# Patient Record
Sex: MALE | Race: WHITE | NOT HISPANIC OR LATINO | Employment: OTHER | URBAN - METROPOLITAN AREA
[De-identification: names, ages, dates, MRNs, and addresses within clinical notes are randomized per-mention and may not be internally consistent; named-entity substitution may affect disease eponyms.]

---

## 2017-01-16 ENCOUNTER — GENERIC CONVERSION - ENCOUNTER (OUTPATIENT)
Dept: OTHER | Facility: OTHER | Age: 82
End: 2017-01-16

## 2017-01-16 ENCOUNTER — APPOINTMENT (OUTPATIENT)
Dept: LAB | Facility: MEDICAL CENTER | Age: 82
End: 2017-01-16
Payer: COMMERCIAL

## 2017-01-16 ENCOUNTER — TRANSCRIBE ORDERS (OUTPATIENT)
Dept: LAB | Facility: MEDICAL CENTER | Age: 82
End: 2017-01-16

## 2017-01-16 DIAGNOSIS — E03.9 HYPOTHYROIDISM: ICD-10-CM

## 2017-01-16 DIAGNOSIS — I25.10 ATHEROSCLEROTIC HEART DISEASE OF NATIVE CORONARY ARTERY WITHOUT ANGINA PECTORIS: ICD-10-CM

## 2017-01-16 DIAGNOSIS — E78.00 PURE HYPERCHOLESTEROLEMIA: ICD-10-CM

## 2017-01-16 DIAGNOSIS — R35.1 NOCTURIA: ICD-10-CM

## 2017-01-16 DIAGNOSIS — R63.4 ABNORMAL WEIGHT LOSS: ICD-10-CM

## 2017-01-16 DIAGNOSIS — I65.29 OCCLUSION AND STENOSIS OF UNSPECIFIED CAROTID ARTERY: ICD-10-CM

## 2017-01-16 DIAGNOSIS — D64.9 ANEMIA: ICD-10-CM

## 2017-01-16 DIAGNOSIS — I10 ESSENTIAL (PRIMARY) HYPERTENSION: ICD-10-CM

## 2017-01-16 LAB
ALBUMIN SERPL BCP-MCNC: 3.6 G/DL (ref 3.5–5)
ALP SERPL-CCNC: 57 U/L (ref 46–116)
ALT SERPL W P-5'-P-CCNC: 31 U/L (ref 12–78)
ANION GAP SERPL CALCULATED.3IONS-SCNC: 4 MMOL/L (ref 4–13)
AST SERPL W P-5'-P-CCNC: 31 U/L (ref 5–45)
BASOPHILS # BLD AUTO: 0.04 THOUSANDS/ΜL (ref 0–0.1)
BASOPHILS NFR BLD AUTO: 1 % (ref 0–1)
BILIRUB SERPL-MCNC: 0.56 MG/DL (ref 0.2–1)
BUN SERPL-MCNC: 27 MG/DL (ref 5–25)
CALCIUM SERPL-MCNC: 8.7 MG/DL (ref 8.3–10.1)
CHLORIDE SERPL-SCNC: 103 MMOL/L (ref 100–108)
CHOLEST SERPL-MCNC: 178 MG/DL (ref 50–200)
CO2 SERPL-SCNC: 32 MMOL/L (ref 21–32)
CREAT SERPL-MCNC: 1.3 MG/DL (ref 0.6–1.3)
EOSINOPHIL # BLD AUTO: 0.29 THOUSAND/ΜL (ref 0–0.61)
EOSINOPHIL NFR BLD AUTO: 4 % (ref 0–6)
ERYTHROCYTE [DISTWIDTH] IN BLOOD BY AUTOMATED COUNT: 13.2 % (ref 11.6–15.1)
GFR SERPL CREATININE-BSD FRML MDRD: 53.1 ML/MIN/1.73SQ M
GLUCOSE SERPL-MCNC: 87 MG/DL (ref 65–140)
HCT VFR BLD AUTO: 36.1 % (ref 36.5–49.3)
HDLC SERPL-MCNC: 53 MG/DL (ref 40–60)
HGB BLD-MCNC: 12.1 G/DL (ref 12–17)
LDLC SERPL CALC-MCNC: 109 MG/DL (ref 0–100)
LYMPHOCYTES # BLD AUTO: 1.86 THOUSANDS/ΜL (ref 0.6–4.47)
LYMPHOCYTES NFR BLD AUTO: 27 % (ref 14–44)
MCH RBC QN AUTO: 30.7 PG (ref 26.8–34.3)
MCHC RBC AUTO-ENTMCNC: 33.5 G/DL (ref 31.4–37.4)
MCV RBC AUTO: 92 FL (ref 82–98)
MONOCYTES # BLD AUTO: 0.57 THOUSAND/ΜL (ref 0.17–1.22)
MONOCYTES NFR BLD AUTO: 8 % (ref 4–12)
NEUTROPHILS # BLD AUTO: 4.2 THOUSANDS/ΜL (ref 1.85–7.62)
NEUTS SEG NFR BLD AUTO: 60 % (ref 43–75)
NRBC BLD AUTO-RTO: 0 /100 WBCS
PLATELET # BLD AUTO: 217 THOUSANDS/UL (ref 149–390)
PMV BLD AUTO: 10.8 FL (ref 8.9–12.7)
POTASSIUM SERPL-SCNC: 4.5 MMOL/L (ref 3.5–5.3)
PROT SERPL-MCNC: 6.9 G/DL (ref 6.4–8.2)
RBC # BLD AUTO: 3.94 MILLION/UL (ref 3.88–5.62)
SODIUM SERPL-SCNC: 139 MMOL/L (ref 136–145)
TRIGL SERPL-MCNC: 78 MG/DL
TSH SERPL DL<=0.05 MIU/L-ACNC: 3.7 UIU/ML (ref 0.36–3.74)
WBC # BLD AUTO: 6.97 THOUSAND/UL (ref 4.31–10.16)

## 2017-01-16 PROCEDURE — 36415 COLL VENOUS BLD VENIPUNCTURE: CPT

## 2017-01-16 PROCEDURE — 80053 COMPREHEN METABOLIC PANEL: CPT

## 2017-01-16 PROCEDURE — 84443 ASSAY THYROID STIM HORMONE: CPT

## 2017-01-16 PROCEDURE — 85025 COMPLETE CBC W/AUTO DIFF WBC: CPT

## 2017-01-16 PROCEDURE — 80061 LIPID PANEL: CPT

## 2017-01-23 ENCOUNTER — ALLSCRIPTS OFFICE VISIT (OUTPATIENT)
Dept: OTHER | Facility: OTHER | Age: 82
End: 2017-01-23

## 2017-01-23 DIAGNOSIS — E03.9 HYPOTHYROIDISM: ICD-10-CM

## 2017-01-23 DIAGNOSIS — N18.9 CHRONIC KIDNEY DISEASE: ICD-10-CM

## 2017-01-23 DIAGNOSIS — I25.10 ATHEROSCLEROTIC HEART DISEASE OF NATIVE CORONARY ARTERY WITHOUT ANGINA PECTORIS: ICD-10-CM

## 2017-01-23 DIAGNOSIS — Z00.00 ENCOUNTER FOR GENERAL ADULT MEDICAL EXAMINATION WITHOUT ABNORMAL FINDINGS: ICD-10-CM

## 2017-01-23 DIAGNOSIS — I65.29 OCCLUSION AND STENOSIS OF UNSPECIFIED CAROTID ARTERY: ICD-10-CM

## 2017-01-23 DIAGNOSIS — E78.00 PURE HYPERCHOLESTEROLEMIA: ICD-10-CM

## 2017-01-23 DIAGNOSIS — F32.9 MAJOR DEPRESSIVE DISORDER, SINGLE EPISODE: ICD-10-CM

## 2017-01-23 DIAGNOSIS — R41.3 OTHER AMNESIA: ICD-10-CM

## 2017-02-22 ENCOUNTER — GENERIC CONVERSION - ENCOUNTER (OUTPATIENT)
Dept: OTHER | Facility: OTHER | Age: 82
End: 2017-02-22

## 2017-05-15 ENCOUNTER — GENERIC CONVERSION - ENCOUNTER (OUTPATIENT)
Dept: OTHER | Facility: OTHER | Age: 82
End: 2017-05-15

## 2017-05-15 ENCOUNTER — APPOINTMENT (OUTPATIENT)
Dept: LAB | Facility: MEDICAL CENTER | Age: 82
End: 2017-05-15
Payer: COMMERCIAL

## 2017-05-15 ENCOUNTER — TRANSCRIBE ORDERS (OUTPATIENT)
Dept: LAB | Facility: MEDICAL CENTER | Age: 82
End: 2017-05-15

## 2017-05-15 DIAGNOSIS — Z00.00 ENCOUNTER FOR GENERAL ADULT MEDICAL EXAMINATION WITHOUT ABNORMAL FINDINGS: ICD-10-CM

## 2017-05-15 DIAGNOSIS — F32.9 MAJOR DEPRESSIVE DISORDER, SINGLE EPISODE: ICD-10-CM

## 2017-05-15 DIAGNOSIS — R41.3 OTHER AMNESIA: ICD-10-CM

## 2017-05-15 DIAGNOSIS — E03.9 HYPOTHYROIDISM: ICD-10-CM

## 2017-05-15 DIAGNOSIS — E78.00 PURE HYPERCHOLESTEROLEMIA: ICD-10-CM

## 2017-05-15 DIAGNOSIS — N18.9 CHRONIC KIDNEY DISEASE: ICD-10-CM

## 2017-05-15 DIAGNOSIS — I65.29 OCCLUSION AND STENOSIS OF UNSPECIFIED CAROTID ARTERY: ICD-10-CM

## 2017-05-15 DIAGNOSIS — I25.10 ATHEROSCLEROTIC HEART DISEASE OF NATIVE CORONARY ARTERY WITHOUT ANGINA PECTORIS: ICD-10-CM

## 2017-05-15 LAB
ALBUMIN SERPL BCP-MCNC: 3.8 G/DL (ref 3.5–5)
ALP SERPL-CCNC: 53 U/L (ref 46–116)
ALT SERPL W P-5'-P-CCNC: 34 U/L (ref 12–78)
ANION GAP SERPL CALCULATED.3IONS-SCNC: 6 MMOL/L (ref 4–13)
AST SERPL W P-5'-P-CCNC: 35 U/L (ref 5–45)
BASOPHILS # BLD AUTO: 0.04 THOUSANDS/ΜL (ref 0–0.1)
BASOPHILS NFR BLD AUTO: 1 % (ref 0–1)
BILIRUB SERPL-MCNC: 0.48 MG/DL (ref 0.2–1)
BUN SERPL-MCNC: 23 MG/DL (ref 5–25)
CALCIUM SERPL-MCNC: 9.5 MG/DL (ref 8.3–10.1)
CHLORIDE SERPL-SCNC: 104 MMOL/L (ref 100–108)
CHOLEST SERPL-MCNC: 203 MG/DL (ref 50–200)
CO2 SERPL-SCNC: 30 MMOL/L (ref 21–32)
CREAT SERPL-MCNC: 1.42 MG/DL (ref 0.6–1.3)
EOSINOPHIL # BLD AUTO: 0.27 THOUSAND/ΜL (ref 0–0.61)
EOSINOPHIL NFR BLD AUTO: 4 % (ref 0–6)
ERYTHROCYTE [DISTWIDTH] IN BLOOD BY AUTOMATED COUNT: 13.8 % (ref 11.6–15.1)
GFR SERPL CREATININE-BSD FRML MDRD: 48 ML/MIN/1.73SQ M
GLUCOSE P FAST SERPL-MCNC: 91 MG/DL (ref 65–99)
HCT VFR BLD AUTO: 38 % (ref 36.5–49.3)
HDLC SERPL-MCNC: 51 MG/DL (ref 40–60)
HGB BLD-MCNC: 12.5 G/DL (ref 12–17)
LDLC SERPL CALC-MCNC: 133 MG/DL (ref 0–100)
LYMPHOCYTES # BLD AUTO: 1.94 THOUSANDS/ΜL (ref 0.6–4.47)
LYMPHOCYTES NFR BLD AUTO: 30 % (ref 14–44)
MCH RBC QN AUTO: 30.6 PG (ref 26.8–34.3)
MCHC RBC AUTO-ENTMCNC: 32.9 G/DL (ref 31.4–37.4)
MCV RBC AUTO: 93 FL (ref 82–98)
MONOCYTES # BLD AUTO: 0.65 THOUSAND/ΜL (ref 0.17–1.22)
MONOCYTES NFR BLD AUTO: 10 % (ref 4–12)
NEUTROPHILS # BLD AUTO: 3.53 THOUSANDS/ΜL (ref 1.85–7.62)
NEUTS SEG NFR BLD AUTO: 55 % (ref 43–75)
NRBC BLD AUTO-RTO: 0 /100 WBCS
PLATELET # BLD AUTO: 196 THOUSANDS/UL (ref 149–390)
PMV BLD AUTO: 11.4 FL (ref 8.9–12.7)
POTASSIUM SERPL-SCNC: 4.8 MMOL/L (ref 3.5–5.3)
PROT SERPL-MCNC: 7.2 G/DL (ref 6.4–8.2)
RBC # BLD AUTO: 4.08 MILLION/UL (ref 3.88–5.62)
SODIUM SERPL-SCNC: 140 MMOL/L (ref 136–145)
TRIGL SERPL-MCNC: 94 MG/DL
TSH SERPL DL<=0.05 MIU/L-ACNC: 5.76 UIU/ML (ref 0.36–3.74)
WBC # BLD AUTO: 6.44 THOUSAND/UL (ref 4.31–10.16)

## 2017-05-15 PROCEDURE — 80061 LIPID PANEL: CPT

## 2017-05-15 PROCEDURE — 36415 COLL VENOUS BLD VENIPUNCTURE: CPT

## 2017-05-15 PROCEDURE — 80053 COMPREHEN METABOLIC PANEL: CPT

## 2017-05-15 PROCEDURE — 84443 ASSAY THYROID STIM HORMONE: CPT

## 2017-05-15 PROCEDURE — 85025 COMPLETE CBC W/AUTO DIFF WBC: CPT

## 2017-05-17 ENCOUNTER — GENERIC CONVERSION - ENCOUNTER (OUTPATIENT)
Dept: OTHER | Facility: OTHER | Age: 82
End: 2017-05-17

## 2017-05-23 ENCOUNTER — ALLSCRIPTS OFFICE VISIT (OUTPATIENT)
Dept: OTHER | Facility: OTHER | Age: 82
End: 2017-05-23

## 2017-05-23 DIAGNOSIS — E03.9 HYPOTHYROIDISM: ICD-10-CM

## 2017-05-23 DIAGNOSIS — N18.9 CHRONIC KIDNEY DISEASE: ICD-10-CM

## 2017-05-23 DIAGNOSIS — R41.3 OTHER AMNESIA: ICD-10-CM

## 2017-05-23 DIAGNOSIS — I25.10 ATHEROSCLEROTIC HEART DISEASE OF NATIVE CORONARY ARTERY WITHOUT ANGINA PECTORIS: ICD-10-CM

## 2017-05-23 DIAGNOSIS — E78.00 PURE HYPERCHOLESTEROLEMIA: ICD-10-CM

## 2017-05-23 DIAGNOSIS — F32.9 MAJOR DEPRESSIVE DISORDER, SINGLE EPISODE: ICD-10-CM

## 2017-07-04 DIAGNOSIS — E03.9 HYPOTHYROIDISM: ICD-10-CM

## 2017-07-10 ENCOUNTER — APPOINTMENT (OUTPATIENT)
Dept: LAB | Facility: MEDICAL CENTER | Age: 82
End: 2017-07-10
Payer: COMMERCIAL

## 2017-07-10 ENCOUNTER — TRANSCRIBE ORDERS (OUTPATIENT)
Dept: LAB | Facility: MEDICAL CENTER | Age: 82
End: 2017-07-10

## 2017-07-10 DIAGNOSIS — E03.9 HYPOTHYROIDISM: ICD-10-CM

## 2017-07-10 LAB — TSH SERPL DL<=0.05 MIU/L-ACNC: 2.61 UIU/ML (ref 0.36–3.74)

## 2017-07-10 PROCEDURE — 36415 COLL VENOUS BLD VENIPUNCTURE: CPT

## 2017-07-10 PROCEDURE — 84443 ASSAY THYROID STIM HORMONE: CPT

## 2017-07-12 ENCOUNTER — GENERIC CONVERSION - ENCOUNTER (OUTPATIENT)
Dept: OTHER | Facility: OTHER | Age: 82
End: 2017-07-12

## 2017-09-14 ENCOUNTER — ALLSCRIPTS OFFICE VISIT (OUTPATIENT)
Dept: OTHER | Facility: OTHER | Age: 82
End: 2017-09-14

## 2017-09-15 ENCOUNTER — GENERIC CONVERSION - ENCOUNTER (OUTPATIENT)
Dept: OTHER | Facility: OTHER | Age: 82
End: 2017-09-15

## 2017-10-03 ENCOUNTER — TRANSCRIBE ORDERS (OUTPATIENT)
Dept: LAB | Facility: MEDICAL CENTER | Age: 82
End: 2017-10-03

## 2017-10-03 ENCOUNTER — APPOINTMENT (OUTPATIENT)
Dept: LAB | Facility: MEDICAL CENTER | Age: 82
End: 2017-10-03
Payer: COMMERCIAL

## 2017-10-03 DIAGNOSIS — F32.9 MAJOR DEPRESSIVE DISORDER, SINGLE EPISODE: ICD-10-CM

## 2017-10-03 DIAGNOSIS — I25.10 ATHEROSCLEROTIC HEART DISEASE OF NATIVE CORONARY ARTERY WITHOUT ANGINA PECTORIS: ICD-10-CM

## 2017-10-03 DIAGNOSIS — E78.00 PURE HYPERCHOLESTEROLEMIA: ICD-10-CM

## 2017-10-03 DIAGNOSIS — E03.9 HYPOTHYROIDISM: ICD-10-CM

## 2017-10-03 DIAGNOSIS — R41.3 OTHER AMNESIA: ICD-10-CM

## 2017-10-03 DIAGNOSIS — N18.9 CHRONIC KIDNEY DISEASE: ICD-10-CM

## 2017-10-03 LAB
ALBUMIN SERPL BCP-MCNC: 3.6 G/DL (ref 3.5–5)
ALP SERPL-CCNC: 54 U/L (ref 46–116)
ALT SERPL W P-5'-P-CCNC: 26 U/L (ref 12–78)
ANION GAP SERPL CALCULATED.3IONS-SCNC: 3 MMOL/L (ref 4–13)
AST SERPL W P-5'-P-CCNC: 31 U/L (ref 5–45)
BASOPHILS # BLD AUTO: 0.06 THOUSANDS/ΜL (ref 0–0.1)
BASOPHILS NFR BLD AUTO: 1 % (ref 0–1)
BILIRUB SERPL-MCNC: 0.5 MG/DL (ref 0.2–1)
BUN SERPL-MCNC: 29 MG/DL (ref 5–25)
CALCIUM SERPL-MCNC: 9 MG/DL (ref 8.3–10.1)
CHLORIDE SERPL-SCNC: 104 MMOL/L (ref 100–108)
CHOLEST SERPL-MCNC: 200 MG/DL (ref 50–200)
CO2 SERPL-SCNC: 31 MMOL/L (ref 21–32)
CREAT SERPL-MCNC: 1.17 MG/DL (ref 0.6–1.3)
EOSINOPHIL # BLD AUTO: 0.25 THOUSAND/ΜL (ref 0–0.61)
EOSINOPHIL NFR BLD AUTO: 4 % (ref 0–6)
ERYTHROCYTE [DISTWIDTH] IN BLOOD BY AUTOMATED COUNT: 13.7 % (ref 11.6–15.1)
GFR SERPL CREATININE-BSD FRML MDRD: 58 ML/MIN/1.73SQ M
GLUCOSE P FAST SERPL-MCNC: 93 MG/DL (ref 65–99)
HCT VFR BLD AUTO: 36.6 % (ref 36.5–49.3)
HDLC SERPL-MCNC: 53 MG/DL (ref 40–60)
HGB BLD-MCNC: 12.3 G/DL (ref 12–17)
LDLC SERPL CALC-MCNC: 132 MG/DL (ref 0–100)
LYMPHOCYTES # BLD AUTO: 1.95 THOUSANDS/ΜL (ref 0.6–4.47)
LYMPHOCYTES NFR BLD AUTO: 29 % (ref 14–44)
MCH RBC QN AUTO: 31 PG (ref 26.8–34.3)
MCHC RBC AUTO-ENTMCNC: 33.6 G/DL (ref 31.4–37.4)
MCV RBC AUTO: 92 FL (ref 82–98)
MONOCYTES # BLD AUTO: 0.59 THOUSAND/ΜL (ref 0.17–1.22)
MONOCYTES NFR BLD AUTO: 9 % (ref 4–12)
NEUTROPHILS # BLD AUTO: 3.9 THOUSANDS/ΜL (ref 1.85–7.62)
NEUTS SEG NFR BLD AUTO: 57 % (ref 43–75)
NRBC BLD AUTO-RTO: 0 /100 WBCS
PLATELET # BLD AUTO: 227 THOUSANDS/UL (ref 149–390)
PMV BLD AUTO: 11.4 FL (ref 8.9–12.7)
POTASSIUM SERPL-SCNC: 4.6 MMOL/L (ref 3.5–5.3)
PROT SERPL-MCNC: 7.3 G/DL (ref 6.4–8.2)
RBC # BLD AUTO: 3.97 MILLION/UL (ref 3.88–5.62)
SODIUM SERPL-SCNC: 138 MMOL/L (ref 136–145)
TRIGL SERPL-MCNC: 76 MG/DL
TSH SERPL DL<=0.05 MIU/L-ACNC: 3.91 UIU/ML (ref 0.36–3.74)
WBC # BLD AUTO: 6.76 THOUSAND/UL (ref 4.31–10.16)

## 2017-10-03 PROCEDURE — 85025 COMPLETE CBC W/AUTO DIFF WBC: CPT

## 2017-10-03 PROCEDURE — 80053 COMPREHEN METABOLIC PANEL: CPT

## 2017-10-03 PROCEDURE — 84443 ASSAY THYROID STIM HORMONE: CPT

## 2017-10-03 PROCEDURE — 36415 COLL VENOUS BLD VENIPUNCTURE: CPT

## 2017-10-03 PROCEDURE — 80061 LIPID PANEL: CPT

## 2017-10-09 ENCOUNTER — GENERIC CONVERSION - ENCOUNTER (OUTPATIENT)
Dept: OTHER | Facility: OTHER | Age: 82
End: 2017-10-09

## 2017-11-17 ENCOUNTER — ALLSCRIPTS OFFICE VISIT (OUTPATIENT)
Dept: OTHER | Facility: OTHER | Age: 82
End: 2017-11-17

## 2017-11-19 NOTE — PROGRESS NOTES
Assessment    1  Hypercholesterolemia (272 0) (E78 00)   2  Hypertension (401 9) (I10)   3  Hypothyroidism (244 9) (E03 9)   4  Bradycardia (427 89) (R00 1)   5  Arteriosclerotic heart disease (414 00) (I25 10)   6  Chronic kidney disease (585 9) (N18 9)    Plan  Anemia, Bradycardia, Depression, Hypercholesterolemia, Hypertension, Hypothyroidism    · (1) TSH; Status:Active; Requested for:17Mar2018; Anemia, Carotid artery stenosis, Chronic kidney disease    · (1) COMPREHENSIVE METABOLIC PANEL; Status:Active; Requested for:17Mar2018; Arteriosclerotic heart disease, Carotid artery stenosis, Chronic kidney disease,Hypercholesterolemia, Hypertension, Hypothyroidism    · (1) LIPID PANEL, FASTING; Status:Active; Requested for:17Mar2018;   Chronic kidney disease, Depression    · (1) CBC/PLT/DIFF; Status:Active; Requested for:17Mar2018;   Hypercholesterolemia    · Atorvastatin Calcium 80 MG Oral Tablet; Take 1 tablet daily    Discussion/Summary    Hypothyroidism- patients last TSH was 3 9 slightly up  Will not adjust dose at this time due to him missing some doses  His wife states when they come home she will call to have his TSH checked in 6 to 8 weeks  Continue dose as previous  LDL was 132 and is on Atorvastatin 80 mg daily  Patient was instructed to increase diet in his diet and is following up with Cardiology  Patient is taking Cymbalta and his wife does not want to change any medications at this time due to them going away  Some of his issues seem to be due to his underlying dementia  Dementia with worsening memory issues- Wife states he is continuing to have memory issues and feels they are getting worse  She was suggesting possible Neurology but does not want a referral or any medication changes at this time  heart rate still at 51  She states Dr Carol Ruiz is aware and is not very concerned at this time   He is going to continue to monitor his heart rate and if patient become symptomatic will order further testing  is up to date on his immunizations and did have the flu vaccine in the fall  Patient was given a script for fasting blood work to have done in 3 months  If any issues or concerns please call the office  Possible side effects of new medications were reviewed with the patient/guardian today  The treatment plan was reviewed with the patient/guardian  The patient/guardian understands and agrees with the treatment plan      Chief Complaint  Follow Up      History of Present Illness  Milan Daly is here today for a follow up visit  He is doing well today but his wife states he is still having issues with his memory  He is forgetting every day things more easily and she states it is getting worse  She does not want to address this now until she is home from being away  He did have blood work done in October and his TSH was up at 3 9  He is taking 75 mcg of Synthroid  His wife states he is missing some doses and feels this is why it is off  She does put his pills out for him but he still forgets to take certain ones  His heart rate is still low at 51 and he does follow up with Dr Mariella Reynolds and his wife states he is aware of his heart rate being low and states this is normal for him  He is eating and drinking without any issues  He is moving his bowels and passing his urine without any issues  His is taking his Cymbalta but she does not want to switch his medication to anything else at this time  He denies any chest pain or SOB  He states he does not want to travel to [place] and would just like to stay home  He offers no other complaints  Review of Systems   Constitutional: No fever or chills, feels well, no tiredness, no recent weight gain or weight loss  Eyes: No complaints of eye pain, no red eyes, no discharge from eyes, no itchy eyes  ENT: no complaints of earache, no hearing loss, no nosebleeds, no nasal discharge, no sore throat, no hoarseness  Cardiovascular: as noted in HPI    Respiratory: No complaints of shortness of breath, no wheezing, no cough, no SOB on exertion, no orthopnea or PND  Gastrointestinal: No complaints of abdominal pain, no constipation, no nausea or vomiting, no diarrhea or bloody stools  Genitourinary: No complaints of dysuria, no incontinence, no hesitancy, no nocturia, no genital lesion, no testicular pain  Musculoskeletal: No complaints of arthralgia, no myalgias, no joint swelling or stiffness, no limb pain or swelling  Integumentary: No complaints of skin rash or skin lesions, no itching, no skin wound, no dry skin  Neurological: as noted in HPI  Psychiatric: Is not suicidal, no sleep disturbances, no anxiety or depression, no change in personality, no emotional problems  Endocrine: as noted in HPI  Hematologic/Lymphatic: No complaints of swollen glands, no swollen glands in the neck, does not bleed easily, no easy bruising  ROS reviewed  Active Problems  1  Anemia (285 9) (D64 9)   2  Arteriosclerotic heart disease (414 00) (I25 10)   3  Arthralgia (719 40) (M25 50)   4  Blocked ear, bilateral (388 8) (H93 8X3)   5  Bradycardia (427 89) (R00 1)   6  Carotid artery stenosis (433 10) (I65 29)   7  Chronic kidney disease (585 9) (N18 9)   8  Depression (311) (F32 9)   9  Dermatitis, eczematoid (692 9) (L30 9)   10  Facial trauma (959 09) (S09 93XA)   11  Hypercholesterolemia (272 0) (E78 00)   12  Hypertension (401 9) (I10)   13  Hypothyroidism (244 9) (E03 9)   14  Influenza vaccine needed (V04 81) (Z23)   15  Memory difficulties (780 93) (R41 3)   16  Need for prophylactic vaccination and inoculation against influenza (V04 81) (Z23)   17  Nocturia (788 43) (R35 1)   18  Peptic ulcer (533 90) (K27 9)   19  Screening for depression (V79 0) (Z13 89)   20  Screening for genitourinary condition (V81 6) (Z13 89)   21  Weight loss (783 21) (R63 4)    Past Medical History  1  History of Dry Eyes (375 15)   2  History of abnormal weight loss (V13 89) (Z87 898)   3  History of acute bronchitis (V12 69) (Z87 09)   4  History of gastric ulcer (V12 79) (Z87 19)   5  History of peptic ulcer (V12 71) (Z87 11)   6  History of Inguinal hernia, left (550 90) (K40 90)   7  History of Left shoulder pain (719 41) (M25 512)    The active problems and past medical history were reviewed and updated today  Surgical History  1  History of Aortic Valve Replacement   2  History of CABG   3  History of Carotid Thromboendarterectomy   4  History of Cataract Surgery   5  History of Inguinal Hernia Repair   6  History of Nephrectomy Right    The surgical history was reviewed and updated today  Family History  Mother    1  Family history of Unexpected Sudden Patient Death  Father    2  Family history of [employer] Workers' Pneumoconiosis  Brother    3  Family history of Alzheimer Disease    The family history was reviewed and updated today  Social History     · Being A Social Drinker   · Marital History - Currently    · Never A Smoker  The social history was reviewed and is unchanged  Current Meds   1  Aspirin 81 MG TABS Recorded   2  Atorvastatin Calcium 80 MG Oral Tablet; Take 1 tablet daily; Therapy: 56UWZ9061 to (Nubia Decree)  Requested for: 40HGF5123; Last Rx:07Nov2016 Ordered   3  Co Q 10 100 MG Oral Capsule Recorded   4  DULoxetine HCl - 30 MG Oral Capsule Delayed Release Particles; Take 1 capsule twice daily; Therapy: 89KDR3389 to ([phone])  Requested for: 78TSK4708; Last Rx:11Oct2017 Ordered   5  Eye Support TABS Recorded   6  HM Vitamin D3 2000 UNIT Oral Capsule; take one capsule by mouth daily; Therapy: (Socorro Gonzalez) to Recorded   7  Levothyroxine Sodium 75 MCG Oral Tablet; TAKE 1 TABLET DAILY; Therapy: 18QLH8025 to (Nahun Zafar)  Requested for: 04CHL2409; Last Rx:70Vkv0085 Ordered   8  Mometasone Furoate 0 1 % External Ointment; APPLY TO AFFECTED AREA(S) of hands ONCE DAILY AS DIRECTED;  Therapy: 91WUS9095 to (Last Rx:09Oux3408) Requested for: 21Txx0349 Ordered   9  Multivitamins TABS Recorded   10  Omega 3 1200 MG Oral Capsule; Therapy: (Recorded:05Apr2013) to Recorded   11  Pantoprazole Sodium 40 MG Oral Tablet Delayed Release; Take 1 tablet daily; Therapy: 00BNJ0392 to (Stu Nelson)  Requested for: 99BXC4799; Last  Rx:07Nov2016 Ordered   12  SM Super B Complex/C Oral Tablet; Therapy: (Recorded:05Apr2013) to Recorded   13  Tamsulosin HCl - 0 4 MG Oral Capsule; take 1 capsule daily; Therapy: 26RNJ1115 to (Denis Cadena)  Requested for: 74Oiu9566; Last  Rx:59Qvi8259 Ordered    The medication list was reviewed and updated today  Allergies  1  Morphine Sulfate TABS  2  IVP Dye    Vitals  Vital Signs    Recorded: 82IAG7999 02:57PM   Temperature 98 6 F, Temporal   Heart Rate 51   Respiration 16   Systolic 481, LUE, Sitting   Diastolic 60, LUE, Sitting   Height 5 ft 10 in   Weight 160 lb    BMI Calculated 22 96   BSA Calculated 1 9   O2 Saturation 98       Physical Exam   Constitutional  General appearance: No acute distress, well appearing and well nourished  Eyes  Conjunctiva and lids: No swelling, erythema, or discharge  Pupils and irises: Equal, round and reactive to light  Ears, Nose, Mouth, and Throat  External inspection of ears and nose: Normal    Otoscopic examination: Tympanic membrance translucent with normal light reflex  Canals patent without erythema  Nasal mucosa, septum, and turbinates: Normal without edema or erythema  Oropharynx: Normal with no erythema, edema, exudate or lesions  Pulmonary  Respiratory effort: No increased work of breathing or signs of respiratory distress  Auscultation of lungs: Clear to auscultation, equal breath sounds bilaterally, no wheezes, no rales, no rhonci  Cardiovascular  Palpation of heart: Normal PMI, no thrills  Auscultation of heart: Normal rate and rhythm, normal S1 and S2, without murmurs     Examination of extremities for edema and/or varicosities: Normal    Carotid pulses: Normal    Abdomen  Abdomen: Non-tender, no masses  Lymphatic  Palpation of lymph nodes in neck: No lymphadenopathy  Musculoskeletal  Gait and station: Normal    Digits and nails: Normal without clubbing or cyanosis  Inspection/palpation of joints, bones, and muscles: Normal    Skin  Skin and subcutaneous tissue: Normal without rashes or lesions  Psychiatric  Orientation to person, place and time: Normal    Mood and affect: Normal          Health Management  Health Maintenance   Medicare Annual Wellness Visit; every 1 year; Last 70SNZ4349; Next Due: 98PYX5236;  Overdue    Signatures   Electronically signed by : Shmuel Navarro, ; Nov 17 2017  4:05PM EST                       (Author)    Electronically signed by : FANI Bajwa ; Nov 17 2017  8:14PM EST                       (Co-author)

## 2018-01-10 NOTE — RESULT NOTES
Verified Results  (1) CBC/PLT/DIFF [id number] 10:04AM [facility] Order Number: [id number]     Test Name Result Flag Reference   WBC COUNT 6 97 Thousand/uL  4 31-10 16   RBC COUNT 3 94 Million/uL  3 88-5 62   HEMOGLOBIN 12 1 g/dL  12 0-17 0   HEMATOCRIT 36 1 % L 36 5-49 3   MCV 92 fL  82-98   MCH 30 7 pg  26 8-34 3   MCHC 33 5 g/dL  31 4-37 4   RDW 13 2 %  11 6-15 1   MPV 10 8 fL  8 9-12 7   PLATELET COUNT 108 Thousands/uL  149-390   nRBC AUTOMATED 0 /100 WBCs     NEUTROPHILS RELATIVE PERCENT 60 %  43-75   LYMPHOCYTES RELATIVE PERCENT 27 %  14-44   MONOCYTES RELATIVE PERCENT 8 %  4-12   EOSINOPHILS RELATIVE PERCENT 4 %  0-6   BASOPHILS RELATIVE PERCENT 1 %  0-1   NEUTROPHILS ABSOLUTE COUNT 4 20 Thousands/?L  1 85-7 62   LYMPHOCYTES ABSOLUTE COUNT 1 86 Thousands/?L  0 60-4 47   MONOCYTES ABSOLUTE COUNT 0 57 Thousand/?L  0 17-1 22   EOSINOPHILS ABSOLUTE COUNT 0 29 Thousand/?L  0 00-0 61   BASOPHILS ABSOLUTE COUNT 0 04 Thousands/?L  0 00-0 10   - Patient Instructions: This bloodwork is non-fasting  Please drink two glasses of water morning of bloodwork  - Patient Instructions: This bloodwork is non-fasting  Please drink two glasses of water morning of bloodwork  (1) COMPREHENSIVE METABOLIC PANEL 05TZN3331 05:20YP [facility] Order Number: [id number]     Test Name Result Flag Reference   GLUCOSE,RANDM 87 mg/dL     If the patient is fasting, the ADA then defines impaired fasting glucose as > 100 mg/dL and diabetes as > or equal to 123 mg/dL     SODIUM 139 mmol/L  136-145   POTASSIUM 4 5 mmol/L  3 5-5 3   CHLORIDE 103 mmol/L  100-108   CARBON DIOXIDE 32 mmol/L  21-32   ANION GAP (CALC) 4 mmol/L  4-13   BLOOD UREA NITROGEN 27 mg/dL H 5-25   CREATININE 1 30 mg/dL  0 60-1 30   Standardized to IDMS reference method   CALCIUM 8 7 mg/dL  8 3-10 1   BILI, TOTAL 0 56 mg/dL  0 20-1 00   ALK PHOSPHATAS 57 U/L     ALT (SGPT) 31 U/L  12-78   AST(SGOT) 31 U/L  5-45 ALBUMIN 3 6 g/dL  3 5-5 0   TOTAL PROTEIN 6 9 g/dL  6 4-8 2   eGFR Non-African American 53 1 ml/min/1 73sq m     - Patient Instructions: This is a fasting blood test  Water,black tea or black  coffee only after 9:00pm the night before test Drink 2 glasses of water the morning of test - Patient Instructions: This bloodwork is non-fasting  Please drink two glasses of   water morning of bloodwork  National Kidney Disease Education Program recommendations are as follows:  GFR calculation is accurate only with a steady state creatinine  Chronic Kidney disease less than 60 ml/min/1 73 sq  meters  Kidney failure less than 15 ml/min/1 73 sq  meters  (1) LIPID PANEL, FASTING [id number] 10:04AM [facility] Order Number: [id number]     Test Name Result Flag Reference   CHOLESTEROL 178 mg/dL     HDL,DIRECT 53 mg/dL  40-60   Specimen collection should occur prior to Metamizole administration due to the potential for falsely depressed results  LDL CHOLESTEROL CALCULATED 109 mg/dL H 0-100   - Patient Instructions: This is a fasting blood test  Water,black tea or black  coffee only after 9:00pm the night before test   Drink 2 glasses of water the morning of test     - Patient Instructions: This is a fasting blood test  Water,black tea or black  coffee only after 9:00pm the night before test Drink 2 glasses of water the morning of test - Patient Instructions: This bloodwork is non-fasting  Please drink two glasses of   water morning of bloodwork  Triglyceride:         Normal              <150 mg/dl       Borderline High    150-199 mg/dl       High               200-499 mg/dl       Very High          >499 mg/dl  Cholesterol:         Desirable        <200 mg/dl      Borderline High  200-239 mg/dl      High             >239 mg/dl  HDL Cholesterol:        High    >59 mg/dL      Low     <41 mg/dL  LDL CALCULATED:    This screening LDL is a calculated result    It does not have the accuracy of the Direct Measured LDL in the monitoring of patients with hyperlipidemia and/or statin therapy  Direct Measure LDL (CJW714) must be ordered separately in these patients  TRIGLYCERIDES 78 mg/dL  <=150   Specimen collection should occur prior to N-Acetylcysteine or Metamizole administration due to the potential for falsely depressed results  (1) TSH [id number] 10:04AM [facility] Order Number: [id number]     Test Name Result Flag Reference   TSH 3 700 uIU/mL  0 358-3 740   - Patient Instructions: This bloodwork is non-fasting  Please drink two glasses of water morning of bloodwork  - Patient Instructions: This is a fasting blood test  Water,black tea or black  coffee only after 9:00pm the night before test Drink 2 glasses of water the morning of test - Patient Instructions: This bloodwork is non-fasting  Please drink two glasses of   water morning of bloodwork  Patients undergoing fluorescein dye angiography may retain small amounts of fluorescein in the body for 48-72 hours post procedure  Samples containing fluorescein can produce falsely depressed TSH values  If the patient had this procedure,a specimen should be resubmitted post fluorescein clearance

## 2018-01-10 NOTE — RESULT NOTES
Verified Results  (1) CBC/PLT/DIFF [id number] 09:34AM [facility] Order Number: [id number]     Test Name Result Flag Reference   WBC COUNT 6 44 Thousand/uL  4 31-10 16   RBC COUNT 4 08 Million/uL  3 88-5 62   HEMOGLOBIN 12 5 g/dL  12 0-17 0   HEMATOCRIT 38 0 %  36 5-49 3   MCV 93 fL  82-98   MCH 30 6 pg  26 8-34 3   MCHC 32 9 g/dL  31 4-37 4   RDW 13 8 %  11 6-15 1   MPV 11 4 fL  8 9-12 7   PLATELET COUNT 097 Thousands/uL  149-390   nRBC AUTOMATED 0 /100 WBCs     NEUTROPHILS RELATIVE PERCENT 55 %  43-75   LYMPHOCYTES RELATIVE PERCENT 30 %  14-44   MONOCYTES RELATIVE PERCENT 10 %  4-12   EOSINOPHILS RELATIVE PERCENT 4 %  0-6   BASOPHILS RELATIVE PERCENT 1 %  0-1   NEUTROPHILS ABSOLUTE COUNT 3 53 Thousands/? ??L  1 85-7 62   LYMPHOCYTES ABSOLUTE COUNT 1 94 Thousands/? ??L  0 60-4 47   MONOCYTES ABSOLUTE COUNT 0 65 Thousand/? ??L  0 17-1 22   EOSINOPHILS ABSOLUTE COUNT 0 27 Thousand/? ??L  0 00-0 61   BASOPHILS ABSOLUTE COUNT 0 04 Thousands/? ??L  0 00-0 10   - Patient Instructions: This bloodwork is non-fasting  Please drink two glasses of water morning of bloodwork       (1) COMPREHENSIVE METABOLIC PANEL [id number] 10:84EH [facility] Order Number: [id number]     Test Name Result Flag Reference   SODIUM 140 mmol/L  136-145   POTASSIUM 4 8 mmol/L  3 5-5 3   CHLORIDE 104 mmol/L  100-108   CARBON DIOXIDE 30 mmol/L  21-32   ANION GAP (CALC) 6 mmol/L  4-13   BLOOD UREA NITROGEN 23 mg/dL  5-25   CREATININE 1 42 mg/dL H 0 60-1 30   Standardized to IDMS reference method   CALCIUM 9 5 mg/dL  8 3-10 1   BILI, TOTAL 0 48 mg/dL  0 20-1 00   ALK PHOSPHATAS 53 U/L     ALT (SGPT) 34 U/L  12-78   AST(SGOT) 35 U/L  5-45   ALBUMIN 3 8 g/dL  3 5-5 0   TOTAL PROTEIN 7 2 g/dL  6 4-8 2   eGFR Non-African American 48 0 ml/min/1 73sq m     National Kidney Disease Education Program recommendations are as follows:  GFR calculation is accurate only with a steady state creatinine  Chronic Kidney disease less than 60 ml/min/1 73 sq  meters  Kidney failure less than 15 ml/min/1 73 sq  meters  GLUCOSE FASTING 91 mg/dL  65-99     (1) LIPID PANEL, FASTING [id number] 09:34AM [facility] Order Number: [id number]     Test Name Result Flag Reference   CHOLESTEROL 203 mg/dL H    HDL,DIRECT 51 mg/dL  40-60   Specimen collection should occur prior to Metamizole administration due to the potential for falsely depressed results  LDL CHOLESTEROL CALCULATED 133 mg/dL H 0-100   - Patient Instructions: This is a fasting blood test  Water,black tea or black  coffee only after 9:00pm the night before test   Drink 2 glasses of water the morning of test       Triglyceride:         Normal              <150 mg/dl       Borderline High    150-199 mg/dl       High               200-499 mg/dl       Very High          >499 mg/dl  Cholesterol:         Desirable        <200 mg/dl      Borderline High  200-239 mg/dl      High             >239 mg/dl  HDL Cholesterol:        High    >59 mg/dL      Low     <41 mg/dL  LDL CALCULATED:    This screening LDL is a calculated result  It does not have the accuracy of the Direct Measured LDL in the monitoring of patients with hyperlipidemia and/or statin therapy  Direct Measure LDL ([id number]) must be ordered separately in these patients  TRIGLYCERIDES 94 mg/dL  <=150   Specimen collection should occur prior to N-Acetylcysteine or Metamizole administration due to the potential for falsely depressed results  (1) TSH [id number] 09:34AM [facility] Fish Order Number: [id number]     Test Name Result Flag Reference   TSH 5 760 uIU/mL H 0 358-3 740   - Patient Instructions: This bloodwork is non-fasting  Please drink two glasses of water morning of bloodwork  Patients undergoing fluorescein dye angiography may retain small amounts of fluorescein in the body for 48-72 hours post procedure   Samples containing fluorescein can produce falsely depressed TSH values  If the patient had this procedure,a specimen should be resubmitted post fluorescein clearance

## 2018-01-12 NOTE — RESULT NOTES
Verified Results  (1) CBC/PLT/DIFF [id number] 09:53AM [facility] Order Number: [id number]     Test Name Result Flag Reference   WBC COUNT 7 75 Thousand/uL  4 31-10 16   RBC COUNT 4 01 Million/uL  3 88-5 62   HEMOGLOBIN 12 6 g/dL  12 0-17 0   HEMATOCRIT 37 1 %  36 5-49 3   MCV 93 fL  82-98   MCH 31 4 pg  26 8-34 3   MCHC 34 0 g/dL  31 4-37 4   RDW 14 0 %  11 6-15 1   MPV 11 8 fL  8 9-12 7   PLATELET COUNT 174 Thousands/uL  149-390   nRBC AUTOMATED 0 /100 WBCs     NEUTROPHILS RELATIVE PERCENT 59 %  43-75   LYMPHOCYTES RELATIVE PERCENT 26 %  14-44   MONOCYTES RELATIVE PERCENT 10 %  4-12   EOSINOPHILS RELATIVE PERCENT 4 %  0-6   BASOPHILS RELATIVE PERCENT 1 %  0-1   NEUTROPHILS ABSOLUTE COUNT 4 59 Thousands/?L  1 85-7 62   LYMPHOCYTES ABSOLUTE COUNT 2 02 Thousands/?L  0 60-4 47   MONOCYTES ABSOLUTE COUNT 0 79 Thousand/?L  0 17-1 22   EOSINOPHILS ABSOLUTE COUNT 0 30 Thousand/?L  0 00-0 61   BASOPHILS ABSOLUTE COUNT 0 04 Thousands/?L  0 00-0 10     (1) COMPREHENSIVE METABOLIC PANEL [id number] 37:93IN [facility] Order Number: [id number]     Test Name Result Flag Reference   GLUCOSE,RANDM 86 mg/dL     If the patient is fasting, the ADA then defines impaired fasting glucose as > 100 mg/dL and diabetes as > or equal to 123 mg/dL     SODIUM 139 mmol/L  136-145   POTASSIUM 4 8 mmol/L  3 5-5 3   CHLORIDE 104 mmol/L  100-108   CARBON DIOXIDE 30 mmol/L  21-32   ANION GAP (CALC) 5 mmol/L  4-13   BLOOD UREA NITROGEN 29 mg/dL H 5-25   CREATININE 1 43 mg/dL H 0 60-1 30   Standardized to IDMS reference method   CALCIUM 8 8 mg/dL  8 3-10 1   BILI, TOTAL 0 50 mg/dL  0 20-1 00   ALK PHOSPHATAS 54 U/L     ALT (SGPT) 32 U/L  12-78   AST(SGOT) 40 U/L  5-45   ALBUMIN 3 7 g/dL  3 5-5 0   TOTAL PROTEIN 6 6 g/dL  6 4-8 2   eGFR Non-African American 47 7 ml/min/1 73sq m     [employer] Disease Education Program recommendations are as follows:  GFR calculation is accurate only with a steady state creatinine  Chronic Kidney disease less than 60 ml/min/1 73 sq  meters  Kidney failure less than 15 ml/min/1 73 sq  meters  (1) LIPID PANEL, FASTING 02Sep2016 09:53AM [facility]ans Order Number: [id number]     Test Name Result Flag Reference   CHOLESTEROL 179 mg/dL     HDL,DIRECT 43 mg/dL  40-60   Specimen collection should occur prior to Metamizole administration due to the potential for falsely depressed results  LDL CHOLESTEROL CALCULATED 122 mg/dL H 0-100   Triglyceride:         Normal              <150 mg/dl       Borderline High    150-199 mg/dl       High               200-499 mg/dl       Very High          >499 mg/dl  Cholesterol:         Desirable        <200 mg/dl      Borderline High  200-239 mg/dl      High             >239 mg/dl  HDL Cholesterol:        High    >59 mg/dL      Low     <41 mg/dL  LDL CALCULATED:    This screening LDL is a calculated result  It does not have the accuracy of the Direct Measured LDL in the monitoring of patients with hyperlipidemia and/or statin therapy  Direct Measure LDL ([id number]) must be ordered separately in these patients  TRIGLYCERIDES 68 mg/dL  <=150   Specimen collection should occur prior to N-Acetylcysteine or Metamizole administration due to the potential for falsely depressed results  (1) TSH 02Sep2016 09:53AM [facility] Terans Order Number: [id number]     Test Name Result Flag Reference   TSH 3 450 uIU/mL  0 358-3 740   Patients undergoing fluorescein dye angiography may retain small amounts of fluorescein in the body for 48-72 hours post procedure  Samples containing fluorescein can produce falsely depressed TSH values  If the patient had this procedure,a specimen should be resubmitted post fluorescein clearance

## 2018-01-13 VITALS
BODY MASS INDEX: 22.62 KG/M2 | DIASTOLIC BLOOD PRESSURE: 70 MMHG | TEMPERATURE: 96.8 F | SYSTOLIC BLOOD PRESSURE: 124 MMHG | HEIGHT: 70 IN | WEIGHT: 158 LBS

## 2018-01-13 VITALS
SYSTOLIC BLOOD PRESSURE: 124 MMHG | DIASTOLIC BLOOD PRESSURE: 60 MMHG | RESPIRATION RATE: 16 BRPM | HEIGHT: 70 IN | OXYGEN SATURATION: 98 % | BODY MASS INDEX: 22.9 KG/M2 | WEIGHT: 160 LBS | TEMPERATURE: 98.6 F | HEART RATE: 51 BPM

## 2018-01-14 VITALS
HEIGHT: 70 IN | BODY MASS INDEX: 22.62 KG/M2 | SYSTOLIC BLOOD PRESSURE: 110 MMHG | RESPIRATION RATE: 16 BRPM | WEIGHT: 158 LBS | TEMPERATURE: 96.2 F | DIASTOLIC BLOOD PRESSURE: 62 MMHG | OXYGEN SATURATION: 94 % | HEART RATE: 47 BPM

## 2018-01-14 NOTE — RESULT NOTES
Verified Results  (1) CBC/PLT/DIFF [id number] 10:08AM [facility] Order Number: [id number]     Test Name Result Flag Reference   WBC COUNT 6 76 Thousand/uL  4 31-10 16   RBC COUNT 3 97 Million/uL  3 88-5 62   HEMOGLOBIN 12 3 g/dL  12 0-17 0   HEMATOCRIT 36 6 %  36 5-49 3   MCV 92 fL  82-98   MCH 31 0 pg  26 8-34 3   MCHC 33 6 g/dL  31 4-37 4   RDW 13 7 %  11 6-15 1   MPV 11 4 fL  8 9-12 7   PLATELET COUNT 303 Thousands/uL  149-390   nRBC AUTOMATED 0 /100 WBCs     NEUTROPHILS RELATIVE PERCENT 57 %  43-75   LYMPHOCYTES RELATIVE PERCENT 29 %  14-44   MONOCYTES RELATIVE PERCENT 9 %  4-12   EOSINOPHILS RELATIVE PERCENT 4 %  0-6   BASOPHILS RELATIVE PERCENT 1 %  0-1   NEUTROPHILS ABSOLUTE COUNT 3 90 Thousands/? ??L  1 85-7 62   LYMPHOCYTES ABSOLUTE COUNT 1 95 Thousands/? ??L  0 60-4 47   MONOCYTES ABSOLUTE COUNT 0 59 Thousand/? ??L  0 17-1 22   EOSINOPHILS ABSOLUTE COUNT 0 25 Thousand/? ??L  0 00-0 61   BASOPHILS ABSOLUTE COUNT 0 06 Thousands/? ??L  0 00-0 10     (1) COMPREHENSIVE METABOLIC PANEL [id number] 44:76TW [facility] Order Number: [id number]     Test Name Result Flag Reference   SODIUM 138 mmol/L  136-145   POTASSIUM 4 6 mmol/L  3 5-5 3   CHLORIDE 104 mmol/L  100-108   CARBON DIOXIDE 31 mmol/L  21-32   ANION GAP (CALC) 3 mmol/L L 4-13   BLOOD UREA NITROGEN 29 mg/dL H 5-25   CREATININE 1 17 mg/dL  0 60-1 30   Standardized to IDMS reference method   CALCIUM 9 0 mg/dL  8 3-10 1   BILI, TOTAL 0 50 mg/dL  0 20-1 00   ALK PHOSPHATAS 54 U/L     ALT (SGPT) 26 U/L  12-78   Specimen collection should occur prior to Sulfasalazine and/or Sulfapyridine administration due to the potential for falsely depressed results  AST(SGOT) 31 U/L  5-45   Specimen collection should occur prior to Sulfasalazine administration due to the potential for falsely depressed results     ALBUMIN 3 6 g/dL  3 5-5 0   TOTAL PROTEIN 7 3 g/dL  6 4-8 2   eGFR 58 ml/min/1 73sq m     National Kidney Disease Education Program recommendations are as follows:  GFR calculation is accurate only with a steady state creatinine  Chronic Kidney disease less than 60 ml/min/1 73 sq  meters  Kidney failure less than 15 ml/min/1 73 sq  meters  GLUCOSE FASTING 93 mg/dL  65-99   Specimen collection should occur prior to Sulfasalazine administration due to the potential for falsely depressed results  Specimen collection should occur prior to Sulfapyridine administration due to the potential for falsely elevated results  (1) LIPID PANEL, FASTING 30XPQ9213 10:08AM [facility] Order Number: [id number]     Test Name Result Flag Reference   CHOLESTEROL 200 mg/dL     HDL,DIRECT 53 mg/dL  40-60   Specimen collection should occur prior to Metamizole administration due to the potential for falsley depressed results  LDL CHOLESTEROL CALCULATED 132 mg/dL H 0-100   Triglyceride:        Normal <150 mg/dl   Borderline High 150-199 mg/dl   High 200-499 mg/dl   Very High >499 mg/dl      Cholesterol:       Desirable <200 mg/dl    Borderline High 200-239 mg/dl    High >239 mg/dl      HDL Cholesterol:       High>59 mg/dL    Low <41 mg/dL      This screening LDL is a calculated result  It does not have the accuracy of the Direct Measured LDL in the monitoring of patients with hyperlipidemia and/or statin therapy  Direct Measure LDL (VFY241) must be ordered separately in these patients  TRIGLYCERIDES 76 mg/dL  <=150   Specimen collection should occur prior to N-Acetylcysteine or Metamizole administration due to the potential for falsely depressed results  (1) TSH 03Oct2017 10:08AM [facility] Order Number: [id number]     Test Name Result Flag Reference   TSH 3 910 uIU/mL H 0 358-3 740   Patients undergoing fluorescein dye angiography may retain small amounts of fluorescein in the body for 48-72 hours post procedure  Samples containing fluorescein can produce falsely depressed TSH values  If the patient had this procedure,a specimen should be resubmitted post fluorescein clearance

## 2018-01-16 NOTE — RESULT NOTES
Verified Results  (1) TSH [id number] 11:03AM [facility] Order Number: [id number]     Test Name Result Flag Reference   TSH 2 610 uIU/mL  0 358-3 740   Patients undergoing fluorescein dye angiography may retain small amounts of fluorescein in the body for 48-72 hours post procedure  Samples containing fluorescein can produce falsely depressed TSH values  If the patient had this procedure,a specimen should be resubmitted post fluorescein clearance

## 2018-01-16 NOTE — PROGRESS NOTES
Chief Complaint  CC patient here today for Flu shot  Active Problems    1  Anemia (285 9) (D64 9)   2  Arteriosclerotic heart disease (414 00) (I25 10)   3  Arthralgia (719 40) (M25 50)   4  Blocked ear, bilateral (388 8) (H93 8X3)   5  Bradycardia (427 89) (R00 1)   6  Carotid artery stenosis (433 10) (I65 29)   7  Chronic kidney disease (585 9) (N18 9)   8  Depression (311) (F32 9)   9  Dermatitis, eczematoid (692 9) (L30 9)   10  Facial trauma (959 09) (S09 93XA)   11  Hypercholesterolemia (272 0) (E78 00)   12  Hypertension (401 9) (I10)   13  Hypothyroidism (244 9) (E03 9)   14  Memory difficulties (780 93) (R41 3)   15  Need for prophylactic vaccination and inoculation against influenza (V04 81) (Z23)   16  Nocturia (788 43) (R35 1)   17  Peptic ulcer (533 90) (K27 9)   18  Screening for depression (V79 0) (Z13 89)   19  Screening for genitourinary condition (V81 6) (Z13 89)   20  Weight loss (783 21) (R63 4)    Current Meds   1  Aspirin 81 MG TABS Recorded   2  Atorvastatin Calcium 80 MG Oral Tablet; Take 1 tablet daily; Therapy: 15QMA1947 to (Bev Mallory)  Requested for: 98RJI0529; Last   Rx:[id number] Ordered   3  Co Q 10 100 MG Oral Capsule Recorded   4  DULoxetine HCl - 30 MG Oral Capsule Delayed Release Particles; Take 1 capsule twice   daily; Therapy: 17ZEI6526 to (Evaluate:55Lpq7943)  Requested for: 67Xrb8171; Last   Rx:[id number] Ordered   5  Eye Support TABS Recorded   6  HM Vitamin D3 2000 UNIT Oral Capsule; take one capsule by mouth daily; Therapy: (Bienvenido Edward) to Recorded   7  Levothyroxine Sodium 75 MCG Oral Tablet; TAKE 1 TABLET DAILY; Therapy: 46HWQ6423 to (Thelma Post)  Requested for: 67BDY2778; Last   Rx:[id number] Ordered   8  Mometasone Furoate 0 1 % External Ointment; APPLY TO AFFECTED AREA(S) of hands   ONCE DAILY AS DIRECTED; Therapy: 16JAH1092 to (Last Rx:51Mry7333)  Requested for: 18Ijf9023 Ordered   9  Multivitamins TABS Recorded   10   Omega 3 1200 MG Oral Capsule; Therapy: (Recorded:05Apr2013) to Recorded   11  Pantoprazole Sodium 40 MG Oral Tablet Delayed Release; Take 1 tablet daily; Therapy: 47DTI9475 to (Evelyne Mendoza)  Requested for: 48GYF8111; Last    Rx:[id number] Ordered   12  SM Super B Complex/C Oral Tablet; Therapy: (Recorded:05Apr2013) to Recorded   13  Tamsulosin HCl - 0 4 MG Oral Capsule; take 1 capsule daily; Therapy: 64KQM0436 to (Otf Hutchinson)  Requested for: 81UCS1037; Last    Rx:50Qfk3598 Ordered    Allergies    1  Morphine Sulfate TABS    2   IVP Dye    Plan  Influenza vaccine needed    · Fluzone High-Dose 0 5 ML Intramuscular Suspension Prefilled Syringe    Signatures   Electronically signed by : FANI Segovia ; Sep 15 2017  8:53AM EST                       (Author)

## 2018-03-17 DIAGNOSIS — E78.00 PURE HYPERCHOLESTEROLEMIA: ICD-10-CM

## 2018-03-17 DIAGNOSIS — E03.9 HYPOTHYROIDISM: ICD-10-CM

## 2018-03-17 DIAGNOSIS — I65.29 OCCLUSION AND STENOSIS OF UNSPECIFIED CAROTID ARTERY: ICD-10-CM

## 2018-03-17 DIAGNOSIS — F32.9 MAJOR DEPRESSIVE DISORDER, SINGLE EPISODE: ICD-10-CM

## 2018-03-17 DIAGNOSIS — I25.10 ATHEROSCLEROTIC HEART DISEASE OF NATIVE CORONARY ARTERY WITHOUT ANGINA PECTORIS: ICD-10-CM

## 2018-03-17 DIAGNOSIS — N18.9 CHRONIC KIDNEY DISEASE: ICD-10-CM

## 2018-03-17 DIAGNOSIS — R00.1 BRADYCARDIA: ICD-10-CM

## 2018-03-17 DIAGNOSIS — I10 ESSENTIAL (PRIMARY) HYPERTENSION: ICD-10-CM

## 2018-03-17 DIAGNOSIS — D64.9 ANEMIA: ICD-10-CM

## 2018-04-24 DIAGNOSIS — E02 SUBCLINICAL IODINE-DEFICIENCY HYPOTHYROIDISM: Primary | ICD-10-CM

## 2018-04-24 RX ORDER — LEVOTHYROXINE SODIUM 0.07 MG/1
1 TABLET ORAL DAILY
COMMUNITY
Start: 2012-01-03 | End: 2018-04-24 | Stop reason: SDUPTHER

## 2018-04-24 RX ORDER — LEVOTHYROXINE SODIUM 0.07 MG/1
75 TABLET ORAL DAILY
Qty: 90 TABLET | Refills: 3 | Status: SHIPPED | OUTPATIENT
Start: 2018-04-24 | End: 2019-08-09 | Stop reason: SDUPTHER

## 2018-05-01 ENCOUNTER — TRANSCRIBE ORDERS (OUTPATIENT)
Dept: ADMINISTRATIVE | Facility: HOSPITAL | Age: 83
End: 2018-05-01

## 2018-05-01 DIAGNOSIS — R31.0 GROSS HEMATURIA: Primary | ICD-10-CM

## 2018-05-02 ENCOUNTER — HOSPITAL ENCOUNTER (INPATIENT)
Facility: HOSPITAL | Age: 83
LOS: 9 days | Discharge: NURSING, REHAB OR LONG-TERM CARE | DRG: 669 | End: 2018-05-11
Attending: EMERGENCY MEDICINE | Admitting: FAMILY MEDICINE
Payer: COMMERCIAL

## 2018-05-02 ENCOUNTER — APPOINTMENT (INPATIENT)
Dept: CT IMAGING | Facility: HOSPITAL | Age: 83
DRG: 669 | End: 2018-05-02
Payer: COMMERCIAL

## 2018-05-02 DIAGNOSIS — D64.9 SYMPTOMATIC ANEMIA: ICD-10-CM

## 2018-05-02 DIAGNOSIS — R31.0 GROSS HEMATURIA: Primary | ICD-10-CM

## 2018-05-02 PROBLEM — N39.0 COMPLICATED UTI (URINARY TRACT INFECTION): Status: ACTIVE | Noted: 2018-05-02

## 2018-05-02 PROBLEM — E03.9 HYPOTHYROID: Chronic | Status: ACTIVE | Noted: 2018-05-02

## 2018-05-02 PROBLEM — F02.80 ALZHEIMER'S DISEASE (HCC): Chronic | Status: ACTIVE | Noted: 2018-05-02

## 2018-05-02 PROBLEM — N40.0 PROSTATE ENLARGEMENT: Chronic | Status: ACTIVE | Noted: 2018-05-02

## 2018-05-02 PROBLEM — D62 ACUTE POST-HEMORRHAGIC ANEMIA: Status: ACTIVE | Noted: 2018-05-02

## 2018-05-02 PROBLEM — G30.9 ALZHEIMER'S DISEASE (HCC): Chronic | Status: ACTIVE | Noted: 2018-05-02

## 2018-05-02 LAB
ANION GAP SERPL CALCULATED.3IONS-SCNC: 6 MMOL/L (ref 4–13)
APTT PPP: 32 SECONDS (ref 23–35)
BACTERIA UR QL AUTO: ABNORMAL /HPF
BASOPHILS # BLD AUTO: 0.02 THOUSANDS/ΜL (ref 0–0.1)
BASOPHILS NFR BLD AUTO: 0 % (ref 0–1)
BILIRUB UR QL STRIP: NEGATIVE
BUN SERPL-MCNC: 26 MG/DL (ref 5–25)
CALCIUM SERPL-MCNC: 9 MG/DL (ref 8.3–10.1)
CHLORIDE SERPL-SCNC: 103 MMOL/L (ref 100–108)
CLARITY UR: ABNORMAL
CO2 SERPL-SCNC: 30 MMOL/L (ref 21–32)
COLOR UR: ABNORMAL
CREAT SERPL-MCNC: 1.35 MG/DL (ref 0.6–1.3)
EOSINOPHIL # BLD AUTO: 0.06 THOUSAND/ΜL (ref 0–0.61)
EOSINOPHIL NFR BLD AUTO: 1 % (ref 0–6)
ERYTHROCYTE [DISTWIDTH] IN BLOOD BY AUTOMATED COUNT: 12.8 % (ref 11.6–15.1)
GFR SERPL CREATININE-BSD FRML MDRD: 49 ML/MIN/1.73SQ M
GLUCOSE SERPL-MCNC: 106 MG/DL (ref 65–140)
GLUCOSE UR STRIP-MCNC: NEGATIVE MG/DL
HCT VFR BLD AUTO: 30.1 % (ref 36.5–49.3)
HGB BLD-MCNC: 10.4 G/DL (ref 12–17)
HGB UR QL STRIP.AUTO: ABNORMAL
INR PPP: 1.04 (ref 0.86–1.16)
KETONES UR STRIP-MCNC: NEGATIVE MG/DL
LEUKOCYTE ESTERASE UR QL STRIP: NEGATIVE
LYMPHOCYTES # BLD AUTO: 1.52 THOUSANDS/ΜL (ref 0.6–4.47)
LYMPHOCYTES NFR BLD AUTO: 18 % (ref 14–44)
MAGNESIUM SERPL-MCNC: 1.8 MG/DL (ref 1.6–2.6)
MCH RBC QN AUTO: 31.6 PG (ref 26.8–34.3)
MCHC RBC AUTO-ENTMCNC: 34.6 G/DL (ref 31.4–37.4)
MCV RBC AUTO: 92 FL (ref 82–98)
MONOCYTES # BLD AUTO: 0.45 THOUSAND/ΜL (ref 0.17–1.22)
MONOCYTES NFR BLD AUTO: 5 % (ref 4–12)
NEUTROPHILS # BLD AUTO: 6.3 THOUSANDS/ΜL (ref 1.85–7.62)
NEUTS SEG NFR BLD AUTO: 76 % (ref 43–75)
NITRITE UR QL STRIP: NEGATIVE
NON-SQ EPI CELLS URNS QL MICRO: ABNORMAL /HPF
PH UR STRIP.AUTO: 7 [PH] (ref 4.5–8)
PLATELET # BLD AUTO: 185 THOUSANDS/UL (ref 149–390)
PMV BLD AUTO: 10.2 FL (ref 8.9–12.7)
POTASSIUM SERPL-SCNC: 4 MMOL/L (ref 3.5–5.3)
PROT UR STRIP-MCNC: >=300 MG/DL
PROTHROMBIN TIME: 13.5 SECONDS (ref 12.1–14.4)
RBC # BLD AUTO: 3.29 MILLION/UL (ref 3.88–5.62)
RBC #/AREA URNS AUTO: ABNORMAL /HPF
SODIUM SERPL-SCNC: 139 MMOL/L (ref 136–145)
SP GR UR STRIP.AUTO: 1.02 (ref 1–1.03)
UROBILINOGEN UR QL STRIP.AUTO: 0.2 E.U./DL
WBC # BLD AUTO: 8.35 THOUSAND/UL (ref 4.31–10.16)
WBC #/AREA URNS AUTO: ABNORMAL /HPF

## 2018-05-02 PROCEDURE — 85610 PROTHROMBIN TIME: CPT | Performed by: EMERGENCY MEDICINE

## 2018-05-02 PROCEDURE — 85025 COMPLETE CBC W/AUTO DIFF WBC: CPT | Performed by: EMERGENCY MEDICINE

## 2018-05-02 PROCEDURE — 99223 1ST HOSP IP/OBS HIGH 75: CPT | Performed by: INTERNAL MEDICINE

## 2018-05-02 PROCEDURE — 85730 THROMBOPLASTIN TIME PARTIAL: CPT | Performed by: EMERGENCY MEDICINE

## 2018-05-02 PROCEDURE — 80048 BASIC METABOLIC PNL TOTAL CA: CPT | Performed by: EMERGENCY MEDICINE

## 2018-05-02 PROCEDURE — 99285 EMERGENCY DEPT VISIT HI MDM: CPT

## 2018-05-02 PROCEDURE — 36415 COLL VENOUS BLD VENIPUNCTURE: CPT | Performed by: EMERGENCY MEDICINE

## 2018-05-02 PROCEDURE — 81001 URINALYSIS AUTO W/SCOPE: CPT | Performed by: EMERGENCY MEDICINE

## 2018-05-02 PROCEDURE — 74176 CT ABD & PELVIS W/O CONTRAST: CPT

## 2018-05-02 PROCEDURE — 87086 URINE CULTURE/COLONY COUNT: CPT | Performed by: EMERGENCY MEDICINE

## 2018-05-02 PROCEDURE — 3E1K78Z IRRIGATION OF GENITOURINARY TRACT USING IRRIGATING SUBSTANCE, VIA NATURAL OR ARTIFICIAL OPENING: ICD-10-PCS | Performed by: EMERGENCY MEDICINE

## 2018-05-02 PROCEDURE — 83735 ASSAY OF MAGNESIUM: CPT | Performed by: EMERGENCY MEDICINE

## 2018-05-02 PROCEDURE — 0T9B70Z DRAINAGE OF BLADDER WITH DRAINAGE DEVICE, VIA NATURAL OR ARTIFICIAL OPENING: ICD-10-PCS | Performed by: EMERGENCY MEDICINE

## 2018-05-02 RX ORDER — ATORVASTATIN CALCIUM 80 MG/1
80 TABLET, FILM COATED ORAL DAILY
COMMUNITY
End: 2018-10-22 | Stop reason: SDUPTHER

## 2018-05-02 RX ORDER — MELATONIN
2000 DAILY
Status: DISCONTINUED | OUTPATIENT
Start: 2018-05-03 | End: 2018-05-03

## 2018-05-02 RX ORDER — TAMSULOSIN HYDROCHLORIDE 0.4 MG/1
0.4 CAPSULE ORAL
Status: DISCONTINUED | OUTPATIENT
Start: 2018-05-03 | End: 2018-05-11 | Stop reason: HOSPADM

## 2018-05-02 RX ORDER — DULOXETIN HYDROCHLORIDE 30 MG/1
20 CAPSULE, DELAYED RELEASE ORAL 2 TIMES DAILY
COMMUNITY
End: 2018-10-21 | Stop reason: SDUPTHER

## 2018-05-02 RX ORDER — DIPHENOXYLATE HYDROCHLORIDE AND ATROPINE SULFATE 2.5; .025 MG/1; MG/1
1 TABLET ORAL DAILY
COMMUNITY
End: 2020-08-16 | Stop reason: ALTCHOICE

## 2018-05-02 RX ORDER — PANTOPRAZOLE SODIUM 20 MG/1
20 TABLET, DELAYED RELEASE ORAL DAILY
Status: DISCONTINUED | OUTPATIENT
Start: 2018-05-03 | End: 2018-05-11 | Stop reason: HOSPADM

## 2018-05-02 RX ORDER — CIPROFLOXACIN 500 MG/1
500 TABLET, FILM COATED ORAL EVERY 12 HOURS SCHEDULED
COMMUNITY
End: 2018-05-11 | Stop reason: HOSPADM

## 2018-05-02 RX ORDER — ACETAMINOPHEN 325 MG/1
650 TABLET ORAL EVERY 6 HOURS PRN
Status: DISCONTINUED | OUTPATIENT
Start: 2018-05-02 | End: 2018-05-11 | Stop reason: HOSPADM

## 2018-05-02 RX ORDER — ONDANSETRON 2 MG/ML
4 INJECTION INTRAMUSCULAR; INTRAVENOUS EVERY 6 HOURS PRN
Status: DISCONTINUED | OUTPATIENT
Start: 2018-05-02 | End: 2018-05-11 | Stop reason: HOSPADM

## 2018-05-02 RX ORDER — ASPIRIN 81 MG/1
81 TABLET ORAL DAILY
COMMUNITY
End: 2020-07-21 | Stop reason: SDUPTHER

## 2018-05-02 RX ORDER — LEVOTHYROXINE SODIUM 0.07 MG/1
75 TABLET ORAL DAILY
Status: DISCONTINUED | OUTPATIENT
Start: 2018-05-03 | End: 2018-05-03

## 2018-05-02 RX ORDER — TAMSULOSIN HYDROCHLORIDE 0.4 MG/1
0.4 CAPSULE ORAL
COMMUNITY
End: 2018-10-22 | Stop reason: SDUPTHER

## 2018-05-02 RX ORDER — LANOLIN ALCOHOL/MO/W.PET/CERES
1000 CREAM (GRAM) TOPICAL DAILY
Status: DISCONTINUED | OUTPATIENT
Start: 2018-05-03 | End: 2018-05-11 | Stop reason: HOSPADM

## 2018-05-02 RX ORDER — PANTOPRAZOLE SODIUM 20 MG/1
20 TABLET, DELAYED RELEASE ORAL DAILY
COMMUNITY
End: 2020-07-21 | Stop reason: SDUPTHER

## 2018-05-02 RX ORDER — DULOXETIN HYDROCHLORIDE 20 MG/1
20 CAPSULE, DELAYED RELEASE ORAL 2 TIMES DAILY
Status: DISCONTINUED | OUTPATIENT
Start: 2018-05-03 | End: 2018-05-11 | Stop reason: HOSPADM

## 2018-05-02 RX ORDER — FENTANYL CITRATE 50 UG/ML
25 INJECTION, SOLUTION INTRAMUSCULAR; INTRAVENOUS EVERY 2 HOUR PRN
Status: DISCONTINUED | OUTPATIENT
Start: 2018-05-02 | End: 2018-05-10

## 2018-05-02 RX ORDER — DEXTROSE AND SODIUM CHLORIDE 5; .9 G/100ML; G/100ML
75 INJECTION, SOLUTION INTRAVENOUS CONTINUOUS
Status: DISPENSED | OUTPATIENT
Start: 2018-05-02 | End: 2018-05-03

## 2018-05-02 RX ORDER — ATORVASTATIN CALCIUM 40 MG/1
80 TABLET, FILM COATED ORAL DAILY
Status: DISCONTINUED | OUTPATIENT
Start: 2018-05-03 | End: 2018-05-11 | Stop reason: HOSPADM

## 2018-05-02 RX ORDER — LANOLIN ALCOHOL/MO/W.PET/CERES
1000 CREAM (GRAM) TOPICAL DAILY
COMMUNITY
End: 2020-07-21 | Stop reason: SDUPTHER

## 2018-05-02 RX ORDER — HALOPERIDOL 1 MG/1
1 TABLET ORAL
Status: DISCONTINUED | OUTPATIENT
Start: 2018-05-02 | End: 2018-05-11 | Stop reason: HOSPADM

## 2018-05-02 RX ORDER — MELATONIN
2000 DAILY
COMMUNITY
End: 2020-08-16 | Stop reason: ALTCHOICE

## 2018-05-02 RX ADMIN — DEXTROSE AND SODIUM CHLORIDE 75 ML/HR: 5; .9 INJECTION, SOLUTION INTRAVENOUS at 23:07

## 2018-05-02 RX ADMIN — HALOPERIDOL 1 MG: 1 TABLET ORAL at 23:55

## 2018-05-02 RX ADMIN — CEFTRIAXONE 1000 MG: 1 INJECTION, SOLUTION INTRAVENOUS at 19:16

## 2018-05-02 NOTE — ED PROVIDER NOTES
History  Chief Complaint   Patient presents with    Blood in Urine     Pt having hematuria since Monday with multiple clots  Constant urgency to go, bladder spasms  Patient: Paulino Sosa y o /male  YOB: 1935  MRN: [id number]  PCP: Raphael Escudero MD  Date of evaluation: 5/2/2018    (N B  Voice-recognition software may have been used in the preparation of this document )    CC: Hematuria  Hematuria with clots since 4/30, associated with urgency  Hematuria started a couple of days before that  History provided by:  Patient and spouse  Blood in Urine   This is a new problem  The problem has been gradually worsening since onset  He describes the hematuria as gross hematuria  He describes his urine color as dark red  Obstructive symptoms do not include dribbling or straining  Pertinent negatives include no abdominal pain, chills, fever, inability to urinate or vomiting  Prior to Admission Medications   Prescriptions Last Dose Informant Patient Reported? Taking?    COCONUT OIL-FLAXSEED OIL PO 5/1/2018 at Unknown time  Yes Yes   Sig: Take by mouth   DULoxetine (CYMBALTA) 30 mg delayed release capsule 5/1/2018 at Unknown time  Yes Yes   Sig: Take 20 mg by mouth 2 (two) times a day   aspirin (ECOTRIN LOW STRENGTH) 81 mg EC tablet 5/2/2018 at Unknown time  Yes Yes   Sig: Take 81 mg by mouth daily   atorvastatin (LIPITOR) 80 mg tablet 5/1/2018 at Unknown time  Yes Yes   Sig: Take 80 mg by mouth daily   cholecalciferol (VITAMIN D3) 1,000 units tablet 5/1/2018 at Unknown time  Yes Yes   Sig: Take 2,000 Units by mouth daily   ciprofloxacin (CIPRO) 500 mg tablet 5/1/2018 at Unknown time  Yes Yes   Sig: Take 500 mg by mouth every 12 (twelve) hours   co-enzyme Q-10 30 MG capsule 5/1/2018 at Unknown time  Yes Yes   Sig: Take 100 mg by mouth 3 (three) times a day   cyanocobalamin (VITAMIN B-12) 1,000 mcg tablet 5/1/2018 at Unknown time  Yes Yes   Sig: Take 1,000 mcg by mouth daily levothyroxine 75 mcg tablet 5/1/2018 at Unknown time  No Yes   Sig: Take 1 tablet (75 mcg total) by mouth daily   multivitamin (THERAGRAN) TABS 5/1/2018 at Unknown time  Yes Yes   Sig: Take 1 tablet by mouth daily   pantoprazole (PROTONIX) 20 mg tablet 5/1/2018 at Unknown time  Yes Yes   Sig: Take 20 mg by mouth daily   tamsulosin (FLOMAX) 0 4 mg 5/1/2018 at Unknown time  Yes Yes   Sig: Take 0 4 mg by mouth daily with dinner      Facility-Administered Medications: None       Past Medical History:   Diagnosis Date    Abnormal weight loss     Alzheimer's disease 5/2/2018    Coronary artery disease     Disease of thyroid gland     Gastric ulcer     last assessed: 2/24/14    Hypothyroid 5/2/2018    Inguinal hernia, left     last assessed: 10/8/14    Peptic ulcer     last assessed: 5/21/13    Prostate enlargement 5/2/2018       Past Surgical History:   Procedure Laterality Date    AORTIC VALVE REPLACEMENT      CATARACT EXTRACTION Bilateral     CORONARY ARTERY BYPASS GRAFT      INGUINAL HERNIA REPAIR      NEPHRECTOMY Right     THROMBOENDARTERECTOMY      Carotid       Family History   Problem Relation Age of Onset    Sudden death Mother      unexpected death    Other Father      Coal workers' Pneumoconiosis    Alzheimer's disease Brother      I have reviewed and agree with the history as documented  Social History   Substance Use Topics    Smoking status: Former Smoker    Smokeless tobacco: Never Used      Comment: quit 20 years ago    Alcohol use Yes      Comment: social        Review of Systems   Reason unable to perform ROS: limited due to short-term memory problems, per wife  Constitutional: Negative for chills and fever  Respiratory: Negative for cough and shortness of breath  Cardiovascular: Negative for chest pain and palpitations  Gastrointestinal: Negative for abdominal pain and vomiting  Genitourinary: Positive for hematuria     Psychiatric/Behavioral: Negative for behavioral problems and confusion  Physical Exam  ED Triage Vitals [05/02/18 1117]   Temperature Pulse Respirations Blood Pressure SpO2   97 5 °F (36 4 °C) 56 19 (!) 125/16 100 %      Temp Source Heart Rate Source Patient Position - Orthostatic VS BP Location FiO2 (%)   Temporal Monitor Sitting Left arm --      Pain Score       No Pain           Orthostatic Vital Signs  Vitals:    05/03/18 2354 05/04/18 0823 05/04/18 1528 05/04/18 2315   BP: 116/66 139/72 157/75 101/53   Pulse: (!) 51 56 104 57   Patient Position - Orthostatic VS: Lying Lying Lying Lying       Physical Exam   Constitutional: He is oriented to person, place, and time  He appears well-developed and well-nourished  HENT:   Mouth/Throat: Oropharynx is clear and moist and mucous membranes are normal    Voice normal   Eyes: EOM are normal  Pupils are equal, round, and reactive to light  Cardiovascular: Normal rate and regular rhythm  Pulmonary/Chest: Effort normal    Abdominal: Soft  Bowel sounds are normal    Neurological: He is alert and oriented to person, place, and time  GCS eye subscore is 4  GCS verbal subscore is 5  GCS motor subscore is 6  Skin: Skin is warm and dry  Psychiatric: He has a normal mood and affect  His speech is normal and behavior is normal    Nursing note and vitals reviewed        ED Medications  Medications   fentanyl citrate (PF) 100 MCG/2ML 25 mcg (not administered)   dextrose 5 % and sodium chloride 0 9 % infusion (0 mL/hr Intravenous Stopped 5/3/18 1220)   ondansetron (ZOFRAN) injection 4 mg (not administered)   acetaminophen (TYLENOL) tablet 650 mg (650 mg Oral Given 5/5/18 0140)   atorvastatin (LIPITOR) tablet 80 mg (80 mg Oral Given 5/4/18 1703)   pantoprazole (PROTONIX) EC tablet 20 mg (20 mg Oral Given 5/4/18 0502)   DULoxetine (CYMBALTA) delayed release capsule 20 mg (20 mg Oral Given 5/4/18 1703)   cyanocobalamin (VITAMIN B-12) tablet 1,000 mcg (1,000 mcg Oral Given 5/4/18 0909)   tamsulosin (FLOMAX) capsule 0 4 mg (0 4 mg Oral Given 5/4/18 1703)   haloperidol (HALDOL) tablet 1 mg (1 mg Oral Given 5/4/18 2148)   oxybutynin (DITROPAN-XL) 24 hr tablet 5 mg (5 mg Oral Given 5/4/18 0909)   levothyroxine tablet 88 mcg (88 mcg Oral Given 5/4/18 0501)   ciprofloxacin (CIPRO) tablet 500 mg (500 mg Oral Given 5/4/18 2148)   belladonna-opium (B&O SUPPOSITORY) 16 2-60 mg per suppository 1 suppository (1 suppository Rectal Given 5/5/18 0058)   cefTRIAXone (ROCEPHIN) IVPB (premix) 1,000 mg (0 mg Intravenous Stopped 5/2/18 1942)       Diagnostic Studies  Results Reviewed     Procedure Component Value Units Date/Time    Urine culture [[id number]] Collected:  05/02/18 1149    Lab Status:  Final result Specimen:  Urine from Urine, Clean Catch Updated:  05/03/18 1149     Urine Culture No Growth <1000 cfu/mL    Basic metabolic panel [[id number]]  (Abnormal) Collected:  05/02/18 1154    Lab Status:  Final result Specimen:  Blood from Arm, Right Updated:  05/02/18 1225     Sodium 139 mmol/L      Potassium 4 0 mmol/L      Chloride 103 mmol/L      CO2 30 mmol/L      Anion Gap 6 mmol/L      BUN 26 (H) mg/dL      Creatinine 1 35 (H) mg/dL      Glucose 106 mg/dL      Calcium 9 0 mg/dL      eGFR 49 ml/min/1 73sq m     Narrative:         National Kidney Disease Education Program recommendations are as follows:  GFR calculation is accurate only with a steady state creatinine  Chronic Kidney disease less than 60 ml/min/1 73 sq  meters  Kidney failure less than 15 ml/min/1 73 sq  meters      Magnesium [[id number]]  (Normal) Collected:  05/02/18 1154    Lab Status:  Final result Specimen:  Blood from Arm, Right Updated:  05/02/18 1225     Magnesium 1 8 mg/dL     Urine Microscopic [[id number]]  (Abnormal) Collected:  05/02/18 1149    Lab Status:  Final result Specimen:  Urine from Urine, Clean Catch Updated:  05/02/18 1218     RBC, UA Innumerable (A) /hpf      WBC, UA       Field obscured, unable to enumerate (A)     /hpf     Epithelial Cells       Field obscured, unable to enumerate (A)     /hpf     Bacteria, UA       Field obscured, unable to enumerate (A)     /hpf    UA w Reflex to Microscopic w Reflex to Culture [[id number]]  (Abnormal) Collected:  05/02/18 1149    Lab Status:  Edited Result - FINAL Specimen:  Urine from Urine, Clean Catch Updated:  05/02/18 1216     Color, UA Red     Clarity, UA Cloudy     Specific Gravity, UA 1 025     pH, UA 7 0     Leukocytes, UA Negative     Nitrite, UA Negative     Protein, UA >=300 (A) mg/dl      Glucose, UA Negative mg/dl      Ketones, UA Negative mg/dl      Urobilinogen, UA 0 2 E U /dl      Bilirubin, UA Negative     Blood, UA Large (A)    Protime-INR [[id number]]  (Normal) Collected:  05/02/18 1154    Lab Status:  Final result Specimen:  Blood from Arm, Right Updated:  05/02/18 1214     Protime 13 5 seconds      INR 1 04    APTT [[id number]]  (Normal) Collected:  05/02/18 1154    Lab Status:  Final result Specimen:  Blood from Arm, Right Updated:  05/02/18 1214     PTT 32 seconds     CBC and differential [[id number]]  (Abnormal) Collected:  05/02/18 1154    Lab Status:  Final result Specimen:  Blood from Arm, Right Updated:  05/02/18 1205     WBC 8 35 Thousand/uL      RBC 3 29 (L) Million/uL      Hemoglobin 10 4 (L) g/dL      Hematocrit 30 1 (L) %      MCV 92 fL      MCH 31 6 pg      MCHC 34 6 g/dL      RDW 12 8 %      MPV 10 2 fL      Platelets 405 Thousands/uL      Neutrophils Relative 76 (H) %      Lymphocytes Relative 18 %      Monocytes Relative 5 %      Eosinophils Relative 1 %      Basophils Relative 0 %      Neutrophils Absolute 6 30 Thousands/µL      Lymphocytes Absolute 1 52 Thousands/µL      Monocytes Absolute 0 45 Thousand/µL      Eosinophils Absolute 0 06 Thousand/µL      Basophils Absolute 0 02 Thousands/µL                  CT abdomen pelvis wo contrast   Final Result by Raz Khan MD (05/02 1909)      No etiology for hematuria identified  Urologic follow-up is recommended    Hyperdensity within the bladder lumen likely representing hemorrhage  Workstation performed: [id number]                    Procedures  Procedures       Phone Contacts  Consult - ED Physician Contact Documentation  Consult performed by: Lilly Dozier  Consult ordered by: Lilly Dozier  Reason for consult: Hematuria, not resolving  Assessment/Recommendations: Ongoing hematuria with hemoglobin dropping          ED Course  ED Course as of May 05 0514   Wed May 02, 2018   1427 Hand irrigation was attempted  Some clots were retrieved  We are now trying to irrigate to clear  MDM  CritCare Time    Disposition  Final diagnoses:   Gross hematuria     Time reflects when diagnosis was documented in both MDM as applicable and the Disposition within this note     Time User Action Codes Description Comment    5/2/2018  5:39 PM Karina Horacio MOHR Add [R31 0] Gross hematuria       ED Disposition     ED Disposition Condition Comment    Admit  Case was discussed with Dr Corina Mejia [MD] for [facility]   and the patient's admission status was agreed to be Admission Status: inpatient status to the service of Dr Carolee Deleon           Follow-up Information    None       Current Discharge Medication List      CONTINUE these medications which have NOT CHANGED    Details   aspirin (ECOTRIN LOW STRENGTH) 81 mg EC tablet Take 81 mg by mouth daily      atorvastatin (LIPITOR) 80 mg tablet Take 80 mg by mouth daily      cholecalciferol (VITAMIN D3) 1,000 units tablet Take 2,000 Units by mouth daily      ciprofloxacin (CIPRO) 500 mg tablet Take 500 mg by mouth every 12 (twelve) hours      co-enzyme Q-10 30 MG capsule Take 100 mg by mouth 3 (three) times a day      COCONUT OIL-FLAXSEED OIL PO Take by mouth      cyanocobalamin (VITAMIN B-12) 1,000 mcg tablet Take 1,000 mcg by mouth daily      DULoxetine (CYMBALTA) 30 mg delayed release capsule Take 20 mg by mouth 2 (two) times a day      levothyroxine 75 mcg tablet Take 1 tablet (75 mcg total) by mouth daily  Qty: 90 tablet, Refills: 3    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

## 2018-05-02 NOTE — ED NOTES
Second bag of irrigation hung at this time  Wife remains at bedside, pt expresses no further needs at this time        Maribel Ellison, RN  05/02/18 2996

## 2018-05-02 NOTE — ED NOTES
CBI started at this time, 250 ml urine emptied from bag prior to initiation of irrigation        Ernestine Malave RN  05/02/18 9204

## 2018-05-02 NOTE — ED NOTES
1 (3L) bag of irrigation into patient at this time, 3L of urine returned   Color is now a pale pink compared to gross red as upon arrival       Kevyn Aleman RN  05/02/18 9313

## 2018-05-02 NOTE — ED NOTES
Found OOB standing beside stretcher  Assisted back to bed  Gait steady       Francisca Abel RN  05/02/18 7774

## 2018-05-02 NOTE — ED NOTES
Hand irrigated with 30 ml sterile saline times 5  Returned blood tinged urine with small  Clots       Haydee Tripathi, RN  05/02/18 7616

## 2018-05-03 LAB
ALBUMIN SERPL BCP-MCNC: 3.4 G/DL (ref 3.5–5)
ALP SERPL-CCNC: 56 U/L (ref 46–116)
ALT SERPL W P-5'-P-CCNC: 41 U/L (ref 12–78)
ANION GAP SERPL CALCULATED.3IONS-SCNC: 9 MMOL/L (ref 4–13)
AST SERPL W P-5'-P-CCNC: 42 U/L (ref 5–45)
BACTERIA UR CULT: NORMAL
BILIRUB SERPL-MCNC: 0.6 MG/DL (ref 0.2–1)
BUN SERPL-MCNC: 18 MG/DL (ref 5–25)
CALCIUM SERPL-MCNC: 9 MG/DL (ref 8.3–10.1)
CHLORIDE SERPL-SCNC: 103 MMOL/L (ref 100–108)
CO2 SERPL-SCNC: 27 MMOL/L (ref 21–32)
CREAT SERPL-MCNC: 1.1 MG/DL (ref 0.6–1.3)
ERYTHROCYTE [DISTWIDTH] IN BLOOD BY AUTOMATED COUNT: 12.9 % (ref 11.6–15.1)
FERRITIN SERPL-MCNC: 53 NG/ML (ref 8–388)
GFR SERPL CREATININE-BSD FRML MDRD: 63 ML/MIN/1.73SQ M
GLUCOSE SERPL-MCNC: 114 MG/DL (ref 65–140)
HCT VFR BLD AUTO: 34.1 % (ref 36.5–49.3)
HGB BLD-MCNC: 11.6 G/DL (ref 12–17)
INR PPP: 1.05 (ref 0.86–1.16)
MCH RBC QN AUTO: 31 PG (ref 26.8–34.3)
MCHC RBC AUTO-ENTMCNC: 34 G/DL (ref 31.4–37.4)
MCV RBC AUTO: 91 FL (ref 82–98)
PLATELET # BLD AUTO: 210 THOUSANDS/UL (ref 149–390)
PMV BLD AUTO: 10.7 FL (ref 8.9–12.7)
POTASSIUM SERPL-SCNC: 3.7 MMOL/L (ref 3.5–5.3)
PROT SERPL-MCNC: 6.7 G/DL (ref 6.4–8.2)
PROTHROMBIN TIME: 13.6 SECONDS (ref 12.1–14.4)
RBC # BLD AUTO: 3.74 MILLION/UL (ref 3.88–5.62)
RETICS # AUTO: NORMAL 10*3/UL (ref 14356–105094)
RETICS # CALC: 0.94 % (ref 0.37–1.87)
SODIUM SERPL-SCNC: 139 MMOL/L (ref 136–145)
TSH SERPL DL<=0.05 MIU/L-ACNC: 4.59 UIU/ML (ref 0.36–3.74)
WBC # BLD AUTO: 10.42 THOUSAND/UL (ref 4.31–10.16)

## 2018-05-03 PROCEDURE — 85610 PROTHROMBIN TIME: CPT | Performed by: INTERNAL MEDICINE

## 2018-05-03 PROCEDURE — 84443 ASSAY THYROID STIM HORMONE: CPT | Performed by: INTERNAL MEDICINE

## 2018-05-03 PROCEDURE — 85045 AUTOMATED RETICULOCYTE COUNT: CPT | Performed by: INTERNAL MEDICINE

## 2018-05-03 PROCEDURE — 99233 SBSQ HOSP IP/OBS HIGH 50: CPT | Performed by: FAMILY MEDICINE

## 2018-05-03 PROCEDURE — 82728 ASSAY OF FERRITIN: CPT | Performed by: INTERNAL MEDICINE

## 2018-05-03 PROCEDURE — 85027 COMPLETE CBC AUTOMATED: CPT | Performed by: INTERNAL MEDICINE

## 2018-05-03 PROCEDURE — 80053 COMPREHEN METABOLIC PANEL: CPT | Performed by: INTERNAL MEDICINE

## 2018-05-03 PROCEDURE — 99222 1ST HOSP IP/OBS MODERATE 55: CPT

## 2018-05-03 RX ORDER — LEVOTHYROXINE SODIUM 88 UG/1
88 TABLET ORAL DAILY
Status: DISCONTINUED | OUTPATIENT
Start: 2018-05-04 | End: 2018-05-11 | Stop reason: HOSPADM

## 2018-05-03 RX ORDER — CIPROFLOXACIN 500 MG/1
500 TABLET, FILM COATED ORAL EVERY 12 HOURS SCHEDULED
Status: DISCONTINUED | OUTPATIENT
Start: 2018-05-03 | End: 2018-05-05

## 2018-05-03 RX ORDER — OXYBUTYNIN CHLORIDE 5 MG/1
5 TABLET, EXTENDED RELEASE ORAL DAILY
Status: DISCONTINUED | OUTPATIENT
Start: 2018-05-03 | End: 2018-05-11 | Stop reason: HOSPADM

## 2018-05-03 RX ADMIN — DULOXETINE HYDROCHLORIDE 20 MG: 20 CAPSULE, DELAYED RELEASE ORAL at 17:27

## 2018-05-03 RX ADMIN — VITAMIN D, TAB 1000IU (100/BT) 2000 UNITS: 25 TAB at 10:45

## 2018-05-03 RX ADMIN — CYANOCOBALAMIN TAB 1000 MCG 1000 MCG: 1000 TAB at 10:46

## 2018-05-03 RX ADMIN — CIPROFLOXACIN 500 MG: 500 TABLET, FILM COATED ORAL at 17:27

## 2018-05-03 RX ADMIN — DULOXETINE HYDROCHLORIDE 20 MG: 20 CAPSULE, DELAYED RELEASE ORAL at 10:45

## 2018-05-03 RX ADMIN — PANTOPRAZOLE SODIUM 20 MG: 20 TABLET, DELAYED RELEASE ORAL at 10:46

## 2018-05-03 RX ADMIN — LEVOTHYROXINE SODIUM 75 MCG: 75 TABLET ORAL at 10:46

## 2018-05-03 RX ADMIN — OXYBUTYNIN CHLORIDE 5 MG: 5 TABLET, EXTENDED RELEASE ORAL at 10:46

## 2018-05-03 RX ADMIN — TAMSULOSIN HYDROCHLORIDE 0.4 MG: 0.4 CAPSULE ORAL at 17:27

## 2018-05-03 RX ADMIN — ATORVASTATIN CALCIUM 80 MG: 40 TABLET, FILM COATED ORAL at 17:27

## 2018-05-03 NOTE — PLAN OF CARE
Problem: Potential for Falls  Goal: Patient will remain free of falls  INTERVENTIONS:  - Assess patient frequently for physical needs  -  Identify cognitive and physical deficits and behaviors that affect risk of falls  -  [place] fall precautions as indicated by assessment  High fall risk   - Educate patient/family on patient safety including physical limitations  - Instruct patient to call for assistance with activity based on assessment  - Modify environment to reduce risk of injury  - Consider OT/PT consult to assist with strengthening/mobility   Outcome: Progressing      Problem: PAIN - ADULT  Goal: Verbalizes/displays adequate comfort level or baseline comfort level  Interventions:  - Encourage patient to monitor pain and request assistance  - Assess pain using appropriate pain scale  0-10 pain scale    - Administer analgesics based on type and severity of pain and evaluate response  - Implement non-pharmacological measures as appropriate and evaluate response  - Consider cultural and social influences on pain and pain management  - Notify physician/advanced practitioner if interventions unsuccessful or patient reports new pain   Outcome: Progressing      Problem: INFECTION - ADULT  Goal: Absence or prevention of progression during hospitalization  INTERVENTIONS:  - Assess and monitor for signs and symptoms of infection  - Monitor lab/diagnostic results  - Monitor all insertion sites, i e  indwelling lines, tubes, and drains   - [place] appropriate cooling/warming therapies per order  - Administer medications as ordered  - Instruct and encourage patient and family to use good hand hygiene technique  - Identify and instruct in appropriate isolation precautions for identified infection/condition   Outcome: Progressing      Problem: SAFETY ADULT  Goal: Maintain or return to baseline ADL function  INTERVENTIONS:  -  Assess patient's ability to carry out ADLs; assess patient's baseline for ADL function and identify physical deficits which impact ability to perform ADLs (bathing, care of mouth/teeth, toileting, grooming, dressing, etc )  - Assess/evaluate cause of self-care deficits   - Assess range of motion  - Assess patient's mobility; develop plan if impaired  - Assess patient's need for assistive devices and provide as appropriate  - Encourage maximum independence but intervene and supervise when necessary  ¯ Involve family in performance of ADLs  ¯ Assess for home care needs following discharge   ¯ Request OT consult to assist with ADL evaluation and planning for discharge  ¯ Provide patient education as appropriate   Outcome: Progressing    Goal: Maintain or return mobility status to optimal level  INTERVENTIONS:  - Assess patient's baseline mobility status (ambulation, transfers, stairs, etc )    - Identify cognitive and physical deficits and behaviors that affect mobility  - Identify mobility aids required to assist with transfers and/or ambulation (gait belt, sit-to-stand, lift, walker, cane, etc )  - [place] fall precautions as indicated by assessment   High fall risk   - Record patient progress and toleration of activity level on Mobility SBAR; progress patient to next Phase/Stage  - Instruct patient to call for assistance with activity based on assessment  - Request Rehabilitation consult to assist with strengthening/weightbearing, etc    Outcome: Progressing      Problem: DISCHARGE PLANNING  Goal: Discharge to home or other facility with appropriate resources  INTERVENTIONS:  - Identify barriers to discharge w/patient and caregiver  - Arrange for needed discharge resources and transportation as appropriate  - Identify discharge learning needs (meds, wound care, etc )  - Arrange for interpretive services to assist at discharge as needed  - Refer to Case Management Department for coordinating discharge planning if the patient needs post-hospital services based on physician/advanced practitioner order or complex needs related to functional status, cognitive ability, or social support system   Outcome: Progressing      Problem: Knowledge Deficit  Goal: Patient/family/caregiver demonstrates understanding of disease process, treatment plan, medications, and discharge instructions  Complete learning assessment and assess knowledge base  Interventions:  - Provide teaching at level of understanding  - Provide teaching via preferred learning methods   Outcome: Progressing      Problem: GENITOURINARY - ADULT  Goal: Maintains or returns to baseline urinary function  INTERVENTIONS:  - Assess urinary function  - Encourage oral fluids to ensure adequate hydration  - Administer IV fluids as ordered to ensure adequate hydration  - Administer ordered medications as needed  - Offer frequent toileting post spears catheter removal   - Follow urinary retention protocol if ordered  Outcome: Progressing    Goal: Absence of urinary retention  INTERVENTIONS:  - Assess patient's ability to void and empty bladder, post spears removal   - Monitor I/O  - Bladder scan as needed, post spears removal   - Discuss with physician/AP medications to alleviate retention as needed  - Discuss catheterization for long term situations as appropriate  Outcome: Progressing    Goal: Urinary catheter remains patent  INTERVENTIONS:  - Assess patency of urinary catheter  Maintain CBI flow as appropriate  Hand irrigate for clots    - Follow guidelines for intermittent irrigation of non-functioning urinary catheter  Outcome: Progressing

## 2018-05-03 NOTE — ASSESSMENT & PLAN NOTE
Urology input appreciated  H&H stable and outpatient cystoscopy recommended  Continue bladder irrigation  Repeat CBC in the morning  Possible discharge tomorrow

## 2018-05-03 NOTE — ASSESSMENT & PLAN NOTE
TSH slightly elevated, increase levothyroxine from 77-88 mcg daily  Recommend to repeat TSH in 4-6 weeks

## 2018-05-03 NOTE — SOCIAL WORK
Met with pt to discuss role as  in helping pt to develop discharge plan and to help pt carry out their plan  Pt lives in a house with his wife   Pt has 4 BUCK with a railing  Pt has 13 steps on the inside  Pt has his bedroom on the 2nd floor   Pt has bathroom on the 1rst and 2nd floor  Pt has a walker but uses no assistive device to ambulate  Pt is independent with ADL's  Pt's wife does the cooking  Pt said that he and his wife both drive  Pt has never had home care or any services in the home  Pt's PCP is Dr Jass Rand  Pt uses the [facility] in [place] he thinks  Discharge checklist discussed with patient and family  Pt might benefit from home care on discharge if he has to go home with a spears

## 2018-05-03 NOTE — PROGRESS NOTES
Progress Note Jay Pineda 1935, 81 y o  male MRN: [id number]    Unit/Bed#: 569-95 Encounter: [id number]    Primary Care Provider: Naz Yu MD   Date and time admitted to hospital: 5/2/2018 11:17 AM        Acute post-hemorrhagic anemia   Assessment & Plan    Secondary to hematuria   Stable H&H  Continue with bladder irrigation  Repeat CBC in am          Complicated UTI (urinary tract infection)   Assessment & Plan    Urine culture had no growth likely because were obtained after patient had been on antibiotic (ciprofloxacin) prior to obtaining urine sample  Will transition back to cipro PO to complete course  Monitor CBC/VS        Prostate enlargement   Assessment & Plan    Predisposing to recurrent urinary tract infections  Urology consulted        Alzheimer's disease   Assessment & Plan    Chronic   Wife updated        Hypothyroid   Assessment & Plan    TSH slightly elevated, increase levothyroxine from 77-88 mcg daily  Recommend to repeat TSH in 4-6 weeks        * Gross hematuria   Assessment & Plan    Urology input appreciated  H&H stable and outpatient cystoscopy recommended  Continue bladder irrigation  Repeat CBC in the morning  Possible discharge tomorrow          VTE Pharmacologic Prophylaxis:   Pharmacologic: none due to anemia/hematuria  Mechanical VTE Prophylaxis in Place: Yes    Patient Centered Rounds: I have performed bedside rounds with nursing staff today  Discussions with Specialists or Other Care Team Provider: Urology    Education and Discussions with Family / Patient: Patient's wife    Time Spent for Care: 45 minutes  More than 50% of total time spent on counseling and coordination of care as described above      Current Length of Stay: 1 day(s)    Current Patient Status: Inpatient   Certification Statement: The patient will continue to require additional inpatient hospital stay due to need for close monitoring, daily labs    Discharge Plan: 24-48 hours    Code Status: Level 1 - Full Code      Subjective:   Patient seen and examined  He appears comfortable  He is oriented to self and type of place  He denies any pain or discomfort  He denies difficulty breathing or palpitations  He denies abdominal pain, nausea, vomiting, diarrhea or constipation  Objective:     Vitals:   Temp (24hrs), Av 7 °F (36 5 °C), Min:97 °F (36 1 °C), Max:98 3 °F (36 8 °C)    HR:  [51-71] 71  Resp:  [16-20] 18  BP: (153-188)/(71-95) 180/95  SpO2:  [97 %-100 %] 99 %  Body mass index is 20 81 kg/m²  Input and Output Summary (last 24 hours): Intake/Output Summary (Last 24 hours) at 18 1444  Last data filed at 18 1440   Gross per 24 hour   Intake          1231 25 ml   Output            -1000 ml   Net          2231 25 ml       Physical Exam:     Physical Exam   Constitutional: He appears well-developed and well-nourished  HENT:   Head: Normocephalic and atraumatic  Eyes: Conjunctivae are normal    Neck: No JVD present  Cardiovascular: Normal rate and regular rhythm  No murmur heard  Pulmonary/Chest: Effort normal  No respiratory distress  He has no wheezes  He has no rales  Abdominal: Soft  He exhibits no distension  There is no tenderness  There is no guarding  Genitourinary:   Genitourinary Comments: Loyd catheter, adali colored urine   Musculoskeletal: He exhibits no edema  Neurological: He is alert  No cranial nerve deficit  He exhibits normal muscle tone  Skin: Skin is warm and dry  Psychiatric: He has a normal mood and affect         Additional Data:     Labs:      Results from last 7 days  Lab Units 18  0459 18  1154   WBC Thousand/uL 10 42* 8 35   HEMOGLOBIN g/dL 11 6* 10 4*   HEMATOCRIT % 34 1* 30 1*   PLATELETS Thousands/uL 210 185   NEUTROS PCT %  --  76*   LYMPHS PCT %  --  18   MONOS PCT %  --  5   EOS PCT %  --  1       Results from last 7 days  Lab Units 18  0459   SODIUM mmol/L 139   POTASSIUM mmol/L 3 7   CHLORIDE mmol/L 103 CO2 mmol/L 27   BUN mg/dL 18   CREATININE mg/dL 1 10   CALCIUM mg/dL 9 0   TOTAL PROTEIN g/dL 6 7   BILIRUBIN TOTAL mg/dL 0 60   ALK PHOS U/L 56   ALT U/L 41   AST U/L 42   GLUCOSE RANDOM mg/dL 114       Results from last 7 days  Lab Units 05/03/18  0459   INR  1 05                 * I Have Reviewed All Lab Data Listed Above  * Additional Pertinent Lab Tests Reviewed: Any[facility] [address] Admission Reviewed    Imaging:    Imaging Reports Reviewed Today Include: CT abd/pelvis    Recent Cultures (last 7 days):       Results from last 7 days  Lab Units 05/02/18  1149   URINE CULTURE  No Growth <1000 cfu/mL       Last 24 Hours Medication List:     Current Facility-Administered Medications:  acetaminophen 650 mg Oral Q6H PRN Speedy Chicas MD   atorvastatin 80 mg Oral Daily Speedy Chicas MD   ciprofloxacin 500 mg Oral Q12H [address] Alverto Gary MD   cyanocobalamin 1,000 mcg Oral Daily Speedy Chicas MD   DULoxetine 20 mg Oral BID Speedy Chicas MD   fentanyl citrate (PF) 25 mcg Intravenous Q2H PRN Speedy Chicas MD   haloperidol 1 mg Oral HS PRN Speedy Chicas MD   [START ON 5/4/2018] levothyroxine 88 mcg Oral Daily Alverto Gary MD   ondansetron 4 mg Intravenous Q6H PRN Speedy Chicas MD   oxybutynin 5 mg Oral Daily Tootie Hatfield PA-C   pantoprazole 20 mg Oral Daily Speedy Chicas MD   tamsulosin 0 4 mg Oral Daily With Dinner Speedy Chicas MD        Today, Patient Was Seen By: Beata Marc MD    ** Please Note: Dictation voice to text software may have been used in the creation of this document   **

## 2018-05-03 NOTE — CASE MANAGEMENT
Initial Clinical Review  Admission: Date/Time/Statement: 5/2/18 @ 1740   Orders Placed This Encounter   Procedures    Inpatient Admission (expected length of stay for this patient is greater than two midnights)     Standing Status:   Standing     Number of Occurrences:   1     Order Specific Question:   Admitting Physician     Answer:   Abdifatah Pittman [[id number]]     Order Specific Question:   Level of Care     Answer:   Med Surg [16]     Order Specific Question:   Estimated length of stay     Answer:   More than 2 Midnights     Order Specific Question:   Certification     Answer:   I certify that inpatient services are medically necessary for this patient for a duration of greater than two midnights  See H&P and MD Progress Notes for additional information about the patient's course of treatment  ED: Date/Time/Mode of Arrival:   ED Arrival Information     Expected Arrival Acuity Means of Arrival Escorted By Service Admission Type    - 5/2/2018 11:16 Urgent Ambulance [facility] General Medicine Urgent    Arrival Complaint    hemeturia      Chief Complaint:   Chief Complaint   Patient presents with    Blood in Urine     Pt having hematuria since Monday with multiple clots  Constant urgency to go, bladder spasms  History of Illness:   81 y o  male who takes flomax for enlarged prostate & follows with a local urologist  His wife at bedside offered most of the history due to the patient's dementia  Patient was scheduled for a renal US next week by his urologist with cystoscopy in mind after the patient began to pass blood clots in his urine 2 days ago associated with painful bladder spasms  This was preceded by initially mild gross hematuria 4 days ago for which he was prescribed PO cipro by same urologist due to suspected complicated UTI related to bladder outlet obstruction   No reported fever, shaking chills, malaise, flank pain, nausea, vomiting, diarrhea, chest pain, cough, dyspnea, headache, neck stiffness, or skin rash  Urethral spears catheter was inserted at the ED with continuous gross hematuria  ED Vital Signs:   ED Triage Vitals [05/02/18 1117]   Temperature Pulse Respirations Blood Pressure SpO2   97 5 °F (36 4 °C) 56 19 (!) 125/16 100 %      Temp Source Heart Rate Source Patient Position - Orthostatic VS BP Location FiO2 (%)   Temporal Monitor Sitting Left arm --      Pain Score       No Pain        Wt Readings from Last 1 Encounters:   05/02/18 69 6 kg (153 lb 7 oz)   Vital Signs (abnormal):   HR 54, 51, 52  /79, 188/80  Abnormal Labs/Diagnostic Test Results:   HGB 10 4 BUN 26 CR 1 35 GFR 49   CT A/P=No etiology for hematuria identified  Urologic follow-up is recommended  Hyperdensity within the bladder lumen likely representing hemorrhage  U/A+PROT, BLOOD  RBC, UA None Seen, 0-5 /hpf Innumerable   A    WBC, UA None Seen, 0-5, 5-55, 5-65 /hpf Field obscured, unable to enumerate   A    Epithelial Cells None Seen, Occasional /hpf Field obscured, unable to enumerate   A    Bacteria, UA None Seen, Occasional /hpf Field obscured, unable to enumerate      ED Treatment:   Medication Administration from 05/02/2018 1116 to 05/02/2018 2009       Date/Time Order Dose Route Action Action by Comments     05/02/2018 1942 cefTRIAXone (ROCEPHIN) IVPB (premix) 1,000 mg 0 mg Intravenous Stopped Myriam Luciano RN      05/02/2018 1916 cefTRIAXone (ROCEPHIN) IVPB (premix) 1,000 mg 1,000 mg Intravenous Demetrius Bolanos RN       Past Medical/Surgical History:    Active Ambulatory Problems     Diagnosis Date Noted    No Active Ambulatory Problems     Resolved Ambulatory Problems     Diagnosis Date Noted    No Resolved Ambulatory Problems     Past Medical History:   Diagnosis Date    Abnormal weight loss     Alzheimer's disease 5/2/2018    Coronary artery disease     Disease of thyroid gland     Gastric ulcer     Hypothyroid 5/2/2018    Inguinal hernia, left     Peptic ulcer     Prostate enlargement 5/2/2018   Admitting Diagnosis: Blood in urine [R31 9]  Gross hematuria [R31 0]  Age/Sex: 81 y o  male  Assessment/Plan:   CBI was stopped in the ED, pt's urine was bloody  Dr Adelia Duncan made aware, CBI re-started at 2045  Pt's aragon was leaking and pt was experiencing bladder spasms, Dr Adelia Duncan made aware, new order for aragon irrigation received  Pt's aragon was irrigated 5 times with a return of bloody urine with blood clots  Currently patient's urine is pink -tinged with CBI  Principal Problem:    Gross hematuria  Active Problems:    Hypothyroid    Alzheimer's disease    Prostate enlargement    Complicated UTI    Acute post-hemorrhagic anemia  Plan for the Primary Problem(s):  · Admit to telemetry, monitor CBC & INR, transfuse with blood products PRN  ?  Immediate ICU transfer if clinical deterioration or unstable  Plan for Additional Problems:   · Urology consult, NPO regarding possible cystoscopy, start continuous bladder irrigation via Aragon catheter  · Avoid anticoagulants & antiplatelet medications for now  · Cont treatment of WHEELER-related complicated UTI with IV rocephin, await urine C&  Admission Orders:  TELEMETRY  ARAGON [address]  Scheduled Meds:   Current Facility-Administered Medications:  acetaminophen 650 mg Oral Q6H PRN Tessy Quinteros MD    atorvastatin 80 mg Oral Daily Tessy Quinteros MD    cefTRIAXone 1,000 mg Intravenous Q24H Tessy Quinteros MD    cholecalciferol 2,000 Units Oral Daily Tessy Quinteros MD    cyanocobalamin 1,000 mcg Oral Daily Tessy Quinteros MD    dextrose 5 % and sodium chloride 0 9 % 75 mL/hr Intravenous Continuous Tessy Quinteros MD Last Rate: 75 mL/hr (05/02/18 4342)   DULoxetine 20 mg Oral BID Tessy Quinteros MD    fentanyl citrate (PF) 25 mcg Intravenous Q2H PRN Tessy Quinteros MD    haloperidol 1 mg Oral HS PRN Tessy Quinteros MD    levothyroxine 75 mcg Oral Daily Tessy Quinteros MD    ondansetron 4 mg Intravenous Q6H PRN Keisha West MD    pantoprazole 20 mg Oral Daily Keisha West MD    tamsulosin 0 4 mg Oral Daily With Dinner Keisha West MD    Continuous Infusions:   dextrose 5 % and sodium chloride 0 9 % 75 mL/hr Last Rate: 75 mL/hr (05/02/18 0148)   PRN Meds:   acetaminophen    fentanyl citrate (PF)    haloperidol    ondansetron  Thank you,  [address] [place] [address] in the [facility] by Julius Mccoy for 2017  Network Utilization Review Department  Phone: [phone]; Fax [phone]  ATTENTION: The Network Utilization Review Department is now centralized for our 7 Facilities  Make a note that we have a new phone and fax numbers for our Department  Please call with any questions or concerns to [phone] and carefully follow the prompts so that you are directed to the right person  All voicemails are confidential  Fax any determinations, approvals, denials, and requests for initial or continue stay review clinical to [phone]  Due to HIGH CALL volume, it would be easier if you could please send faxed requests to expedite your requests and in part, help us provide discharge notifications faster

## 2018-05-03 NOTE — PLAN OF CARE
Problem: DISCHARGE PLANNING - CARE MANAGEMENT  Goal: Discharge to post-acute care or home with appropriate resources  INTERVENTIONS:  - Conduct assessment to determine patient/family and health care team treatment goals, and need for post-acute services based on payer coverage, community resources, and patient preferences, and barriers to discharge  - Address psychosocial, clinical, and financial barriers to discharge as identified in assessment in conjunction with the patient/family and health care team  - Arrange appropriate level of post-acute services according to patient's   needs and preference and payer coverage in collaboration with the physician and health care team  - Communicate with and update the patient/family, physician, and health care team regarding progress on the discharge plan  - Arrange appropriate transportation to post-acute venues  Pt might benefit from home care on discharge    Outcome: Progressing

## 2018-05-03 NOTE — PROGRESS NOTES
Spears catheter became blocked and leaked a moderate amount around the catheter  Catheter hand irrigated with NSS and resulted in a moderate amount of small blood clots being obtained  CBI continued and spears is patent

## 2018-05-03 NOTE — ASSESSMENT & PLAN NOTE
Urine culture had no growth likely because were obtained after patient had been on antibiotic (ciprofloxacin) prior to obtaining urine sample  Will transition back to cipro PO to complete course  Monitor CBC/VS

## 2018-05-03 NOTE — PROGRESS NOTES
Patient complained of a bladder spasm and leakage around spears catheter was noted  Hand irrigated for a moderate amount of small blood clots  CBI resummed and infusing and spears patent

## 2018-05-03 NOTE — CONSULTS
Consultation - Urology   Olamide Mullins 81 y o  male MRN: [id number]  Unit/Bed#: 718-88 Encounter: [id number]      Assessment/Plan      Assessment:  Principal Problems:    Gross hematuria, initially with clots  Three way Loyd catheter has been inserted and the patient remains on CBI  Mild post hemorrhagic anemia likely secondary to hematuria, continue to monitor, no indication for blood transfusion  Patient does have a history of chronic iron deficiency anemia upon review of his previous medical records  History of chronic kidney disease, creatinine baseline at 1 1    Proteinuria    Urinary tract infection, likely related to bladder outlet obstruction from chronic prostatic hypertrophy  Other Active Problems:    Hypothyroid    Alzheimer's disease, patient is forgetful and a difficult historian  Benign prostatic hypertrophy    Previous aortic valve replacement, believed to be a tissue valve  The patient was not on any anticoagulation other than daily       aspirin  Status post right radical nephrectomy, diagnosis undocumented etiology  Date unknown to the patient  Right carotid bruit present on exam, history of carotid artery stenosis  Depression    Coronary artery disease    History of gastric ulcer      Plan:  Continue current antibiotics, IV Rocephin  Await urine culture  Maintain 3 way catheter and titrate CBI until urine clear  The patient would require cystoscopy as an inpatient if the patient shows a drop in hemoglobin or no improvement with the hematuria over the next 24 hours  Otherwise the patient should have cystoscopy done as an outpatient after hospital discharge, and this likely would be done by his urologist in [facility] that he has maintained care in the past   Nursing to hand irrigate urinary catheter if any clots until clear  Continue to monitor hemoglobin, repeat a m   Labs  Patient should not be on any anticoagulation or anti-platelet medications  Maintain the patient on daily Flomax  This provider will personally contact the attending on-call urologist, Dr Mohit Benson, via telephone for further recommendations at this time  Will hold retroperitoneal ultrasound at this time  The patient has a surgically absent right kidney  Also will order Ditropan as needed for bladder spasms  History of Present Illness   Attending: Cecy Emerson MD  Reason for Consult / Principal Problem:  Gross hematuria for the past 2 days    HPI: Orville De Leon is a 81y o  year old male who presents with a previous history of prostatic enlargement and had been previously started on Flomax daily  The patient is followed by Dr Sukhjinder Piper, urologist from [place]  The patient is forgetful and has a history of Alzheimer's disease  He is a difficult historian  Patient was brought to the hospital and seen in the ED last evening because of grossly bloody urine and clots have been noted now for about 5 days  He was experiencing painful bladder spasms for the past 2 days  He first was noticed to have mild blood in his urine earlier in the week prior to this and he was started on p o  Cipro by Dr Lottie Hemphill  The patient was also ordered an outpatient retroperitoneal ultrasound which has not been performed  Review of all diagnostic studies including laboratory results in [facility] system does not find any previous urinalysis to find out whether not he had had been experiencing any blood in his urine prior to this  The patient had a right radical nephrectomy performed, however the patient is forgetful and cannot recall what year the surgeries performed or the surgeon  He is on aware if this was performed for renal carcinoma on the right side  Patient underwent a CT scan of the abdomen and pelvis without IV contrast last evening in the ED  No definitive etiology for hematuria was identified     There was suggestion of hyperdensity within the bladder lumen likely representing hemorrhage  The patient had insertion of a 3 way large-bore Loyd catheter last evening and was started on continuous bladder irrigation  Clots were hand irrigated by nursing last evening  His CBI bladder irrigation is punch red in color at present though no visible clots in the Loyd bag or tubing  The patient has not had any documented fever or chills  He denies any flank pain  He is limited as a historian because of forgetfulness  He denies nausea or vomiting  CBC showed a white count of 10 42  H&H were 11 6 and 34 1  Creatinine 1 1  The patient has been started on IV Rocephin  Urinalysis showed innumerable blood present  The patient has significant proteinuria present in the urine  Urine culture is pending at this time  Inpatient consult to Urology  Consult performed by: John Valdze ordered by: Oscar Beltre        Review of Systems entire 12 point review of systems other than the urinary review described above in the HPI was difficult from the patient because of his mental status with dementia and confusion  The patient is currently on a bed alarm  The majority of the patient's past medical history needed to be obtained upon review of his old medical records and office notes from his primary care provider, Dr Marcos Osgood      Historical Information   Past Medical History:   Diagnosis Date    Abnormal weight loss     Alzheimer's disease 5/2/2018    Coronary artery disease     Disease of thyroid gland     Gastric ulcer     last assessed: 2/24/14    Hypothyroid 5/2/2018    Inguinal hernia, left     last assessed: 10/8/14    Peptic ulcer     last assessed: 5/21/13    Prostate enlargement 5/2/2018     Past Surgical History:   Procedure Laterality Date    AORTIC VALVE REPLACEMENT      CATARACT EXTRACTION Bilateral     CORONARY ARTERY BYPASS GRAFT      INGUINAL HERNIA REPAIR      NEPHRECTOMY Right     THROMBOENDARTERECTOMY      Carotid Social History   History   Alcohol Use    Yes     Comment: social     History   Drug Use No     History   Smoking Status    Former Smoker   Smokeless Tobacco    Never Used     Comment: quit 20 years ago     Family History: non-contributory to HPI  Mother sudden death, cause unknown  Father had black lung  No history of  malignancy  Meds/Allergies   current meds:   Current Facility-Administered Medications   Medication Dose Route Frequency    acetaminophen (TYLENOL) tablet 650 mg  650 mg Oral Q6H PRN    atorvastatin (LIPITOR) tablet 80 mg  80 mg Oral Daily    cefTRIAXone (ROCEPHIN) IVPB (premix) 1,000 mg  1,000 mg Intravenous Q24H    cholecalciferol (VITAMIN D3) tablet 2,000 Units  2,000 Units Oral Daily    cyanocobalamin (VITAMIN B-12) tablet 1,000 mcg  1,000 mcg Oral Daily    dextrose 5 % and sodium chloride 0 9 % infusion  75 mL/hr Intravenous Continuous    DULoxetine (CYMBALTA) delayed release capsule 20 mg  20 mg Oral BID    fentanyl citrate (PF) 100 MCG/2ML 25 mcg  25 mcg Intravenous Q2H PRN    haloperidol (HALDOL) tablet 1 mg  1 mg Oral HS PRN    levothyroxine tablet 75 mcg  75 mcg Oral Daily    ondansetron (ZOFRAN) injection 4 mg  4 mg Intravenous Q6H PRN    pantoprazole (PROTONIX) EC tablet 20 mg  20 mg Oral Daily    tamsulosin (FLOMAX) capsule 0 4 mg  0 4 mg Oral Daily With Dinner     Allergies   Allergen Reactions    Contrast [Iodinated Diagnostic Agents]     Iodine Throat Swelling     IVP contrast dye    Morphine Rash       Objective   Vitals: Blood pressure 169/77, pulse (!) 52, temperature (!) 97 °F (36 1 °C), temperature source Temporal, resp  rate 18, height 6' (1 829 m), weight 69 6 kg (153 lb 7 oz), SpO2 97 %  I/O last 24 hours:   In: -   Out: 1400 [Urine:1400]    Invasive Devices     Peripheral Intravenous Line            Peripheral IV 05/03/18 Left Arm less than 1 day          Drain            Continuous Bladder Irrigation Three-way less than 1 day    Urethral Catheter Three way 20 Fr  less than 1 day                Physical Exam     The patient is awake and examined supine in bed  He is noticeably forgetful  He speech is clear  No facial asymmetry  Skin warm and dry to touch  No pallor jaundice  Head normocephalic  PERRLA, EOMI  Sclera appear white OU  Oral mucosa is somewhat dry  Pharynx clear  Neck supple  There is a loud right carotid bruit which is asymmetric and different from that of his radiating aortic heart murmur heard best over the right upper sternal border  Chest symmetric  Well-healed median sternotomy scar  Heart regular rate and rhythm, bradycardic  Patient has a grade 1-2/6 aortic murmur heard  Lungs essentially clear  Back no tenderness to percussion  Back appears straight  Abdomen patient has a right flank scar noted  Positive bowel sounds are heard  The abdomen is soft  No suprapubic tenderness  No definite masses  No guarding rigidity  Patient previous left inguinal hernia repair  Genitalia exam shows uncircumcised male penis  Patient has a large bore 3 way Loyd catheter currently draining 2200 mL of light red punch colored urine  No visible clots were seen by this examiner  Testicles present and nontender bilaterally  No calf tenderness  No peripheral edema  Ambulation could not be observed  No tremor  No obvious coordination problems or weakness  Lab Results:   I have personally reviewed pertinent reports        Protime-INR   Order: [id number]   Status:  Final result   Visible to patient:  No (Inaccessible in MyChart)   Next appt:  05/08/2018 at 02:00 PM in Radiology ([facility] US 1)    Ref Range & Units 5/3/18 0459   Protime 12 1 - 14 4 seconds 13 6    INR 0 86 - 1 16 1 05       Specimen Collected: 05/03/18 04:59   Last Resulted: 05/03/18 06:11                  CBC:   Lab Results   Component Value Date    WBC 10 42 (H) 05/03/2018    HGB 11 6 (L) 05/03/2018    HCT 34 1 (L) 05/03/2018    MCV 91 05/03/2018     05/03/2018 MCH 31 0 05/03/2018    MCHC 34 0 05/03/2018    RDW 12 9 05/03/2018    MPV 10 7 05/03/2018     CMP:   Lab Results   Component Value Date     05/03/2018     05/03/2018    CO2 27 05/03/2018    ANIONGAP 9 05/03/2018    BUN 18 05/03/2018    CREATININE 1 10 05/03/2018    GLUCOSE 114 05/03/2018    CALCIUM 9 0 05/03/2018    AST 42 05/03/2018    ALT 41 05/03/2018    ALKPHOS 56 05/03/2018    PROT 6 7 05/03/2018    BILITOT 0 60 05/03/2018    EGFR 63 05/03/2018     Urinalysis:   Lab Results   Component Value Date    COLORU Red 05/02/2018    CLARITYU Cloudy 05/02/2018    SPECGRAV 1 025 05/02/2018    PHUR 7 0 05/02/2018    LEUKOCYTESUR Negative 05/02/2018    NITRITE Negative 05/02/2018    PROTEINUA >=300 (A) 05/02/2018    GLUCOSEU Negative 05/02/2018    KETONESU Negative 05/02/2018    BILIRUBINUR Negative 05/02/2018    BLOODU Large (A) 05/02/2018     Urine Microscopic   Order: [id number] - Reflex for Order [id number]   Status:  Final result   Visible to patient:  No (Not Released)   Next appt:  05/08/2018 at 02:00 PM in Radiology ([facility] US 1)    Ref Range & Units 5/2/18 1149 Flag   RBC, UA None Seen, 0-5 /hpf Innumerable   A    WBC, UA None Seen, 0-5, 5-55, 5-65 /hpf Field obscured, unable to enumerate   A    Epithelial Cells None Seen, Occasional /hpf Field obscured, unable to enumerate   A    Bacteria, UA None Seen, Occasional /hpf Field obscured, unable to enumerate   A       Specimen Collected: 05/02/18 11:49   Last Resulted: 05/02/18 12:18                Urine Culture: No results found for: URINECX  PSA: No results found for: PSA  Imaging Studies: I have personally reviewed pertinent reports  CT ABDOMEN AND PELVIS WITHOUT IV CONTRAST     INDICATION:   hematuria      COMPARISON: None      TECHNIQUE:  CT examination of the abdomen and pelvis was performed without intravenous contrast   Axial, sagittal, and coronal 2D reformatted images were created from the source data and submitted for interpretation     Radiation dose length product (DLP) for this visit:  186 37 mGy-cm   This examination, like all CT scans performed in the [facility], was performed utilizing techniques to minimize radiation dose exposure, including the use of iterative   reconstruction and automated exposure control       Enteric contrast was administered       FINDINGS:     ABDOMEN     LOWER CHEST:  No clinically significant abnormality identified in the visualized lower chest      LIVER/BILIARY TREE:  Unremarkable      GALLBLADDER:  No calcified gallstones  No pericholecystic inflammatory change      SPLEEN:  Unremarkable      PANCREAS:  Unremarkable      ADRENAL GLANDS:  Unremarkable      KIDNEYS/URETERS:  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  Noncontrast evaluation of left kidney is grossly unremarkable      STOMACH AND BOWEL:  Unremarkable      APPENDIX:  No findings to suggest appendicitis      ABDOMINOPELVIC CAVITY:  No ascites or free intraperitoneal air  No lymphadenopathy      VESSELS:  Unremarkable for patient's age      PELVIS     REPRODUCTIVE ORGANS:  Status post hysterectomy      URINARY BLADDER:  Loyd catheter is present  Hyperdensity within the bladder lumen is consistent with history of hematuria      ABDOMINAL WALL/INGUINAL REGIONS:  Unremarkable      OSSEOUS STRUCTURES:  No acute fracture or destructive osseous lesion      IMPRESSION:     No etiology for hematuria identified  Urologic follow-up is recommended  Hyperdensity within the bladder lumen likely representing hemorrhage        EKG, Pathology, and Other Studies: I have personally reviewed pertinent reports  VTE Prophylaxis: Reason for no pharmacologic prophylaxis Gross hematuria    Code Status: Level 1 - Full Code  Counseling / Coordination of Care  Total floor / unit time spent today 50 minutes  Greater than 50% of total time was spent with the patient and / or family counseling and / or coordination of care   A description of the counseling / coordination of care:  Extensive review of the patient's previous medical records, laboratory studies, diagnostic imaging, personal examination of the patient, and review of e-mail correspondence from the overnight on-call nurse practitioner representing the Urology group for recommendations in the care of the patient at this time      Frederick Bloom PA-C

## 2018-05-03 NOTE — PROGRESS NOTES
Spears catheter became blocked and leaked large amounts around catheter twice this morning  Spears catheter was hand irrigated with NSS resulting in a large amount of small blood clots both times  CBI continued and spears is patent

## 2018-05-03 NOTE — H&P
History and Physical - [facility]    Patient Information: Tre Shi 81 y o  male MRN: [id number]  Unit/Bed#: 752-22 Encounter: [id number]  Admitting Physician: Amparo Park MD  PCP: Julia Cheung MD  Date of Admission:  05/02/18    Assessment/Plan:    Hospital Problem List:     Principal Problem:    Gross hematuria  Active Problems:    Hypothyroid    Alzheimer's disease    Prostate enlargement    Complicated UTI    Acute post-hemorrhagic anemia      Plan for the Primary Problem(s):  · Admit to telemetry, monitor CBC & INR, transfuse with blood products PRN  · Immediate ICU transfer if clinical deterioration or unstable    Plan for Additional Problems:   · Urology consult, NPO regarding possible cystoscopy, start continuous bladder irrigation via Loyd catheter  · Avoid anticoagulants & antiplatelet medications for now  · Cont treatment of WHEELER-related complicated UTI with IV rocephin, await urine C&S    VTE Prophylaxis: Pharmacologic VTE Prophylaxis contraindicated due to hematuria  / sequential compression device   Code Status: full  POLST: There is no POLST form on file for this patient (pre-hospital)    Anticipated Length of Stay:  Patient will be admitted on an Inpatient basis with an anticipated length of stay of  > 2 midnights  Justification for Hospital Stay: requires CBI      Chief Complaint:   bloody urine past 5 days    History of Present Illness: Tre Shi is a 81 y o  male who takes flomax for enlarged prostate & follows with a local urologist  His wife at bedside offered most of the history due to the patient's dementia  Patient was scheduled for a renal US next week by his urologist with cystoscopy in mind after the patient began to pass blood clots in his urine 2 days ago associated with painful bladder spasms   This was preceded by initially mild gross hematuria 4 days ago for which he was prescribed PO cipro by same urologist due to suspected complicated UTI related to bladder outlet obstruction  No reported fever, shaking chills, malaise, flank pain, nausea, vomiting, diarrhea, chest pain, cough, dyspnea, headache, neck stiffness, or skin rash  Urethral spears catheter was inserted at the ED with continuous gross hematuria  Review of Systems:    A 10+ point review of systems was obtained & is as above, otherwise negative  Review of Systems    Past Medical and Surgical History:     Past Medical History:   Diagnosis Date    Abnormal weight loss     Alzheimer's disease 5/2/2018    Coronary artery disease     Disease of thyroid gland     Gastric ulcer     last assessed: 2/24/14    Hypothyroid 5/2/2018    Inguinal hernia, left     last assessed: 10/8/14    Peptic ulcer     last assessed: 5/21/13    Prostate enlargement 5/2/2018       Past Surgical History:   Procedure Laterality Date    AORTIC VALVE REPLACEMENT      CATARACT EXTRACTION Bilateral     CORONARY ARTERY BYPASS GRAFT      INGUINAL HERNIA REPAIR      NEPHRECTOMY Right     THROMBOENDARTERECTOMY      Carotid       Meds/Allergies:    Prior to Admission medications    Medication Sig Start Date End Date Taking?  Authorizing Provider   aspirin (ECOTRIN LOW STRENGTH) 81 mg EC tablet Take 81 mg by mouth daily   Yes Historical Provider, MD   atorvastatin (LIPITOR) 80 mg tablet Take 80 mg by mouth daily   Yes Historical Provider, MD   cholecalciferol (VITAMIN D3) 1,000 units tablet Take 2,000 Units by mouth daily   Yes Historical Provider, MD   ciprofloxacin (CIPRO) 500 mg tablet Take 500 mg by mouth every 12 (twelve) hours   Yes Historical Provider, MD   co-enzyme Q-10 30 MG capsule Take 100 mg by mouth 3 (three) times a day   Yes Historical Provider, MD   COCONUT OIL-FLAXSEED OIL PO Take by mouth   Yes Historical Provider, MD   cyanocobalamin (VITAMIN B-12) 1,000 mcg tablet Take 1,000 mcg by mouth daily   Yes Historical Provider, MD   DULoxetine (CYMBALTA) 30 mg delayed release capsule Take 20 mg by mouth 2 (two) times a day   Yes Historical Provider, MD   levothyroxine 75 mcg tablet Take 1 tablet (75 mcg total) by mouth daily 4/24/18  Yes PAULO Morales   multivitamin (THERAGRAN) TABS Take 1 tablet by mouth daily   Yes Historical Provider, MD   pantoprazole (PROTONIX) 20 mg tablet Take 20 mg by mouth daily   Yes Historical Provider, MD   tamsulosin (FLOMAX) 0 4 mg Take 0 4 mg by mouth daily with dinner   Yes Historical Provider, MD     I have reviewed home medications with patient personally  Allergies:    Allergies   Allergen Reactions    Contrast [Iodinated Diagnostic Agents]     Iodine Throat Swelling     IVP contrast dye    Morphine Rash       Social History:     Marital Status: /Civil Union   Patient Pre-hospital Living Situation: home  Substance Use History:   History   Alcohol Use    Yes     Comment: social     History   Smoking Status    Former Smoker   Smokeless Tobacco    Never Used     Comment: quit 20 years ago     History   Drug Use No       Family History:    non-contributory    Physical Exam:   General: elderly, in no overt distress  HEENT: a bit pale, not jaundiced  Neck: supple, no JVD  Resp: clear lungs, no crackles or wheeze  Cardiovascular: S1 S2 audible, regular  GI: soft abdomen, nontender, bowel sounds present  Musculoskeletal: no pedal edema or cyanosis  : spears catheter draining grossly bloody urine into bag  Skin: no petechiae or suspicious rash  Psych: oriented to person & place  Neuro: Awake, alert, pleasantly confused, follows commands, essentially nonfocal      Vitals:   Blood Pressure: 169/77 (05/02/18 2020)  Pulse: (!) 52 (05/02/18 2020)  Temperature: (!) 97 °F (36 1 °C) (05/02/18 2020)  Temp Source: Temporal (05/02/18 2020)  Respirations: 18 (05/02/18 2020)  Height: 6' (182 9 cm) (05/02/18 2020)  Weight - Scale: 69 6 kg (153 lb 7 oz) (05/02/18 2020)  SpO2: 97 % (05/02/18 2020)      Additional Data:     Lab Results: I have personally reviewed pertinent reports  Results from last 7 days  Lab Units 05/02/18  1154   WBC Thousand/uL 8 35   HEMOGLOBIN g/dL 10 4*   HEMATOCRIT % 30 1*   PLATELETS Thousands/uL 185   NEUTROS PCT % 76*   LYMPHS PCT % 18   MONOS PCT % 5   EOS PCT % 1       Results from last 7 days  Lab Units 05/02/18  1154   SODIUM mmol/L 139   POTASSIUM mmol/L 4 0   CHLORIDE mmol/L 103   CO2 mmol/L 30   BUN mg/dL 26*   CREATININE mg/dL 1 35*   CALCIUM mg/dL 9 0   GLUCOSE RANDOM mg/dL 106       Results from last 7 days  Lab Units 05/02/18  1154   INR  1 04     Invalid input(s): TROIP    Imaging: I have personally reviewed pertinent reports  Ct Abdomen Pelvis Wo Contrast    Result Date: 5/2/2018  Narrative: CT ABDOMEN AND PELVIS WITHOUT IV CONTRAST INDICATION:   hematuria  COMPARISON: None  TECHNIQUE:  CT examination of the abdomen and pelvis was performed without intravenous contrast   Axial, sagittal, and coronal 2D reformatted images were created from the source data and submitted for interpretation  Radiation dose length product (DLP) for this visit:  186 37 mGy-cm   This examination, like all CT scans performed in the [facility], was performed utilizing techniques to minimize radiation dose exposure, including the use of iterative  reconstruction and automated exposure control  Enteric contrast was administered  FINDINGS: ABDOMEN LOWER CHEST:  No clinically significant abnormality identified in the visualized lower chest  LIVER/BILIARY TREE:  Unremarkable  GALLBLADDER:  No calcified gallstones  No pericholecystic inflammatory change  SPLEEN:  Unremarkable  PANCREAS:  Unremarkable  ADRENAL GLANDS:  Unremarkable  KIDNEYS/URETERS:  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  Noncontrast evaluation of left kidney is grossly unremarkable  STOMACH AND BOWEL:  Unremarkable  APPENDIX:  No findings to suggest appendicitis  ABDOMINOPELVIC CAVITY:  No ascites or free intraperitoneal air  No lymphadenopathy   VESSELS:  Unremarkable for patient's age  PELVIS REPRODUCTIVE ORGANS:  Status post hysterectomy  URINARY BLADDER:  Loyd catheter is present  Hyperdensity within the bladder lumen is consistent with history of hematuria  ABDOMINAL WALL/INGUINAL REGIONS:  Unremarkable  OSSEOUS STRUCTURES:  No acute fracture or destructive osseous lesion  Impression: No etiology for hematuria identified  Urologic follow-up is recommended  Hyperdensity within the bladder lumen likely representing hemorrhage  Workstation performed: [id number]         ** Please Note: Dragon 360 Dictation voice to text software may have been used in the creation of this document

## 2018-05-04 LAB
ANION GAP SERPL CALCULATED.3IONS-SCNC: 5 MMOL/L (ref 4–13)
BASOPHILS # BLD AUTO: 0.05 THOUSANDS/ΜL (ref 0–0.1)
BASOPHILS NFR BLD AUTO: 1 % (ref 0–1)
BUN SERPL-MCNC: 20 MG/DL (ref 5–25)
CALCIUM SERPL-MCNC: 9 MG/DL (ref 8.3–10.1)
CHLORIDE SERPL-SCNC: 103 MMOL/L (ref 100–108)
CO2 SERPL-SCNC: 29 MMOL/L (ref 21–32)
CREAT SERPL-MCNC: 1.16 MG/DL (ref 0.6–1.3)
EOSINOPHIL # BLD AUTO: 0.13 THOUSAND/ΜL (ref 0–0.61)
EOSINOPHIL NFR BLD AUTO: 1 % (ref 0–6)
ERYTHROCYTE [DISTWIDTH] IN BLOOD BY AUTOMATED COUNT: 12.9 % (ref 11.6–15.1)
GFR SERPL CREATININE-BSD FRML MDRD: 59 ML/MIN/1.73SQ M
GLUCOSE SERPL-MCNC: 107 MG/DL (ref 65–140)
HCT VFR BLD AUTO: 31.5 % (ref 36.5–49.3)
HGB BLD-MCNC: 10.7 G/DL (ref 12–17)
LYMPHOCYTES # BLD AUTO: 1.34 THOUSANDS/ΜL (ref 0.6–4.47)
LYMPHOCYTES NFR BLD AUTO: 14 % (ref 14–44)
MCH RBC QN AUTO: 31.1 PG (ref 26.8–34.3)
MCHC RBC AUTO-ENTMCNC: 34 G/DL (ref 31.4–37.4)
MCV RBC AUTO: 92 FL (ref 82–98)
MONOCYTES # BLD AUTO: 0.93 THOUSAND/ΜL (ref 0.17–1.22)
MONOCYTES NFR BLD AUTO: 10 % (ref 4–12)
NEUTROPHILS # BLD AUTO: 7.02 THOUSANDS/ΜL (ref 1.85–7.62)
NEUTS SEG NFR BLD AUTO: 74 % (ref 43–75)
PLATELET # BLD AUTO: 189 THOUSANDS/UL (ref 149–390)
PMV BLD AUTO: 10.7 FL (ref 8.9–12.7)
POTASSIUM SERPL-SCNC: 4.6 MMOL/L (ref 3.5–5.3)
RBC # BLD AUTO: 3.44 MILLION/UL (ref 3.88–5.62)
SODIUM SERPL-SCNC: 137 MMOL/L (ref 136–145)
WBC # BLD AUTO: 9.47 THOUSAND/UL (ref 4.31–10.16)

## 2018-05-04 PROCEDURE — 99232 SBSQ HOSP IP/OBS MODERATE 35: CPT | Performed by: FAMILY MEDICINE

## 2018-05-04 PROCEDURE — 85025 COMPLETE CBC W/AUTO DIFF WBC: CPT | Performed by: FAMILY MEDICINE

## 2018-05-04 PROCEDURE — 80048 BASIC METABOLIC PNL TOTAL CA: CPT | Performed by: FAMILY MEDICINE

## 2018-05-04 PROCEDURE — 99232 SBSQ HOSP IP/OBS MODERATE 35: CPT

## 2018-05-04 RX ORDER — ATROPA BELLADONNA AND OPIUM 16.2; 6 MG/1; MG/1
1 SUPPOSITORY RECTAL EVERY 6 HOURS PRN
Status: DISCONTINUED | OUTPATIENT
Start: 2018-05-04 | End: 2018-05-06

## 2018-05-04 RX ORDER — ATROPA BELLADONNA AND OPIUM 16.2; 6 MG/1; MG/1
1 SUPPOSITORY RECTAL EVERY 6 HOURS PRN
Status: DISCONTINUED | OUTPATIENT
Start: 2018-05-04 | End: 2018-05-04

## 2018-05-04 RX ADMIN — ACETAMINOPHEN 650 MG: 325 TABLET, FILM COATED ORAL at 05:02

## 2018-05-04 RX ADMIN — HALOPERIDOL 1 MG: 1 TABLET ORAL at 21:48

## 2018-05-04 RX ADMIN — DULOXETINE HYDROCHLORIDE 20 MG: 20 CAPSULE, DELAYED RELEASE ORAL at 09:09

## 2018-05-04 RX ADMIN — CIPROFLOXACIN 500 MG: 500 TABLET, FILM COATED ORAL at 09:09

## 2018-05-04 RX ADMIN — TAMSULOSIN HYDROCHLORIDE 0.4 MG: 0.4 CAPSULE ORAL at 17:03

## 2018-05-04 RX ADMIN — DULOXETINE HYDROCHLORIDE 20 MG: 20 CAPSULE, DELAYED RELEASE ORAL at 17:03

## 2018-05-04 RX ADMIN — OXYBUTYNIN CHLORIDE 5 MG: 5 TABLET, EXTENDED RELEASE ORAL at 09:09

## 2018-05-04 RX ADMIN — CYANOCOBALAMIN TAB 1000 MCG 1000 MCG: 1000 TAB at 09:09

## 2018-05-04 RX ADMIN — LEVOTHYROXINE SODIUM 88 MCG: 88 TABLET ORAL at 05:01

## 2018-05-04 RX ADMIN — CIPROFLOXACIN 500 MG: 500 TABLET, FILM COATED ORAL at 21:48

## 2018-05-04 RX ADMIN — ATORVASTATIN CALCIUM 80 MG: 40 TABLET, FILM COATED ORAL at 17:03

## 2018-05-04 RX ADMIN — PANTOPRAZOLE SODIUM 20 MG: 20 TABLET, DELAYED RELEASE ORAL at 05:02

## 2018-05-04 RX ADMIN — ATROPA BELLADONNA AND OPIUM 1 SUPPOSITORY: 16.2; 6 SUPPOSITORY RECTAL at 17:18

## 2018-05-04 RX ADMIN — HALOPERIDOL 1 MG: 1 TABLET ORAL at 00:33

## 2018-05-04 NOTE — ASSESSMENT & PLAN NOTE
Urine culture had no growth likely because patient had been on antibiotic (ciprofloxacin) prior to obtaining urine sample  Will transition back to cipro PO to complete course  Monitor CBC/VS

## 2018-05-04 NOTE — ASSESSMENT & PLAN NOTE
Secondary to hematuria   Stable H&H  Continue with bladder irrigation  Repeat CBC in am  Anticipate discharge tomorrow

## 2018-05-04 NOTE — PROGRESS NOTES
Hand irrigation performed on catheter  Removed several small clots  Patient c/o bladder spasms intermittently  Dr Elver Thompson aware

## 2018-05-04 NOTE — PROGRESS NOTES
Progress Note Lacy Boast 1935, 81 y o  male MRN: [id number]    Unit/Bed#: 586-43 Encounter: [id number]    Primary Care Provider: Yuliana Velazquez MD   Date and time admitted to hospital: 5/2/2018 11:17 AM        Acute post-hemorrhagic anemia   Assessment & Plan    Secondary to hematuria   Stable H&H  Continue with bladder irrigation  Repeat CBC in am  Anticipate discharge tomorrow          Complicated UTI (urinary tract infection)   Assessment & Plan    Urine culture had no growth likely because patient had been on antibiotic (ciprofloxacin) prior to obtaining urine sample  Will transition back to cipro PO to complete course  Monitor CBC/VS        Prostate enlargement   Assessment & Plan    Predisposing to recurrent urinary tract infections  Urology consulted        Alzheimer's disease   Assessment & Plan    Chronic   Ongoing family update        Hypothyroid   Assessment & Plan    TSH slightly elevated, increased levothyroxine from 77-88 mcg daily  Recommend to repeat TSH in 4-6 weeks        * Gross hematuria   Assessment & Plan    Urology input appreciated  H&H stable and outpatient cystoscopy recommended  Continue bladder irrigation  Repeat CBC in the morning  Possible discharge tomorrow          VTE Pharmacologic Prophylaxis:   Pharmacologic: None due to acute hematuria  Mechanical VTE Prophylaxis in Place: Yes    Patient Centered Rounds: I have performed bedside rounds with nursing staff today  Discussions with Specialists or Other Care Team Provider: Urology    Education and Discussions with Family / Patient: will update wife    Time Spent for Care: 30 minutes  More than 50% of total time spent on counseling and coordination of care as described above      Current Length of Stay: 2 day(s)    Current Patient Status: Inpatient   Certification Statement: The patient will continue to require additional inpatient hospital stay due to need for close monitoring of urine output, bladder irrigation, daily labs    Discharge Plan: 24-48 hours    Code Status: Level 1 - Full Code      Subjective:   Patient seen and examined  He is sleeping but easy to arouse  He has no complaints  He is easily re-oriented regarding location  No o/n events  Urine in bag pink and clear  Objective:     Vitals:   Temp (24hrs), Av °F (36 7 °C), Min:97 3 °F (36 3 °C), Max:98 7 °F (37 1 °C)    HR:  [51-70] 56  Resp:  [17-18] 18  BP: (111-139)/(60-72) 139/72  SpO2:  [97 %-99 %] 98 %  Body mass index is 21 26 kg/m²  Input and Output Summary (last 24 hours): Intake/Output Summary (Last 24 hours) at 18 1207  Last data filed at 18 7352   Gross per 24 hour   Intake          1471 25 ml   Output             3270 ml   Net         -1798 75 ml       Physical Exam:     Physical Exam   Constitutional: No distress  HENT:   Head: Normocephalic and atraumatic  Eyes: Conjunctivae are normal    Neck: No JVD present  Cardiovascular: Normal rate and regular rhythm  No murmur heard  Pulmonary/Chest: Effort normal  No respiratory distress  He has no wheezes  He has no rales  Abdominal: Soft  He exhibits no distension  There is no tenderness  There is no guarding  Genitourinary:   Genitourinary Comments: Loyd, clear pink urine   Musculoskeletal: He exhibits no edema  Neurological: He is alert  Skin: Skin is warm and dry  Psychiatric: His speech is delayed  He is slowed  Cognition and memory are impaired         Additional Data:     Labs:      Results from last 7 days  Lab Units 18  0522   WBC Thousand/uL 9 47   HEMOGLOBIN g/dL 10 7*   HEMATOCRIT % 31 5*   PLATELETS Thousands/uL 189   NEUTROS PCT % 74   LYMPHS PCT % 14   MONOS PCT % 10   EOS PCT % 1       Results from last 7 days  Lab Units 18  0522 18  0459   SODIUM mmol/L 137 139   POTASSIUM mmol/L 4 6 3 7   CHLORIDE mmol/L 103 103   CO2 mmol/L 29 27   BUN mg/dL 20 18   CREATININE mg/dL 1 16 1 10   CALCIUM mg/dL 9 0 9 0   TOTAL PROTEIN g/dL  -- 6  7   BILIRUBIN TOTAL mg/dL  --  0 60   ALK PHOS U/L  --  56   ALT U/L  --  41   AST U/L  --  42   GLUCOSE RANDOM mg/dL 107 114       Results from last 7 days  Lab Units 05/03/18  0459   INR  1 05                 * I Have Reviewed All Lab Data Listed Above  * Additional Pertinent Lab Tests Reviewed: Anyi 66 Admission Reviewed    Imaging:    Imaging Reports Reviewed Today Include: no new    Recent Cultures (last 7 days):       Results from last 7 days  Lab Units 05/02/18  1149   URINE CULTURE  No Growth <1000 cfu/mL       Last 24 Hours Medication List:     Current Facility-Administered Medications:  acetaminophen 650 mg Oral Q6H PRN Willem Vega MD   atorvastatin 80 mg Oral Daily Willem Vega MD   ciprofloxacin 500 mg Oral Q12H [address] Yimi Apple MD   cyanocobalamin 1,000 mcg Oral Daily Willem Vega MD   DULoxetine 20 mg Oral BID Willem Vega MD   fentanyl citrate (PF) 25 mcg Intravenous Q2H PRN Willem Vega MD   haloperidol 1 mg Oral HS PRN Willem Vega MD   levothyroxine 88 mcg Oral Daily Yimi Apple MD   ondansetron 4 mg Intravenous Q6H PRN Willem Vega MD   oxybutynin 5 mg Oral Daily Staci Will PA-C   pantoprazole 20 mg Oral Daily Willem Vega MD   tamsulosin 0 4 mg Oral Daily With Dinner Willem Vega MD        Today, Patient Was Seen By: Zach Rausch MD    ** Please Note: Dictation voice to text software may have been used in the creation of this document   **

## 2018-05-04 NOTE — DISCHARGE INSTRUCTIONS
Large bore 3 way catheter to remain indwelling after hospital discharge, attached to a leg bag  He should remain on oral antibiotics while the indwelling Loyd catheter remains after hospital discharge  The patient's wife can be instructed for care and emptying of the Loyd bag as well as any irrigation of the catheter at home  He previously had been under the care of Dr Helena Singh, his established urologist in [place]    Dr Shannon Westfall office telephone #[phone]  The patient will need follow-up with Dr Adeline Braun next week after he has been discharged from the hospital     Kellee Robles PA-C

## 2018-05-04 NOTE — PROGRESS NOTES
Several attempts made throughout the day to decrease the flow of CBI  Each attempt resulted in increased clots, increased drainage from urinary meatus and increased bladder spasms  Dr Lima Ma, urology, notified  New order received for ANJALI suppository, which was then administered

## 2018-05-04 NOTE — PROGRESS NOTES
Moderate amount of leakage noted around spears  Hand irrigated for small amount of small blood clots  Rachel GONZALEZ made aware of leakage  Instructed to check balloon  Balloon is intact with no issues  CBI resumed  Patient is in no pain and is resting comfortably at this time  Will continue to monitor closely throughout the night

## 2018-05-04 NOTE — PHYSICAL THERAPY NOTE
PT NOTE:                Order received and chart review performed  Attempted to see pt in pm however pt still with leaking and bleeding around catheter and connected to CBI  Obtained prior level of function from pt and home living set up in addition to encouraging pt to dangle at EOB for all meals which he verbalized understanding  Will hold PT evaluation until bleeding around catheter stops  Pt is (I) at home with ambulation ADL's IADL's and driving  Pt lives with spouse in a 2 story house with bedroom 2nd floor and bathroom all floors  Pt has a walk-in shower and RW at home that he does not use  Pt has 4 BUCK with HR and 13 steps to 2nd floor with HR

## 2018-05-04 NOTE — PROGRESS NOTES
Urology Progress Note - Nilsa Alcantar 81 y o  male MRN: [id number]    Unit/Bed#: 183-50 Encounter: [id number]      Assessment:  Principal Problems:    Gross hematuria, improving with CBI and thinning clots with hand irrigation p r n  by nursing      Mild post hemorrhagic anemia likely secondary to hematuria, continue to monitor, no indication for blood transfusion  Patient's hemoglobin today is 10 7, yesterday 11 6    History of chronic iron deficiency anemia     History of chronic kidney disease, creatinine today 1 6    Proteinuria    Urinary tract infection, likely related to bladder outlet obstruction from chronic prostatic hypertrophy      Other Active Problems:    Hypothyroid    Alzheimer's disease    Previous aortic valve replacement     Status post right radical nephrectomy    Right carotid bruit present on exam, history of carotid artery stenosis  Previous left carotid endarterectomy    Depression    Coronary artery disease    History of gastric ulcer    Plan:    Titrate CBI bladder irrigation today, hand irrigation p r n  by nursing  No indication for urgent cystoscopy at this time  The patient's hemoglobin is stable  May stop CBI when no visible clots are seen  Cap the inflow port when CBI irrigation has been discontinued  The patient was started on Ditropan XL 5 mg daily yesterday and seems to be helping with his bladder spasms  Large bore 3 way catheter to remain indwelling after hospital discharge, attached to a leg bag  Patient has been transitioned back to p o  Cipro 500 mg q 12 hours by the hospitalist physician, urine culture showed no growth however the patient had been on antibiotics prior to hospital admission  Maintain the patient on daily tamsulosin as before  He should remain on p o  antibiotics while the indwelling Loyd catheter remains after hospital discharge  Repeat a m  labs including CBC    Please notify the urologist on call with Stacia Ponce if any hemoglobin dropped more than 1 g tomorrow from his baseline today  The patient's wife can be instructed for care and emptying of the Loyd bag as well as any irrigation of the catheter at home  He previously had been under the care of Dr Juan Diego Blount, his established urologist in [place]  Dr Hali Grey office telephone #[phone]  The patient will need follow-up with Dr Arleth Waters next week after he has been discharged from the hospital   Dr Kate Sanderson is the [facility] urologist on call for the service starting today  Subjective: The patient remains confused  He had a 3 way catheter inserted on admission  CBI bladder irrigation is running at present  The urine in the tubing is very light pink tinged  There is about 2400 mL of red blood in the collection bag  The patient denies any complaints  He does believe that he is experiencing very little bladder spasms compared to yesterday  Nursing reports that they had to irrigate by hand the catheter 3 times per shift last evening and through the night  The urine is less red in color at present  No visible clots are seen by this examiner at present  Also the patient was having some leaking around the urethral meatus last evening  The catheter balloon is 30 mL  Confirmed with nursing that 30 mL was used to fill the balloon  He has not been having any leaking from the catheter overnight  He likely was having some overflow incontinence with bladder spasms and this has been improved since you started Ditropan XL yesterday  There is no report of any fever  He is hemoglobin today is stable  Urine culture report came back and showed no growth, however the patient had been started on Cipro by his urologist from [place] about 6 days ago        Scheduled Meds:  Current Facility-Administered Medications:  acetaminophen 650 mg Oral Q6H PRN Gray Mitchell MD   atorvastatin 80 mg Oral Daily Gray Mitchell MD   ciprofloxacin 500 mg Oral Q12H Paula Kelley MD cyanocobalamin 1,000 mcg Oral Daily Elfego Navas MD   DULoxetine 20 mg Oral BID Elfego Navas MD   fentanyl citrate (PF) 25 mcg Intravenous Q2H PRN Elfego Navas MD   haloperidol 1 mg Oral HS PRN Elfego Navas MD   levothyroxine 88 mcg Oral Daily Chance Winston MD   ondansetron 4 mg Intravenous Q6H PRN Elfego Navas MD   oxybutynin 5 mg Oral Daily Marti Chin PA-C   pantoprazole 20 mg Oral Daily Elfego Navas MD   tamsulosin 0 4 mg Oral Daily With Dinner Elfego Navas MD     Continuous Infusions:   PRN Meds:   acetaminophen    fentanyl citrate (PF)    haloperidol    ondansetron    Objective:     Vitals: Blood pressure 116/66, pulse (!) 51, temperature (!) 97 3 °F (36 3 °C), temperature source Temporal, resp  rate 18, height 6' (1 829 m), weight 71 1 kg (156 lb 12 oz), SpO2 99 %  ,Body mass index is 21 26 kg/m²  Intake/Output Summary (Last 24 hours) at 05/04/18 0735  Last data filed at 05/04/18 6656   Gross per 24 hour   Intake          1471 25 ml   Output             1470 ml   Net             1 25 ml       Physical Exam:  Patient is lying supine in bed  He is awake  The patient is confused and forgetful  He is not reliable as historian  Head normocephalic  Left neck scar noted  Loud right carotid bruit heard  Oral mucosa is somewhat dry  Chest median sternotomy scar  Heart regular rate and rhythm, bradycardic, with a grade 3/6 systolic murmur heard throughout precordium  Lungs essentially clear  No wheezing is heard  Back no CVA tenderness  Abdomen positive bowel sounds are heard  Abdomen is soft  No guarding  No suprapubic tenderness  He has a large bore 3 way urethral catheter which is draining light pink tinged urine in the tubing  No leaking around the urethral meatus at present        Invasive Devices     Peripheral Intravenous Line            Peripheral IV 05/04/18 Left Hand less than 1 day          Drain            Continuous Bladder Irrigation Three-way 1 day    Urethral Catheter Three way 20 Fr  1 day                Lab, Imaging and other studies: I have personally reviewed pertinent reports         Urine culture   Order: [id number] - Reflex for Order [id number]   Status:  Final result   Visible to patient:  No (Inaccessible in [address])   Next appt:  05/08/2018 at 02:00 PM in Radiology ([facility] US 1)   Specimen Information: Urine, Clean Catch; Urine        Urine Culture No Growth <1000 cfu/mL             Specimen Collected: 05/02/18 11:49   Last Resulted: 05/03/18 11:49                  CBC and differential   Order: [id number]   Status:  Final result   Visible to patient:  No (Not Released)   Next appt:  05/08/2018 at 02:00 PM in Radiology ([facility] US 1)    Ref Range & Units 5/4/18 0522 Flag   WBC 4 31 - 10 16 Thousand/uL 9 47     RBC 3 88 - 5 62 Million/uL 3 44   L    Hemoglobin 12 0 - 17 0 g/dL 10 7   L    Hematocrit 36 5 - 49 3 % 31 5   L    MCV 82 - 98 fL 92     MCH 26 8 - 34 3 pg 31 1     MCHC 31 4 - 37 4 g/dL 34 0     RDW 11 6 - 15 1 % 12 9     MPV 8 9 - 12 7 fL 10 7     Platelets 769 - 815 Thousands/uL 189     Neutrophils Relative 43 - 75 % 74     Lymphocytes Relative 14 - 44 % 14     Monocytes Relative 4 - 12 % 10     Eosinophils Relative 0 - 6 % 1     Basophils Relative 0 - 1 % 1     Neutrophils Absolute 1 85 - 7 62 Thousands/µL 7 02     Lymphocytes Absolute 0 60 - 4 47 Thousands/µL 1 34     Monocytes Absolute 0 17 - 1 22 Thousand/µL 0 93     Eosinophils Absolute 0 00 - 0 61 Thousand/µL 0 13     Basophils Absolute 0 00 - 0 10 Thousands/µL 0 05        Specimen Collected: 05/04/18 05:22   Last Resulted: 05/04/18 05:52              VTE Pharmacologic Prophylaxis: Reason for no pharmacologic prophylaxis Hematuria  VTE Mechanical Prophylaxis: sequential compression device     Time spent by this provider today was 30 min including review of all diagnostic imaging, laboratory studies, personal examination of the patient, and telephone discussion with the on-call urologist,   Margarita Galvez, in bathroom regarding proposed urological care and management of the patient at this time      Martine Hernandez PA-C

## 2018-05-04 NOTE — ASSESSMENT & PLAN NOTE
TSH slightly elevated, increased levothyroxine from 77-88 mcg daily  Recommend to repeat TSH in 4-6 weeks

## 2018-05-04 NOTE — PROGRESS NOTES
Hand irrigated spears catheter for small amount of moderate sized blood clots  CBI running with out any issues at this time

## 2018-05-05 ENCOUNTER — APPOINTMENT (INPATIENT)
Dept: CT IMAGING | Facility: HOSPITAL | Age: 83
DRG: 669 | End: 2018-05-05
Payer: COMMERCIAL

## 2018-05-05 PROBLEM — R00.1 BRADYCARDIA: Status: ACTIVE | Noted: 2018-05-05

## 2018-05-05 PROBLEM — R55 SYNCOPE AND COLLAPSE: Status: ACTIVE | Noted: 2018-05-05

## 2018-05-05 LAB
ANION GAP SERPL CALCULATED.3IONS-SCNC: 8 MMOL/L (ref 4–13)
BASOPHILS # BLD AUTO: 0.04 THOUSANDS/ΜL (ref 0–0.1)
BASOPHILS NFR BLD AUTO: 1 % (ref 0–1)
BUN SERPL-MCNC: 22 MG/DL (ref 5–25)
CALCIUM SERPL-MCNC: 8.7 MG/DL (ref 8.3–10.1)
CHLORIDE SERPL-SCNC: 102 MMOL/L (ref 100–108)
CO2 SERPL-SCNC: 27 MMOL/L (ref 21–32)
CREAT SERPL-MCNC: 1.25 MG/DL (ref 0.6–1.3)
EOSINOPHIL # BLD AUTO: 0.25 THOUSAND/ΜL (ref 0–0.61)
EOSINOPHIL NFR BLD AUTO: 3 % (ref 0–6)
ERYTHROCYTE [DISTWIDTH] IN BLOOD BY AUTOMATED COUNT: 12.9 % (ref 11.6–15.1)
GFR SERPL CREATININE-BSD FRML MDRD: 54 ML/MIN/1.73SQ M
GLUCOSE SERPL-MCNC: 102 MG/DL (ref 65–140)
HCT VFR BLD AUTO: 28.9 % (ref 36.5–49.3)
HCT VFR BLD AUTO: 29.9 % (ref 36.5–49.3)
HGB BLD-MCNC: 10.2 G/DL (ref 12–17)
HGB BLD-MCNC: 9.8 G/DL (ref 12–17)
LYMPHOCYTES # BLD AUTO: 1.61 THOUSANDS/ΜL (ref 0.6–4.47)
LYMPHOCYTES NFR BLD AUTO: 18 % (ref 14–44)
MCH RBC QN AUTO: 31 PG (ref 26.8–34.3)
MCHC RBC AUTO-ENTMCNC: 33.9 G/DL (ref 31.4–37.4)
MCV RBC AUTO: 92 FL (ref 82–98)
MONOCYTES # BLD AUTO: 0.87 THOUSAND/ΜL (ref 0.17–1.22)
MONOCYTES NFR BLD AUTO: 10 % (ref 4–12)
NEUTROPHILS # BLD AUTO: 6.04 THOUSANDS/ΜL (ref 1.85–7.62)
NEUTS SEG NFR BLD AUTO: 68 % (ref 43–75)
PLATELET # BLD AUTO: 201 THOUSANDS/UL (ref 149–390)
PMV BLD AUTO: 10.7 FL (ref 8.9–12.7)
POTASSIUM SERPL-SCNC: 3.9 MMOL/L (ref 3.5–5.3)
PROLACTIN SERPL-MCNC: 21.3 NG/ML (ref 2.5–17.4)
RBC # BLD AUTO: 3.16 MILLION/UL (ref 3.88–5.62)
SODIUM SERPL-SCNC: 137 MMOL/L (ref 136–145)
TROPONIN I SERPL-MCNC: 0.04 NG/ML
TROPONIN I SERPL-MCNC: 0.05 NG/ML
WBC # BLD AUTO: 8.81 THOUSAND/UL (ref 4.31–10.16)

## 2018-05-05 PROCEDURE — 85018 HEMOGLOBIN: CPT | Performed by: FAMILY MEDICINE

## 2018-05-05 PROCEDURE — 99233 SBSQ HOSP IP/OBS HIGH 50: CPT | Performed by: FAMILY MEDICINE

## 2018-05-05 PROCEDURE — 84146 ASSAY OF PROLACTIN: CPT | Performed by: FAMILY MEDICINE

## 2018-05-05 PROCEDURE — 85025 COMPLETE CBC W/AUTO DIFF WBC: CPT | Performed by: FAMILY MEDICINE

## 2018-05-05 PROCEDURE — 93005 ELECTROCARDIOGRAM TRACING: CPT | Performed by: FAMILY MEDICINE

## 2018-05-05 PROCEDURE — 70450 CT HEAD/BRAIN W/O DYE: CPT

## 2018-05-05 PROCEDURE — 85014 HEMATOCRIT: CPT | Performed by: FAMILY MEDICINE

## 2018-05-05 PROCEDURE — 99232 SBSQ HOSP IP/OBS MODERATE 35: CPT | Performed by: UROLOGY

## 2018-05-05 PROCEDURE — 80048 BASIC METABOLIC PNL TOTAL CA: CPT | Performed by: FAMILY MEDICINE

## 2018-05-05 PROCEDURE — 84484 ASSAY OF TROPONIN QUANT: CPT | Performed by: FAMILY MEDICINE

## 2018-05-05 RX ORDER — SODIUM CHLORIDE 9 MG/ML
75 INJECTION, SOLUTION INTRAVENOUS CONTINUOUS
Status: DISCONTINUED | OUTPATIENT
Start: 2018-05-05 | End: 2018-05-08

## 2018-05-05 RX ADMIN — ACETAMINOPHEN 650 MG: 325 TABLET, FILM COATED ORAL at 01:40

## 2018-05-05 RX ADMIN — DULOXETINE HYDROCHLORIDE 20 MG: 20 CAPSULE, DELAYED RELEASE ORAL at 17:40

## 2018-05-05 RX ADMIN — TAMSULOSIN HYDROCHLORIDE 0.4 MG: 0.4 CAPSULE ORAL at 17:40

## 2018-05-05 RX ADMIN — SODIUM CHLORIDE 75 ML/HR: 0.9 INJECTION, SOLUTION INTRAVENOUS at 11:56

## 2018-05-05 RX ADMIN — CEFTRIAXONE 1000 MG: 1 INJECTION, SOLUTION INTRAVENOUS at 17:57

## 2018-05-05 RX ADMIN — ATROPA BELLADONNA AND OPIUM 1 SUPPOSITORY: 16.2; 6 SUPPOSITORY RECTAL at 17:59

## 2018-05-05 RX ADMIN — PANTOPRAZOLE SODIUM 20 MG: 20 TABLET, DELAYED RELEASE ORAL at 05:46

## 2018-05-05 RX ADMIN — CYANOCOBALAMIN TAB 1000 MCG 1000 MCG: 1000 TAB at 11:08

## 2018-05-05 RX ADMIN — ATROPA BELLADONNA AND OPIUM 1 SUPPOSITORY: 16.2; 6 SUPPOSITORY RECTAL at 00:58

## 2018-05-05 RX ADMIN — LEVOTHYROXINE SODIUM 88 MCG: 88 TABLET ORAL at 05:46

## 2018-05-05 RX ADMIN — OXYBUTYNIN CHLORIDE 5 MG: 5 TABLET, EXTENDED RELEASE ORAL at 11:09

## 2018-05-05 RX ADMIN — DULOXETINE HYDROCHLORIDE 20 MG: 20 CAPSULE, DELAYED RELEASE ORAL at 11:08

## 2018-05-05 RX ADMIN — CIPROFLOXACIN 500 MG: 500 TABLET, FILM COATED ORAL at 11:08

## 2018-05-05 RX ADMIN — ATORVASTATIN CALCIUM 80 MG: 40 TABLET, FILM COATED ORAL at 17:40

## 2018-05-05 NOTE — ASSESSMENT & PLAN NOTE
Urine culture had no growth likely because patient had been on antibiotic (ciprofloxacin) prior to obtaining urine sample  Transitioned back to cipro PO to complete course  Monitor CBC/VS closely

## 2018-05-05 NOTE — PROGRESS NOTES
Progress Note Maria E Hum 1935, 81 y o  male MRN: [id number]    Unit/Bed#: 200-18 Encounter: [id number]    Primary Care Provider: Cindy Haile MD   Date and time admitted to hospital: 5/2/2018 11:17 AM        Syncope and collapse   Assessment & Plan    Patient had a syncopal episode was lowered to floor by nursing, Rapid Response was called, VS were stable prior to episode   CT brain was obtained and negative  H&H repeated and stable  Prolactin ordered  Troponin was checked and mildly elevated, continue to trend troponin, place on telemetry, obtain EKG        Bradycardia   Assessment & Plan    HR in the 50's  Place on telemetry  Consider Cardiology consult          Acute post-hemorrhagic anemia   Assessment & Plan    Secondary to hematuria   Hb with a mild drop, patient continues to pass clots  Had near-syncope episode today leading to Rapid Response  Continue with bladder irrigation  Re-consult Urology            Complicated UTI (urinary tract infection)   Assessment & Plan    Urine culture had no growth likely because patient had been on antibiotic (ciprofloxacin) prior to obtaining urine sample  Transitioned back to cipro PO to complete course  Monitor CBC/VS closely        Prostate enlargement   Assessment & Plan    Predisposing to recurrent urinary tract infections  Urology consulted        Alzheimer's disease   Assessment & Plan    Chronic   Ongoing family update        Hypothyroid   Assessment & Plan    TSH slightly elevated, increased levothyroxine from 77-88 mcg daily  Recommend to repeat TSH in 4-6 weeks        * Gross hematuria   Assessment & Plan    Urology input appreciated, reconsulted due to increased blood clots  H&H stable and outpatient cystoscopy recommended  Continue bladder irrigation  Repeat CBC in the morning  Possible discharge tomorrow          VTE Pharmacologic Prophylaxis:   Pharmacologic: Contraindicated due to anemia  Mechanical VTE Prophylaxis in Place: Yes    Patient Centered Rounds: I have performed bedside rounds with nursing staff today  Discussions with Specialists or Other Care Team Provider: will d/w Urology     Education and Discussions with Family / Patient: will update wife    Time Spent for Care: 45 minutes  More than 50% of total time spent on counseling and coordination of care as described above  Current Length of Stay: 3 day(s)    Current Patient Status: Inpatient   Certification Statement: The patient will continue to require additional inpatient hospital stay due to need for close monitoring, frequent labs    Discharge Plan: TBD    Code Status: Level 1 - Full Code      Subjective:   Patient seen and examined during and after rapid response  Earlier today he was noted by nursing to have a staring episode after which he appeared to be collapsing and was lowered to the ground by nursing  The patient was awake and responsive on the floor  He denies discomforts or difficulty breathing  He denies chest pain  He did not appear in distress  Objective:     Vitals:   Temp (24hrs), Av 2 °F (36 2 °C), Min:96 4 °F (35 8 °C), Max:97 9 °F (36 6 °C)    HR:  [] 55  Resp:  [18] 18  BP: (101-157)/(53-75) 121/58  SpO2:  [98 %-99 %] 98 %  Body mass index is 21 23 kg/m²  Input and Output Summary (last 24 hours): Intake/Output Summary (Last 24 hours) at 18 1126  Last data filed at 18 0730   Gross per 24 hour   Intake              480 ml   Output             5175 ml   Net            -4695 ml       Physical Exam:     Physical Exam   Constitutional: No distress  HENT:   Head: Normocephalic and atraumatic  Eyes: Conjunctivae are normal    Neck: No JVD present  Cardiovascular: Regular rhythm  Bradycardia present  No murmur heard  Pulmonary/Chest: Effort normal  No respiratory distress  He has no wheezes  He has no rales  Abdominal: Soft  He exhibits no distension  There is no tenderness  There is no guarding  Musculoskeletal: He exhibits no edema  Neurological: He is alert  No cranial nerve deficit  Skin: Skin is warm and dry  Psychiatric: He is slowed and withdrawn  Additional Data:     Labs:      Results from last 7 days  Lab Units 05/05/18  0919 05/05/18  0441   WBC Thousand/uL  --  8 81   HEMOGLOBIN g/dL 10 2* 9 8*   HEMATOCRIT % 29 9* 28 9*   PLATELETS Thousands/uL  --  201   NEUTROS PCT %  --  68   LYMPHS PCT %  --  18   MONOS PCT %  --  10   EOS PCT %  --  3       Results from last 7 days  Lab Units 05/05/18  0441  05/03/18  0459   SODIUM mmol/L 137  < > 139   POTASSIUM mmol/L 3 9  < > 3 7   CHLORIDE mmol/L 102  < > 103   CO2 mmol/L 27  < > 27   BUN mg/dL 22  < > 18   CREATININE mg/dL 1 25  < > 1 10   CALCIUM mg/dL 8 7  < > 9 0   TOTAL PROTEIN g/dL  --   --  6 7   BILIRUBIN TOTAL mg/dL  --   --  0 60   ALK PHOS U/L  --   --  56   ALT U/L  --   --  41   AST U/L  --   --  42   GLUCOSE RANDOM mg/dL 102  < > 114   < > = values in this interval not displayed  Results from last 7 days  Lab Units 05/03/18  0459   INR  1 05                 * I Have Reviewed All Lab Data Listed Above  * Additional Pertinent Lab Tests Reviewed:  Anyi 66 Admission Reviewed    Imaging:    Imaging Reports Reviewed Today Include: CT head    Recent Cultures (last 7 days):       Results from last 7 days  Lab Units 05/02/18  1149   URINE CULTURE  No Growth <1000 cfu/mL       Last 24 Hours Medication List:     Current Facility-Administered Medications:  acetaminophen 650 mg Oral Q6H PRN Mikhail Ruiz MD   atorvastatin 80 mg Oral Daily Mikhail Ruiz MD   belladonna-opium 1 suppository Rectal Q6H PRN Sera Bella MD   ciprofloxacin 500 mg Oral Q12H [address] Yoni Mallory MD   cyanocobalamin 1,000 mcg Oral Daily Mikhail Ruiz MD   DULoxetine 20 mg Oral BID Mikhail Ruiz MD   fentanyl citrate (PF) 25 mcg Intravenous Q2H PRN Mikhail Ruiz MD   haloperidol 1 mg Oral HS PRN Mihkail Ruiz MD levothyroxine 88 mcg Oral Daily Andrea Wiley MD   ondansetron 4 mg Intravenous Q6H PRN Keisha West MD   oxybutynin 5 mg Oral Daily Adwoa Mendoza, PA-C   pantoprazole 20 mg Oral Daily Keisha West MD   tamsulosin 0 4 mg Oral Daily With Dinner Keisha West MD        Today, Patient Was Seen By: Kenneth Landeros MD    ** Please Note: Dictation voice to text software may have been used in the creation of this document   **

## 2018-05-05 NOTE — ASSESSMENT & PLAN NOTE
Patient had a syncopal episode was lowered to floor by nursing, Rapid Response was called, VS were stable prior to episode   CT brain was obtained and negative  H&H repeated and stable  Prolactin ordered  Troponin was checked and mildly elevated, continue to trend troponin, place on telemetry, obtain EKG

## 2018-05-05 NOTE — ASSESSMENT & PLAN NOTE
Urology input appreciated, reconsulted due to increased blood clots  H&H stable and outpatient cystoscopy recommended  Continue bladder irrigation  Repeat CBC in the morning  Possible discharge tomorrow

## 2018-05-05 NOTE — PROGRESS NOTES
After bed alarm went off and upon entering room pt found OOB with CBI disconnected  While attempting to assist patient back to bed pt had blank starkelly was unable to ambulate back to bed  Assisted to floor by staff of 3  RRT called  Returned to bed via Hovermat  No apparent injury noted  Dr Eddi Trevino aware  New orders received  Pt now alert and cooperative

## 2018-05-05 NOTE — ASSESSMENT & PLAN NOTE
Secondary to hematuria   Hb with a mild drop, patient continues to pass clots  Had near-syncope episode today leading to Rapid Response  Continue with bladder irrigation  Re-consult Urology

## 2018-05-06 LAB
ANION GAP SERPL CALCULATED.3IONS-SCNC: 6 MMOL/L (ref 4–13)
ATRIAL RATE: 53 BPM
BASOPHILS # BLD AUTO: 0.03 THOUSANDS/ΜL (ref 0–0.1)
BASOPHILS NFR BLD AUTO: 0 % (ref 0–1)
BUN SERPL-MCNC: 22 MG/DL (ref 5–25)
CALCIUM SERPL-MCNC: 8.1 MG/DL (ref 8.3–10.1)
CHLORIDE SERPL-SCNC: 104 MMOL/L (ref 100–108)
CO2 SERPL-SCNC: 27 MMOL/L (ref 21–32)
CREAT SERPL-MCNC: 1.31 MG/DL (ref 0.6–1.3)
EOSINOPHIL # BLD AUTO: 0.27 THOUSAND/ΜL (ref 0–0.61)
EOSINOPHIL NFR BLD AUTO: 3 % (ref 0–6)
ERYTHROCYTE [DISTWIDTH] IN BLOOD BY AUTOMATED COUNT: 12.8 % (ref 11.6–15.1)
GFR SERPL CREATININE-BSD FRML MDRD: 51 ML/MIN/1.73SQ M
GLUCOSE SERPL-MCNC: 111 MG/DL (ref 65–140)
HCT VFR BLD AUTO: 27 % (ref 36.5–49.3)
HGB BLD-MCNC: 9.1 G/DL (ref 12–17)
LYMPHOCYTES # BLD AUTO: 1.43 THOUSANDS/ΜL (ref 0.6–4.47)
LYMPHOCYTES NFR BLD AUTO: 14 % (ref 14–44)
MCH RBC QN AUTO: 30.8 PG (ref 26.8–34.3)
MCHC RBC AUTO-ENTMCNC: 33.7 G/DL (ref 31.4–37.4)
MCV RBC AUTO: 92 FL (ref 82–98)
MONOCYTES # BLD AUTO: 0.82 THOUSAND/ΜL (ref 0.17–1.22)
MONOCYTES NFR BLD AUTO: 8 % (ref 4–12)
NEUTROPHILS # BLD AUTO: 7.44 THOUSANDS/ΜL (ref 1.85–7.62)
NEUTS SEG NFR BLD AUTO: 75 % (ref 43–75)
P AXIS: 70 DEGREES
PLATELET # BLD AUTO: 199 THOUSANDS/UL (ref 149–390)
PMV BLD AUTO: 10.8 FL (ref 8.9–12.7)
POTASSIUM SERPL-SCNC: 3.9 MMOL/L (ref 3.5–5.3)
PR INTERVAL: 180 MS
QRS AXIS: -29 DEGREES
QRSD INTERVAL: 94 MS
QT INTERVAL: 458 MS
QTC INTERVAL: 429 MS
RBC # BLD AUTO: 2.95 MILLION/UL (ref 3.88–5.62)
SODIUM SERPL-SCNC: 137 MMOL/L (ref 136–145)
T WAVE AXIS: 42 DEGREES
VENTRICULAR RATE: 53 BPM
WBC # BLD AUTO: 9.99 THOUSAND/UL (ref 4.31–10.16)

## 2018-05-06 PROCEDURE — 99232 SBSQ HOSP IP/OBS MODERATE 35: CPT | Performed by: UROLOGY

## 2018-05-06 PROCEDURE — 97167 OT EVAL HIGH COMPLEX 60 MIN: CPT

## 2018-05-06 PROCEDURE — 97530 THERAPEUTIC ACTIVITIES: CPT

## 2018-05-06 PROCEDURE — G8978 MOBILITY CURRENT STATUS: HCPCS | Performed by: PHYSICAL THERAPIST

## 2018-05-06 PROCEDURE — 99232 SBSQ HOSP IP/OBS MODERATE 35: CPT | Performed by: FAMILY MEDICINE

## 2018-05-06 PROCEDURE — 97163 PT EVAL HIGH COMPLEX 45 MIN: CPT | Performed by: PHYSICAL THERAPIST

## 2018-05-06 PROCEDURE — 93010 ELECTROCARDIOGRAM REPORT: CPT | Performed by: INTERNAL MEDICINE

## 2018-05-06 PROCEDURE — 80048 BASIC METABOLIC PNL TOTAL CA: CPT | Performed by: FAMILY MEDICINE

## 2018-05-06 PROCEDURE — G8988 SELF CARE GOAL STATUS: HCPCS

## 2018-05-06 PROCEDURE — G8987 SELF CARE CURRENT STATUS: HCPCS

## 2018-05-06 PROCEDURE — 97530 THERAPEUTIC ACTIVITIES: CPT | Performed by: PHYSICAL THERAPIST

## 2018-05-06 PROCEDURE — 85025 COMPLETE CBC W/AUTO DIFF WBC: CPT | Performed by: FAMILY MEDICINE

## 2018-05-06 PROCEDURE — G8979 MOBILITY GOAL STATUS: HCPCS | Performed by: PHYSICAL THERAPIST

## 2018-05-06 RX ORDER — PHENAZOPYRIDINE HYDROCHLORIDE 100 MG/1
100 TABLET, FILM COATED ORAL ONCE
Status: COMPLETED | OUTPATIENT
Start: 2018-05-06 | End: 2018-05-06

## 2018-05-06 RX ADMIN — ATROPA BELLADONNA AND OPIUM 1 SUPPOSITORY: 16.2; 6 SUPPOSITORY RECTAL at 05:26

## 2018-05-06 RX ADMIN — TAMSULOSIN HYDROCHLORIDE 0.4 MG: 0.4 CAPSULE ORAL at 17:14

## 2018-05-06 RX ADMIN — SODIUM CHLORIDE 75 ML/HR: 0.9 INJECTION, SOLUTION INTRAVENOUS at 01:42

## 2018-05-06 RX ADMIN — CEFTRIAXONE 1000 MG: 1 INJECTION, SOLUTION INTRAVENOUS at 17:21

## 2018-05-06 RX ADMIN — SODIUM CHLORIDE 75 ML/HR: 0.9 INJECTION, SOLUTION INTRAVENOUS at 14:35

## 2018-05-06 RX ADMIN — PANTOPRAZOLE SODIUM 20 MG: 20 TABLET, DELAYED RELEASE ORAL at 05:26

## 2018-05-06 RX ADMIN — ATORVASTATIN CALCIUM 80 MG: 40 TABLET, FILM COATED ORAL at 17:14

## 2018-05-06 RX ADMIN — OXYBUTYNIN CHLORIDE 5 MG: 5 TABLET, EXTENDED RELEASE ORAL at 10:08

## 2018-05-06 RX ADMIN — DULOXETINE HYDROCHLORIDE 20 MG: 20 CAPSULE, DELAYED RELEASE ORAL at 10:08

## 2018-05-06 RX ADMIN — PHENAZOPYRIDINE HYDROCHLORIDE 100 MG: 100 TABLET ORAL at 20:03

## 2018-05-06 RX ADMIN — LEVOTHYROXINE SODIUM 88 MCG: 88 TABLET ORAL at 05:26

## 2018-05-06 RX ADMIN — DULOXETINE HYDROCHLORIDE 20 MG: 20 CAPSULE, DELAYED RELEASE ORAL at 17:14

## 2018-05-06 RX ADMIN — CYANOCOBALAMIN TAB 1000 MCG 1000 MCG: 1000 TAB at 10:08

## 2018-05-06 NOTE — PHYSICAL THERAPY NOTE
PT Evaluation       05/06/18 1016   Note Type   Note type Eval/Treat   Pain Assessment   Pain Assessment No/denies pain   Pain Score No Pain   Home Living   Type of Home House   Home Layout Two level;Bed/bath upstairs;Stairs to enter with rails  (4 BUCK c HR, 13 steps to 2nd floor c HR)   Bathroom Shower/Tub Walk-in shower   Bathroom Toilet Standard   Bathroom Equipment Grab bars in shower   [facility]   Prior Function   Level of [address] with ADLs and functional mobility   Lives With Spouse   ADL Assistance Independent   IADLs Independent   Vocational Retired   Comments Lives with spouse, no AD with amb PTA   Restrictions/Precautions   Other Precautions Multiple lines; Fall Risk   General   Family/Caregiver Present No   Cognition   Overall Cognitive Status WFL   Arousal/Participation Alert   Orientation Level Oriented to person   Memory Decreased short term memory   Following Commands Follows one step commands without difficulty   Comments Pt stated several times he needed to use the bathroom and was told he had a catheter in but continued to forget   RUE Assessment   RUE Assessment (see OT eval)   LUE Assessment   LUE Assessment (see OT eval)   RLE Assessment   RLE Assessment WFL  (RLE strength 3+ to 4/5)   LLE Assessment   LLE Assessment WFL  (LLE strength 3+-4/5)   Light Touch   RLE Light Touch Grossly intact   LLE Light Touch Grossly intact   Bed Mobility   Supine to Sit 6  Modified independent   Additional items Bedrails;Verbal cues   Sit to Supine 6  Modified independent   Additional items Bedrails;Verbal cues   Transfers   Sit to Stand 6  Modified independent   Additional items Verbal cues   Stand to Sit 6  Modified independent   Additional items Verbal cues   Ambulation/Elevation   Gait pattern Shuffling; Foward flexed   Gait Assistance 5  Supervision   Additional items Assist x 1;Verbal cues   Assistive Device None   Distance 3 ft   Balance Static Sitting Good   Dynamic Sitting Good   Static Standing Fair   Dynamic Standing Fair   Ambulatory Fair   Activity Tolerance   Activity Tolerance Patient tolerated treatment well   Assessment   Prognosis Good   Problem List Decreased strength;Decreased endurance; Impaired balance;Decreased mobility; Decreased cognition;Decreased safety awareness   Assessment Pt is an 81year old male presenting with gross heamaturia and PMH of Alzheimer's and UTI  Pt is (I) with ADLs and functional mobility PTA and does not use AD with ambulation  PT notes the patient with decreased strength and ROM of BLE, decreased cognition and recall, WNL bed mobility and trasnfers and decreased functional mobility  PT only able to stand pt and take 2 steps forward 2* pt still connected to CBI  Full gait assessment to be performed when pt disconnected from CBI  Pt would benefit from continued PT     Goals   Patient Goals Pt wishes to return home   LTG Expiration Date 05/20/18   Long Term Goal #1 Pt will be (I) with all bed mobility and trasnfers   Long Term Goal #2 Pt will ambulate 250 ft with no AD mod (I) to ensure safe return home   Plan   Treatment/Interventions Functional transfer training;LE strengthening/ROM; Elevations; Therapeutic exercise; Endurance training;Patient/family training   PT Frequency (3-5x/wk)   Recommendation   Recommendation Home independently;Home with family support   PT - OK to Discharge Yes  (when medically stable)   Pt supine in bed at end of treatment, all lines/tubes intact, all needs within reach

## 2018-05-06 NOTE — ASSESSMENT & PLAN NOTE
Secondary to hematuria   Hb with a mild drop at 9 1 today  Had near-syncope episode yesterday leading to Rapid Response, however, has been stable with VS  Continue with bladder irrigation  Urology input appreciated

## 2018-05-06 NOTE — PROGRESS NOTES
Progress Note Manuela Grace 1935, 81 y o  male MRN: [id number]    Unit/Bed#: 200-18 Encounter: [id number]    Primary Care Provider: Magnolia Vickers MD   Date and time admitted to hospital: 5/2/2018 11:17 AM        Syncope and collapse   Assessment & Plan    Patient had a syncopal episode yesterday, was lowered to floor by nursing, Rapid Response was called, VS have been stable other than occasional episodes of mild bradycardia  CT brain was obtained and negative  Mild drop in Hb today  Prolactin was mildly elevated, will obtain EEG  Troponin was checked and mildly elevated then trended down, no acute changes on EKG        Bradycardia   Assessment & Plan    HR in the 52's yesterday now improved  Continue on telemetry for now  Consider Cardiology consult          Acute post-hemorrhagic anemia   Assessment & Plan    Secondary to hematuria   Hb with a mild drop at 9 1 today  Had near-syncope episode yesterday leading to Rapid Response, however, has been stable with VS  Continue with bladder irrigation  Urology input appreciated            Complicated UTI (urinary tract infection)   Assessment & Plan    Urine culture had no growth likely because patient had been on antibiotic (ciprofloxacin) prior to obtaining urine sample  Urology input appreciated, patient placed on Rocephin  Monitor CBC/VS closely        Prostate enlargement   Assessment & Plan    Predisposing to recurrent urinary tract infections  Urology consulted        Alzheimer's disease   Assessment & Plan    Chronic   Ongoing family update        Hypothyroid   Assessment & Plan    TSH slightly elevated, increased levothyroxine from 77-88 mcg daily  Recommend to repeat TSH in 4-6 weeks        * Gross hematuria   Assessment & Plan    Urology input appreciated, reconsulted due to increased blood clots  H&H dropped by 1 point this am, for potential cystoscopy   Continue bladder irrigation  Repeat CBC in the morning            VTE Pharmacologic Prophylaxis:   Pharmacologic: None due to hematuria/anemia  Mechanical VTE Prophylaxis in Place: Yes    Patient Centered Rounds: I have performed bedside rounds with nursing staff today  Discussions with Specialists or Other Care Team Provider: Urology    Education and Discussions with Family / Patient: will d/w patient's wife    Time Spent for Care: 30 minutes  More than 50% of total time spent on counseling and coordination of care as described above  Current Length of Stay: 4 day(s)    Current Patient Status: Inpatient   Certification Statement: The patient will continue to require additional inpatient hospital stay due to need for close monitroing, daily labs, potential procedure    Discharge Plan: TBD    Code Status: Level 1 - Full Code      Subjective:   Patient seen and examined  He reports feeling well  He appears comfortable  No overnight events  Urine clear and pink  Objective:     Vitals:   Temp (24hrs), Av 5 °F (36 4 °C), Min:97 °F (36 1 °C), Max:97 9 °F (36 6 °C)    HR:  [61-71] 69  Resp:  [18-20] 20  BP: ()/(54-66) 151/66  SpO2:  [94 %-96 %] 95 %  Body mass index is 20 39 kg/m²  Input and Output Summary (last 24 hours): Intake/Output Summary (Last 24 hours) at 18 1321  Last data filed at 18 0450   Gross per 24 hour   Intake             1170 ml   Output              775 ml   Net              395 ml       Physical Exam:     Physical Exam   Constitutional: No distress  HENT:   Head: Normocephalic and atraumatic  Eyes: Conjunctivae are normal    Neck: No JVD present  Cardiovascular: Normal rate and regular rhythm  No murmur heard  Pulmonary/Chest: Effort normal  No respiratory distress  He has no wheezes  He has no rales  Abdominal: Soft  He exhibits no distension  There is no tenderness  There is no guarding  Genitourinary:   Genitourinary Comments: Loyd cather, pink clear uirne   Musculoskeletal: He exhibits no edema  Neurological: He is alert  He exhibits normal muscle tone  Skin: Skin is warm and dry  Psychiatric: He has a normal mood and affect  Additional Data:     Labs:      Results from last 7 days  Lab Units 05/06/18  0452   WBC Thousand/uL 9 99   HEMOGLOBIN g/dL 9 1*   HEMATOCRIT % 27 0*   PLATELETS Thousands/uL 199   NEUTROS PCT % 75   LYMPHS PCT % 14   MONOS PCT % 8   EOS PCT % 3       Results from last 7 days  Lab Units 05/06/18  0452  05/03/18  0459   SODIUM mmol/L 137  < > 139   POTASSIUM mmol/L 3 9  < > 3 7   CHLORIDE mmol/L 104  < > 103   CO2 mmol/L 27  < > 27   BUN mg/dL 22  < > 18   CREATININE mg/dL 1 31*  < > 1 10   CALCIUM mg/dL 8 1*  < > 9 0   TOTAL PROTEIN g/dL  --   --  6 7   BILIRUBIN TOTAL mg/dL  --   --  0 60   ALK PHOS U/L  --   --  56   ALT U/L  --   --  41   AST U/L  --   --  42   GLUCOSE RANDOM mg/dL 111  < > 114   < > = values in this interval not displayed  Results from last 7 days  Lab Units 05/03/18  0459   INR  1 05                 * I Have Reviewed All Lab Data Listed Above  * Additional Pertinent Lab Tests Reviewed:  Anyi 66 Admission Reviewed    Imaging:    Imaging Reports Reviewed Today Include: none today    Recent Cultures (last 7 days):       Results from last 7 days  Lab Units 05/02/18  1149   URINE CULTURE  No Growth <1000 cfu/mL       Last 24 Hours Medication List:     Current Facility-Administered Medications:  acetaminophen 650 mg Oral Q6H PRN Sharrie Nageotte, MD    atorvastatin 80 mg Oral Daily Sharrie Nageotte, MD    belladonna-opium 1 suppository Rectal Q6H PRN Jas Perez MD    cefTRIAXone 1,000 mg Intravenous Q24H Jas Perez MD Last Rate: 1,000 mg (05/05/18 0568)   cyanocobalamin 1,000 mcg Oral Daily Sharrie Nageotte, MD    DULoxetine 20 mg Oral BID Sharrie Nageotte, MD    fentanyl citrate (PF) 25 mcg Intravenous Q2H PRN Sharrie Nageotte, MD    haloperidol 1 mg Oral HS PRN Sharrie Nageotte, MD    levothyroxine 88 mcg Oral Daily Miley Pineda MD    ondansetron 4 mg Intravenous Q6H PRN Cordell Ovalle MD    oxybutynin 5 mg Oral Daily Sia Ocampo PA-C    pantoprazole 20 mg Oral Daily Cordell Ovalle MD    sodium chloride 75 mL/hr Intravenous Continuous Elba Barnard MD Last Rate: 75 mL/hr (05/06/18 0142)   tamsulosin 0 4 mg Oral Daily With Dinner Cordell Ovalle MD         Today, Patient Was Seen By: Trish Woodard MD    ** Please Note: Dictation voice to text software may have been used in the creation of this document   **

## 2018-05-06 NOTE — OCCUPATIONAL THERAPY NOTE
[address]  Evaluation     Patient Name: Lorrie BAR Date: 5/6/2018  Problem List  Patient Active Problem List   Diagnosis    Gross hematuria    Hypothyroid    Alzheimer's disease    Prostate enlargement    Complicated UTI (urinary tract infection)    Acute post-hemorrhagic anemia    Syncope and collapse    Bradycardia     Past Medical History  Past Medical History:   Diagnosis Date    Abnormal weight loss     Alzheimer's disease 5/2/2018    Coronary artery disease     Disease of thyroid gland     Gastric ulcer     last assessed: 2/24/14    Hypothyroid 5/2/2018    Inguinal hernia, left     last assessed: 10/8/14    Peptic ulcer     last assessed: 5/21/13    Prostate enlargement 5/2/2018     Past Surgical History  Past Surgical History:   Procedure Laterality Date    AORTIC VALVE REPLACEMENT      CATARACT EXTRACTION Bilateral     CORONARY ARTERY BYPASS GRAFT      INGUINAL HERNIA REPAIR      NEPHRECTOMY Right     THROMBOENDARTERECTOMY      Carotid           05/06/18 1321   Note Type   Note type Eval/Treat   Restrictions/Precautions   Weight Bearing Precautions Per Order No   Other Precautions Multiple lines; Fall Risk  (connected to CBI)   Pain Assessment   Pain Assessment No/denies pain   Pain Score No Pain   Home Living   Type of Home House   Home Layout Multi-level   Bathroom Shower/Tub Walk-in shower   Bathroom Toilet Standard   Bathroom Equipment Grab bars in shower   [address] [place]   Additional Comments two story home with 4STE; bed and bathroom on second floor   Prior Function   Level of [place] Independent with ADLs and functional mobility   Lives With Spouse   ADL Assistance Independent   IADLs Independent   Psychosocial   Psychosocial (WDL) WDL   ADL   Where Assessed Edge of bed   LB Dressing Assistance 3  Moderate Assistance   LB Dressing Deficit Steadying   Bed Mobility   Supine to Sit 6  Modified independent Additional items Bedrails   Sit to Supine 6  Modified independent   Additional items Bedrails   Transfers   Sit to Stand 5  Supervision   Additional items Verbal cues   Stand to Sit 5  Supervision   Additional items Verbal cues   Functional Mobility   Functional Mobility 4  Minimal assistance   Additional Comments CG for few steps; distance limited as pt connected to CBI   Balance   Static Sitting Good   Dynamic Sitting Good   Static Standing Fair   Dynamic Standing Fair   Ambulatory Fair   Activity Tolerance   Activity Tolerance Patient tolerated treatment well   RUE Assessment   RUE Assessment WFL  (grossly 4 throughout)   LUE Assessment   LUE Assessment WFL  (grossly 4 throughout)   Hand Function   Gross Motor Coordination Functional   Fine Motor Coordination Functional   Sensation   Light Touch No apparent deficits   Cognition   Orientation Level Oriented to person   Memory Decreased short term memory   Following Commands Unable to follow one step commands   Comments patient asked the same questions several times throughtout session    Assessment   Limitation Decreased ADL status; Decreased UE strength;Decreased cognition;Decreased endurance;Decreased self-care trans   Prognosis Good   Assessment 81year old male admitted to hospital with gross hematuria currently on CBI  Patient was independent PTA with self care, transfers, and functional mobility without  the use of an AD  Will benefit from skilled OT services to increase strength, balance, safety, and endurance to improve self care and mobility tasks     Goals   Patient Goals Pt would like to go home and be able to care for self   Short Term Goal #1 Improve UE strength to 4+ to assist with functional transfers   Short Term Goal #2 pt will be able to karmen/doff socks at supervision level    Long Term Goal #1 will complete functional transfers at mod I level   Long Term Goal #2 will complete functional mobility at mod I level    Long Term Goal pt will complete toileting at mod I level   Plan   Treatment Interventions ADL retraining;Functional transfer training;UE strengthening/ROM; Endurance training;Equipment evaluation/education   Goal Expiration Date 05/20/18   OT Frequency 3-5x/wk   Recommendation   OT Discharge Recommendation Home with family support   OT - OK to Discharge Yes   Barthel Index   Feeding 5   Bathing 0   Grooming Score 5   Dressing Score 5   Bladder Score 0   Bowels Score 10   Toilet Use Score 5   Transfers (Bed/Chair) Score 10   Mobility (Level Surface) Score 0   Stairs Score 0   Barthel Index Score 40        Pt will benefit from continued OT services in order to maximize (I) c ADL performance, FM c no device, and improve overall endurance/strength required to complete functional tasks in preparation for d/c  Pt left supine in bed at end of session; all needs within reach; all lines intact

## 2018-05-06 NOTE — ASSESSMENT & PLAN NOTE
Urine culture had no growth likely because patient had been on antibiotic (ciprofloxacin) prior to obtaining urine sample  Urology input appreciated, patient placed on Rocephin  Monitor CBC/VS closely

## 2018-05-06 NOTE — RESPIRATORY THERAPY NOTE
LATE ENTRY   =   RRT NOTE:  RRT was called & respiratory therapist made appearance & was dismissed immediately, as NO RESPIRATORY THERAPY modalities were indicated

## 2018-05-06 NOTE — ASSESSMENT & PLAN NOTE
Patient had a syncopal episode yesterday, was lowered to floor by nursing, Rapid Response was called, VS have been stable other than occasional episodes of mild bradycardia  CT brain was obtained and negative  Mild drop in Hb today  Prolactin was mildly elevated, will obtain EEG  Troponin was checked and mildly elevated then trended down, no acute changes on EKG

## 2018-05-06 NOTE — ASSESSMENT & PLAN NOTE
Urology input appreciated, reconsulted due to increased blood clots  H&H dropped by 1 point this am, for potential cystoscopy   Continue bladder irrigation  Repeat CBC in the morning

## 2018-05-07 ENCOUNTER — ANESTHESIA (INPATIENT)
Dept: PERIOP | Facility: HOSPITAL | Age: 83
DRG: 669 | End: 2018-05-07
Payer: COMMERCIAL

## 2018-05-07 ENCOUNTER — ANESTHESIA EVENT (INPATIENT)
Dept: PERIOP | Facility: HOSPITAL | Age: 83
DRG: 669 | End: 2018-05-07
Payer: COMMERCIAL

## 2018-05-07 LAB
ABO GROUP BLD BPU: NORMAL
ABO GROUP BLD: NORMAL
ANION GAP SERPL CALCULATED.3IONS-SCNC: 8 MMOL/L (ref 4–13)
BASOPHILS # BLD AUTO: 0.03 THOUSANDS/ΜL (ref 0–0.1)
BASOPHILS NFR BLD AUTO: 0 % (ref 0–1)
BLD GP AB SCN SERPL QL: NEGATIVE
BPU ID: NORMAL
BUN SERPL-MCNC: 18 MG/DL (ref 5–25)
CALCIUM SERPL-MCNC: 8.2 MG/DL (ref 8.3–10.1)
CHLORIDE SERPL-SCNC: 104 MMOL/L (ref 100–108)
CO2 SERPL-SCNC: 26 MMOL/L (ref 21–32)
CREAT SERPL-MCNC: 1.15 MG/DL (ref 0.6–1.3)
CROSSMATCH: NORMAL
EOSINOPHIL # BLD AUTO: 0.2 THOUSAND/ΜL (ref 0–0.61)
EOSINOPHIL NFR BLD AUTO: 2 % (ref 0–6)
ERYTHROCYTE [DISTWIDTH] IN BLOOD BY AUTOMATED COUNT: 12.7 % (ref 11.6–15.1)
GFR SERPL CREATININE-BSD FRML MDRD: 59 ML/MIN/1.73SQ M
GLUCOSE SERPL-MCNC: 110 MG/DL (ref 65–140)
HCT VFR BLD AUTO: 23.6 % (ref 36.5–49.3)
HGB BLD-MCNC: 7.9 G/DL (ref 12–17)
LYMPHOCYTES # BLD AUTO: 2.09 THOUSANDS/ΜL (ref 0.6–4.47)
LYMPHOCYTES NFR BLD AUTO: 21 % (ref 14–44)
MCH RBC QN AUTO: 30.5 PG (ref 26.8–34.3)
MCHC RBC AUTO-ENTMCNC: 33.5 G/DL (ref 31.4–37.4)
MCV RBC AUTO: 91 FL (ref 82–98)
MONOCYTES # BLD AUTO: 0.88 THOUSAND/ΜL (ref 0.17–1.22)
MONOCYTES NFR BLD AUTO: 9 % (ref 4–12)
NEUTROPHILS # BLD AUTO: 6.73 THOUSANDS/ΜL (ref 1.85–7.62)
NEUTS SEG NFR BLD AUTO: 68 % (ref 43–75)
PLATELET # BLD AUTO: 199 THOUSANDS/UL (ref 149–390)
PMV BLD AUTO: 10.7 FL (ref 8.9–12.7)
POTASSIUM SERPL-SCNC: 4.2 MMOL/L (ref 3.5–5.3)
RBC # BLD AUTO: 2.59 MILLION/UL (ref 3.88–5.62)
RH BLD: NEGATIVE
SODIUM SERPL-SCNC: 138 MMOL/L (ref 136–145)
SPECIMEN EXPIRATION DATE: NORMAL
UNIT DISPENSE STATUS: NORMAL
UNIT PRODUCT CODE: NORMAL
UNIT RH: NORMAL
WBC # BLD AUTO: 9.93 THOUSAND/UL (ref 4.31–10.16)

## 2018-05-07 PROCEDURE — 86850 RBC ANTIBODY SCREEN: CPT | Performed by: FAMILY MEDICINE

## 2018-05-07 PROCEDURE — 0TCB8ZZ EXTIRPATION OF MATTER FROM BLADDER, VIA NATURAL OR ARTIFICIAL OPENING ENDOSCOPIC: ICD-10-PCS | Performed by: UROLOGY

## 2018-05-07 PROCEDURE — 86901 BLOOD TYPING SEROLOGIC RH(D): CPT | Performed by: FAMILY MEDICINE

## 2018-05-07 PROCEDURE — 88307 TISSUE EXAM BY PATHOLOGIST: CPT | Performed by: PATHOLOGY

## 2018-05-07 PROCEDURE — 99233 SBSQ HOSP IP/OBS HIGH 50: CPT | Performed by: UROLOGY

## 2018-05-07 PROCEDURE — 99233 SBSQ HOSP IP/OBS HIGH 50: CPT | Performed by: FAMILY MEDICINE

## 2018-05-07 PROCEDURE — 52001 CYSTO W/IRRG&EVAC MLT CLOTS: CPT | Performed by: UROLOGY

## 2018-05-07 PROCEDURE — 86900 BLOOD TYPING SEROLOGIC ABO: CPT | Performed by: FAMILY MEDICINE

## 2018-05-07 PROCEDURE — 0TBB8ZZ EXCISION OF BLADDER, VIA NATURAL OR ARTIFICIAL OPENING ENDOSCOPIC: ICD-10-PCS | Performed by: FAMILY MEDICINE

## 2018-05-07 PROCEDURE — 97110 THERAPEUTIC EXERCISES: CPT

## 2018-05-07 PROCEDURE — P9021 RED BLOOD CELLS UNIT: HCPCS

## 2018-05-07 PROCEDURE — 0T9B70Z DRAINAGE OF BLADDER WITH DRAINAGE DEVICE, VIA NATURAL OR ARTIFICIAL OPENING: ICD-10-PCS | Performed by: UROLOGY

## 2018-05-07 PROCEDURE — 80048 BASIC METABOLIC PNL TOTAL CA: CPT | Performed by: FAMILY MEDICINE

## 2018-05-07 PROCEDURE — 85025 COMPLETE CBC W/AUTO DIFF WBC: CPT | Performed by: FAMILY MEDICINE

## 2018-05-07 PROCEDURE — 30233N1 TRANSFUSION OF NONAUTOLOGOUS RED BLOOD CELLS INTO PERIPHERAL VEIN, PERCUTANEOUS APPROACH: ICD-10-PCS | Performed by: UROLOGY

## 2018-05-07 PROCEDURE — 86920 COMPATIBILITY TEST SPIN: CPT

## 2018-05-07 RX ORDER — ONDANSETRON 2 MG/ML
INJECTION INTRAMUSCULAR; INTRAVENOUS AS NEEDED
Status: DISCONTINUED | OUTPATIENT
Start: 2018-05-07 | End: 2018-05-07 | Stop reason: SURG

## 2018-05-07 RX ORDER — EPHEDRINE SULFATE 50 MG/ML
INJECTION, SOLUTION INTRAVENOUS AS NEEDED
Status: DISCONTINUED | OUTPATIENT
Start: 2018-05-07 | End: 2018-05-07 | Stop reason: SURG

## 2018-05-07 RX ORDER — OXYCODONE HYDROCHLORIDE 5 MG/1
5 TABLET ORAL EVERY 4 HOURS PRN
Status: DISCONTINUED | OUTPATIENT
Start: 2018-05-07 | End: 2018-05-11 | Stop reason: HOSPADM

## 2018-05-07 RX ORDER — ATROPA BELLADONNA AND OPIUM 16.2; 6 MG/1; MG/1
SUPPOSITORY RECTAL AS NEEDED
Status: DISCONTINUED | OUTPATIENT
Start: 2018-05-07 | End: 2018-05-07 | Stop reason: HOSPADM

## 2018-05-07 RX ORDER — ONDANSETRON 2 MG/ML
4 INJECTION INTRAMUSCULAR; INTRAVENOUS ONCE AS NEEDED
Status: DISCONTINUED | OUTPATIENT
Start: 2018-05-07 | End: 2018-05-07 | Stop reason: HOSPADM

## 2018-05-07 RX ORDER — MAGNESIUM HYDROXIDE 1200 MG/15ML
LIQUID ORAL AS NEEDED
Status: DISCONTINUED | OUTPATIENT
Start: 2018-05-07 | End: 2018-05-07 | Stop reason: HOSPADM

## 2018-05-07 RX ORDER — FENTANYL CITRATE 50 UG/ML
INJECTION, SOLUTION INTRAMUSCULAR; INTRAVENOUS AS NEEDED
Status: DISCONTINUED | OUTPATIENT
Start: 2018-05-07 | End: 2018-05-07 | Stop reason: SURG

## 2018-05-07 RX ORDER — PROPOFOL 10 MG/ML
INJECTION, EMULSION INTRAVENOUS AS NEEDED
Status: DISCONTINUED | OUTPATIENT
Start: 2018-05-07 | End: 2018-05-07 | Stop reason: SURG

## 2018-05-07 RX ORDER — FENTANYL CITRATE/PF 50 MCG/ML
25 SYRINGE (ML) INJECTION
Status: DISCONTINUED | OUTPATIENT
Start: 2018-05-07 | End: 2018-05-07 | Stop reason: HOSPADM

## 2018-05-07 RX ORDER — SODIUM CHLORIDE, SODIUM LACTATE, POTASSIUM CHLORIDE, CALCIUM CHLORIDE 600; 310; 30; 20 MG/100ML; MG/100ML; MG/100ML; MG/100ML
INJECTION, SOLUTION INTRAVENOUS CONTINUOUS PRN
Status: DISCONTINUED | OUTPATIENT
Start: 2018-05-07 | End: 2018-05-07

## 2018-05-07 RX ORDER — LIDOCAINE HYDROCHLORIDE 10 MG/ML
INJECTION, SOLUTION INFILTRATION; PERINEURAL AS NEEDED
Status: DISCONTINUED | OUTPATIENT
Start: 2018-05-07 | End: 2018-05-07 | Stop reason: SURG

## 2018-05-07 RX ADMIN — ONDANSETRON HYDROCHLORIDE 4 MG: 2 INJECTION, SOLUTION INTRAVENOUS at 12:03

## 2018-05-07 RX ADMIN — FENTANYL CITRATE 25 MCG: 50 INJECTION, SOLUTION INTRAMUSCULAR; INTRAVENOUS at 11:54

## 2018-05-07 RX ADMIN — CEFTRIAXONE 1000 MG: 1 INJECTION, SOLUTION INTRAVENOUS at 19:41

## 2018-05-07 RX ADMIN — PANTOPRAZOLE SODIUM 20 MG: 20 TABLET, DELAYED RELEASE ORAL at 05:15

## 2018-05-07 RX ADMIN — OXYCODONE HYDROCHLORIDE 5 MG: 5 TABLET ORAL at 10:59

## 2018-05-07 RX ADMIN — TAMSULOSIN HYDROCHLORIDE 0.4 MG: 0.4 CAPSULE ORAL at 19:40

## 2018-05-07 RX ADMIN — FENTANYL CITRATE 25 MCG: 50 INJECTION, SOLUTION INTRAMUSCULAR; INTRAVENOUS at 11:56

## 2018-05-07 RX ADMIN — DULOXETINE HYDROCHLORIDE 20 MG: 20 CAPSULE, DELAYED RELEASE ORAL at 19:39

## 2018-05-07 RX ADMIN — ATORVASTATIN CALCIUM 80 MG: 40 TABLET, FILM COATED ORAL at 19:40

## 2018-05-07 RX ADMIN — SODIUM CHLORIDE 75 ML/HR: 0.9 INJECTION, SOLUTION INTRAVENOUS at 04:30

## 2018-05-07 RX ADMIN — PROPOFOL 100 MG: 10 INJECTION, EMULSION INTRAVENOUS at 11:40

## 2018-05-07 RX ADMIN — OXYCODONE HYDROCHLORIDE 5 MG: 5 TABLET ORAL at 19:40

## 2018-05-07 RX ADMIN — EPHEDRINE SULFATE 5 MG: 50 INJECTION, SOLUTION INTRAMUSCULAR; INTRAVENOUS; SUBCUTANEOUS at 11:50

## 2018-05-07 RX ADMIN — LIDOCAINE HYDROCHLORIDE 50 MG: 10 INJECTION, SOLUTION INFILTRATION; PERINEURAL at 11:40

## 2018-05-07 RX ADMIN — LEVOTHYROXINE SODIUM 88 MCG: 88 TABLET ORAL at 05:15

## 2018-05-07 RX ADMIN — CEFAZOLIN SODIUM 1000 MG: 1 SOLUTION INTRAVENOUS at 11:49

## 2018-05-07 RX ADMIN — SODIUM CHLORIDE, SODIUM LACTATE, POTASSIUM CHLORIDE, AND CALCIUM CHLORIDE: .6; .31; .03; .02 INJECTION, SOLUTION INTRAVENOUS at 11:37

## 2018-05-07 RX ADMIN — OXYCODONE HYDROCHLORIDE 5 MG: 5 TABLET ORAL at 02:10

## 2018-05-07 RX ADMIN — EPHEDRINE SULFATE 10 MG: 50 INJECTION, SOLUTION INTRAMUSCULAR; INTRAVENOUS; SUBCUTANEOUS at 11:54

## 2018-05-07 NOTE — ANESTHESIA PREPROCEDURE EVALUATION
Review of Systems/Medical History  Patient summary reviewed  Chart reviewed      Cardiovascular  Exercise tolerance: poor,  CAD ,    Pulmonary       GI/Hepatic    PUD,   Comment: Gastric ulcer, peptic ulcer     Prostatic disorder, benign prostatic hyperplasia  Comment: Gross hematuria     Endo/Other  History of thyroid disease , hypothyroidism,      GYN       Hematology  Anemia ,    Comment: H&H   7 9/ 23 6 this am Musculoskeletal       Neurology      Comment: Alzheimer's, syncopal episode yesterday with bradycardia Psychology           Physical Exam    Airway    Mallampati score: III  TM Distance: >3 FB  Neck ROM: full     Dental   upper dentures and lower dentures,     Cardiovascular  Cardiovascular exam normal    Pulmonary  Pulmonary exam normal     Other Findings        Anesthesia Plan  ASA Score- 3 Emergent    Anesthesia Type- general with ASA Monitors  Additional Monitors:   Airway Plan: LMA  Plan Factors-    Induction- intravenous  Postoperative Plan-     Informed Consent- Anesthetic plan and risks discussed with spouse

## 2018-05-07 NOTE — ASSESSMENT & PLAN NOTE
Secondary to hematuria   Hb continues to gradually drop, 7 9 today, will give 1 u PRBC (5/7)  For cystoscopy later today  Continue with bladder irrigation  Urology input appreciated

## 2018-05-07 NOTE — ASSESSMENT & PLAN NOTE
Patient had a syncopal episode with staring and confusion then started to collapse on 5/5, was lowered to floor by nursing, Rapid Response was called, patient was responsive on my arrival, able to follow commands, VS have been stable other than occasional episodes of mild bradycardia  CT brain was obtained and negative  Hb continues to drop with anemia likely contributing   Prolactin was mildly elevated, will obtain EEG  Troponin was checked and mildly elevated then trended down, no acute changes on EKG

## 2018-05-07 NOTE — ANESTHESIA POSTPROCEDURE EVALUATION
Post-Op Assessment Note      CV Status:  Stable    Mental Status:  Alert    Hydration Status:  Stable    PONV Controlled:  None    Airway Patency:  Patent    Post Op Vitals Reviewed: Yes          Staff: CRNA           BP  162/71    Temp  97 1    Pulse  90   Resp   16   SpO2   100

## 2018-05-07 NOTE — PHYSICAL THERAPY NOTE
PT treatment note      05/06/18 5851   Pain Assessment   Pain Assessment 0-10   Pain Score No Pain   Cognition   Orientation Level Oriented to person;Disoriented to time;Disoriented to situation   Exercises   Heelslides 20 reps;AAROM;AROM   Hip Flexion 10 reps;AROM   Hip Abduction 20 reps;AAROM;AROM   Hip Adduction 25 reps;AAROM;AROM   Knee AROM Short Arc Quad 20 reps;AROM   Ankle Pumps 20 reps   Assessment   Prognosis Good   Problem List Decreased strength;Decreased endurance; Impaired balance;Decreased mobility   Assessment Performed TE as above  Rest breaks as needed  Limited activity d/t pain  Good strength noted with TE  Plan   Treatment/Interventions Functional transfer training;LE strengthening/ROM; Therapeutic exercise; Endurance training;Bed mobility;Gait training   Progress Slow progress, decreased activity tolerance   Recommendation   Recommendation Home independently;Home with family support   Pt  In bed  with call bell within reach, scd's connected and turned on and alarm on at end of PT session

## 2018-05-07 NOTE — CASE MANAGEMENT
Continued Stay Review  Date: 5/7/18  Vital Signs: /65 (BP Location: Left arm)   Pulse 64   Temp 98 °F (36 7 °C) (Temporal)   Resp 20   Ht 6' (1 829 m)   Wt 69 7 kg (153 lb 10 6 oz)   SpO2 99%   BMI 20 84 kg/m²   Medications:   Scheduled Meds:   Current Facility-Administered Medications:  acetaminophen 650 mg Oral Q6H PRN Madhu Aldrich MD    atorvastatin 80 mg Oral Daily Madhu Aldrich MD    cefTRIAXone 1,000 mg Intravenous Q24H John Lazar MD Last Rate: 1,000 mg (05/06/18 1721)   cyanocobalamin 1,000 mcg Oral Daily Madhu Aldrich MD    DULoxetine 20 mg Oral BID Madhu Aldrich MD    fentanyl citrate (PF) 25 mcg Intravenous Q2H PRN Madhu Aldrich MD    haloperidol 1 mg Oral HS PRN Madhu Aldrich MD    levothyroxine 88 mcg Oral Daily Juan Sanchez MD    ondansetron 4 mg Intravenous Q6H PRN Madhu Aldrich MD    oxybutynin 5 mg Oral Daily Ezekiel Whitehead PA-C    oxyCODONE 5 mg Oral Q4H PRN Alexis Miranda DO    pantoprazole 20 mg Oral Daily Madhu Aldrich MD    sodium chloride 75 mL/hr Intravenous Continuous Juan Sanchez MD Last Rate: 75 mL/hr (05/07/18 0430)   tamsulosin 0 4 mg Oral Daily With Dinner Madhu Aldrich MD    Continuous Infusions:   sodium chloride 75 mL/hr Last Rate: 75 mL/hr (05/07/18 0430)   PRN Meds:   acetaminophen    fentanyl citrate (PF)    haloperidol    ondansetron    oxyCODONE  Abnormal Labs/Diagnostic Results:   HGB 7 9  Age/Sex: 81 y o  male   Assessment/Plan:   CONTINUED DROP IN HGB 2ND GROSS HEMATURIA, BLADDER IRRIGATION ONGOING, UROLOGY FOLLOWING, SCHEDULED FOR CYSTO LATER TODAY  HGB NOW @ 7 9  TO BE TRANSFUSED WITH 1UPRBC'S TODAY  Discharge Plan: TBD  Thank you,  [facility] in the [facility] [email or web address] for 2017  Network Utilization Review Department  Phone: [phone]; Fax [phone]  ATTENTION: The Network Utilization Review Department is now centralized for our 7 Facilities  Make a note that we have a new phone and fax numbers for our Department  Please call with any questions or concerns to [phone] and carefully follow the prompts so that you are directed to the right person  All voicemails are confidential  Fax any determinations, approvals, denials, and requests for initial or continue stay review clinical to [phone]  Due to HIGH CALL volume, it would be easier if you could please send faxed requests to expedite your requests and in part, help us provide discharge notifications faster

## 2018-05-07 NOTE — ASSESSMENT & PLAN NOTE
Persists  Urology input appreciated  H&H dropped to 7 9, for cystoscopy later today (5/7)  Continue bladder irrigation  Repeat CBC in the morning

## 2018-05-07 NOTE — PROGRESS NOTES
UROLOGY PROGRESS NOTE   Patient Identifiers: Rafy Almazan (MRN [id number])  Date of Service: 5/7/2018        Assessment:   56-year-old male with cardiac comorbidities and gross hematuria     Plan:     The patient requires a cystoscopy and clot evacuation  I have had a discussion with his wife Nikunj Burns by phone at [phone]  We have discussed the planned procedure and I have discussed with her the potential need for fulguration or resection of the tumor or other abnormality is found  We discussed the risks which include bleeding, blood loss anemia requiring transfusion, infection, damage to surrounding structures  Patient's wife also understands there is risk for a general anesthetic in an elderly patient with other medical comorbidities  She has agreed to allow us to proceed to the operating room  Patient is receiving 1 unit of blood ordered by the internal medicine team   They will continue to monitor his hemoglobin and hemodynamics  Case was also discussed with the anesthesia service  Subjective:     24 HR EVENTS:   Patient continues to have gross hematuria with clots  Patient complaining of abdominal fullness and need to urinate  He has pain at the penis      Objective:     VITALS:    Vitals:    05/07/18 0809   BP: 109/65   Pulse: 64   Resp: 20   Temp: 98 °F (36 7 °C)   SpO2: 99%       INS & OUTS:  CBI    LABS:  Lab Results   Component Value Date    HGB 7 9 (L) 05/07/2018    HCT 23 6 (L) 05/07/2018    WBC 9 93 05/07/2018     05/07/2018   ]    Lab Results   Component Value Date     05/07/2018    K 4 2 05/07/2018     05/07/2018    CO2 26 05/07/2018    BUN 18 05/07/2018    CREATININE 1 15 05/07/2018    CALCIUM 8 2 (L) 05/07/2018    GLUCOSE 110 05/07/2018   ]    INPATIENT MEDS:    Current Facility-Administered Medications:     acetaminophen (TYLENOL) tablet 650 mg, 650 mg, Oral, Q6H PRN, Jaleel Ellison MD, 650 mg at 05/05/18 0140    atorvastatin (LIPITOR) tablet 80 mg, 80 mg, Oral, Daily, Netta Limon MD, 80 mg at 05/06/18 1714    cefTRIAXone (ROCEPHIN) IVPB (premix) 1,000 mg, 1,000 mg, Intravenous, Q24H, Elizabeth Lind MD, Last Rate: 100 mL/hr at 05/06/18 1721, 1,000 mg at 05/06/18 1721    cyanocobalamin (VITAMIN B-12) tablet 1,000 mcg, 1,000 mcg, Oral, Daily, Netta Limon MD, 1,000 mcg at 05/06/18 1008    DULoxetine (CYMBALTA) delayed release capsule 20 mg, 20 mg, Oral, BID, Netta Limon MD, 20 mg at 05/06/18 1714    fentanyl citrate (PF) 100 MCG/2ML 25 mcg, 25 mcg, Intravenous, Q2H PRN, Netta Limon MD    haloperidol (HALDOL) tablet 1 mg, 1 mg, Oral, HS PRN, Netta Limon MD, 1 mg at 05/04/18 2148    levothyroxine tablet 88 mcg, 88 mcg, Oral, Daily, Giovana Llamas MD, 88 mcg at 05/07/18 0515    ondansetron (ZOFRAN) injection 4 mg, 4 mg, Intravenous, Q6H PRN, Netta Limon MD    oxybutynin (DITROPAN-XL) 24 hr tablet 5 mg, 5 mg, Oral, Daily, Nelda Bowden PA-C, 5 mg at 05/06/18 1008    oxyCODONE (ROXICODONE) IR tablet 5 mg, 5 mg, Oral, Q4H PRN, Diann Mcelroy DO, 5 mg at 05/07/18 0210    pantoprazole (PROTONIX) EC tablet 20 mg, 20 mg, Oral, Daily, Netta Limon MD, 20 mg at 05/07/18 0515    sodium chloride 0 9 % infusion, 75 mL/hr, Intravenous, Continuous, Giovana Llamas MD, Last Rate: 75 mL/hr at 05/07/18 0430, 75 mL/hr at 05/07/18 0430    tamsulosin (FLOMAX) capsule 0 4 mg, 0 4 mg, Oral, Daily With Dinner, Netta Limon MD, 0 4 mg at 05/06/18 4109      Physical Exam:   /65 (BP Location: Left arm)   Pulse 64   Temp 98 °F (36 7 °C) (Temporal)   Resp 20   Ht 6' (1 829 m)   Wt 69 7 kg (153 lb 10 6 oz)   SpO2 99%   BMI 20 84 kg/m²   GEN: no acute distress    RESP: breathing comfortably with no accessory muscle use    ABD: moderately distended   EXT: no significant peripheral edema   ARAGON: draining dark red urine  heavy clots fast CBI    RADIOLOGY:   CT ABDOMEN AND PELVIS WITHOUT IV CONTRAST     INDICATION: hematuria      COMPARISON: None      TECHNIQUE:  CT examination of the abdomen and pelvis was performed without intravenous contrast   Axial, sagittal, and coronal 2D reformatted images were created from the source data and submitted for interpretation       Radiation dose length product (DLP) for this visit:  186 37 mGy-cm   This examination, like all CT scans performed in the [facility], was performed utilizing techniques to minimize radiation dose exposure, including the use of iterative   reconstruction and automated exposure control       Enteric contrast was administered       FINDINGS:     ABDOMEN     LOWER CHEST:  No clinically significant abnormality identified in the visualized lower chest      LIVER/BILIARY TREE:  Unremarkable      GALLBLADDER:  No calcified gallstones  No pericholecystic inflammatory change      SPLEEN:  Unremarkable      PANCREAS:  Unremarkable      ADRENAL GLANDS:  Unremarkable      KIDNEYS/URETERS:  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  Noncontrast evaluation of left kidney is grossly unremarkable      STOMACH AND BOWEL:  Unremarkable      APPENDIX:  No findings to suggest appendicitis      ABDOMINOPELVIC CAVITY:  No ascites or free intraperitoneal air  No lymphadenopathy      VESSELS:  Unremarkable for patient's age      PELVIS     REPRODUCTIVE ORGANS:  Status post hysterectomy      URINARY BLADDER:  Loyd catheter is present  Hyperdensity within the bladder lumen is consistent with history of hematuria      ABDOMINAL WALL/INGUINAL REGIONS:  Unremarkable      OSSEOUS STRUCTURES:  No acute fracture or destructive osseous lesion      IMPRESSION:     No etiology for hematuria identified  Urologic follow-up is recommended  Hyperdensity within the bladder lumen likely representing hemorrhage

## 2018-05-07 NOTE — PROGRESS NOTES
Progress Note Raquel Angulo 1935, 81 y o  male MRN: [id number]    Unit/Bed#: 200-18 Encounter: [id number]    Primary Care Provider: Adam Major MD   Date and time admitted to hospital: 5/2/2018 11:17 AM        Syncope and collapse   Assessment & Plan    Patient had a syncopal episode with staring and confusion then started to collapse on 5/5, was lowered to floor by nursing, Rapid Response was called, patient was responsive on my arrival, able to follow commands, VS have been stable other than occasional episodes of mild bradycardia  CT brain was obtained and negative  Hb continues to drop with anemia likely contributing   Prolactin was mildly elevated, will obtain EEG  Troponin was checked and mildly elevated then trended down, no acute changes on EKG        Bradycardia   Assessment & Plan    Chronic  Continue home regimen          Acute post-hemorrhagic anemia   Assessment & Plan    Secondary to hematuria   Hb continues to gradually drop, 7 9 today, will give 1 u PRBC (5/7)  For cystoscopy later today  Continue with bladder irrigation  Urology input appreciated            Complicated UTI (urinary tract infection)   Assessment & Plan    Urine culture had no growth likely because patient had been on antibiotic (ciprofloxacin) prior to obtaining urine sample  Urology input appreciated, patient placed on Rocephin  Monitor CBC/VS closely        Prostate enlargement   Assessment & Plan    Predisposing to recurrent urinary tract infections  Urology consulted        Alzheimer's disease   Assessment & Plan    Chronic   Ongoing family update/consents obtained from wife        Hypothyroid   Assessment & Plan    TSH slightly elevated, increased levothyroxine from 77-88 mcg daily  Recommend to repeat TSH in 4-6 weeks        * Gross hematuria   Assessment & Plan    Persists  Urology input appreciated  H&H dropped to 7 9, for cystoscopy later today (5/7)  Continue bladder irrigation  Repeat CBC in the morning            VTE Pharmacologic Prophylaxis:   Pharmacologic: None due to ongoing bleed  Mechanical VTE Prophylaxis in Place: Yes    Patient Centered Rounds: I have performed bedside rounds with nursing staff today  Discussions with Specialists or Other Care Team Provider: Urology    Education and Discussions with Family / Patient: Patient's wife Susie Angel    Time Spent for Care: 45 minutes  More than 50% of total time spent on counseling and coordination of care as described above  Current Length of Stay: 5 day(s)    Current Patient Status: Inpatient   Certification Statement: The patient will continue to require additional inpatient hospital stay due to need for blood transfusion, procedure (cystoscopy), close monitoring    Discharge Plan: TBD    Code Status: Level 1 - Full Code      Subjective:   Patient seen and examined  He is sleeping but easily aroused  Denies pain or discomfort  No o/n events  Objective:     Vitals:   Temp (24hrs), Av 7 °F (36 5 °C), Min:96 7 °F (35 9 °C), Max:98 4 °F (36 9 °C)    HR:  [59-91] 64  Resp:  [18-20] 20  BP: (109-140)/(57-68) 109/65  SpO2:  [97 %-99 %] 99 %  Body mass index is 20 84 kg/m²  Input and Output Summary (last 24 hours): Intake/Output Summary (Last 24 hours) at 18 0852  Last data filed at 18 0738   Gross per 24 hour   Intake           2542 5 ml   Output             2707 ml   Net           -164 5 ml       Physical Exam:     Physical Exam   Constitutional: No distress  HENT:   Head: Normocephalic and atraumatic  Eyes: Conjunctivae are normal    Neck: No JVD present  Cardiovascular: Normal rate and regular rhythm  No murmur heard  Pulmonary/Chest: Effort normal  No respiratory distress  He has no wheezes  He has no rales  Abdominal: Soft  He exhibits no distension  There is no tenderness  There is no guarding     Genitourinary:   Genitourinary Comments: Loyd, bag with gross hematuria   Musculoskeletal: He exhibits no edema  Neurological: He is alert  He exhibits normal muscle tone  Skin: Skin is warm and dry  Psychiatric: Cognition and memory are impaired  Additional Data:     Labs:      Results from last 7 days  Lab Units 05/07/18  0508   WBC Thousand/uL 9 93   HEMOGLOBIN g/dL 7 9*   HEMATOCRIT % 23 6*   PLATELETS Thousands/uL 199   NEUTROS PCT % 68   LYMPHS PCT % 21   MONOS PCT % 9   EOS PCT % 2       Results from last 7 days  Lab Units 05/07/18  0508  05/03/18  0459   SODIUM mmol/L 138  < > 139   POTASSIUM mmol/L 4 2  < > 3 7   CHLORIDE mmol/L 104  < > 103   CO2 mmol/L 26  < > 27   BUN mg/dL 18  < > 18   CREATININE mg/dL 1 15  < > 1 10   CALCIUM mg/dL 8 2*  < > 9 0   TOTAL PROTEIN g/dL  --   --  6 7   BILIRUBIN TOTAL mg/dL  --   --  0 60   ALK PHOS U/L  --   --  56   ALT U/L  --   --  41   AST U/L  --   --  42   GLUCOSE RANDOM mg/dL 110  < > 114   < > = values in this interval not displayed  Results from last 7 days  Lab Units 05/03/18  0459   INR  1 05                 * I Have Reviewed All Lab Data Listed Above  * Additional Pertinent Lab Tests Reviewed:  Anyi 66 Admission Reviewed    Imaging:    Imaging Reports Reviewed Today Include: None today    Recent Cultures (last 7 days):       Results from last 7 days  Lab Units 05/02/18  1149   URINE CULTURE  No Growth <1000 cfu/mL       Last 24 Hours Medication List:     Current Facility-Administered Medications:  acetaminophen 650 mg Oral Q6H PRN Cordell Ovalle MD    atorvastatin 80 mg Oral Daily Cordell Ovalle MD    cefTRIAXone 1,000 mg Intravenous Q24H Damari Penny MD Last Rate: 1,000 mg (05/06/18 1721)   cyanocobalamin 1,000 mcg Oral Daily Cordell Ovalle MD    DULoxetine 20 mg Oral BID Cordell Ovalle MD    fentanyl citrate (PF) 25 mcg Intravenous Q2H PRN Cordell Ovalle MD    haloperidol 1 mg Oral HS PRN Cordell Ovalle MD    levothyroxine 88 mcg Oral Daily Ragini Good MD    ondansetron 4 mg Intravenous Q6H PRN Francoise Patience MD Kristin    oxybutynin 5 mg Oral Daily Tootie Hatfield PA-C    oxyCODONE 5 mg Oral Q4H PRN Will Reyes DO    pantoprazole 20 mg Oral Daily Speedy Chicas MD    sodium chloride 75 mL/hr Intravenous Continuous Alverto Gary MD Last Rate: 75 mL/hr (05/07/18 0430)   tamsulosin 0 4 mg Oral Daily With Dinner Speedy Chicas MD         Today, Patient Was Seen By: Beata Marc MD    ** Please Note: Dictation voice to text software may have been used in the creation of this document   **

## 2018-05-07 NOTE — OP NOTE
OPERATIVE REPORT  PATIENT NAME: Jonny Mora    :  1935  MRN: [id number]  Pt Location: [facility] OR ROOM 02    SURGERY DATE: 2018    Surgeon(s) and Role:     * Joana Garrison MD - Primary    Preop Diagnosis:  Gross hematuria [R31 0]    Post-Op Diagnosis Codes:     * Gross hematuria [R31 0]    Procedure(s) (LRB):  CYSTOSCOPY EVACUATION OF CLOTS (N/A)    Specimen(s):  * No specimens in log *    Estimated Blood Loss:   Minimal    Drains:  Urethral Catheter Three way 20 Fr  (Active)   Site Assessment Clean;Skin intact 2018  1:00 AM   Collection Container Standard drainage bag 2018  1:00 AM   Securement Method Securing device (Describe) 2018  1:00 AM   Number of days: 5       Continuous Bladder Irrigation Three-way (Active)   Site Assessment Clean;Skin intact 2018  1:00 AM   Securement Method Securing device (Describe) 2018  1:00 AM   Rate Moderate 2018  7:38 AM   Irrigant Normal saline 2018  7:38 AM   CBI Irrigation Intake (mL) 3000 mL 2018  7:38 AM   CBI Loyd Output (mL) 3100 mL 2018  7:38 AM   CBI Net Output (mL) 100 mL 2018  7:38 AM   Loyd Output Appearance Bloody; Blood clots 2018  7:38 AM   Number of days: 5       Anesthesia Type:   General    Operative Indications:  Gross hematuria [R31 0]      Operative Findings:  1  Large clot burden  2  Medium between 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    Complications:   None    Procedure and Technique: Jonny Mora is a 81y o  -year-old male with a history of gross hematuria for the last several days  Patient sees Dr Aroldo Urena as an outpatient  Because of his continued bleeding, we recommended cystoscopy with clot evacuation  We discussed with the patient potential need for removal of any abnormalities  Patient's wife did give us informed consent given the patient's inability to consent for himself  Risk and benefits of transurethral resection of the recurrent bladder tumor were discussed and reviewed  Informed consent was obtained  Patient was brought to the operating room on 5/7/2018  After the smooth induction of general LMA anesthesia, the patient was placed in the dorsal lithotomy position  His genitalia was prepped and draped in a sterile fashion  Intravenous antibiotics were administered  A timeout was performed with all members of the operative team confirming the patient's identity and procedure to be performed  24F continuous flow resection scope was placed with 30 degree lens  The bladder was thoroughly inspected  There was a large amount of clot  This was evacuated with the [facility] evacuator set  Once the bladder was free and clear of clot, we were able to identify a small to medium-sized tumor in the right posterior wall  Transurethral resection of the medium-sized bladder tumor was performed  The tumor was sent as specimen  The base of the tumor was fulgurated with electrocautery  Hemostasis was excellent  The bladder was left full the cystoscope was removed  A 24 [address] 3 way hematuria catheter was placed  The urine was noted to be clear  30 cc were placed in the balloon  The continuous bladder irrigation was hooked up was not started  A belladonna and opium suppository was placed per rectum  Overall the patient tolerated the procedure well  The patient was extubated in the operating room and transferred to the PACU in stable condition at the conclusion of the case       I was present for the entire procedure    Patient Disposition:  PACU     SIGNATURE: Johana Macias MD  DATE: May 7, 2018  TIME: 12:25 PM

## 2018-05-08 PROBLEM — D49.4 BLADDER TUMOR: Status: ACTIVE | Noted: 2018-05-08

## 2018-05-08 LAB
ANION GAP SERPL CALCULATED.3IONS-SCNC: 7 MMOL/L (ref 4–13)
BASOPHILS # BLD AUTO: 0.04 THOUSANDS/ΜL (ref 0–0.1)
BASOPHILS NFR BLD AUTO: 1 % (ref 0–1)
BUN SERPL-MCNC: 14 MG/DL (ref 5–25)
CALCIUM SERPL-MCNC: 8.5 MG/DL (ref 8.3–10.1)
CHLORIDE SERPL-SCNC: 103 MMOL/L (ref 100–108)
CO2 SERPL-SCNC: 29 MMOL/L (ref 21–32)
CREAT SERPL-MCNC: 1.04 MG/DL (ref 0.6–1.3)
EOSINOPHIL # BLD AUTO: 0.31 THOUSAND/ΜL (ref 0–0.61)
EOSINOPHIL NFR BLD AUTO: 4 % (ref 0–6)
ERYTHROCYTE [DISTWIDTH] IN BLOOD BY AUTOMATED COUNT: 13.5 % (ref 11.6–15.1)
GFR SERPL CREATININE-BSD FRML MDRD: 67 ML/MIN/1.73SQ M
GLUCOSE SERPL-MCNC: 88 MG/DL (ref 65–140)
HCT VFR BLD AUTO: 29.1 % (ref 36.5–49.3)
HGB BLD-MCNC: 10 G/DL (ref 12–17)
LYMPHOCYTES # BLD AUTO: 1.48 THOUSANDS/ΜL (ref 0.6–4.47)
LYMPHOCYTES NFR BLD AUTO: 18 % (ref 14–44)
MCH RBC QN AUTO: 31.2 PG (ref 26.8–34.3)
MCHC RBC AUTO-ENTMCNC: 34.4 G/DL (ref 31.4–37.4)
MCV RBC AUTO: 91 FL (ref 82–98)
MONOCYTES # BLD AUTO: 0.74 THOUSAND/ΜL (ref 0.17–1.22)
MONOCYTES NFR BLD AUTO: 9 % (ref 4–12)
NEUTROPHILS # BLD AUTO: 5.49 THOUSANDS/ΜL (ref 1.85–7.62)
NEUTS SEG NFR BLD AUTO: 68 % (ref 43–75)
PLATELET # BLD AUTO: 190 THOUSANDS/UL (ref 149–390)
PMV BLD AUTO: 10.7 FL (ref 8.9–12.7)
POTASSIUM SERPL-SCNC: 4.5 MMOL/L (ref 3.5–5.3)
RBC # BLD AUTO: 3.21 MILLION/UL (ref 3.88–5.62)
SODIUM SERPL-SCNC: 139 MMOL/L (ref 136–145)
WBC # BLD AUTO: 8.06 THOUSAND/UL (ref 4.31–10.16)

## 2018-05-08 PROCEDURE — 99232 SBSQ HOSP IP/OBS MODERATE 35: CPT | Performed by: UROLOGY

## 2018-05-08 PROCEDURE — 85025 COMPLETE CBC W/AUTO DIFF WBC: CPT | Performed by: FAMILY MEDICINE

## 2018-05-08 PROCEDURE — 99232 SBSQ HOSP IP/OBS MODERATE 35: CPT | Performed by: FAMILY MEDICINE

## 2018-05-08 PROCEDURE — 97530 THERAPEUTIC ACTIVITIES: CPT

## 2018-05-08 PROCEDURE — 80048 BASIC METABOLIC PNL TOTAL CA: CPT | Performed by: FAMILY MEDICINE

## 2018-05-08 RX ORDER — PHENAZOPYRIDINE HYDROCHLORIDE 100 MG/1
100 TABLET, FILM COATED ORAL
Status: DISCONTINUED | OUTPATIENT
Start: 2018-05-08 | End: 2018-05-11 | Stop reason: HOSPADM

## 2018-05-08 RX ADMIN — TAMSULOSIN HYDROCHLORIDE 0.4 MG: 0.4 CAPSULE ORAL at 17:29

## 2018-05-08 RX ADMIN — PANTOPRAZOLE SODIUM 20 MG: 20 TABLET, DELAYED RELEASE ORAL at 05:35

## 2018-05-08 RX ADMIN — CYANOCOBALAMIN TAB 1000 MCG 1000 MCG: 1000 TAB at 09:05

## 2018-05-08 RX ADMIN — PHENAZOPYRIDINE HYDROCHLORIDE 100 MG: 100 TABLET ORAL at 19:29

## 2018-05-08 RX ADMIN — LEVOTHYROXINE SODIUM 88 MCG: 88 TABLET ORAL at 05:35

## 2018-05-08 RX ADMIN — SODIUM CHLORIDE 75 ML/HR: 0.9 INJECTION, SOLUTION INTRAVENOUS at 03:52

## 2018-05-08 RX ADMIN — DULOXETINE HYDROCHLORIDE 20 MG: 20 CAPSULE, DELAYED RELEASE ORAL at 17:29

## 2018-05-08 RX ADMIN — ATORVASTATIN CALCIUM 80 MG: 40 TABLET, FILM COATED ORAL at 17:29

## 2018-05-08 RX ADMIN — DULOXETINE HYDROCHLORIDE 20 MG: 20 CAPSULE, DELAYED RELEASE ORAL at 09:06

## 2018-05-08 RX ADMIN — ACETAMINOPHEN 650 MG: 325 TABLET, FILM COATED ORAL at 23:58

## 2018-05-08 RX ADMIN — OXYCODONE HYDROCHLORIDE 5 MG: 5 TABLET ORAL at 13:46

## 2018-05-08 RX ADMIN — CEFTRIAXONE 1000 MG: 1 INJECTION, SOLUTION INTRAVENOUS at 17:29

## 2018-05-08 RX ADMIN — OXYCODONE HYDROCHLORIDE 5 MG: 5 TABLET ORAL at 18:10

## 2018-05-08 RX ADMIN — OXYBUTYNIN CHLORIDE 5 MG: 5 TABLET, EXTENDED RELEASE ORAL at 09:06

## 2018-05-08 NOTE — PROGRESS NOTES
UROLOGY PROGRESS NOTE   Patient Identifiers: Shea Connelly (MRN [id number])  Date of Service: 5/8/2018        Assessment:   81year old man status post clot evacuation and resection of bladder tumor small postoperative day 1     Plan:     Catheter remains in place  Urine is clear  CBI has not been running overnight  Patient should be discharged home with Loyd catheter  I discussed with patient's wife 2 week follow-up in my office for review of pathology  Patient does have a primary urologist as an outpatient and the wife may prefer to return to him  I discussed with her obtaining the pathology report prior to her visit  Either way, I would be happy to see the patient but the wife does understand the patient needs close urologic follow-up  Subjective:     24 HR EVENTS:  Patient underwent clot evacuation and resection of bladder tumor    No issues overnight    Objective:     VITALS:    Vitals:    05/08/18 0501   BP: 128/60   Pulse: 58   Resp: 18   Temp: 99 °F (37 2 °C)   SpO2: 95%       INS & OUTS:  1475cc/24hours    LABS:  Lab Results   Component Value Date    HGB 10 0 (L) 05/08/2018    HCT 29 1 (L) 05/08/2018    WBC 8 06 05/08/2018     05/08/2018   ]    Lab Results   Component Value Date     05/08/2018    K 4 5 05/08/2018     05/08/2018    CO2 29 05/08/2018    BUN 14 05/08/2018    CREATININE 1 04 05/08/2018    CALCIUM 8 5 05/08/2018    GLUCOSE 88 05/08/2018   ]    INPATIENT MEDS:    Current Facility-Administered Medications:     acetaminophen (TYLENOL) tablet 650 mg, 650 mg, Oral, Q6H PRN, Bobby Thompson MD, 650 mg at 05/05/18 0140    atorvastatin (LIPITOR) tablet 80 mg, 80 mg, Oral, Daily, Bobby Thompson MD, 80 mg at 05/07/18 1940    cefTRIAXone (ROCEPHIN) IVPB (premix) 1,000 mg, 1,000 mg, Intravenous, Q24H, Robert Shine MD, Last Rate: 100 mL/hr at 05/07/18 1941, 1,000 mg at 05/07/18 1941    cyanocobalamin (VITAMIN B-12) tablet 1,000 mcg, 1,000 mcg, Oral, Daily, Neal Maloney MD, Stopped at 05/07/18 4072    DULoxetine (CYMBALTA) delayed release capsule 20 mg, 20 mg, Oral, BID, Neal Maloney MD, 20 mg at 05/07/18 1939    fentanyl citrate (PF) 100 MCG/2ML 25 mcg, 25 mcg, Intravenous, Q2H PRN, Neal Maloney MD    haloperidol (HALDOL) tablet 1 mg, 1 mg, Oral, HS PRN, Neal Maloney MD, 1 mg at 05/04/18 2148    levothyroxine tablet 88 mcg, 88 mcg, Oral, Daily, Jhoana Wylie MD, 88 mcg at 05/08/18 0535    ondansetron (ZOFRAN) injection 4 mg, 4 mg, Intravenous, Q6H PRN, Neal Maloney MD    oxybutynin (DITROPAN-XL) 24 hr tablet 5 mg, 5 mg, Oral, Daily, Solitario Ponce PA-C, Stopped at 05/07/18 0959    oxyCODONE (ROXICODONE) IR tablet 5 mg, 5 mg, Oral, Q4H PRN, [employer], DO, 5 mg at 05/07/18 1940    pantoprazole (PROTONIX) EC tablet 20 mg, 20 mg, Oral, Daily, Neal Maloney MD, 20 mg at 05/08/18 0535    sodium chloride 0 9 % infusion, 75 mL/hr, Intravenous, Continuous, Jhoana Wylie MD, Last Rate: 75 mL/hr at 05/08/18 0352, 75 mL/hr at 05/08/18 0352    tamsulosin (FLOMAX) capsule 0 4 mg, 0 4 mg, Oral, Daily With Dinner, Neal Maloney MD, 0 4 mg at 05/07/18 1940      Physical Exam:   /60 (BP Location: Right arm)   Pulse 58   Temp 99 °F (37 2 °C) (Temporal)   Resp 18   Ht 6' (1 829 m)   Wt 69 7 kg (153 lb 10 6 oz)   SpO2 95%   BMI 20 84 kg/m²   GEN: no acute distress    RESP: breathing comfortably with no accessory muscle use    ABD: soft, non-tender, non-distended   EXT: no significant peripheral edema   ARAGON: in place draining clear yellow urine

## 2018-05-08 NOTE — SOCIAL WORK
Spoke with pt's wife and she would like the pt to go for short term rehab  She would like the pt to go to 1) [facility] floor and 2) [facility]   I will make referral to both and start auth

## 2018-05-08 NOTE — OCCUPATIONAL THERAPY NOTE
OT Note     05/08/18 0901   Restrictions/Precautions   Other Precautions Fall Risk;Bed Alarm   ADL   Eating Assistance 5  Supervision/Setup   Eating Comments Cues to initiate   Cognition   Orientation Level Oriented to person   Assessment   Assessment Presents supine and poorly positioned  A x 2 to position for comfort and alignment  A M  meal placed on over the bed table, requires set up all item, Cues to initiate self feed  Preoccupied with whereabouts of spouse  Thinks  spouse is currently hospitalized here  Attempts to reassure and orient without success  Pt  oriented to call bell and use of same     Recommendation   Recommendation (Continue OT services )

## 2018-05-08 NOTE — PROGRESS NOTES
Progress Note Alphonse [place] 1935, 81 y o  male MRN: [id number]    Unit/Bed#: 191-50 Encounter: [id number]    Primary Care Provider: Laquita Latham MD   Date and time admitted to hospital: 5/2/2018 11:17 AM        Acute post-hemorrhagic anemia   Assessment & Plan    Secondary to hematuria   hgb stable today without evidence of hematuria in spears         Bladder tumor   Assessment & Plan    Patient will need close follow-up with Urology to follow up pathology        Bradycardia   Assessment & Plan    resolved          Complicated UTI (urinary tract infection)   Assessment & Plan    Urine culture had no growth likely because patient had been on antibiotic (ciprofloxacin) prior to obtaining urine sample  Urology input appreciated, patient placed on Rocephin  Afebrile without leukocytosis        * Gross hematuria   Assessment & Plan    Resolved            Progress Note Sylwia Yeung 81 y o  male MRN: [id number]    Unit/Bed#: 412-01 Encounter: [id number]          Subjective:   Seen and examined at bedside, he has no acute complaints  Spears is in place with clear urine  I called and discussed discharge planning with the patient's wife who would like the patient undergo some short-term rehabilitation    Objective:     Vitals:   Vitals:    05/08/18 0822   BP: 104/56   Pulse: 67   Resp: 18   Temp: 98 4 °F (36 9 °C)   SpO2: 99%     Body mass index is 20 84 kg/m²      Intake/Output Summary (Last 24 hours) at 05/08/18 1124  Last data filed at 05/08/18 0501   Gross per 24 hour   Intake              818 ml   Output             1375 ml   Net             -557 ml       Physical Exam:   /56 (BP Location: Right arm)   Pulse 67   Temp 98 4 °F (36 9 °C) (Temporal)   Resp 18   Ht 6' (1 829 m)   Wt 69 7 kg (153 lb 10 6 oz)   SpO2 99%   BMI 20 84 kg/m²   General appearance: alert and oriented, in no acute distress  Lungs: clear to auscultation bilaterally  Heart: regular rate and rhythm, S1, S2 normal, no murmur, click, rub or gallop  Abdomen: soft, non-tender; bowel sounds normal; no masses,  no organomegaly  Male genitalia:  Loyd in place with clear urine  Extremities: extremities normal, warm and well-perfused; no cyanosis, clubbing, or edema  Pulses: 2+ and symmetric  Neurologic: Grossly normal     Invasive Devices     Peripheral Intravenous Line            Peripheral IV 05/07/18 Left Forearm 1 day          Drain            Continuous Bladder Irrigation Three-way 5 days    Urethral Catheter Three way 24 Fr  less than 1 day                  Results from last 7 days  Lab Units 05/08/18  0454 05/07/18  0508 05/06/18  0452   WBC Thousand/uL 8 06 9 93 9 99   HEMOGLOBIN g/dL 10 0* 7 9* 9 1*   HEMATOCRIT % 29 1* 23 6* 27 0*   PLATELETS Thousands/uL 190 199 199         Results from last 7 days  Lab Units 05/08/18  0454 05/07/18  0508 05/06/18  0452  05/03/18  0459   SODIUM mmol/L 139 138 137  < > 139   POTASSIUM mmol/L 4 5 4 2 3 9  < > 3 7   CHLORIDE mmol/L 103 104 104  < > 103   CO2 mmol/L 29 26 27  < > 27   BUN mg/dL 14 18 22  < > 18   CREATININE mg/dL 1 04 1 15 1 31*  < > 1 10   CALCIUM mg/dL 8 5 8 2* 8 1*  < > 9 0   TOTAL PROTEIN g/dL  --   --   --   --  6 7   BILIRUBIN TOTAL mg/dL  --   --   --   --  0 60   ALK PHOS U/L  --   --   --   --  56   ALT U/L  --   --   --   --  41   AST U/L  --   --   --   --  42   GLUCOSE RANDOM mg/dL 88 110 111  < > 114   < > = values in this interval not displayed      Medication Administration - last 24 hours from 05/07/2018 1124 to 05/08/2018 1124       Date/Time Order Dose Route Action Action by     05/07/2018 1228 fentanyl citrate (PF) 100 MCG/2ML 25 mcg   Intravenous WENDY Stallings Aas, MD     05/07/2018 1156 fentanyl citrate (PF) 100 MCG/2ML 25 mcg   Intravenous MAR Hold Automatic Transfer Provider     05/07/2018 1228 ondansetron (ZOFRAN) injection 4 mg   Intravenous WENDY Mcgee MD     05/07/2018 1156 ondansetron (ZOFRAN) injection 4 mg Intravenous MAR Hold Automatic Transfer Provider     05/07/2018 1228 acetaminophen (TYLENOL) tablet 650 mg   Oral MAR Un[place] Avani Sagastume MD     05/07/2018 1156 acetaminophen (TYLENOL) tablet 650 mg   Oral MAR Hold Automatic Transfer Provider     05/07/2018 1940 atorvastatin (LIPITOR) tablet 80 mg 80 mg Oral Given Cornelius Moran, RN     05/07/2018 1228 atorvastatin (LIPITOR) tablet 80 mg   Oral MAR [place] Avani Sagastume MD     05/07/2018 1156 atorvastatin (LIPITOR) tablet 80 mg   Oral MAR Hold Automatic Transfer Provider     05/08/2018 0535 pantoprazole (PROTONIX) EC tablet 20 mg 20 mg Oral Given Jose Killian RN     05/07/2018 1228 pantoprazole (PROTONIX) EC tablet 20 mg   Oral MAR [place] vAani Sagastume MD     05/07/2018 1156 pantoprazole (PROTONIX) EC tablet 20 mg   Oral MAR Hold Automatic Transfer Provider     05/08/2018 0906 DULoxetine (CYMBALTA) delayed release capsule 20 mg 20 mg Oral Given Norma Dsouza RN     05/07/2018 1939 DULoxetine (CYMBALTA) delayed release capsule 20 mg 20 mg Oral Given Cornelius Moran RN     05/07/2018 1228 DULoxetine (CYMBALTA) delayed release capsule 20 mg   Oral MAR [place] Avani Sagastume MD     05/07/2018 1156 DULoxetine (CYMBALTA) delayed release capsule 20 mg   Oral MAR Hold Automatic Transfer Provider     05/08/2018 0905 cyanocobalamin (VITAMIN B-12) tablet 1,000 mcg 1,000 mcg Oral Given Norma Dsouza RN     05/07/2018 1228 cyanocobalamin (VITAMIN B-12) tablet 1,000 mcg   Oral MAR [place] Avani Sagastume MD     05/07/2018 1156 cyanocobalamin (VITAMIN B-12) tablet 1,000 mcg   Oral MAR Hold Automatic Transfer Provider     05/07/2018 1940 tamsulosin (FLOMAX) capsule 0 4 mg 0 4 mg Oral Given Cornelius Moran RN     05/07/2018 1228 tamsulosin (FLOMAX) capsule 0 4 mg   Oral MAR [place] Avani Sagastume MD     05/07/2018 1156 tamsulosin (FLOMAX) capsule 0 4 mg   Oral MAR Hold Automatic Transfer Provider     05/07/2018 1228 haloperidol (HALDOL) tablet 1 mg   Oral MAR [place] Walt Hauser MD     05/07/2018 1156 haloperidol (HALDOL) tablet 1 mg   Oral MAR Hold Automatic Transfer Provider     05/08/2018 0906 oxybutynin (DITROPAN-XL) 24 hr tablet 5 mg 5 mg Oral Given Feliz Villafana RN     05/07/2018 1228 oxybutynin (DITROPAN-XL) 24 hr tablet 5 mg   Oral MAR [place] Walt Hauser MD     05/07/2018 1156 oxybutynin (DITROPAN-XL) 24 hr tablet 5 mg   Oral MAR Hold Automatic Transfer Provider     05/08/2018 0535 levothyroxine tablet 88 mcg 88 mcg Oral Given Caryl Hurtado RN     05/07/2018 1228 levothyroxine tablet 88 mcg   Oral MAR [place] Walt Hauser MD     05/07/2018 1156 levothyroxine tablet 88 mcg   Oral MAR Hold Automatic Transfer Provider     05/08/2018 0352 sodium chloride 0 9 % infusion 75 mL/hr Intravenous [address] [id number] TAYA Santos     05/08/2018 0351 sodium chloride 0 9 % infusion 0 mL/hr Intravenous Stopped Caryl Hurtado RN     05/07/2018 1939 sodium chloride 0 9 % infusion 75 mL/hr Intravenous Restarted Alexandru Prakash RN     05/07/2018 1941 cefTRIAXone (ROCEPHIN) IVPB (premix) 1,000 mg 1,000 mg Intravenous [address] Alexandru Prakash RN     05/07/2018 1228 cefTRIAXone (ROCEPHIN) IVPB (premix) 1,000 mg   Intravenous MAR [place] Walt Hauser MD     05/07/2018 1156 cefTRIAXone (ROCEPHIN) IVPB (premix) 1,000 mg   Intravenous MAR Hold Automatic Transfer Provider     05/07/2018 1940 oxyCODONE (ROXICODONE) IR tablet 5 mg 5 mg Oral Given Alexandru Prakash RN     05/07/2018 1228 oxyCODONE (ROXICODONE) IR tablet 5 mg   Oral MAR Unhold Walt Hauser MD     05/07/2018 1156 oxyCODONE (ROXICODONE) IR tablet 5 mg   Oral MAR Hold Automatic Transfer Provider            Lab, Imaging and other studies: I have personally reviewed pertinent reports      VTE Pharmacologic Prophylaxis:  None secondary hematuria  VTE Mechanical Prophylaxis: sequential compression device     Miya Rodriguez MD  5/8/2018,11:24 AM

## 2018-05-08 NOTE — ASSESSMENT & PLAN NOTE
Urine culture had no growth likely because patient had been on antibiotic (ciprofloxacin) prior to obtaining urine sample  Urology input appreciated, patient placed on Rocephin  Afebrile without leukocytosis

## 2018-05-09 ENCOUNTER — APPOINTMENT (INPATIENT)
Dept: RADIOLOGY | Facility: HOSPITAL | Age: 83
DRG: 669 | End: 2018-05-09
Payer: COMMERCIAL

## 2018-05-09 LAB
ALBUMIN SERPL BCP-MCNC: 3 G/DL (ref 3.5–5)
ALP SERPL-CCNC: 51 U/L (ref 46–116)
ALT SERPL W P-5'-P-CCNC: 28 U/L (ref 12–78)
ANION GAP SERPL CALCULATED.3IONS-SCNC: 4 MMOL/L (ref 4–13)
AST SERPL W P-5'-P-CCNC: 31 U/L (ref 5–45)
BASOPHILS # BLD AUTO: 0.05 THOUSANDS/ΜL (ref 0–0.1)
BASOPHILS NFR BLD AUTO: 1 % (ref 0–1)
BILIRUB SERPL-MCNC: 0.4 MG/DL (ref 0.2–1)
BUN SERPL-MCNC: 20 MG/DL (ref 5–25)
CALCIUM SERPL-MCNC: 8.5 MG/DL (ref 8.3–10.1)
CHLORIDE SERPL-SCNC: 102 MMOL/L (ref 100–108)
CO2 SERPL-SCNC: 29 MMOL/L (ref 21–32)
CREAT SERPL-MCNC: 1.33 MG/DL (ref 0.6–1.3)
EOSINOPHIL # BLD AUTO: 0.57 THOUSAND/ΜL (ref 0–0.61)
EOSINOPHIL NFR BLD AUTO: 6 % (ref 0–6)
ERYTHROCYTE [DISTWIDTH] IN BLOOD BY AUTOMATED COUNT: 13.3 % (ref 11.6–15.1)
GFR SERPL CREATININE-BSD FRML MDRD: 50 ML/MIN/1.73SQ M
GLUCOSE SERPL-MCNC: 104 MG/DL (ref 65–140)
HCT VFR BLD AUTO: 28.7 % (ref 36.5–49.3)
HGB BLD-MCNC: 9.7 G/DL (ref 12–17)
LACTATE SERPL-SCNC: 1.7 MMOL/L (ref 0.5–2)
LYMPHOCYTES # BLD AUTO: 2.39 THOUSANDS/ΜL (ref 0.6–4.47)
LYMPHOCYTES NFR BLD AUTO: 25 % (ref 14–44)
MAGNESIUM SERPL-MCNC: 1.7 MG/DL (ref 1.6–2.6)
MCH RBC QN AUTO: 30.7 PG (ref 26.8–34.3)
MCHC RBC AUTO-ENTMCNC: 33.8 G/DL (ref 31.4–37.4)
MCV RBC AUTO: 91 FL (ref 82–98)
MONOCYTES # BLD AUTO: 1.05 THOUSAND/ΜL (ref 0.17–1.22)
MONOCYTES NFR BLD AUTO: 11 % (ref 4–12)
NEUTROPHILS # BLD AUTO: 5.35 THOUSANDS/ΜL (ref 1.85–7.62)
NEUTS SEG NFR BLD AUTO: 57 % (ref 43–75)
PHOSPHATE SERPL-MCNC: 3.6 MG/DL (ref 2.3–4.1)
PLATELET # BLD AUTO: 204 THOUSANDS/UL (ref 149–390)
PMV BLD AUTO: 10.2 FL (ref 8.9–12.7)
POTASSIUM SERPL-SCNC: 4.2 MMOL/L (ref 3.5–5.3)
PROT SERPL-MCNC: 6.1 G/DL (ref 6.4–8.2)
RBC # BLD AUTO: 3.16 MILLION/UL (ref 3.88–5.62)
SODIUM SERPL-SCNC: 135 MMOL/L (ref 136–145)
WBC # BLD AUTO: 9.41 THOUSAND/UL (ref 4.31–10.16)

## 2018-05-09 PROCEDURE — 87040 BLOOD CULTURE FOR BACTERIA: CPT | Performed by: INTERNAL MEDICINE

## 2018-05-09 PROCEDURE — 97116 GAIT TRAINING THERAPY: CPT

## 2018-05-09 PROCEDURE — 84100 ASSAY OF PHOSPHORUS: CPT | Performed by: INTERNAL MEDICINE

## 2018-05-09 PROCEDURE — 83735 ASSAY OF MAGNESIUM: CPT | Performed by: INTERNAL MEDICINE

## 2018-05-09 PROCEDURE — 97110 THERAPEUTIC EXERCISES: CPT

## 2018-05-09 PROCEDURE — 85025 COMPLETE CBC W/AUTO DIFF WBC: CPT | Performed by: FAMILY MEDICINE

## 2018-05-09 PROCEDURE — 97530 THERAPEUTIC ACTIVITIES: CPT

## 2018-05-09 PROCEDURE — 80053 COMPREHEN METABOLIC PANEL: CPT | Performed by: INTERNAL MEDICINE

## 2018-05-09 PROCEDURE — 99232 SBSQ HOSP IP/OBS MODERATE 35: CPT | Performed by: FAMILY MEDICINE

## 2018-05-09 PROCEDURE — 97535 SELF CARE MNGMENT TRAINING: CPT

## 2018-05-09 PROCEDURE — 71045 X-RAY EXAM CHEST 1 VIEW: CPT

## 2018-05-09 PROCEDURE — 83605 ASSAY OF LACTIC ACID: CPT | Performed by: INTERNAL MEDICINE

## 2018-05-09 RX ORDER — MAGNESIUM SULFATE HEPTAHYDRATE 40 MG/ML
2 INJECTION, SOLUTION INTRAVENOUS ONCE
Status: DISCONTINUED | OUTPATIENT
Start: 2018-05-09 | End: 2018-05-11 | Stop reason: HOSPADM

## 2018-05-09 RX ADMIN — PHENAZOPYRIDINE HYDROCHLORIDE 100 MG: 100 TABLET ORAL at 15:40

## 2018-05-09 RX ADMIN — PHENAZOPYRIDINE HYDROCHLORIDE 100 MG: 100 TABLET ORAL at 11:56

## 2018-05-09 RX ADMIN — CEFTRIAXONE 1000 MG: 1 INJECTION, SOLUTION INTRAVENOUS at 18:48

## 2018-05-09 RX ADMIN — CYANOCOBALAMIN TAB 1000 MCG 1000 MCG: 1000 TAB at 08:45

## 2018-05-09 RX ADMIN — DULOXETINE HYDROCHLORIDE 20 MG: 20 CAPSULE, DELAYED RELEASE ORAL at 18:48

## 2018-05-09 RX ADMIN — OXYBUTYNIN CHLORIDE 5 MG: 5 TABLET, EXTENDED RELEASE ORAL at 08:46

## 2018-05-09 RX ADMIN — DULOXETINE HYDROCHLORIDE 20 MG: 20 CAPSULE, DELAYED RELEASE ORAL at 08:45

## 2018-05-09 RX ADMIN — Medication 400 MG: at 18:48

## 2018-05-09 RX ADMIN — PHENAZOPYRIDINE HYDROCHLORIDE 100 MG: 100 TABLET ORAL at 08:46

## 2018-05-09 RX ADMIN — ATORVASTATIN CALCIUM 80 MG: 40 TABLET, FILM COATED ORAL at 15:40

## 2018-05-09 RX ADMIN — HALOPERIDOL 1 MG: 1 TABLET ORAL at 21:31

## 2018-05-09 RX ADMIN — TAMSULOSIN HYDROCHLORIDE 0.4 MG: 0.4 CAPSULE ORAL at 15:40

## 2018-05-09 RX ADMIN — PANTOPRAZOLE SODIUM 20 MG: 20 TABLET, DELAYED RELEASE ORAL at 05:26

## 2018-05-09 RX ADMIN — LEVOTHYROXINE SODIUM 88 MCG: 88 TABLET ORAL at 05:25

## 2018-05-09 NOTE — PROGRESS NOTES
I was notified by the nursing staff the the patient had a fever 100 4° F  check blood cultures x2 sets  Check a portable chest x-ray

## 2018-05-09 NOTE — PROGRESS NOTES
I was informed by the nursing staff that the patient had a 6-beat run of ventricular tachycardia  His potassium is an adequate level at 4 2  His creatinine has somewhat worsened this morning  I will order magnesium supplementation for a goal magnesium level of at least 2 in the setting of ventricular tachycardia  We will continue to monitor the patient for any further ventricular ectopy

## 2018-05-09 NOTE — SOCIAL WORK
I CALLED [facility] AND THEY TOLD ME THAT THE AUTH IS STILL PENDING I TOLD HER THAT WE SENT THIS IN LAST NIGHT AND THAT WE ARE STILL WAITING FOR Tonia 0018 AND SHE TOLD ME THAT THEY HAVE 24- 50 HOURS TO GET THE AUTH

## 2018-05-09 NOTE — PHYSICAL THERAPY NOTE
PT Treatment note     05/09/18 8361   Restrictions/Precautions   Weight Bearing Precautions Per Order No   Other Precautions Chair Alarm; Bed Alarm; Fall Risk;Multiple lines;Telemetry   Cognition   Orientation Level Oriented to person;Oriented to place   Subjective   Subjective Agreeable to therapy  Bed Mobility   Supine to Sit 4  Minimal assistance   Additional items Bedrails;HOB elevated;Verbal cues; Increased time required   Transfers   Sit to Stand 4  Minimal assistance   Additional items Assist x 1;Bedrails;Verbal cues   Stand to Sit 4  Minimal assistance   Additional items Assist x 1; Armrests; Verbal cues   Stand pivot 4  Minimal assistance   Additional items Assist x 1;Verbal cues   Ambulation/Elevation   Gait pattern Forward Flexion   Gait Assistance 4  Minimal assist   Additional items Assist x 1;Verbal cues   Assistive Device Rolling walker   Distance 60' x 2   (assist to manuever walker with turns)   Balance   Static Sitting Good   Dynamic Sitting Good   Static Standing Fair   Dynamic Standing Fair   Ambulatory Fair  (with RW)   Endurance Deficit   Endurance Deficit Yes   Activity Tolerance   Activity Tolerance Patient limited by fatigue   Exercises   Hip Flexion 20 reps;AROM; Sitting   Hip Abduction Sitting;20 reps;AROM   Hip Adduction Sitting;20 reps;AROM   Knee AROM Long Arc Quad Sitting;20 reps;AROM   Ankle Pumps Sitting;20 reps;AROM   Assessment   Prognosis Good   Problem List Decreased strength;Decreased endurance; Impaired balance;Decreased mobility; Decreased safety awareness   Assessment Pt  performing bed mobility,transfers and ambulation at (min) x 1 level of function  Requires cues with  tx  for hand placement  Cues for direction and safety with RW  Decreased balance increasing risk for falls  Pt  is in need of continued PT to improve strength, balance, endurance, safety awareness and functional mobility      Plan   Treatment/Interventions Functional transfer training;LE strengthening/ROM; Therapeutic exercise; Endurance training;Bed mobility;Gait training   Progress Progressing toward goals   Recommendation   Recommendation Short-term skilled PT   Pt  OOB with call bell within reach, scd's connected and turned on and alarm on at end of PT session

## 2018-05-09 NOTE — ASSESSMENT & PLAN NOTE
Urine culture had no growth likely because patient had been on antibiotic (ciprofloxacin) prior to obtaining urine sample  Urology input appreciated, patient placed on Rocephin  Continue with rocephin

## 2018-05-09 NOTE — OCCUPATIONAL THERAPY NOTE
OT Note     05/09/18 0959   Restrictions/Precautions   Other Precautions Fall Risk;Telemetry; Chair Alarm   ADL   Where Assessed Chair   Grooming Assistance 2  Maximal Assistance   Grooming Deficit Denture care  (Cues for upper and lower)   Grooming Comments Completes grooming with set up   [address]  Supervision/Setup   UB Bathing Deficit Verbal cueing;Supervision/safety; Increased time to complete   UB Bathing Comments A with back, repeats already completed areas   LB Bathing Assistance 5  Supervision/Setup   LB Bathing Deficit Supervision/safety; Increased time to complete;Verbal cueing  (Dawson/buttocks)   LB Bathing Comments LEs in seated; deferr dawson/buttocks to nsg   UB Dressing Assistance 4  Minimal Assistance   UB Dressing Comments To manage lines and fasteners   LB Dressing Assistance 5  Supervision/Setup   LB Dressing Comments Doffs/dons non skids without difficulty   Cognition   Orientation Level Oriented to person   Comments Unable orient to place despirte several attempts   Activity Tolerance   Activity Tolerance Patient tolerated treatment well   Assessment   Assessment Presents seated recliner at bedside  Completes a m  self care as per above  Requirtes cues to sequence and for already completed areas  Preoccupied with needing to urinate  Does not comprehend catheter despite reiteration  Remains OOB in recliner  Chair alarm activated  Call bell and phone in reach  SCCDs applied and turned on     Recommendation   Recommendation (Continue OT services )

## 2018-05-09 NOTE — PROGRESS NOTES
Progress Note Hussein Chaudhry 1935, 81 y o  male MRN: [id number]    Unit/Bed#: 229-83 Encounter: [id number]    Primary Care Provider: Julia Cheung MD   Date and time admitted to hospital: 5/2/2018 84:94 AM        Complicated UTI (urinary tract infection)   Assessment & Plan    Urine culture had no growth likely because patient had been on antibiotic (ciprofloxacin) prior to obtaining urine sample  Urology input appreciated, patient placed on Rocephin  Continue with rocephin         Bladder tumor   Assessment & Plan    Patient will need close follow-up with Urology to follow up pathology  His wife would prefer to follow up with Dr Jeannette Osorio in 2 weeks         Acute post-hemorrhagic anemia   Assessment & Plan    Secondary to hematuria   hgb stable today without evidence of hematuria in spears         Alzheimer's disease   Assessment & Plan    Chronic   At baseline            Progress Note - Tre Shi 81 y o  male MRN: [id number]    Unit/Bed#: 131-23 Encounter: [id number]          Subjective:   Seen and examined @ bedside  He appears well today  Wife is at bedside and agrees he's at baseline function  Currently awaiting insurance auth for rehab    Objective:     Vitals:   Vitals:    05/09/18 1542   BP: 129/60   Pulse: 77   Resp: 18   Temp: 98 1 °F (36 7 °C)   SpO2: 99%     Body mass index is 20 84 kg/m²      Intake/Output Summary (Last 24 hours) at 05/09/18 1717  Last data filed at 05/09/18 1200   Gross per 24 hour   Intake              600 ml   Output             1550 ml   Net             -950 ml       Physical Exam:   /60 (BP Location: Left arm)   Pulse 77   Temp 98 1 °F (36 7 °C) (Temporal)   Resp 18   Ht 6' (1 829 m)   Wt 69 7 kg (153 lb 10 6 oz)   SpO2 99%   BMI 20 84 kg/m²   General appearance: alert and oriented, in no acute distress  Head: Normocephalic, without obvious abnormality, atraumatic  Lungs: clear to auscultation bilaterally  Heart: regular rate and rhythm, S1, S2 normal, no murmur, click, rub or gallop  Abdomen: soft, non-tender; bowel sounds normal; no masses,  no organomegaly   Male Gen: spears in place with clear urine   Extremities: extremities normal, warm and well-perfused; no cyanosis, clubbing, or edema  Pulses: 2+ and symmetric  Neurologic: Grossly normal     Invasive Devices     Drain            Continuous Bladder Irrigation Three-way 6 days    Urethral Catheter Three way 24 Fr  2 days                  Results from last 7 days  Lab Units 05/09/18 0452 05/08/18 0454 05/07/18  0508   WBC Thousand/uL 9 41 8 06 9 93   HEMOGLOBIN g/dL 9 7* 10 0* 7 9*   HEMATOCRIT % 28 7* 29 1* 23 6*   PLATELETS Thousands/uL 204 190 199         Results from last 7 days  Lab Units 05/09/18  0452 05/08/18  0454 05/07/18  0508  05/03/18  0459   SODIUM mmol/L 135* 139 138  < > 139   POTASSIUM mmol/L 4 2 4 5 4 2  < > 3 7   CHLORIDE mmol/L 102 103 104  < > 103   CO2 mmol/L 29 29 26  < > 27   BUN mg/dL 20 14 18  < > 18   CREATININE mg/dL 1 33* 1 04 1 15  < > 1 10   CALCIUM mg/dL 8 5 8 5 8 2*  < > 9 0   TOTAL PROTEIN g/dL 6 1*  --   --   --  6 7   BILIRUBIN TOTAL mg/dL 0 40  --   --   --  0 60   ALK PHOS U/L 51  --   --   --  56   ALT U/L 28  --   --   --  41   AST U/L 31  --   --   --  42   GLUCOSE RANDOM mg/dL 104 88 110  < > 114   < > = values in this interval not displayed      Medication Administration - last 24 hours from 05/08/2018 1717 to 05/09/2018 1717       Date/Time Order Dose Route Action Action by     05/08/2018 3018 acetaminophen (TYLENOL) tablet 650 mg 650 mg Oral Given Ariana Knox, TAYA     05/09/2018 1540 atorvastatin (LIPITOR) tablet 80 mg 80 mg Oral Given Terrence Bright, TAYA     05/08/2018 1729 atorvastatin (LIPITOR) tablet 80 mg 80 mg Oral Given Burgess Don RN     05/09/2018 0526 pantoprazole (PROTONIX) EC tablet 20 mg 20 mg Oral Given Ariana Estimable, RN     05/09/2018 0845 DULoxetine (CYMBALTA) delayed release capsule 20 mg 20 mg Oral Given Emigdio Cooper RN     05/08/2018 1729 DULoxetine (CYMBALTA) delayed release capsule 20 mg 20 mg Oral Given Leafy Fortsandra, RN     05/09/2018 0845 cyanocobalamin (VITAMIN B-12) tablet 1,000 mcg 1,000 mcg Oral Given Rebecca Wilhelm RN     05/09/2018 1540 tamsulosin (FLOMAX) capsule 0 4 mg 0 4 mg Oral Given Jacky Saba, RN     05/08/2018 1729 tamsulosin (FLOMAX) capsule 0 4 mg 0 4 mg [facility]     05/09/2018 0846 oxybutynin (DITROPAN-XL) 24 hr tablet 5 mg 5 mg Oral Given Rebecca Wilhelm, TAYA     05/09/2018 6595 levothyroxine tablet 88 mcg 88 mcg Oral Given Aracely Aguirre, RN     05/08/2018 1729 cefTRIAXone (ROCEPHIN) IVPB (premix) 1,000 mg 1,000 mg Intravenous [address] [facility]     05/08/2018 1810 oxyCODONE (ROXICODONE) IR tablet 5 mg 5 mg Oral Given Leafy Fortsandra, RN     05/09/2018 1540 phenazopyridine (PYRIDIUM) tablet 100 mg 100 mg Oral Given Jacky Saba, RN     05/09/2018 1156 phenazopyridine (PYRIDIUM) tablet 100 mg 100 mg Oral Given Rebecca Wilhelm RN     05/09/2018 0846 phenazopyridine (PYRIDIUM) tablet 100 mg 100 mg Oral Given Rebecca Wilhelm RN     05/08/2018 1929 phenazopyridine (PYRIDIUM) tablet 100 mg 100 mg Oral Given Leafy Fortis, RN     05/09/2018 1157 magnesium sulfate 2 g/50 mL IVPB (premix) 2 g 2 g Intravenous Not Given Rebecca Wilhelm RN            Lab, Imaging and other studies: I have personally reviewed pertinent reports      VTE Pharmacologic Prophylaxis: none 2/2 anemia and hematuria  VTE Mechanical Prophylaxis: sequential compression device     Petra Phelps MD  5/9/2018,5:17 PM

## 2018-05-09 NOTE — ASSESSMENT & PLAN NOTE
Patient will need close follow-up with Urology to follow up pathology  His wife would prefer to follow up with Dr Ruiz Friday in 2 weeks

## 2018-05-09 NOTE — PROGRESS NOTES
Patient had episode of v-tach for 6 beats  He is resting quietly in bed and  denies any discomfort, weakness, SOB  Rhythm has since returned to normal sinus rhythm  Dr Shahbaz Germain made aware  Will continue to monitor patient

## 2018-05-10 PROBLEM — C67.9 MALIGNANT NEOPLASM OF BLADDER (HCC): Status: ACTIVE | Noted: 2018-05-08

## 2018-05-10 LAB
ANION GAP SERPL CALCULATED.3IONS-SCNC: 5 MMOL/L (ref 4–13)
BASOPHILS # BLD AUTO: 0.04 THOUSANDS/ΜL (ref 0–0.1)
BASOPHILS NFR BLD AUTO: 1 % (ref 0–1)
BUN SERPL-MCNC: 20 MG/DL (ref 5–25)
CALCIUM SERPL-MCNC: 8.7 MG/DL (ref 8.3–10.1)
CHLORIDE SERPL-SCNC: 102 MMOL/L (ref 100–108)
CO2 SERPL-SCNC: 29 MMOL/L (ref 21–32)
CREAT SERPL-MCNC: 1.1 MG/DL (ref 0.6–1.3)
EOSINOPHIL # BLD AUTO: 0.37 THOUSAND/ΜL (ref 0–0.61)
EOSINOPHIL NFR BLD AUTO: 5 % (ref 0–6)
ERYTHROCYTE [DISTWIDTH] IN BLOOD BY AUTOMATED COUNT: 12.8 % (ref 11.6–15.1)
GFR SERPL CREATININE-BSD FRML MDRD: 63 ML/MIN/1.73SQ M
GLUCOSE SERPL-MCNC: 100 MG/DL (ref 65–140)
HCT VFR BLD AUTO: 26.3 % (ref 36.5–49.3)
HGB BLD-MCNC: 8.9 G/DL (ref 12–17)
LYMPHOCYTES # BLD AUTO: 1.16 THOUSANDS/ΜL (ref 0.6–4.47)
LYMPHOCYTES NFR BLD AUTO: 16 % (ref 14–44)
MAGNESIUM SERPL-MCNC: 1.7 MG/DL (ref 1.6–2.6)
MCH RBC QN AUTO: 30.6 PG (ref 26.8–34.3)
MCHC RBC AUTO-ENTMCNC: 33.8 G/DL (ref 31.4–37.4)
MCV RBC AUTO: 90 FL (ref 82–98)
MONOCYTES # BLD AUTO: 0.77 THOUSAND/ΜL (ref 0.17–1.22)
MONOCYTES NFR BLD AUTO: 10 % (ref 4–12)
NEUTROPHILS # BLD AUTO: 5.09 THOUSANDS/ΜL (ref 1.85–7.62)
NEUTS SEG NFR BLD AUTO: 68 % (ref 43–75)
PLATELET # BLD AUTO: 214 THOUSANDS/UL (ref 149–390)
PMV BLD AUTO: 10.6 FL (ref 8.9–12.7)
POTASSIUM SERPL-SCNC: 3.8 MMOL/L (ref 3.5–5.3)
RBC # BLD AUTO: 2.91 MILLION/UL (ref 3.88–5.62)
SODIUM SERPL-SCNC: 136 MMOL/L (ref 136–145)
WBC # BLD AUTO: 7.43 THOUSAND/UL (ref 4.31–10.16)

## 2018-05-10 PROCEDURE — 80048 BASIC METABOLIC PNL TOTAL CA: CPT | Performed by: FAMILY MEDICINE

## 2018-05-10 PROCEDURE — 97116 GAIT TRAINING THERAPY: CPT

## 2018-05-10 PROCEDURE — 85025 COMPLETE CBC W/AUTO DIFF WBC: CPT | Performed by: FAMILY MEDICINE

## 2018-05-10 PROCEDURE — 97530 THERAPEUTIC ACTIVITIES: CPT

## 2018-05-10 PROCEDURE — 83735 ASSAY OF MAGNESIUM: CPT | Performed by: FAMILY MEDICINE

## 2018-05-10 PROCEDURE — 99232 SBSQ HOSP IP/OBS MODERATE 35: CPT | Performed by: FAMILY MEDICINE

## 2018-05-10 RX ORDER — POLYETHYLENE GLYCOL 3350 17 G/17G
17 POWDER, FOR SOLUTION ORAL DAILY
Status: DISCONTINUED | OUTPATIENT
Start: 2018-05-10 | End: 2018-05-11 | Stop reason: HOSPADM

## 2018-05-10 RX ORDER — AMOXICILLIN 250 MG
1 CAPSULE ORAL
Status: DISCONTINUED | OUTPATIENT
Start: 2018-05-10 | End: 2018-05-11 | Stop reason: HOSPADM

## 2018-05-10 RX ADMIN — LEVOTHYROXINE SODIUM 88 MCG: 88 TABLET ORAL at 05:01

## 2018-05-10 RX ADMIN — DULOXETINE HYDROCHLORIDE 20 MG: 20 CAPSULE, DELAYED RELEASE ORAL at 17:01

## 2018-05-10 RX ADMIN — PHENAZOPYRIDINE HYDROCHLORIDE 100 MG: 100 TABLET ORAL at 11:40

## 2018-05-10 RX ADMIN — CEFTRIAXONE 1000 MG: 1 INJECTION, SOLUTION INTRAVENOUS at 17:01

## 2018-05-10 RX ADMIN — PANTOPRAZOLE SODIUM 20 MG: 20 TABLET, DELAYED RELEASE ORAL at 05:01

## 2018-05-10 RX ADMIN — OXYBUTYNIN CHLORIDE 5 MG: 5 TABLET, EXTENDED RELEASE ORAL at 08:05

## 2018-05-10 RX ADMIN — PHENAZOPYRIDINE HYDROCHLORIDE 100 MG: 100 TABLET ORAL at 16:59

## 2018-05-10 RX ADMIN — Medication 400 MG: at 08:04

## 2018-05-10 RX ADMIN — DULOXETINE HYDROCHLORIDE 20 MG: 20 CAPSULE, DELAYED RELEASE ORAL at 08:05

## 2018-05-10 RX ADMIN — POLYETHYLENE GLYCOL (3350) 17 G: 17 POWDER, FOR SOLUTION ORAL at 16:59

## 2018-05-10 RX ADMIN — Medication 400 MG: at 17:01

## 2018-05-10 RX ADMIN — CYANOCOBALAMIN TAB 1000 MCG 1000 MCG: 1000 TAB at 08:04

## 2018-05-10 RX ADMIN — TAMSULOSIN HYDROCHLORIDE 0.4 MG: 0.4 CAPSULE ORAL at 16:59

## 2018-05-10 RX ADMIN — Medication 1 TABLET: at 21:21

## 2018-05-10 RX ADMIN — PHENAZOPYRIDINE HYDROCHLORIDE 100 MG: 100 TABLET ORAL at 08:04

## 2018-05-10 RX ADMIN — ATORVASTATIN CALCIUM 80 MG: 40 TABLET, FILM COATED ORAL at 16:59

## 2018-05-10 NOTE — PLAN OF CARE
Problem: Potential for Falls  Goal: Patient will remain free of falls  INTERVENTIONS:  - Assess patient frequently for physical needs  -  Identify cognitive and physical deficits and behaviors that affect risk of falls  -  [place] fall precautions as indicated by assessment  High fall risk   - Educate patient/family on patient safety including physical limitations  - Instruct patient to call for assistance with activity based on assessment  - Modify environment to reduce risk of injury  - Consider OT/PT consult to assist with strengthening/mobility    Outcome: Progressing      Problem: PAIN - ADULT  Goal: Verbalizes/displays adequate comfort level or baseline comfort level  Interventions:  - Encourage patient to monitor pain and request assistance  - Assess pain using appropriate pain scale  0-10 pain scale    - Administer analgesics based on type and severity of pain and evaluate response  - Implement non-pharmacological measures as appropriate and evaluate response  - Consider cultural and social influences on pain and pain management  - Notify physician/advanced practitioner if interventions unsuccessful or patient reports new pain    Outcome: Progressing      Problem: INFECTION - ADULT  Goal: Absence or prevention of progression during hospitalization  INTERVENTIONS:  - Assess and monitor for signs and symptoms of infection  - Monitor lab/diagnostic results  - Monitor all insertion sites, i e  indwelling lines, tubes, and drains   - [place] appropriate cooling/warming therapies per order  - Administer medications as ordered  - Instruct and encourage patient and family to use good hand hygiene technique  - Identify and instruct in appropriate isolation precautions for identified infection/condition    Outcome: Progressing      Problem: SAFETY ADULT  Goal: Maintain or return to baseline ADL function  INTERVENTIONS:  -  Assess patient's ability to carry out ADLs; assess patient's baseline for ADL function and identify physical deficits which impact ability to perform ADLs (bathing, care of mouth/teeth, toileting, grooming, dressing, etc )  - Assess/evaluate cause of self-care deficits   - Assess range of motion  - Assess patient's mobility; develop plan if impaired  - Assess patient's need for assistive devices and provide as appropriate  - Encourage maximum independence but intervene and supervise when necessary  ¯ Involve family in performance of ADLs  ¯ Assess for home care needs following discharge   ¯ Request OT consult to assist with ADL evaluation and planning for discharge  ¯ Provide patient education as appropriate   Outcome: Progressing    Goal: Maintain or return mobility status to optimal level  INTERVENTIONS:  - Assess patient's baseline mobility status (ambulation, transfers, stairs, etc )    - Identify cognitive and physical deficits and behaviors that affect mobility  - Identify mobility aids required to assist with transfers and/or ambulation (gait belt, sit-to-stand, lift, walker, cane, etc )  - [place] fall precautions as indicated by assessment   High fall risk   - Record patient progress and toleration of activity level on Mobility SBAR; progress patient to next Phase/Stage  - Instruct patient to call for assistance with activity based on assessment  - Request Rehabilitation consult to assist with strengthening/weightbearing, etc     Outcome: Progressing      Problem: DISCHARGE PLANNING  Goal: Discharge to home or other facility with appropriate resources  INTERVENTIONS:  - Identify barriers to discharge w/patient and caregiver  - Arrange for needed discharge resources and transportation as appropriate  - Identify discharge learning needs (meds, wound care, etc )  - Arrange for interpretive services to assist at discharge as needed  - Refer to Case Management Department for coordinating discharge planning if the patient needs post-hospital services based on physician/advanced practitioner order or complex needs related to functional status, cognitive ability, or social support system   Outcome: Progressing      Problem: Knowledge Deficit  Goal: Patient/family/caregiver demonstrates understanding of disease process, treatment plan, medications, and discharge instructions  Complete learning assessment and assess knowledge base  Interventions:  - Provide teaching at level of understanding  - Provide teaching via preferred learning methods   Outcome: Progressing      Problem: GENITOURINARY - ADULT  Goal: Maintains or returns to baseline urinary function  INTERVENTIONS:  - Assess urinary function  - Encourage oral fluids to ensure adequate hydration  - Administer IV fluids as ordered to ensure adequate hydration  - Administer ordered medications as needed  - Offer frequent toileting post spears catheter removal   - Follow urinary retention protocol if ordered   Outcome: Progressing    Goal: Absence of urinary retention  INTERVENTIONS:  - Assess patient's ability to void and empty bladder, post spears removal   - Monitor I/O  - Bladder scan as needed, post spears removal   - Discuss with physician/AP medications to alleviate retention as needed  - Discuss catheterization for long term situations as appropriate   Outcome: Progressing    Goal: Urinary catheter remains patent  INTERVENTIONS:  - Assess patency of urinary catheter  Maintain CBI flow as appropriate  Hand irrigate for clots    - Follow guidelines for intermittent irrigation of non-functioning urinary catheter   Outcome: Progressing      Problem: DISCHARGE PLANNING - CARE MANAGEMENT  Goal: Discharge to post-acute care or home with appropriate resources  INTERVENTIONS:  - Conduct assessment to determine patient/family and health care team treatment goals, and need for post-acute services based on payer coverage, community resources, and patient preferences, and barriers to discharge  - Address psychosocial, clinical, and financial barriers to discharge as identified in assessment in conjunction with the patient/family and health care team  - Arrange appropriate level of post-acute services according to patient's   needs and preference and payer coverage in collaboration with the physician and health care team  - Communicate with and update the patient/family, physician, and health care team regarding progress on the discharge plan  - Arrange appropriate transportation to post-acute venues  Pt might benefit from home care on discharge     Outcome: Progressing      Problem: Prexisting or High Potential for Compromised Skin Integrity  Goal: Skin integrity is maintained or improved  INTERVENTIONS:  - Identify patients at risk for skin breakdown  - Assess and monitor skin integrity  - Assess and monitor nutrition and hydration status  - Monitor labs (i e  albumin)  - Assess for incontinence   - Turn and reposition patient  - Assist with mobility/ambulation  - Relieve pressure over bony prominences  - Avoid friction and shearing  - Provide appropriate hygiene as needed including keeping skin clean and dry  - Evaluate need for skin moisturizer/barrier cream  - Collaborate with interdisciplinary team (i e  Nutrition, Rehabilitation, etc )   - Patient/family teaching   Outcome: Progressing

## 2018-05-10 NOTE — ASSESSMENT & PLAN NOTE
Patient will need close follow-up with Urology to follow up pathology  His wife would prefer to follow up with Dr Rosemarie Howard in 2 weeks

## 2018-05-10 NOTE — PHYSICAL THERAPY NOTE
PT treatment Note      05/10/18 6068   Pain Assessment   Pain Score No Pain   Restrictions/Precautions   Other Precautions (Fall Risk; Chair Alarm; Bed Alarm;Cognitive)   Transfers   Sit to Stand 5  Supervision   Additional items Verbal cues   Stand to Sit 5  Supervision   Additional items Armrests; Verbal cues   Stand pivot 4  Minimal assistance   Additional items Assist x 1;Verbal cues   Ambulation/Elevation   Gait pattern Forward Flexion   Gait Assistance 4  Minimal assist   Additional items Tactile cues   Assistive Device Rolling walker   Distance 170' cues for direction changes, turns   Balance   Static Sitting Good   Dynamic Sitting Good   Static Standing Fair   Dynamic Standing Fair   Ambulatory Fair  (with RW)   Endurance Deficit   Endurance Deficit Yes   Activity Tolerance   Activity Tolerance Patient limited by fatigue   Assessment   Prognosis Good   Problem List Decreased strength;Decreased endurance; Impaired balance;Decreased mobility; Decreased safety awareness; Impaired judgement;Decreased cognition   Assessment Pt  performing bed mobility,transfers and ambulation at (min) x 1 level of function  Cues for direction and safety with RW  Decreased balance increasing risk for falls  Pt  is in need of continued PT to improve strength, balance, endurance, safety awareness and functional mobility  Plan   Treatment/Interventions Functional transfer training;LE strengthening/ROM; Therapeutic exercise; Endurance training;Gait training;Bed mobility   Progress Progressing toward goals   Recommendation   Recommendation Short-term skilled PT   Pt  OOB with call bell within reach, scd's connected and turned on and alarm on at end of PT session

## 2018-05-10 NOTE — PROGRESS NOTES
Progress Note Franki Lane 1935, 81 y o  male MRN: [id number]    Unit/Bed#: 532-01 Encounter: [id number]    Primary Care Provider: Jesus Ayala MD   Date and time admitted to hospital: 5/2/2018 27:41 AM        Complicated UTI (urinary tract infection)   Assessment & Plan    Urine culture had no growth likely because patient had been on antibiotic (ciprofloxacin) prior to obtaining urine sample  Urology input appreciated, patient placed on Rocephin  Continue with rocephin         Bladder tumor   Assessment & Plan    Patient will need close follow-up with Urology to follow up pathology  His wife would prefer to follow up with Dr Amaury Bales in 2 weeks         Acute post-hemorrhagic anemia   Assessment & Plan    Secondary to hematuria   Hemoglobin slightly decreased today  Will continue to monitor  Alzheimer's disease   Assessment & Plan    Chronic   At baseline  He did get slightly confused at night, possibly sundowning  Currently awaiting insurance authorization for short-term rehab    Progress Note - Khushi Malloy 81 y o  male MRN: [id number]    Unit/Bed#: 315-76 Encounter: [id number]        Subjective:   Patient seen and examined at bedside, he became slightly confused last evening and required a 1 time dose of oral Haldol  This morning he is at baseline  Objective:     Vitals:   Vitals:    05/10/18 0736   BP: 119/58   Pulse: 66   Resp: 18   Temp: 98 5 °F (36 9 °C)   SpO2: 97%     Body mass index is 21 02 kg/m²      Intake/Output Summary (Last 24 hours) at 05/10/18 0839  Last data filed at 05/10/18 0824   Gross per 24 hour   Intake              600 ml   Output             1875 ml   Net            -1275 ml       Physical Exam:   /58 (BP Location: Left arm)   Pulse 66   Temp 98 5 °F (36 9 °C) (Temporal)   Resp 18   Ht 6' (1 829 m)   Wt 70 3 kg (154 lb 15 7 oz)   SpO2 97%   BMI 21 02 kg/m²   General appearance: alert and oriented, in no acute distress  Head: Normocephalic, without obvious abnormality, atraumatic  Lungs: clear to auscultation bilaterally  Heart: regular rate and rhythm, S1, S2 normal, no murmur, click, rub or gallop  Abdomen: soft, non-tender; bowel sounds normal; no masses,  no organomegaly  Extremities: extremities normal, warm and well-perfused; no cyanosis, clubbing, or edema  Pulses: 2+ and symmetric  Neurologic: Grossly normal     Invasive Devices     Peripheral Intravenous Line            Peripheral IV 05/09/18 Left Arm less than 1 day          Drain            Continuous Bladder Irrigation Three-way 7 days    Urethral Catheter Three way 24 Fr  2 days                  Results from last 7 days  Lab Units 05/10/18  0445 05/09/18  0452 05/08/18  0454   WBC Thousand/uL 7 43 9 41 8 06   HEMOGLOBIN g/dL 8 9* 9 7* 10 0*   HEMATOCRIT % 26 3* 28 7* 29 1*   PLATELETS Thousands/uL 214 204 190         Results from last 7 days  Lab Units 05/10/18  0445 05/09/18  0452 05/08/18  0454   SODIUM mmol/L 136 135* 139   POTASSIUM mmol/L 3 8 4 2 4 5   CHLORIDE mmol/L 102 102 103   CO2 mmol/L 29 29 29   BUN mg/dL 20 20 14   CREATININE mg/dL 1 10 1 33* 1 04   CALCIUM mg/dL 8 7 8 5 8 5   TOTAL PROTEIN g/dL  --  6 1*  --    BILIRUBIN TOTAL mg/dL  --  0 40  --    ALK PHOS U/L  --  51  --    ALT U/L  --  28  --    AST U/L  --  31  --    GLUCOSE RANDOM mg/dL 100 104 88       Medication Administration - last 24 hours from 05/09/2018 0839 to 05/10/2018 6629       Date/Time Order Dose Route Action Action by     05/09/2018 1540 atorvastatin (LIPITOR) tablet 80 mg 80 mg Oral Given Dorene Patch, RN     05/10/2018 0501 pantoprazole (PROTONIX) EC tablet 20 mg 20 mg Oral Given [employer], RN     05/10/2018 0805 DULoxetine (CYMBALTA) delayed release capsule 20 mg 20 mg Oral Given Marina Atkinson, RN     05/09/2018 1843 DULoxetine (CYMBALTA) delayed release capsule 20 mg 20 mg Oral Given Dorene Patch, RN     05/09/2018 0845 DULoxetine (CYMBALTA) delayed release capsule 20 mg 20 mg Oral Given Jesus Larsen, RN     05/10/2018 6232 cyanocobalamin (VITAMIN B-12) tablet 1,000 mcg 1,000 mcg Oral Given Duke Solorio, TAYA     05/09/2018 0845 cyanocobalamin (VITAMIN B-12) tablet 1,000 mcg 1,000 mcg [facility]     05/09/2018 1540 tamsulosin (FLOMAX) capsule 0 4 mg 0 4 mg Oral Given aJtinder Rivas, RN     05/09/2018 2131 haloperidol (HALDOL) tablet 1 mg 1 mg Oral Given Jatinder Rivas, TAYA     05/10/2018 0805 oxybutynin (DITROPAN-XL) 24 hr tablet 5 mg 5 mg Oral Given Wall TAYA Solorio     05/09/2018 0846 oxybutynin (DITROPAN-XL) 24 hr tablet 5 mg 5 mg Oral [facility], RN     05/10/2018 0501 levothyroxine tablet 88 mcg 88 mcg Oral Given 5666 [facility], RN     05/10/2018 6387 phenazopyridine (PYRIDIUM) tablet 100 mg 100 mg Oral Given Duke Solorio RN     05/09/2018 1540 phenazopyridine (PYRIDIUM) tablet 100 mg 100 mg Oral Given Jatinder Rivas, RN     05/09/2018 1156 phenazopyridine (PYRIDIUM) tablet 100 mg 100 mg Oral [facility], [facility]     05/09/2018 0846 phenazopyridine (PYRIDIUM) tablet 100 mg 100 mg Oral [facility], RN     05/09/2018 1157 magnesium sulfate 2 g/50 mL IVPB (premix) 2 g 2 g Intravenous Not Given Jesus Larsen RN     05/10/2018 0804 magnesium oxide (MAG-OX) tablet 400 mg 400 mg Oral Given Duke Solorio RN     05/09/2018 1848 magnesium oxide (MAG-OX) tablet 400 mg 400 mg Oral Given Jatinder Rivas RN     05/09/2018 1848 cefTRIAXone (ROCEPHIN) IVPB (premix) 1,000 mg 1,000 mg Intravenous New Bag Jatinder Rivas RN            Lab, Imaging and other studies: I have personally reviewed pertinent reports      VTE Pharmacologic Prophylaxis:  None secondary to anemia  VTE Mechanical Prophylaxis: sequential compression device     Waldo Martinez MD  5/10/2018,8:39 AM

## 2018-05-10 NOTE — SOCIAL WORK
Called Maria Isabel Judd to find out what the status of patients auth to go to SNF  Left a message for her to return my call

## 2018-05-10 NOTE — SOCIAL WORK
Called pt's wife Meryl Shelley to let her know that we still have not heard from the insurance company so pt will have to stay in hospital for another night

## 2018-05-10 NOTE — OCCUPATIONAL THERAPY NOTE
OT Note     05/10/18 1256   Restrictions/Precautions   Other Precautions Fall Risk; Chair Alarm; Bed Alarm;Cognitive   Bed Mobility   Supine to Sit 5  Supervision   Additional items Verbal cues   Transfers   Sit to Stand 5  Supervision   Additional items Verbal cues; Impulsive;HOB elevated   Functional Mobility   Functional Mobility (Refer PT note for details)   Cognition   Orientation Level Oriented to person   Comments Attempt orient to place without success   Activity Tolerance   Activity Tolerance Patient tolerated treatment well   Assessment   Assessment Presents supine, agreeable to participate  Txfred EOB, preoccupied with need to urinate, attempt orient to catheter hoqwever continues to verbalize need to pee  Completes sit to stand with vc for safe hand placement without follow thru  Bed ht  increased to allow increased I thru sit to stand  Refer PT note for additional details     Recommendation   OT Discharge Recommendation Short Term Rehab

## 2018-05-11 ENCOUNTER — HOSPITAL ENCOUNTER (INPATIENT)
Facility: HOSPITAL | Age: 83
LOS: 15 days | Discharge: HOME HEALTH | DRG: 092 | End: 2018-05-26
Attending: INTERNAL MEDICINE | Admitting: INTERNAL MEDICINE
Payer: COMMERCIAL

## 2018-05-11 VITALS
HEIGHT: 72 IN | BODY MASS INDEX: 20.48 KG/M2 | HEART RATE: 61 BPM | OXYGEN SATURATION: 100 % | WEIGHT: 151.24 LBS | RESPIRATION RATE: 18 BRPM | TEMPERATURE: 97.7 F | SYSTOLIC BLOOD PRESSURE: 113 MMHG | DIASTOLIC BLOOD PRESSURE: 66 MMHG

## 2018-05-11 DIAGNOSIS — D50.9 IRON DEFICIENCY ANEMIA, UNSPECIFIED IRON DEFICIENCY ANEMIA TYPE: Primary | ICD-10-CM

## 2018-05-11 PROCEDURE — 97530 THERAPEUTIC ACTIVITIES: CPT

## 2018-05-11 PROCEDURE — 97116 GAIT TRAINING THERAPY: CPT

## 2018-05-11 PROCEDURE — 97110 THERAPEUTIC EXERCISES: CPT

## 2018-05-11 PROCEDURE — 99239 HOSP IP/OBS DSCHRG MGMT >30: CPT | Performed by: FAMILY MEDICINE

## 2018-05-11 RX ORDER — CEFUROXIME AXETIL 250 MG/1
250 TABLET ORAL EVERY 12 HOURS SCHEDULED
Qty: 6 TABLET | Refills: 0
Start: 2018-05-11 | End: 2018-05-14

## 2018-05-11 RX ORDER — PHENAZOPYRIDINE HYDROCHLORIDE 100 MG/1
100 TABLET, FILM COATED ORAL
Qty: 10 TABLET | Refills: 0 | Status: SHIPPED | OUTPATIENT
Start: 2018-05-11 | End: 2018-05-29

## 2018-05-11 RX ORDER — OXYBUTYNIN CHLORIDE 5 MG/1
5 TABLET, EXTENDED RELEASE ORAL DAILY
Refills: 0
Start: 2018-05-12 | End: 2018-05-29

## 2018-05-11 RX ADMIN — DULOXETINE HYDROCHLORIDE 20 MG: 20 CAPSULE, DELAYED RELEASE ORAL at 09:01

## 2018-05-11 RX ADMIN — OXYBUTYNIN CHLORIDE 5 MG: 5 TABLET, EXTENDED RELEASE ORAL at 09:02

## 2018-05-11 RX ADMIN — PHENAZOPYRIDINE HYDROCHLORIDE 100 MG: 100 TABLET ORAL at 11:54

## 2018-05-11 RX ADMIN — LEVOTHYROXINE SODIUM 88 MCG: 88 TABLET ORAL at 05:14

## 2018-05-11 RX ADMIN — PANTOPRAZOLE SODIUM 20 MG: 20 TABLET, DELAYED RELEASE ORAL at 05:14

## 2018-05-11 RX ADMIN — Medication 400 MG: at 09:01

## 2018-05-11 RX ADMIN — PHENAZOPYRIDINE HYDROCHLORIDE 100 MG: 100 TABLET ORAL at 09:02

## 2018-05-11 RX ADMIN — POLYETHYLENE GLYCOL (3350) 17 G: 17 POWDER, FOR SOLUTION ORAL at 09:02

## 2018-05-11 RX ADMIN — CYANOCOBALAMIN TAB 1000 MCG 1000 MCG: 1000 TAB at 09:01

## 2018-05-11 NOTE — NURSING NOTE
Pt going to [facility] on fifth floor for rehab, independent at home prior to hospitalization

## 2018-05-11 NOTE — PHYSICAL THERAPY NOTE
PT treatment Note      05/11/18 1127   Pain Assessment   Pain Score No Pain   Restrictions/Precautions   Other Precautions Chair Alarm; Bed Alarm; Fall Risk   Bed Mobility   Supine to Sit 5  Supervision   Additional items Verbal cues; Bedrails;HOB elevated   Transfers   Sit to Stand 5  Supervision   Additional items Verbal cues   Stand to Sit 5  Supervision   Additional items Armrests; Verbal cues   Stand pivot 4  Minimal assistance   Additional items Assist x 1;Verbal cues   Ambulation/Elevation   Gait Assistance 4  Minimal assist   Additional items Assist x 1;Verbal cues   Assistive Device Rolling walker   Distance 200'    Balance   Static Sitting Good   Dynamic Sitting Good   Static Standing Fair   Dynamic Standing Fair   Ambulatory Fair  (with RW)   Endurance Deficit   Endurance Deficit Yes   Activity Tolerance   Activity Tolerance Patient limited by fatigue   Exercises   Hip Flexion 20 reps   Hip Abduction 20 reps   Hip Adduction 20 reps   Knee AROM Long Arc Quad 20 reps   Ankle Pumps 20 reps   Assessment   Prognosis Good   Problem List Decreased strength;Decreased endurance; Impaired balance;Decreased mobility; Decreased cognition; Impaired judgement;Decreased safety awareness   Assessment Pt  performing bed mobility,transfers and ambulation at (min) x 1 level of function  Cues for direction and safety with RW  Decreased balance increasing risk for falls  Improved safety awareness with RW   Pt  is in need of continued PT to improve strength, balance, endurance, safety awareness and functional mobility  Plan   Treatment/Interventions Functional transfer training;LE strengthening/ROM; Therapeutic exercise; Endurance training;Bed mobility;Gait training   Progress Progressing toward goals   Recommendation   Recommendation Short-term skilled PT   Pt  OOB with call bell within reach, scd's connected and turned on and alarm on at end of PT session

## 2018-05-11 NOTE — PLAN OF CARE
Problem: Potential for Falls  Goal: Patient will remain free of falls  INTERVENTIONS:  - Assess patient frequently for physical needs  -  Identify cognitive and physical deficits and behaviors that affect risk of falls  -  [place] fall precautions as indicated by assessment  High fall risk   - Educate patient/family on patient safety including physical limitations  - Instruct patient to call for assistance with activity based on assessment  - Modify environment to reduce risk of injury  - Consider OT/PT consult to assist with strengthening/mobility    Outcome: Progressing      Problem: PAIN - ADULT  Goal: Verbalizes/displays adequate comfort level or baseline comfort level  Interventions:  - Encourage patient to monitor pain and request assistance  - Assess pain using appropriate pain scale  0-10 pain scale    - Administer analgesics based on type and severity of pain and evaluate response  - Implement non-pharmacological measures as appropriate and evaluate response  - Consider cultural and social influences on pain and pain management  - Notify physician/advanced practitioner if interventions unsuccessful or patient reports new pain    Outcome: Progressing      Problem: INFECTION - ADULT  Goal: Absence or prevention of progression during hospitalization  INTERVENTIONS:  - Assess and monitor for signs and symptoms of infection  - Monitor lab/diagnostic results  - Monitor all insertion sites, i e  indwelling lines, tubes, and drains   - [place] appropriate cooling/warming therapies per order  - Administer medications as ordered  - Instruct and encourage patient and family to use good hand hygiene technique  - Identify and instruct in appropriate isolation precautions for identified infection/condition    Outcome: Progressing      Problem: SAFETY ADULT  Goal: Maintain or return to baseline ADL function  INTERVENTIONS:  -  Assess patient's ability to carry out ADLs; assess patient's baseline for ADL function and identify physical deficits which impact ability to perform ADLs (bathing, care of mouth/teeth, toileting, grooming, dressing, etc )  - Assess/evaluate cause of self-care deficits   - Assess range of motion  - Assess patient's mobility; develop plan if impaired  - Assess patient's need for assistive devices and provide as appropriate  - Encourage maximum independence but intervene and supervise when necessary  ¯ Involve family in performance of ADLs  ¯ Assess for home care needs following discharge   ¯ Request OT consult to assist with ADL evaluation and planning for discharge  ¯ Provide patient education as appropriate   Outcome: Progressing    Goal: Maintain or return mobility status to optimal level  INTERVENTIONS:  - Assess patient's baseline mobility status (ambulation, transfers, stairs, etc )    - Identify cognitive and physical deficits and behaviors that affect mobility  - Identify mobility aids required to assist with transfers and/or ambulation (gait belt, sit-to-stand, lift, walker, cane, etc )  - [place] fall precautions as indicated by assessment   High fall risk   - Record patient progress and toleration of activity level on Mobility SBAR; progress patient to next Phase/Stage  - Instruct patient to call for assistance with activity based on assessment  - Request Rehabilitation consult to assist with strengthening/weightbearing, etc     Outcome: Progressing      Problem: DISCHARGE PLANNING  Goal: Discharge to home or other facility with appropriate resources  INTERVENTIONS:  - Identify barriers to discharge w/patient and caregiver  - Arrange for needed discharge resources and transportation as appropriate  - Identify discharge learning needs (meds, wound care, etc )  - Arrange for interpretive services to assist at discharge as needed  - Refer to Case Management Department for coordinating discharge planning if the patient needs post-hospital services based on physician/advanced practitioner order or complex needs related to functional status, cognitive ability, or social support system   Outcome: Progressing      Problem: Knowledge Deficit  Goal: Patient/family/caregiver demonstrates understanding of disease process, treatment plan, medications, and discharge instructions  Complete learning assessment and assess knowledge base  Interventions:  - Provide teaching at level of understanding  - Provide teaching via preferred learning methods   Outcome: Progressing      Problem: GENITOURINARY - ADULT  Goal: Maintains or returns to baseline urinary function  INTERVENTIONS:  - Assess urinary function  - Encourage oral fluids to ensure adequate hydration  - Administer IV fluids as ordered to ensure adequate hydration  - Administer ordered medications as needed  - Offer frequent toileting post spears catheter removal   - Follow urinary retention protocol if ordered   Outcome: Progressing    Goal: Absence of urinary retention  INTERVENTIONS:  - Assess patient's ability to void and empty bladder, post spears removal   - Monitor I/O  - Bladder scan as needed, post spears removal   - Discuss with physician/AP medications to alleviate retention as needed  - Discuss catheterization for long term situations as appropriate   Outcome: Progressing    Goal: Urinary catheter remains patent  INTERVENTIONS:  - Assess patency of urinary catheter  Maintain CBI flow as appropriate  Hand irrigate for clots    - Follow guidelines for intermittent irrigation of non-functioning urinary catheter   Outcome: Progressing      Problem: DISCHARGE PLANNING - CARE MANAGEMENT  Goal: Discharge to post-acute care or home with appropriate resources  INTERVENTIONS:  - Conduct assessment to determine patient/family and health care team treatment goals, and need for post-acute services based on payer coverage, community resources, and patient preferences, and barriers to discharge  - Address psychosocial, clinical, and financial barriers to discharge as identified in assessment in conjunction with the patient/family and health care team  - Arrange appropriate level of post-acute services according to patient's   needs and preference and payer coverage in collaboration with the physician and health care team  - Communicate with and update the patient/family, physician, and health care team regarding progress on the discharge plan  - Arrange appropriate transportation to post-acute venues  Pt might benefit from home care on discharge     Outcome: Progressing      Problem: Prexisting or High Potential for Compromised Skin Integrity  Goal: Skin integrity is maintained or improved  INTERVENTIONS:  - Identify patients at risk for skin breakdown  - Assess and monitor skin integrity  - Assess and monitor nutrition and hydration status  - Monitor labs (i e  albumin)  - Assess for incontinence   - Turn and reposition patient  - Assist with mobility/ambulation  - Relieve pressure over bony prominences  - Avoid friction and shearing  - Provide appropriate hygiene as needed including keeping skin clean and dry  - Evaluate need for skin moisturizer/barrier cream  - Collaborate with interdisciplinary team (i e  Nutrition, Rehabilitation, etc )   - Patient/family teaching   Outcome: Progressing

## 2018-05-11 NOTE — SOCIAL WORK
Called to representative at [facility] to find out about Aj Dill  She can see the case and clinicals in system but nothing else  She is not sure what is going on  She did sent email to our representative requesting a high priority call back ASAP

## 2018-05-11 NOTE — SOCIAL WORK
Received auth for pt to go to short term rehab on the 5th floor  The auth number is [id number]  NRD is 5/16/18  Please send clinicals to Kenny Dinh at Phone [phone]  Fax is [phone]  Called pt's wife and notified her of authorization  Pt will be transferred via wheel chair with belongings  Case Management reviewed discharge planning process including the following: identifying help that is needed at home, pt's preference for discharge needs and Meds at [facility]  Reviewed with Pt that any member of the healthcare team can answer questions regarding : medications, jmportance of recognizing  Signs and symptoms of any  medical problems  Case Management also encouraged pt to follow up with all recommended appointments after discharge

## 2018-05-11 NOTE — DISCHARGE SUMMARY
Discharge Summary - Aakash Brito 81 y o  male MRN: [id number]    Unit/Bed#: 731-15 Encounter: [id number]    Admission Date:   Admission Orders     Ordered        05/02/18 1740  Inpatient Admission (expected length of stay for this patient is greater than two midnights)  Once               Admitting Diagnosis: Blood in urine [R31 9]  Gross hematuria [R31 0]    HPI: Aakash Brito is a 81 y o  male who takes flomax for enlarged prostate & follows with a local urologist  His wife at bedside offered most of the history due to the patient's dementia  Patient was scheduled for a renal US next week by his urologist with cystoscopy in mind after the patient began to pass blood clots in his urine 2 days ago associated with painful bladder spasms  This was preceded by initially mild gross hematuria 4 days ago for which he was prescribed PO cipro by same urologist due to suspected complicated UTI related to bladder outlet obstruction  No reported fever, shaking chills, malaise, flank pain, nausea, vomiting, diarrhea, chest pain, cough, dyspnea, headache, neck stiffness, or skin rash  Urethral spears catheter was inserted at the ED with continuous gross hematuria  Procedures Performed: No orders of the defined types were placed in this encounter  Summary of Hospital Course:    Acute post hemorrhagic anemia secondary to gross hematuria secondary to malignant neoplasm of bladder / Acute cystitis with hematuria  Patient admitted to medical floor  Three-way Spears catheter was inserted with CBI  Consultation with Urology was obtained  On 05/05/2018 the patient had a syncopal episode but had normal vital signs  His hemoglobin continued to decrease and was ultimately transfused with 1 unit PRBCs  Urology was reconsulted and the patient underwent a cystoscopy  Findings were a bladder tumor that was resected transurethrally  The base of the tumor was fulgurated    The patient returned medical floor and discussion with his wife regarding follow-up was initiated  She will follow up with Alberto Yee in 2 weeks  Although the patient had a negative urine culture I elected to continue antibiotics for total of 10 days because he was previously on ciprofloxacin prior to hospitalization    Significant Findings, Care, Treatment and Services Provided:   CT abdomen pelvis:  No etiology for hematuria  Hyperdensity within the bladder lumen    Complications:  None    Discharge Diagnosis:  See above    Resolved Problems  Date Reviewed: 5/7/2018    None          Condition at Discharge: fair         Discharge instructions/Information to patient and family:   See after visit summary for information provided to patient and family  Provisions for Follow-Up Care:  See after visit summary for information related to follow-up care and any pertinent home health orders  PCP: Wen Gray MD    Disposition:  [facility]    Planned Readmission: No    Discharge Statement   I spent 45 minutes discharging the patient  This time was spent on the day of discharge  I had direct contact with the patient on the day of discharge  Additional documentation is required if more than 30 minutes were spent on discharge  Discharge Medications:  See after visit summary for reconciled discharge medications provided to patient and family

## 2018-05-11 NOTE — PROGRESS NOTES
Progress Note João Arce 1935, 81 y o  male MRN: [id number]    Unit/Bed#: 936-55 Encounter: [id number]    Primary Care Provider: Darlene Mejia MD   Date and time admitted to hospital: 5/2/2018 11:17 AM        Malignant neoplasm of bladder [facility])   Assessment & Plan    Patient will need close follow-up with Urology to follow up pathology  His wife would prefer to follow up with Dr Thad Nick in 2 weeks         Acute post-hemorrhagic anemia   Assessment & Plan    Secondary to hematuria   Hemoglobin slightly decreased today  Will continue to monitor  Alzheimer's disease   Assessment & Plan    Chronic   At baseline  He did get slightly confused at night, possibly sundowning  Still awaiting insurance authorization for skilled nursing facility acceptance  Progress Note João Arce 81 y o  male MRN: [id number]    Unit/Bed#: 015-69 Encounter: [id number]      Subjective:   Patient seen examined at bedside, no acute complaints  Objective:     Vitals:   Vitals:    05/11/18 0721   BP: 114/52   Pulse: 66   Resp: 18   Temp: 98 2 °F (36 8 °C)   SpO2: 96%     Body mass index is 20 51 kg/m²      Intake/Output Summary (Last 24 hours) at 05/11/18 1230  Last data filed at 05/11/18 0800   Gross per 24 hour   Intake              440 ml   Output             2150 ml   Net            -1710 ml       Physical Exam:   /52 (BP Location: Left arm)   Pulse 66   Temp 98 2 °F (36 8 °C) (Temporal)   Resp 18   Ht 6' (1 829 m)   Wt 68 6 kg (151 lb 3 8 oz)   SpO2 96%   BMI 20 51 kg/m²   General appearance: alert and oriented, in no acute distress  Lungs: clear to auscultation bilaterally  Heart: regular rate and rhythm, S1, S2 normal, no murmur, click, rub or gallop  Abdomen: soft, non-tender; bowel sounds normal; no masses,  no organomegaly  Extremities: extremities normal, warm and well-perfused; no cyanosis, clubbing, or edema  Neurologic: Grossly normal     Invasive Devices Peripheral Intravenous Line            Peripheral IV 05/09/18 Left Arm 1 day          Drain            Continuous Bladder Irrigation Three-way 8 days    Urethral Catheter Three way 24 Fr  4 days                  Results from last 7 days  Lab Units 05/10/18  0445 05/09/18  0452 05/08/18  0454   WBC Thousand/uL 7 43 9 41 8 06   HEMOGLOBIN g/dL 8 9* 9 7* 10 0*   HEMATOCRIT % 26 3* 28 7* 29 1*   PLATELETS Thousands/uL 214 204 190         Results from last 7 days  Lab Units 05/10/18  0445 05/09/18  0452 05/08/18  0454   SODIUM mmol/L 136 135* 139   POTASSIUM mmol/L 3 8 4 2 4 5   CHLORIDE mmol/L 102 102 103   CO2 mmol/L 29 29 29   BUN mg/dL 20 20 14   CREATININE mg/dL 1 10 1 33* 1 04   CALCIUM mg/dL 8 7 8 5 8 5   TOTAL PROTEIN g/dL  --  6 1*  --    BILIRUBIN TOTAL mg/dL  --  0 40  --    ALK PHOS U/L  --  51  --    ALT U/L  --  28  --    AST U/L  --  31  --    GLUCOSE RANDOM mg/dL 100 104 88       Medication Administration - last 24 hours from 05/10/2018 1230 to 05/11/2018 1230       Date/Time Order Dose Route Action Action by     05/10/2018 1659 atorvastatin (LIPITOR) tablet 80 mg 80 mg Oral Given Zakia Mora RN     05/11/2018 0514 pantoprazole (PROTONIX) EC tablet 20 mg 20 mg Oral Given Celanese [facility], RN     05/11/2018 0901 DULoxetine (CYMBALTA) delayed release capsule 20 mg 20 mg Oral Given Madalyn Andre RN     05/10/2018 1701 DULoxetine (CYMBALTA) delayed release capsule 20 mg 20 mg Oral Given Zakia Mora RN     05/11/2018 0901 cyanocobalamin (VITAMIN B-12) tablet 1,000 mcg 1,000 mcg Oral Given Madalyn Andre RN     05/10/2018 1659 tamsulosin (FLOMAX) capsule 0 4 mg 0 4 mg Oral Given Zakia Mora RN     05/11/2018 0902 oxybutynin (DITROPAN-XL) 24 hr tablet 5 mg 5 mg Oral Given Madalyn Andre RN     05/11/2018 0514 levothyroxine tablet 88 mcg 88 mcg Oral Given [employer], RN     05/11/2018 1154 phenazopyridine (PYRIDIUM) tablet 100 mg 100 mg Oral Given Madalyn Andre RN 05/11/2018 0902 phenazopyridine (PYRIDIUM) tablet 100 mg 100 mg Oral Given Sheryle Coventry, RN     05/10/2018 1659 phenazopyridine (PYRIDIUM) tablet 100 mg 100 mg Oral Given Conchis Leary RN     05/11/2018 0901 magnesium oxide (MAG-OX) tablet 400 mg 400 mg Oral Given Sheryle Coventry, RN     05/10/2018 1701 magnesium oxide (MAG-OX) tablet 400 mg 400 mg Oral Given Conchis Leary RN     05/10/2018 1701 cefTRIAXone (ROCEPHIN) IVPB (premix) 1,000 mg 1,000 mg Intravenous New Bag Conchis Leary RN     05/11/2018 0902 polyethylene glycol (MIRALAX) packet 17 g 17 g Oral Given Sheryle Coventry, RN     05/10/2018 1659 polyethylene glycol (MIRALAX) packet 17 g 17 g Oral Given Conchis Leary RN     05/10/2018 2121 senna-docusate sodium (SENOKOT S) 8 6-50 mg per tablet 1 tablet 1 tablet Oral Given [facility], RN            Lab, Imaging and other studies: I have personally reviewed pertinent reports      VTE Pharmacologic Prophylaxis: Heparin  VTE Mechanical Prophylaxis:  SCD     Mary Jerez MD  5/11/2018,12:30 PM

## 2018-05-11 NOTE — ASSESSMENT & PLAN NOTE
Patient will need close follow-up with Urology to follow up pathology  His wife would prefer to follow up with Dr Amaury Bales in 2 weeks

## 2018-05-12 PROCEDURE — 97530 THERAPEUTIC ACTIVITIES: CPT

## 2018-05-12 PROCEDURE — 97110 THERAPEUTIC EXERCISES: CPT

## 2018-05-12 PROCEDURE — 97116 GAIT TRAINING THERAPY: CPT

## 2018-05-12 PROCEDURE — 97166 OT EVAL MOD COMPLEX 45 MIN: CPT

## 2018-05-12 PROCEDURE — 97162 PT EVAL MOD COMPLEX 30 MIN: CPT

## 2018-05-14 LAB
BACTERIA BLD CULT: NORMAL
BACTERIA BLD CULT: NORMAL

## 2018-05-14 PROCEDURE — 97110 THERAPEUTIC EXERCISES: CPT

## 2018-05-14 PROCEDURE — 97535 SELF CARE MNGMENT TRAINING: CPT

## 2018-05-14 PROCEDURE — 97116 GAIT TRAINING THERAPY: CPT

## 2018-05-15 PROCEDURE — G0515 COGNITIVE SKILLS DEVELOPMENT: HCPCS

## 2018-05-15 PROCEDURE — 97530 THERAPEUTIC ACTIVITIES: CPT

## 2018-05-15 PROCEDURE — 97535 SELF CARE MNGMENT TRAINING: CPT

## 2018-05-15 PROCEDURE — 97110 THERAPEUTIC EXERCISES: CPT

## 2018-05-15 PROCEDURE — 92523 SPEECH SOUND LANG COMPREHEN: CPT

## 2018-05-15 PROCEDURE — 97116 GAIT TRAINING THERAPY: CPT

## 2018-05-16 PROCEDURE — 97110 THERAPEUTIC EXERCISES: CPT

## 2018-05-16 PROCEDURE — 97530 THERAPEUTIC ACTIVITIES: CPT

## 2018-05-16 PROCEDURE — 97535 SELF CARE MNGMENT TRAINING: CPT

## 2018-05-16 PROCEDURE — 97116 GAIT TRAINING THERAPY: CPT

## 2018-05-17 PROCEDURE — 97110 THERAPEUTIC EXERCISES: CPT

## 2018-05-17 PROCEDURE — G0515 COGNITIVE SKILLS DEVELOPMENT: HCPCS

## 2018-05-17 PROCEDURE — 97535 SELF CARE MNGMENT TRAINING: CPT

## 2018-05-17 PROCEDURE — 97116 GAIT TRAINING THERAPY: CPT

## 2018-05-17 PROCEDURE — 97530 THERAPEUTIC ACTIVITIES: CPT

## 2018-05-18 PROCEDURE — 97110 THERAPEUTIC EXERCISES: CPT

## 2018-05-18 PROCEDURE — 97116 GAIT TRAINING THERAPY: CPT

## 2018-05-18 PROCEDURE — 97530 THERAPEUTIC ACTIVITIES: CPT

## 2018-05-19 PROCEDURE — 97535 SELF CARE MNGMENT TRAINING: CPT

## 2018-05-19 PROCEDURE — 97110 THERAPEUTIC EXERCISES: CPT

## 2018-05-21 LAB
ALBUMIN SERPL BCP-MCNC: 3.2 G/DL (ref 3.5–5)
ALP SERPL-CCNC: 85 U/L (ref 46–116)
ALT SERPL W P-5'-P-CCNC: 28 U/L (ref 12–78)
ANION GAP SERPL CALCULATED.3IONS-SCNC: 5 MMOL/L (ref 4–13)
AST SERPL W P-5'-P-CCNC: 25 U/L (ref 5–45)
BASOPHILS # BLD AUTO: 0.04 THOUSANDS/ΜL (ref 0–0.1)
BASOPHILS NFR BLD AUTO: 1 % (ref 0–1)
BILIRUB SERPL-MCNC: 0.4 MG/DL (ref 0.2–1)
BUN SERPL-MCNC: 20 MG/DL (ref 5–25)
CALCIUM SERPL-MCNC: 8.7 MG/DL (ref 8.3–10.1)
CHLORIDE SERPL-SCNC: 102 MMOL/L (ref 100–108)
CO2 SERPL-SCNC: 30 MMOL/L (ref 21–32)
CREAT SERPL-MCNC: 1.34 MG/DL (ref 0.6–1.3)
EOSINOPHIL # BLD AUTO: 0.21 THOUSAND/ΜL (ref 0–0.61)
EOSINOPHIL NFR BLD AUTO: 3 % (ref 0–6)
ERYTHROCYTE [DISTWIDTH] IN BLOOD BY AUTOMATED COUNT: 12.9 % (ref 11.6–15.1)
GFR SERPL CREATININE-BSD FRML MDRD: 49 ML/MIN/1.73SQ M
GLUCOSE SERPL-MCNC: 97 MG/DL (ref 65–140)
HCT VFR BLD AUTO: 27.5 % (ref 36.5–49.3)
HGB BLD-MCNC: 9.1 G/DL (ref 12–17)
LYMPHOCYTES # BLD AUTO: 1.37 THOUSANDS/ΜL (ref 0.6–4.47)
LYMPHOCYTES NFR BLD AUTO: 20 % (ref 14–44)
MCH RBC QN AUTO: 30.6 PG (ref 26.8–34.3)
MCHC RBC AUTO-ENTMCNC: 33.1 G/DL (ref 31.4–37.4)
MCV RBC AUTO: 93 FL (ref 82–98)
MONOCYTES # BLD AUTO: 0.6 THOUSAND/ΜL (ref 0.17–1.22)
MONOCYTES NFR BLD AUTO: 9 % (ref 4–12)
NEUTROPHILS # BLD AUTO: 4.71 THOUSANDS/ΜL (ref 1.85–7.62)
NEUTS SEG NFR BLD AUTO: 68 % (ref 43–75)
PLATELET # BLD AUTO: 321 THOUSANDS/UL (ref 149–390)
PMV BLD AUTO: 9.8 FL (ref 8.9–12.7)
POTASSIUM SERPL-SCNC: 4.2 MMOL/L (ref 3.5–5.3)
PROT SERPL-MCNC: 6.1 G/DL (ref 6.4–8.2)
RBC # BLD AUTO: 2.97 MILLION/UL (ref 3.88–5.62)
SODIUM SERPL-SCNC: 137 MMOL/L (ref 136–145)
WBC # BLD AUTO: 6.93 THOUSAND/UL (ref 4.31–10.16)

## 2018-05-21 PROCEDURE — 80053 COMPREHEN METABOLIC PANEL: CPT | Performed by: NURSE PRACTITIONER

## 2018-05-21 PROCEDURE — 85025 COMPLETE CBC W/AUTO DIFF WBC: CPT | Performed by: INTERNAL MEDICINE

## 2018-05-21 PROCEDURE — 97110 THERAPEUTIC EXERCISES: CPT

## 2018-05-21 PROCEDURE — 97530 THERAPEUTIC ACTIVITIES: CPT

## 2018-05-21 PROCEDURE — 97116 GAIT TRAINING THERAPY: CPT

## 2018-05-22 PROCEDURE — 97530 THERAPEUTIC ACTIVITIES: CPT

## 2018-05-22 PROCEDURE — 97110 THERAPEUTIC EXERCISES: CPT

## 2018-05-22 PROCEDURE — 97116 GAIT TRAINING THERAPY: CPT

## 2018-05-22 PROCEDURE — 92507 TX SP LANG VOICE COMM INDIV: CPT

## 2018-05-23 PROCEDURE — 97116 GAIT TRAINING THERAPY: CPT

## 2018-05-23 PROCEDURE — 97110 THERAPEUTIC EXERCISES: CPT

## 2018-05-23 PROCEDURE — 97535 SELF CARE MNGMENT TRAINING: CPT

## 2018-05-23 PROCEDURE — 92507 TX SP LANG VOICE COMM INDIV: CPT

## 2018-05-24 PROCEDURE — 97110 THERAPEUTIC EXERCISES: CPT

## 2018-05-24 PROCEDURE — 97116 GAIT TRAINING THERAPY: CPT

## 2018-05-24 PROCEDURE — 97530 THERAPEUTIC ACTIVITIES: CPT

## 2018-05-24 PROCEDURE — 92507 TX SP LANG VOICE COMM INDIV: CPT

## 2018-05-25 PROCEDURE — 97110 THERAPEUTIC EXERCISES: CPT

## 2018-05-25 PROCEDURE — 97530 THERAPEUTIC ACTIVITIES: CPT

## 2018-05-25 PROCEDURE — 97116 GAIT TRAINING THERAPY: CPT

## 2018-05-29 ENCOUNTER — PROCEDURE VISIT (OUTPATIENT)
Dept: UROLOGY | Facility: CLINIC | Age: 83
End: 2018-05-29
Payer: COMMERCIAL

## 2018-05-29 VITALS
WEIGHT: 152 LBS | SYSTOLIC BLOOD PRESSURE: 132 MMHG | HEIGHT: 72 IN | DIASTOLIC BLOOD PRESSURE: 80 MMHG | BODY MASS INDEX: 20.59 KG/M2

## 2018-05-29 DIAGNOSIS — C67.2 MALIGNANT NEOPLASM OF LATERAL WALL OF URINARY BLADDER (HCC): Primary | ICD-10-CM

## 2018-05-29 PROCEDURE — 99213 OFFICE O/P EST LOW 20 MIN: CPT | Performed by: UROLOGY

## 2018-05-29 NOTE — PROGRESS NOTES
[address] FOLLOW UP NOTE     CHIEF COMPLAINT   Beltran Gonzales is a 81 y o  male with a complaint of   Chief Complaint   Patient presents with    Post-op     TURBT (5/7/18)       History of Present Illness:     59-year-old male who was previously a patient of an outside urologist   Patient has had a recent hospitalization was significant gross hematuria  He was taken to the operating room on 5/7/2018 identified  This was resected  Patient's hematuria improved dramatically over his hospital stay  Given his deconditioning and other medical issues, he was transferred to the 5th floor for rehabilitation  He presents today for discussion his pathology  Of note, the wife of the patient is a former operating room nurse at [employer]    He has now been transitioned to home  His wife reports continued agitation and irritation at home  He has not had any recurrent bleeding        Past Medical History:     Past Medical History:   Diagnosis Date    Abnormal weight loss     Alzheimer's disease 5/2/2018    Coronary artery disease     Disease of thyroid gland     Gastric ulcer     last assessed: 2/24/14    Hypothyroid 5/2/2018    Inguinal hernia, left     last assessed: 10/8/14    Peptic ulcer     last assessed: 5/21/13    Prostate enlargement 5/2/2018       PAST SURGICAL HISTORY:     Past Surgical History:   Procedure Laterality Date    AORTIC VALVE REPLACEMENT      CATARACT EXTRACTION Bilateral     CORONARY ARTERY BYPASS GRAFT      INGUINAL HERNIA REPAIR      NEPHRECTOMY Right     MI CYSTOURETHROSCOPY W/IRRIG & EVAC CLOTS N/A 5/7/2018    Procedure: CYSTOSCOPY EVACUATION OF CLOTS AND TURBT;  Surgeon: Ghazal Delgado MD;  Location: [facility];  Service: Urology    THROMBOENDARTERECTOMY      Carotid       CURRENT MEDICATIONS:     Current Outpatient Prescriptions   Medication Sig Dispense Refill    aspirin (ECOTRIN LOW STRENGTH) 81 mg EC tablet Take 81 mg by mouth daily      atorvastatin (LIPITOR) 80 mg tablet Take 80 mg by mouth daily      cholecalciferol (VITAMIN D3) 1,000 units tablet Take 2,000 Units by mouth daily      co-enzyme Q-10 30 MG capsule Take 100 mg by mouth 3 (three) times a day      COCONUT OIL-FLAXSEED OIL PO Take by mouth      cyanocobalamin (VITAMIN B-12) 1,000 mcg tablet Take 1,000 mcg by mouth daily      DULoxetine (CYMBALTA) 30 mg delayed release capsule Take 20 mg by mouth 2 (two) times a day      levothyroxine 75 mcg tablet Take 1 tablet (75 mcg total) by mouth daily 90 tablet 3    multivitamin (THERAGRAN) TABS Take 1 tablet by mouth daily      oxybutynin (DITROPAN-XL) 5 mg 24 hr tablet Take 1 tablet (5 mg total) by mouth daily  0    pantoprazole (PROTONIX) 20 mg tablet Take 20 mg by mouth daily      phenazopyridine (PYRIDIUM) 100 mg tablet Take 1 tablet (100 mg total) by mouth 3 (three) times a day with meals 10 tablet 0    tamsulosin (FLOMAX) 0 4 mg Take 0 4 mg by mouth daily with dinner       No current facility-administered medications for this visit          ALLERGIES:     Allergies   Allergen Reactions    Contrast [Iodinated Diagnostic Agents]     Iodine Throat Swelling     IVP contrast dye    Morphine Rash       SOCIAL HISTORY:     Social History     Social History    Marital status: /Civil Union     Spouse name: N/A    Number of children: N/A    Years of education: N/A     Social History Main Topics    Smoking status: Former Smoker    Smokeless tobacco: Never Used      Comment: quit 20 years ago    Alcohol use Yes      Comment: social    Drug use: No    Sexual activity: Not on file     Other Topics Concern    Not on file     Social History Narrative    No narrative on file       SOCIAL HISTORY:     Family History   Problem Relation Age of Onset    Sudden death Mother      unexpected death   Carmel Browning Other Father      Coal workers' Pneumoconiosis    Alzheimer's disease Brother        REVIEW OF SYSTEMS:     Review of Systems   Constitutional: Negative  Respiratory: Negative  Cardiovascular: Negative  Gastrointestinal: Negative  Genitourinary: Negative  Musculoskeletal: Negative  Skin: Negative  Neurological: Negative  Psychiatric/Behavioral: Positive for behavioral problems and confusion  PHYSICAL EXAM:     There were no vitals taken for this visit  General:  Frail-appearing elderly male in no acute distress  HEENT:  Normocephalic, atraumatic  Neck is supple without any palpable lymphadenopathy  Cardiovascular:  Patient has normal palpable distal radial pulses  There is no significant peripheral edema  No JVD is noted  Respiratory:  Patient has unlabored respirations  There is no audible wheeze or rhonchi  Abdomen:   Abdomen is soft and nontender  There is no tympany  Inguinal and umbilical hernia are not appreciated  Musculoskeletal:  Patient does not have significant CVA tenderness in the  flank with palpation or percussion  They full range of motion in all 4 extremities  Strength in all 4 extremities appears congruent  Patient is able to ambulate without assistance or difficulty  Dermatologic:  Patient has no skin abnormalities or rashes        LABS:     CBC:   Lab Results   Component Value Date    WBC 6 93 2018    HGB 9 1 (L) 2018    HCT 27 5 (L) 2018    MCV 93 2018     2018       BMP:   Lab Results   Component Value Date    GLUCOSE 97 2018    CALCIUM 8 7 2018     2018    K 4 2 2018    CO2 30 2018     2018    BUN 20 2018    CREATININE 1 34 (H) 2018       IMAGIN/2/18  CT ABDOMEN AND PELVIS WITHOUT IV CONTRAST     INDICATION:   hematuria      COMPARISON: None      TECHNIQUE:  CT examination of the abdomen and pelvis was performed without intravenous contrast   Axial, sagittal, and coronal 2D reformatted images were created from the source data and submitted for interpretation       Radiation dose length product (DLP) for this visit:  186 37 mGy-cm   This examination, like all CT scans performed in the [facility], was performed utilizing techniques to minimize radiation dose exposure, including the use of iterative   reconstruction and automated exposure control       Enteric contrast was administered       FINDINGS:     ABDOMEN     LOWER CHEST:  No clinically significant abnormality identified in the visualized lower chest      LIVER/BILIARY TREE:  Unremarkable      GALLBLADDER:  No calcified gallstones  No pericholecystic inflammatory change      SPLEEN:  Unremarkable      PANCREAS:  Unremarkable      ADRENAL GLANDS:  Unremarkable      KIDNEYS/URETERS:  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  Noncontrast evaluation of left kidney is grossly unremarkable      STOMACH AND BOWEL:  Unremarkable      APPENDIX:  No findings to suggest appendicitis      ABDOMINOPELVIC CAVITY:  No ascites or free intraperitoneal air  No lymphadenopathy      VESSELS:  Unremarkable for patient's age      PELVIS     REPRODUCTIVE ORGANS:  Status post hysterectomy      URINARY BLADDER:  Loyd catheter is present  Hyperdensity within the bladder lumen is consistent with history of hematuria      ABDOMINAL WALL/INGUINAL REGIONS:  Unremarkable      OSSEOUS STRUCTURES:  No acute fracture or destructive osseous lesion      IMPRESSION:     No etiology for hematuria identified  Urologic follow-up is recommended  Hyperdensity within the bladder lumen likely representing hemorrhage  PATHOLOGY:     Final Diagnosis   A  Urinary bladder tumor curettings:  - Low grade papillary urothelial carcinoma  - No invasive malignancy is seen  - No detrusor muscle is seen in this material        ASSESSMENT:     81 y o  male with newly diagnosed LGTa bladder cancer    PLAN:     F/U surveillance in 3 months with cystoscopy    Patient should stop the Ditropan is the skin increased confusion and behavioral changes in the elderly especially with underlying dementia      Wife knows to call with any questions or concerns

## 2018-06-05 ENCOUNTER — TRANSITIONAL CARE MANAGEMENT (OUTPATIENT)
Dept: INTERNAL MEDICINE CLINIC | Facility: CLINIC | Age: 83
End: 2018-06-05

## 2018-06-05 ENCOUNTER — OFFICE VISIT (OUTPATIENT)
Dept: INTERNAL MEDICINE CLINIC | Facility: CLINIC | Age: 83
End: 2018-06-05
Payer: COMMERCIAL

## 2018-06-05 VITALS
SYSTOLIC BLOOD PRESSURE: 130 MMHG | TEMPERATURE: 97.8 F | DIASTOLIC BLOOD PRESSURE: 60 MMHG | BODY MASS INDEX: 21.4 KG/M2 | WEIGHT: 158 LBS | HEIGHT: 72 IN

## 2018-06-05 DIAGNOSIS — G30.1 LATE ONSET ALZHEIMER'S DISEASE WITHOUT BEHAVIORAL DISTURBANCE (HCC): Primary | Chronic | ICD-10-CM

## 2018-06-05 DIAGNOSIS — C67.2 MALIGNANT NEOPLASM OF LATERAL WALL OF URINARY BLADDER (HCC): ICD-10-CM

## 2018-06-05 DIAGNOSIS — F32.A DEPRESSION, UNSPECIFIED DEPRESSION TYPE: ICD-10-CM

## 2018-06-05 DIAGNOSIS — F02.80 LATE ONSET ALZHEIMER'S DISEASE WITHOUT BEHAVIORAL DISTURBANCE (HCC): Primary | Chronic | ICD-10-CM

## 2018-06-05 DIAGNOSIS — D50.0 IRON DEFICIENCY ANEMIA DUE TO CHRONIC BLOOD LOSS: ICD-10-CM

## 2018-06-05 PROBLEM — I65.23 ASYMPTOMATIC BILATERAL CAROTID ARTERY STENOSIS: Status: ACTIVE | Noted: 2017-04-24

## 2018-06-05 PROBLEM — R45.4 IRRITABILITY AND ANGER: Status: ACTIVE | Noted: 2018-06-05

## 2018-06-05 PROBLEM — N18.9 CHRONIC KIDNEY DISEASE: Status: ACTIVE | Noted: 2017-01-23

## 2018-06-05 PROCEDURE — 99495 TRANSJ CARE MGMT MOD F2F 14D: CPT | Performed by: FAMILY MEDICINE

## 2018-06-05 RX ORDER — DONEPEZIL HYDROCHLORIDE 5 MG/1
5 TABLET, FILM COATED ORAL
Qty: 90 TABLET | Refills: 0 | Status: SHIPPED | OUTPATIENT
Start: 2018-06-05 | End: 2018-08-22 | Stop reason: SDUPTHER

## 2018-06-05 NOTE — PATIENT INSTRUCTIONS
Donepezil (By mouth)   Donepezil ([id number])  Treats Alzheimer disease  Brand Name(s): Aricept   There may be other brand names for this medicine  When This Medicine Should Not Be Used: This medicine is not right for everyone  Do not use it if you had an allergic reaction to donepezil or to medicines that contain piperidine  How to Use This Medicine:   Tablet, Dissolving Tablet  · Your doctor will tell you how much medicine to use  Do not use more than directed  · Read and follow the patient instructions that come with this medicine  Talk to your doctor or pharmacist if you have any questions  · Tablet: Swallow the 23-mg tablet whole  Do not crush, break, or chew it  · Disintegrating tablet:Make sure your hands are dry before you handle the disintegrating tablet  Peel back the foil from the blister pack, then remove the tablet  Do not push the tablet through the foil  Place the tablet in your mouth  After it has melted, swallow or take a drink of water  · Missed dose: Skip the missed dose and take your next dose at the regular time  Do not take extra medicine to make up for a missed dose  · Store the medicine in a closed container at room temperature, away from heat, moisture, and direct light  Drugs and Foods to Avoid:   Ask your doctor or pharmacist before using any other medicine, including over-the-counter medicines, vitamins, and herbal products  · Some medicines can affect how donepezil works  Tell your doctor if you are using any of the following:   ¨ Bethanechol, carbamazepine, dexamethasone, ketoconazole, phenobarbital, phenytoin, quinidine, or rifampin  ¨ NSAID pain or arthritis medicine (such as aspirin, diclofenac, ibuprofen, naproxen)  Warnings While Using This Medicine:   · Tell your doctor if you are pregnant or breastfeeding, or if you have heart disease, lung disease, asthma or other breathing problems, stomach ulcers or bleeding, or trouble urinating    · This medicine may cause the following problems:   ¨ Slow heartbeat  ¨ Stomach or bowel bleeding  ¨ Seizures  · Tell any doctor or dentist who treats you that you are using this medicine  You may need to stop using this medicine several days before you have surgery or medical tests  · Your doctor will check your progress and the effects of this medicine at regular visits  Keep all appointments  · Keep all medicine out of the reach of children  Never share your medicine with anyone  Possible Side Effects While Using This Medicine:   Call your doctor right away if you notice any of these side effects:  · Allergic reaction: Itching or hives, swelling in your face or hands, swelling or tingling in your mouth or throat, chest tightness, trouble breathing  · Bloody or black, tarry stools  · Change in how much or how often you urinate  · Chest pain, slow or uneven heartbeat, trouble breathing  · Lightheadedness, dizziness, fainting  · Seizures  · Severe stomach pain  · Unusual bleeding, bruising, or weakness  · Vomiting of blood or material that looks like coffee grounds  If you notice these less serious side effects, talk with your doctor:   · Mild nausea, vomiting, or diarrhea  · Weight loss  If you notice other side effects that you think are caused by this medicine, tell your doctor  Call your doctor for medical advice about side effects  You may report side effects to FDA at 5-804-FDA-0196  © 2017 [address] [employer] Information is for End User's use only and may not be sold, redistributed or otherwise used for commercial purposes  The above information is an  only  It is not intended as medical advice for individual conditions or treatments  Talk to your doctor, nurse or pharmacist before following any medical regimen to see if it is safe and effective for you

## 2018-06-05 NOTE — PROGRESS NOTES
Assessment/Plan:     No problem-specific Assessment & Plan notes found for this encounter  Diagnoses and all orders for this visit:    Late onset Alzheimer's disease without behavioral disturbance  -     CBC and differential; Future  -     Comprehensive metabolic panel; Future  -     donepezil (ARICEPT) 5 mg tablet; Take 1 tablet (5 mg total) by mouth daily at bedtime    Depression, unspecified depression type  -     CBC and differential; Future  -     Comprehensive metabolic panel; Future  -     donepezil (ARICEPT) 5 mg tablet; Take 1 tablet (5 mg total) by mouth daily at bedtime    Iron deficiency anemia due to chronic blood loss  -     CBC and differential; Future  -     Comprehensive metabolic panel; Future  -     donepezil (ARICEPT) 5 mg tablet; Take 1 tablet (5 mg total) by mouth daily at bedtime    Malignant neoplasm of lateral wall of urinary bladder [facility])        Hospital records reviewed with them  Events preceding the hospitalization including the change in his mental status and his behavior were also reviewed with him and his wife  Continue to follow up with Urology  Watch for any worsening of urinary symptoms  Watch for any return of hematuria  Will recheck laboratory testing as noted above to follow his renal function as well as his hemoglobin and hematocrit  He had extensive other laboratory testing done while hospitalized  Will hold on his routine laboratory testing at this point  Discussed with them at length potential treatments for the dementia with behavioral issues  Discussed using a medication to help with memory  Wife wishes to start with something relatively mild to avoid side effects  Will start with Aricept as noted above  Potential side effects discussed  Discussed with them considering another medication for behavioral issues if this does not show results  Continue with the Cymbalta    Discussed with them following up with Neurology did discuss options regarding his dementia with his increased irritability  They will consider this  Will have him follow up in about 1 month as previously scheduled or sooner if needed  If he is doing well, would consider pushing this appointment out further if they wish  Subjective:     Patient ID: Beltran Gonzales is a 81 y o  male  He presents for follow-up after recent hospitalization  He had begun with gross hematuria which was initially mild prior to admission  About 2 days prior to admission, however, he began with gross hematuria with passage of clots  He was starting to undergo an outpatient evaluation by his urologist but when the bleeding became worse, his wife had brought him to the emergency room  His wife states that the entire bathroom was basically covered in blood  He was evaluated in the emergency room and was subsequently admitted  He did require continuous bladder irrigation in large quantities  His hemoglobin continued to drop and he did require transfusion of 1 PRBC  He had 1 syncopal episode during the hospitalization which was possibly related to his anemia  He was evaluated by Urology and underwent TURBT  This was found to be malignant neoplasm in of the bladder  Bleeding was then controlled and hemoglobin was stable  He was subsequently deemed stable to go to outpatient rehab facility  He underwent rehab on the 5th floor and did relatively well  He has had significant worsening of his memory issues and behavioral issues over the past 6 months or so  Wife states that these began to worsen significantly even prior to going to [place] around [place]  Symptoms worsened while in [place]  He was very irritable and angry almost at all times  He had threatened physical violence but never acted on it  Wife states that his mood has been very variable  Seems to be worse in the morning  Describes him as very negative  Mood seems to improve as the day goes on    He recognizes now that he has a significant memory issue  He answers very few questions  Defers all questions to his wife  Wife states that he at times states that he does not want live but she doubts that he will act on this  They did, however, remove all potential weapons from the house  Her son was concerned about having them there  Has not begun to wonder much  Appetite has been stable  Denies any nausea or vomiting  Continues to follow-up with Urology  Has not had any further gross hematuria  Review of Systems   Constitutional: Positive for activity change and fatigue  Negative for appetite change, chills and fever  HENT: Negative for hearing loss and rhinorrhea  Eyes: Negative for pain and visual disturbance  Respiratory: Negative for cough, chest tightness and shortness of breath  Cardiovascular: Negative for chest pain and palpitations  Gastrointestinal: Negative for abdominal pain, blood in stool, diarrhea, nausea and vomiting  Endocrine: Negative for polydipsia, polyphagia and polyuria  Genitourinary: Negative for dysuria  As per HPI   Musculoskeletal: Negative for arthralgias and gait problem  Skin: Negative for color change  Neurological: Positive for light-headedness  Negative for dizziness and headaches  Hematological: Does not bruise/bleed easily  Psychiatric/Behavioral: Positive for agitation, behavioral problems and decreased concentration  The patient is nervous/anxious  The following portions of the patient's history were reviewed and updated as appropriate:   He  has a past medical history of Abnormal weight loss; Alzheimer's disease (5/2/2018); Coronary artery disease; Disease of thyroid gland; Gastric ulcer; Hypothyroid (5/2/2018); Inguinal hernia, left; Peptic ulcer; and Prostate enlargement (5/2/2018)    He   Patient Active Problem List    Diagnosis Date Noted    Irritability and anger 06/05/2018    Malignant neoplasm of bladder ([facility] [address] ) 05/08/2018    Syncope and collapse 05/05/2018    Bradycardia 05/05/2018    Gross hematuria 05/02/2018    Hypothyroidism 05/02/2018    Alzheimer's disease 05/02/2018    Prostate enlargement 50/06/1642    Complicated UTI (urinary tract infection) 05/02/2018    Acute post-hemorrhagic anemia 05/02/2018    Asymptomatic bilateral carotid artery stenosis 04/24/2017    Chronic kidney disease 01/23/2017    Blocked ear, bilateral 09/06/2016    Nocturia 09/06/2016    Weight loss 09/06/2016    Facial trauma 04/25/2016    Peptic ulcer 12/17/2015    Memory difficulties 12/08/2015    Occlusion and stenosis of carotid artery without mention of cerebral infarction 03/11/2015    Arthralgia 05/19/2014    Dermatitis, eczematoid 02/24/2014    Arteriosclerotic heart disease 06/07/2013    Anemia 02/18/2013    Carotid artery stenosis 11/13/2012    Depression 11/13/2012    Hypercholesterolemia 05/29/2012    Hypertension 05/29/2012     He  has a past surgical history that includes Aortic valve replacement; Thromboendarterectomy; Cataract extraction (Bilateral); Inguinal hernia repair; Nephrectomy (Right); Coronary artery bypass graft; and pr cystourethroscopy w/irrig & evac clots (N/A, 5/7/2018)  His family history includes Alzheimer's disease in his brother; Other in his father; Sudden death in his mother  He  reports that he has quit smoking  He has never used smokeless tobacco  He reports that he drinks alcohol  He reports that he does not use drugs    Current Outpatient Prescriptions   Medication Sig Dispense Refill    aspirin (ECOTRIN LOW STRENGTH) 81 mg EC tablet Take 81 mg by mouth daily      atorvastatin (LIPITOR) 80 mg tablet Take 80 mg by mouth daily      cholecalciferol (VITAMIN D3) 1,000 units tablet Take 2,000 Units by mouth daily      co-enzyme Q-10 30 MG capsule Take 100 mg by mouth 3 (three) times a day      COCONUT OIL-FLAXSEED OIL PO Take by mouth      cyanocobalamin (VITAMIN B-12) 1,000 mcg tablet Take 1,000 mcg by mouth daily      DULoxetine (CYMBALTA) 30 mg delayed release capsule Take 20 mg by mouth 2 (two) times a day      levothyroxine 75 mcg tablet Take 1 tablet (75 mcg total) by mouth daily 90 tablet 3    multivitamin (THERAGRAN) TABS Take 1 tablet by mouth daily      pantoprazole (PROTONIX) 20 mg tablet Take 20 mg by mouth daily      tamsulosin (FLOMAX) 0 4 mg Take 0 4 mg by mouth daily with dinner      donepezil (ARICEPT) 5 mg tablet Take 1 tablet (5 mg total) by mouth daily at bedtime 90 tablet 0     No current facility-administered medications for this visit  Current Outpatient Prescriptions on File Prior to Visit   Medication Sig    aspirin (ECOTRIN LOW STRENGTH) 81 mg EC tablet Take 81 mg by mouth daily    atorvastatin (LIPITOR) 80 mg tablet Take 80 mg by mouth daily    cholecalciferol (VITAMIN D3) 1,000 units tablet Take 2,000 Units by mouth daily    co-enzyme Q-10 30 MG capsule Take 100 mg by mouth 3 (three) times a day    COCONUT OIL-FLAXSEED OIL PO Take by mouth    cyanocobalamin (VITAMIN B-12) 1,000 mcg tablet Take 1,000 mcg by mouth daily    DULoxetine (CYMBALTA) 30 mg delayed release capsule Take 20 mg by mouth 2 (two) times a day    levothyroxine 75 mcg tablet Take 1 tablet (75 mcg total) by mouth daily    multivitamin (THERAGRAN) TABS Take 1 tablet by mouth daily    pantoprazole (PROTONIX) 20 mg tablet Take 20 mg by mouth daily    tamsulosin (FLOMAX) 0 4 mg Take 0 4 mg by mouth daily with dinner     No current facility-administered medications on file prior to visit  He is allergic to contrast [iodinated diagnostic agents]; iodine; and morphine       Objective:     Physical Exam   Constitutional: He is cooperative  No distress  HENT:   Head: Normocephalic and atraumatic  Nose: Nose normal    Eyes: Conjunctivae are normal  Pupils are equal, round, and reactive to light  Neck: No muscular tenderness present  Cardiovascular: Normal rate and regular rhythm    Exam reveals no gallop and no friction rub  Murmur heard  Systolic murmur is present with a grade of 1/6   Pulmonary/Chest: He has no wheezes  He has no rales  He exhibits no tenderness  Abdominal: He exhibits no distension  There is no tenderness  There is no rebound and no guarding  Lymphadenopathy:     He has no cervical adenopathy  Neurological: He is alert  Skin: Skin is warm and dry  Psychiatric: His affect is labile  His speech is delayed  Cognition and memory are impaired  He exhibits abnormal recent memory and abnormal remote memory  He is inattentive  Nursing note and vitals reviewed  Vitals:    06/05/18 1501   BP: 130/60   BP Location: Left arm   Patient Position: Sitting   Cuff Size: Standard   Temp: 97 8 °F (36 6 °C)   TempSrc: Temporal   Weight: 71 7 kg (158 lb)   Height: 6' (1 829 m)       Xr Chest Portable    Result Date: 5/9/2018  Narrative: CHEST INDICATION:   fever  COMPARISON:  10/6/2014  EXAM PERFORMED/VIEWS:  XR CHEST PORTABLE FINDINGS:  Median sternotomy wires appear intact  Cardiomediastinal silhouette appears unremarkable  The lungs are clear  No pneumothorax or pleural effusion  Osseous structures appear within normal limits for patient age  Impression: No acute cardiopulmonary disease   Workstation performed: [id number]       Admission on 05/11/2018, Discharged on 05/26/2018   Component Date Value Ref Range Status    WBC 05/21/2018 6 93  4 31 - 10 16 Thousand/uL Final    RBC 05/21/2018 2 97* 3 88 - 5 62 Million/uL Final    Hemoglobin 05/21/2018 9 1* 12 0 - 17 0 g/dL Final    Hematocrit 05/21/2018 27 5* 36 5 - 49 3 % Final    MCV 05/21/2018 93  82 - 98 fL Final    MCH 05/21/2018 30 6  26 8 - 34 3 pg Final    MCHC 05/21/2018 33 1  31 4 - 37 4 g/dL Final    RDW 05/21/2018 12 9  11 6 - 15 1 % Final    MPV 05/21/2018 9 8  8 9 - 12 7 fL Final    Platelets 02/71/7277 321  149 - 390 Thousands/uL Final    Neutrophils Relative 05/21/2018 68  43 - 75 % Final    Lymphocytes Relative 05/21/2018 20  14 - 44 % Final    Monocytes Relative 05/21/2018 9  4 - 12 % Final    Eosinophils Relative 05/21/2018 3  0 - 6 % Final    Basophils Relative 05/21/2018 1  0 - 1 % Final    Neutrophils Absolute 05/21/2018 4 71  1 85 - 7 62 Thousands/µL Final    Lymphocytes Absolute 05/21/2018 1 37  0 60 - 4 47 Thousands/µL Final    Monocytes Absolute 05/21/2018 0 60  0 17 - 1 22 Thousand/µL Final    Eosinophils Absolute 05/21/2018 0 21  0 00 - 0 61 Thousand/µL Final    Basophils Absolute 05/21/2018 0 04  0 00 - 0 10 Thousands/µL Final    Sodium 05/21/2018 137  136 - 145 mmol/L Final    Potassium 05/21/2018 4 2  3 5 - 5 3 mmol/L Final    Chloride 05/21/2018 102  100 - 108 mmol/L Final    CO2 05/21/2018 30  21 - 32 mmol/L Final    Anion Gap 05/21/2018 5  4 - 13 mmol/L Final    BUN 05/21/2018 20  5 - 25 mg/dL Final    Creatinine 05/21/2018 1 34* 0 60 - 1 30 mg/dL Final    Standardized to IDMS reference method    Glucose 05/21/2018 97  65 - 140 mg/dL Final      If the patient is fasting, the ADA then defines impaired fasting glucose as > 100 mg/dL and diabetes as > or equal to 123 mg/dL  Specimen collection should occur prior to Sulfasalazine administration due to the potential for falsely depressed results  Specimen collection should occur prior to Sulfapyridine administration due to the potential for falsely elevated results   Calcium 05/21/2018 8 7  8 3 - 10 1 mg/dL Final    AST 05/21/2018 25  5 - 45 U/L Final      Specimen collection should occur prior to Sulfasalazine administration due to the potential for falsely depressed results   ALT 05/21/2018 28  12 - 78 U/L Final      Specimen collection should occur prior to Sulfasalazine administration due to the potential for falsely depressed results       Alkaline Phosphatase 05/21/2018 85  46 - 116 U/L Final    Total Protein 05/21/2018 6 1* 6 4 - 8 2 g/dL Final    Albumin 05/21/2018 3 2* 3 5 - 5 0 g/dL Final    Total Bilirubin 05/21/2018 0 40  0 20 - 1 00 mg/dL Final    eGFR 05/21/2018 49  ml/min/1 73sq m Final   Admission on 05/02/2018, Discharged on 05/11/2018   No results displayed because visit has over 200 results  Transitional Care Management Review: Elver Clarke is a 81 y o  male here for TCM follow up  During the TCM phone call patient stated:    Date and time hospital follow up call was made:  5/28/2018  2:48 PM  Hospital care reviewed:  Records reviewed  Patient was hopsitalized at:  [facility]  Date of admission:  5/11/18  Date of discharge:  5/26/18  Diagnosis:  iRON DEFICIENCY ANEMIA  Disposition:  Home  Were the patients medicaitons reviewed and updated:  Yes  Current symptoms:  None  Post hospital issues:  None  Scheduled for follow up?:  Yes  Did you obtain your prescribed medications:  Yes  Do you need help managing your perscriptions or medications:  No  Is transportation to your appointments needed:  No  I have advised the patient to call PCP with any new or worsening symptoms (please type in name along with any credentials):  Katelyn Guerra  Living Arrangements:  Family members  Support System:  Home care staff, Family  The type of support provided:  Emotional, Financial  Do you have social support:  Yes, as much as I need  Are you recieving outpatient services:  No  Are you recieving home care services:  Yes  Types of home care services:  Nurse visit, Other (comment)  comment:  [facility]  Are you using any community resources:  No  Current waiver service:  No  Interperter language line required?:  No  Counseling:  Family  Comments:  Spouse Jay stated doing well               Talia Russell MD

## 2018-07-03 ENCOUNTER — APPOINTMENT (OUTPATIENT)
Dept: LAB | Facility: MEDICAL CENTER | Age: 83
End: 2018-07-03
Payer: COMMERCIAL

## 2018-07-03 DIAGNOSIS — G30.1 LATE ONSET ALZHEIMER'S DISEASE WITHOUT BEHAVIORAL DISTURBANCE (HCC): Chronic | ICD-10-CM

## 2018-07-03 DIAGNOSIS — D50.0 IRON DEFICIENCY ANEMIA DUE TO CHRONIC BLOOD LOSS: ICD-10-CM

## 2018-07-03 DIAGNOSIS — F02.80 LATE ONSET ALZHEIMER'S DISEASE WITHOUT BEHAVIORAL DISTURBANCE (HCC): Chronic | ICD-10-CM

## 2018-07-03 DIAGNOSIS — F32.A DEPRESSION, UNSPECIFIED DEPRESSION TYPE: ICD-10-CM

## 2018-07-03 LAB
ALBUMIN SERPL BCP-MCNC: 4 G/DL (ref 3.5–5)
ALP SERPL-CCNC: 65 U/L (ref 46–116)
ALT SERPL W P-5'-P-CCNC: 38 U/L (ref 12–78)
ANION GAP SERPL CALCULATED.3IONS-SCNC: 3 MMOL/L (ref 4–13)
AST SERPL W P-5'-P-CCNC: 39 U/L (ref 5–45)
BASOPHILS # BLD AUTO: 0.05 THOUSANDS/ΜL (ref 0–0.1)
BASOPHILS NFR BLD AUTO: 1 % (ref 0–1)
BILIRUB SERPL-MCNC: 0.48 MG/DL (ref 0.2–1)
BUN SERPL-MCNC: 23 MG/DL (ref 5–25)
CALCIUM SERPL-MCNC: 9.1 MG/DL (ref 8.3–10.1)
CHLORIDE SERPL-SCNC: 104 MMOL/L (ref 100–108)
CO2 SERPL-SCNC: 31 MMOL/L (ref 21–32)
CREAT SERPL-MCNC: 1.37 MG/DL (ref 0.6–1.3)
EOSINOPHIL # BLD AUTO: 0.18 THOUSAND/ΜL (ref 0–0.61)
EOSINOPHIL NFR BLD AUTO: 3 % (ref 0–6)
ERYTHROCYTE [DISTWIDTH] IN BLOOD BY AUTOMATED COUNT: 13.5 % (ref 11.6–15.1)
GFR SERPL CREATININE-BSD FRML MDRD: 48 ML/MIN/1.73SQ M
GLUCOSE SERPL-MCNC: 91 MG/DL (ref 65–140)
HCT VFR BLD AUTO: 33.2 % (ref 36.5–49.3)
HGB BLD-MCNC: 10.5 G/DL (ref 12–17)
IMM GRANULOCYTES # BLD AUTO: 0.02 THOUSAND/UL (ref 0–0.2)
IMM GRANULOCYTES NFR BLD AUTO: 0 % (ref 0–2)
LYMPHOCYTES # BLD AUTO: 1.61 THOUSANDS/ΜL (ref 0.6–4.47)
LYMPHOCYTES NFR BLD AUTO: 26 % (ref 14–44)
MCH RBC QN AUTO: 29.4 PG (ref 26.8–34.3)
MCHC RBC AUTO-ENTMCNC: 31.6 G/DL (ref 31.4–37.4)
MCV RBC AUTO: 93 FL (ref 82–98)
MONOCYTES # BLD AUTO: 0.62 THOUSAND/ΜL (ref 0.17–1.22)
MONOCYTES NFR BLD AUTO: 10 % (ref 4–12)
NEUTROPHILS # BLD AUTO: 3.66 THOUSANDS/ΜL (ref 1.85–7.62)
NEUTS SEG NFR BLD AUTO: 60 % (ref 43–75)
NRBC BLD AUTO-RTO: 0 /100 WBCS
PLATELET # BLD AUTO: 224 THOUSANDS/UL (ref 149–390)
PMV BLD AUTO: 11.8 FL (ref 8.9–12.7)
POTASSIUM SERPL-SCNC: 5.2 MMOL/L (ref 3.5–5.3)
PROT SERPL-MCNC: 7.3 G/DL (ref 6.4–8.2)
RBC # BLD AUTO: 3.57 MILLION/UL (ref 3.88–5.62)
SODIUM SERPL-SCNC: 138 MMOL/L (ref 136–145)
WBC # BLD AUTO: 6.14 THOUSAND/UL (ref 4.31–10.16)

## 2018-07-03 PROCEDURE — 85025 COMPLETE CBC W/AUTO DIFF WBC: CPT

## 2018-07-03 PROCEDURE — 36415 COLL VENOUS BLD VENIPUNCTURE: CPT

## 2018-07-03 PROCEDURE — 80053 COMPREHEN METABOLIC PANEL: CPT

## 2018-07-05 ENCOUNTER — OFFICE VISIT (OUTPATIENT)
Dept: INTERNAL MEDICINE CLINIC | Facility: CLINIC | Age: 83
End: 2018-07-05
Payer: COMMERCIAL

## 2018-07-05 VITALS
HEIGHT: 72 IN | BODY MASS INDEX: 20.86 KG/M2 | TEMPERATURE: 98.2 F | DIASTOLIC BLOOD PRESSURE: 62 MMHG | WEIGHT: 154 LBS | SYSTOLIC BLOOD PRESSURE: 106 MMHG | OXYGEN SATURATION: 97 % | HEART RATE: 57 BPM

## 2018-07-05 DIAGNOSIS — C67.2 MALIGNANT NEOPLASM OF LATERAL WALL OF URINARY BLADDER (HCC): ICD-10-CM

## 2018-07-05 DIAGNOSIS — I25.10 ARTERIOSCLEROTIC HEART DISEASE: ICD-10-CM

## 2018-07-05 DIAGNOSIS — N18.30 STAGE 3 CHRONIC KIDNEY DISEASE (HCC): ICD-10-CM

## 2018-07-05 DIAGNOSIS — F02.80 LATE ONSET ALZHEIMER'S DISEASE WITHOUT BEHAVIORAL DISTURBANCE (HCC): Primary | Chronic | ICD-10-CM

## 2018-07-05 DIAGNOSIS — D50.0 IRON DEFICIENCY ANEMIA DUE TO CHRONIC BLOOD LOSS: ICD-10-CM

## 2018-07-05 DIAGNOSIS — E03.9 HYPOTHYROIDISM, UNSPECIFIED TYPE: Chronic | ICD-10-CM

## 2018-07-05 DIAGNOSIS — G30.1 LATE ONSET ALZHEIMER'S DISEASE WITHOUT BEHAVIORAL DISTURBANCE (HCC): Primary | Chronic | ICD-10-CM

## 2018-07-05 PROBLEM — R31.0 GROSS HEMATURIA: Status: RESOLVED | Noted: 2018-05-02 | Resolved: 2018-07-05

## 2018-07-05 PROBLEM — N39.0 COMPLICATED UTI (URINARY TRACT INFECTION): Status: RESOLVED | Noted: 2018-05-02 | Resolved: 2018-07-05

## 2018-07-05 PROCEDURE — 99214 OFFICE O/P EST MOD 30 MIN: CPT | Performed by: FAMILY MEDICINE

## 2018-07-05 NOTE — PROGRESS NOTES
Assessment/Plan:    No problem-specific Assessment & Plan notes found for this encounter  Diagnoses and all orders for this visit:    Late onset Alzheimer's disease without behavioral disturbance  -     CBC and differential; Future    Malignant neoplasm of lateral wall of urinary bladder (HCC)    Stage 3 chronic kidney disease  -     CBC and differential; Future  -     Comprehensive metabolic panel; Future  -     Lipid panel; Future    Iron deficiency anemia due to chronic blood loss  -     Iron Panel; Future    Arteriosclerotic heart disease  -     CBC and differential; Future  -     Comprehensive metabolic panel; Future  -     Lipid panel; Future    Hypothyroidism, unspecified type  -     TSH, 3rd generation; Future        Reviewed recent laboratory testing with them  Hemoglobin is slowly improving  Continue with foods high in iron  Will check an iron panel with next laboratory testing  Creatinine remains mildly elevated  Discussed the importance of staying well hydrated  Discussed with them the importance of emptying his bladder completely  Will continue to follow this  Slip for repeat laboratory testing given  Continue to follow up with Urology  Continue follow-up with Cardiology  Discussed the memory issues with them  He seems to be slowly but steadily worsening from a confusion standpoint  Continue the Aricept  Discussed with them possibly increasing the dosage but his wife wishes to hold at present  She is not sure whether it is helped very much at all  Will have them follow up in about 2-3 months or sooner if needed  Subjective:      Patient ID: Tiffanie Palomares is a 81 y o  male  He presents for follow-up  Has been doing somewhat better  Appetite has improved but does not eat large meals  Wife states that he grazes on food throughout the day  Denies any nausea or vomiting  He defers most of the questions to his wife  Memory seems to be worsening    He does not recall being in the hospital recently or having the bladder cancer or the hematuria  Wife feels that his memory has worsened even since the hospitalization  His labile at times with his mood  Does become irritable at times  Symptoms seem to be worse early in the day  Does have urinary urgency  Has difficulty controlling his urination at times  Does urinate at night  Feeling somewhat stronger  Denies any dysuria  Trying to stay well hydrated  Sleeping relatively well except for the getting up to urinate  Wife feels that he is emptying his bladder relatively well  Denies any reflux symptoms  Denies any nausea or vomiting  Denies any gross hematuria  Denies any chest pain or palpitations  Denies any significant shortness of breath  The following portions of the patient's history were reviewed and updated as appropriate:   He  has a past medical history of Abnormal weight loss; Alzheimer's disease (5/2/2018); Aortic insufficiency with aortic stenosis; Coronary artery disease; Diabetes ([facility] [address] ); Disease of thyroid gland; Dyslipidemia; Gastric ulcer; H/O cardiovascular stress test; History of EKG; History of EKG; History of Holter monitoring; echocardiogram; Hypertension; Hypothyroid (5/2/2018); ICAO (internal carotid artery occlusion); Inguinal hernia, left; Peptic ulcer; and Prostate enlargement (5/2/2018)    He   Patient Active Problem List    Diagnosis Date Noted    Presence of aortocoronary bypass graft 07/13/2018    Presence of prosthetic heart valve 07/13/2018    Personal history of nicotine dependence 07/13/2018    Irritability and anger 06/05/2018    Malignant neoplasm of bladder ([facility] [address] ) 05/08/2018    Syncope and collapse 05/05/2018    Bradycardia 05/05/2018    Hypothyroidism 05/02/2018    Alzheimer's disease 05/02/2018    Prostate enlargement 05/02/2018    Acute post-hemorrhagic anemia 05/02/2018    Asymptomatic bilateral carotid artery stenosis 04/24/2017    Chronic kidney disease 01/23/2017  Nocturia 09/06/2016    Weight loss 09/06/2016    Peptic ulcer 12/17/2015    Memory difficulties 12/08/2015    Occlusion and stenosis of carotid artery without mention of cerebral infarction 03/11/2015    Arthralgia 05/19/2014    Dermatitis, eczematoid 02/24/2014    Arteriosclerotic heart disease 06/07/2013    Anemia 02/18/2013    Carotid artery stenosis 11/13/2012    Depression 11/13/2012    Hypercholesterolemia 05/29/2012    Hypertension 05/29/2012     He  has a past surgical history that includes Aortic valve replacement; Thromboendarterectomy; Cataract extraction (Bilateral); Inguinal hernia repair; Nephrectomy (Right); Coronary artery bypass graft; pr cystourethroscopy w/irrig & evac clots (N/A, 5/7/2018); and CAROTID ENDARTARECTOMY (04/26/2013)  His family history includes Alzheimer's disease in his brother; Other in his father; Sudden death in his mother  He  reports that he has quit smoking  His smoking use included Cigarettes  He has never used smokeless tobacco  He reports that he drinks alcohol  He reports that he does not use drugs    Current Outpatient Prescriptions   Medication Sig Dispense Refill    aspirin (ECOTRIN LOW STRENGTH) 81 mg EC tablet Take 81 mg by mouth daily      atorvastatin (LIPITOR) 80 mg tablet Take 80 mg by mouth daily      cholecalciferol (VITAMIN D3) 1,000 units tablet Take 2,000 Units by mouth daily      co-enzyme Q-10 30 MG capsule Take 100 mg by mouth 3 (three) times a day      COCONUT OIL-FLAXSEED OIL PO Take by mouth      cyanocobalamin (VITAMIN B-12) 1,000 mcg tablet Take 1,000 mcg by mouth daily      donepezil (ARICEPT) 5 mg tablet Take 1 tablet (5 mg total) by mouth daily at bedtime 90 tablet 0    DULoxetine (CYMBALTA) 30 mg delayed release capsule Take 20 mg by mouth 2 (two) times a day      levothyroxine 75 mcg tablet Take 1 tablet (75 mcg total) by mouth daily 90 tablet 3    multivitamin (THERAGRAN) TABS Take 1 tablet by mouth daily      pantoprazole (PROTONIX) 20 mg tablet Take 20 mg by mouth daily      tamsulosin (FLOMAX) 0 4 mg Take 0 4 mg by mouth daily with dinner      B Complex-C (SUPER B/C) CAPS Take by mouth take 1 tablet daily   levothyroxine 50 mcg tablet Take 50 mcg by mouth daily      Omega-3 Fatty Acids (OMEGA 3 PO) Take by mouth Omega 3 350 mg- 400 mg capsule [id number] mg every day       No current facility-administered medications for this visit  Current Outpatient Prescriptions on File Prior to Visit   Medication Sig    aspirin (ECOTRIN LOW STRENGTH) 81 mg EC tablet Take 81 mg by mouth daily    atorvastatin (LIPITOR) 80 mg tablet Take 80 mg by mouth daily    cholecalciferol (VITAMIN D3) 1,000 units tablet Take 2,000 Units by mouth daily    co-enzyme Q-10 30 MG capsule Take 100 mg by mouth 3 (three) times a day    COCONUT OIL-FLAXSEED OIL PO Take by mouth    cyanocobalamin (VITAMIN B-12) 1,000 mcg tablet Take 1,000 mcg by mouth daily    donepezil (ARICEPT) 5 mg tablet Take 1 tablet (5 mg total) by mouth daily at bedtime    DULoxetine (CYMBALTA) 30 mg delayed release capsule Take 20 mg by mouth 2 (two) times a day    levothyroxine 75 mcg tablet Take 1 tablet (75 mcg total) by mouth daily    multivitamin (THERAGRAN) TABS Take 1 tablet by mouth daily    pantoprazole (PROTONIX) 20 mg tablet Take 20 mg by mouth daily    tamsulosin (FLOMAX) 0 4 mg Take 0 4 mg by mouth daily with dinner     No current facility-administered medications on file prior to visit  He is allergic to contrast [iodinated diagnostic agents]; iodine; and morphine       Review of Systems   Constitutional: Negative for activity change, appetite change, chills and fever  HENT: Negative for congestion and hearing loss  Eyes: Negative for pain and visual disturbance  Respiratory: Negative for chest tightness and shortness of breath  Cardiovascular: Negative for chest pain and palpitations     Gastrointestinal: Negative for abdominal pain, blood in stool, diarrhea, nausea and vomiting  Endocrine: Negative for polydipsia, polyphagia and polyuria  Genitourinary: Positive for frequency and urgency  Negative for dysuria  Musculoskeletal: Negative for arthralgias and gait problem  Skin: Negative for color change  Neurological: Negative for dizziness, weakness and headaches  Hematological: Does not bruise/bleed easily  Psychiatric/Behavioral: Positive for agitation, confusion and decreased concentration  Negative for behavioral problems  The patient is not nervous/anxious  Objective:      /62 (BP Location: Left arm, Patient Position: Sitting, Cuff Size: Standard)   Pulse 57   Temp 98 2 °F (36 8 °C) (Temporal)   Ht 6' (1 829 m)   Wt 69 9 kg (154 lb)   SpO2 97%   BMI 20 89 kg/m²          Physical Exam   Constitutional: He is cooperative  No distress  HENT:   Head: Normocephalic and atraumatic  Nose: Nose normal    Moist mucous membranes   Eyes: Conjunctivae are normal  Pupils are equal, round, and reactive to light  Neck: No JVD present  Cardiovascular: Normal rate and regular rhythm  Exam reveals no gallop and no friction rub  Murmur heard  Systolic murmur is present with a grade of 1/6   Pulmonary/Chest: He has no wheezes  He has no rales  He exhibits no tenderness  Abdominal: He exhibits no distension  There is no tenderness  There is no rebound and no guarding  Lymphadenopathy:     He has no cervical adenopathy  Neurological: He is alert  Skin: Skin is warm and dry  Psychiatric: His behavior is normal  His affect is labile  Cognition and memory are impaired  He exhibits abnormal recent memory  Nursing note and vitals reviewed        Below is the patient's most recent value for Albumin, ALT, AST, BUN, Calcium, Chloride, Cholesterol, CO2, Creatinine, GFR, Glucose, HDL, Hematocrit, Hemoglobin, Hemoglobin A1C, LDL, Magnesium, Phosphorus, Platelets, Potassium, PSA, Sodium, Triglycerides, and WBC    Lab Results   Component Value Date    ALT 38 07/03/2018    AST 39 07/03/2018    BUN 23 07/03/2018    CALCIUM 9 1 07/03/2018     07/03/2018    CHOL 200 10/03/2017    CO2 31 07/03/2018    CREATININE 1 37 (H) 07/03/2018    HDL 53 10/03/2017    HCT 33 2 (L) 07/03/2018    HGB 10 5 (L) 07/03/2018    MG 1 7 05/10/2018    PHOS 3 6 05/09/2018     07/03/2018    K 5 2 07/03/2018     07/03/2018    TRIG 76 10/03/2017    WBC 6 14 07/03/2018     Note: for a comprehensive list of the patient's lab results, access the Results Review activity

## 2018-07-06 ENCOUNTER — TELEPHONE (OUTPATIENT)
Dept: INTERNAL MEDICINE CLINIC | Facility: CLINIC | Age: 83
End: 2018-07-06

## 2018-07-13 PROBLEM — Z87.891 PERSONAL HISTORY OF NICOTINE DEPENDENCE: Status: ACTIVE | Noted: 2018-07-13

## 2018-07-13 PROBLEM — Z95.1 PRESENCE OF AORTOCORONARY BYPASS GRAFT: Status: ACTIVE | Noted: 2018-07-13

## 2018-07-13 PROBLEM — Z95.2 PRESENCE OF PROSTHETIC HEART VALVE: Status: ACTIVE | Noted: 2018-07-13

## 2018-07-13 RX ORDER — B-COMPLEX WITH VITAMIN C
CAPSULE ORAL
COMMUNITY
End: 2020-08-16 | Stop reason: ALTCHOICE

## 2018-07-13 RX ORDER — LEVOTHYROXINE SODIUM 0.05 MG/1
50 TABLET ORAL DAILY
COMMUNITY
End: 2018-11-09

## 2018-07-23 ENCOUNTER — OFFICE VISIT (OUTPATIENT)
Dept: CARDIOLOGY CLINIC | Facility: CLINIC | Age: 83
End: 2018-07-23
Payer: COMMERCIAL

## 2018-07-23 VITALS
WEIGHT: 156 LBS | BODY MASS INDEX: 20.67 KG/M2 | DIASTOLIC BLOOD PRESSURE: 74 MMHG | HEART RATE: 52 BPM | HEIGHT: 73 IN | SYSTOLIC BLOOD PRESSURE: 138 MMHG

## 2018-07-23 DIAGNOSIS — R00.1 BRADYCARDIA: ICD-10-CM

## 2018-07-23 DIAGNOSIS — I25.10 CORONARY ARTERY DISEASE INVOLVING NATIVE CORONARY ARTERY OF NATIVE HEART WITHOUT ANGINA PECTORIS: ICD-10-CM

## 2018-07-23 DIAGNOSIS — Z95.2 PRESENCE OF PROSTHETIC HEART VALVE: ICD-10-CM

## 2018-07-23 DIAGNOSIS — Z95.1 PRESENCE OF AORTOCORONARY BYPASS GRAFT: Primary | ICD-10-CM

## 2018-07-23 PROCEDURE — 99214 OFFICE O/P EST MOD 30 MIN: CPT | Performed by: INTERNAL MEDICINE

## 2018-07-23 NOTE — PROGRESS NOTES
Subjective:        Patient ID: Marjorie Seymour is a 81 y o  male  Chief Complaint:  Madhuri Tucker is here for routine cardiology follow-up in light of CAD status post coronary artery bypass grafting and aortic stenosis status post tissue AVR back in 2013  Fortunately he denies any concerning cardiopulmonary complaints though his wife reports intermittent orthostatic like symptoms though no brianne syncope  He is not on any medications that would lower blood pressure or heart rate  Recent hematuria illness records reviewed  And he denies any chest pains, shortness of breath, palpitations, syncope, unilateral symptoms, fevers or shaking chills  His wife is aware of antibiotic prophylaxis necessary prior to a applicable dental surgical procedures  Unfortunately his memory seems to be failing, he has been asking me the same questions several times during my examination today  The following portions of the patient's history were reviewed and updated as appropriate: allergies, current medications, past family history, past medical history, past social history, past surgical history and problem list   Review of Systems   Constitution: Negative for chills, diaphoresis, malaise/fatigue and weight gain  HENT: Negative for nosebleeds and stridor  Eyes: Negative for double vision, vision loss in left eye, vision loss in right eye and visual disturbance  Cardiovascular: Negative for chest pain, claudication, cyanosis, dyspnea on exertion, irregular heartbeat, leg swelling, near-syncope, orthopnea, palpitations, paroxysmal nocturnal dyspnea and syncope  Respiratory: Negative for cough, shortness of breath, snoring and wheezing  Endocrine: Negative for polydipsia, polyphagia and polyuria  Hematologic/Lymphatic: Negative for bleeding problem  Does not bruise/bleed easily  Skin: Negative for flushing and rash  Musculoskeletal: Negative for falls and myalgias     Gastrointestinal: Negative for abdominal pain, heartburn, hematemesis, hematochezia, melena and nausea  Genitourinary: Positive for hematuria (Resolved now, status post urologic workup)  Neurological: Positive for light-headedness  Negative for brief paralysis, dizziness, focal weakness, headaches, loss of balance and vertigo  Psychiatric/Behavioral: Positive for memory loss ( progressive)  Negative for altered mental status and substance abuse  Allergic/Immunologic: Negative for hives  Objective:     Physical Exam   Constitutional: He is oriented to person, place, and time  He appears well-developed and well-nourished  No distress  HENT:   Head: Normocephalic and atraumatic  Eyes: EOM are normal  Pupils are equal, round, and reactive to light  No scleral icterus  Neck: Normal range of motion  Neck supple  No JVD present  No thyromegaly present  Cardiovascular: Normal rate and regular rhythm  Exam reveals no gallop and no friction rub  Murmur (Grade 1 outflow murmur preserved S2) heard  Pulmonary/Chest: Effort normal and breath sounds normal  No stridor  No respiratory distress  He has no wheezes  He has no rales  Abdominal: Soft  Bowel sounds are normal  He exhibits no distension and no mass  There is no tenderness  Musculoskeletal: Normal range of motion  He exhibits no edema or deformity  Neurological: He is alert and oriented to person, place, and time  Coordination normal    Skin: Skin is warm and dry  No erythema  No pallor  Psychiatric: He has a normal mood and affect   His behavior is normal        Lab Review:   Appointment on 07/03/2018   Component Date Value    WBC 07/03/2018 6 14     RBC 07/03/2018 3 57*    Hemoglobin 07/03/2018 10 5*    Hematocrit 07/03/2018 33 2*    MCV 07/03/2018 93     MCH 07/03/2018 29 4     MCHC 07/03/2018 31 6     RDW 07/03/2018 13 5     MPV 07/03/2018 11 8     Platelets 36/10/6642 224     nRBC 07/03/2018 0     Neutrophils Relative 07/03/2018 60     Immat GRANS % 07/03/2018 0  Lymphocytes Relative 07/03/2018 26     Monocytes Relative 07/03/2018 10     Eosinophils Relative 07/03/2018 3     Basophils Relative 07/03/2018 1     Neutrophils Absolute 07/03/2018 3 66     Immature Grans Absolute 07/03/2018 0 02     Lymphocytes Absolute 07/03/2018 1 61     Monocytes Absolute 07/03/2018 0 62     Eosinophils Absolute 07/03/2018 0 18     Basophils Absolute 07/03/2018 0 05     Sodium 07/03/2018 138     Potassium 07/03/2018 5 2     Chloride 07/03/2018 104     CO2 07/03/2018 31     Anion Gap 07/03/2018 3*    BUN 07/03/2018 23     Creatinine 07/03/2018 1 37*    Glucose 07/03/2018 91     Calcium 07/03/2018 9 1     AST 07/03/2018 39     ALT 07/03/2018 38     Alkaline Phosphatase 07/03/2018 65     Total Protein 07/03/2018 7 3     Albumin 07/03/2018 4 0     Total Bilirubin 07/03/2018 0 48     eGFR 07/03/2018 48    Admission on 05/11/2018, Discharged on 05/26/2018   Component Date Value    WBC 05/21/2018 6 93     RBC 05/21/2018 2 97*    Hemoglobin 05/21/2018 9 1*    Hematocrit 05/21/2018 27 5*    MCV 05/21/2018 93     MCH 05/21/2018 30 6     MCHC 05/21/2018 33 1     RDW 05/21/2018 12 9     MPV 05/21/2018 9 8     Platelets 78/76/7832 321     Neutrophils Relative 05/21/2018 68     Lymphocytes Relative 05/21/2018 20     Monocytes Relative 05/21/2018 9     Eosinophils Relative 05/21/2018 3     Basophils Relative 05/21/2018 1     Neutrophils Absolute 05/21/2018 4 71     Lymphocytes Absolute 05/21/2018 1 37     Monocytes Absolute 05/21/2018 0 60     Eosinophils Absolute 05/21/2018 0 21     Basophils Absolute 05/21/2018 0 04     Sodium 05/21/2018 137     Potassium 05/21/2018 4 2     Chloride 05/21/2018 102     CO2 05/21/2018 30     Anion Gap 05/21/2018 5     BUN 05/21/2018 20     Creatinine 05/21/2018 1 34*    Glucose 05/21/2018 97     Calcium 05/21/2018 8 7     AST 05/21/2018 25     ALT 05/21/2018 28     Alkaline Phosphatase 05/21/2018 85     Total Protein 05/21/2018 6 1*    Albumin 05/21/2018 3 2*    Total Bilirubin 05/21/2018 0 40     eGFR 05/21/2018 49          Assessment:       1  Presence of aortocoronary bypass graft     2  Coronary artery disease involving native coronary artery of native heart without angina pectoris  Echo complete with contrast if indicated   3  Bradycardia  Holter monitor - 24 hour   4  Presence of prosthetic heart valve          Plan:       In light of some orthostatics symptoms I will make sure he is not having any more progressive bradycardia, 24 hr Holter monitor ordered  Negative chronotropic agents should be avoided  For surveillance purposes I ordered an echocardiogram for tissue AVR surveillance  He is having no angina, only were surveillance intermediate risk her higher surgery plan would I advocate preoperative risk stratifying stress testing  Medications ideal, no changes advised  Return office in 6 months as long as echocardiogram Holter monitor not alarming

## 2018-07-23 NOTE — PATIENT INSTRUCTIONS
Aortic Valve Replacement   WHAT YOU NEED TO KNOW:   Aortic valve replacement is surgery to put a new aortic valve in your heart  Your aortic valve separates the lower section of your heart from your aorta  The aorta is the large blood vessel that carries blood from your heart to your body  Your aortic valve opens and closes to let blood flow from your heart  When your aortic valve does not open or close as it should, the amount of blood that your heart can pump to your body decreases  DISCHARGE INSTRUCTIONS:   Medicines:   · Antiplatelets  help prevent blood clots  This medicine makes it more likely for you to bleed or bruise  · Blood thinners    help prevent blood clots  Examples of blood thinners include heparin and warfarin  Clots can cause strokes, heart attacks, and death  The following are general safety guidelines to follow while you are taking a blood thinner:    ¨ Watch for bleeding and bruising while you take blood thinners  Watch for bleeding from your gums or nose  Watch for blood in your urine and bowel movements  Use a soft washcloth on your skin, and a soft toothbrush to brush your teeth  This can keep your skin and gums from bleeding  If you shave, use an electric shaver  Do not play contact sports  ¨ Tell your dentist and other healthcare providers that you take anticoagulants  Wear a bracelet or necklace that says you take this medicine  ¨ Do not start or stop any medicines unless your healthcare provider tells you to  Many medicines cannot be used with blood thinners  ¨ Tell your healthcare provider right away if you forget to take the medicine, or if you take too much  ¨ Warfarin  is a blood thinner that you may need to take  The following are things you should be aware of if you take warfarin  § Foods and medicines can affect the amount of warfarin in your blood  Do not make major changes to your diet while you take warfarin   Warfarin works best when you eat about the same amount of vitamin K every day  Vitamin K is found in green leafy vegetables and certain other foods  Ask for more information about what to eat when you are taking warfarin  § You will need to see your healthcare provider for follow-up visits when you are on warfarin  You will need regular blood tests  These tests are used to decide how much medicine you need  · Heart medicine: This medicine is given to strengthen or regulate your heartbeat  It also may help your heart in other ways  Talk with your caregiver to find out what your heart medicine is and why you are taking it  · Blood pressure medicine: This is given to lower your blood pressure  A controlled blood pressure helps protect your organs, such as your heart, lungs, brain, and kidneys  Take your blood pressure medicine exactly as directed  · Antibiotics: This medicine will help kill germs that may get into your blood and cause an infection in your heart  Healthcare providers may tell you to take antibiotics before and after dental work, surgery, and some procedures  This is important after heart valve disease  · Take your medicine as directed  Contact your healthcare provider if you think your medicine is not helping or if you have side effects  Tell him or her if you are allergic to any medicine  Keep a list of the medicines, vitamins, and herbs you take  Include the amounts, and when and why you take them  Bring the list or the pill bottles to follow-up visits  Carry your medicine list with you in case of an emergency  Follow up with your healthcare provider as directed:  Write down your questions so you remember to ask them during your visits  Care for your wound:  Ask healthcare providers how to take care of your incision wound  Check your wound, clean it, and change the bandages each day  If the bandage gets dirty or falls off, put on a new one  Mouth care: Take care of your teeth and gums   Brush and floss your teeth, and see your dentist regularly  You may help prevent an infection in your heart if you do this  Tell your dentist that you have had heart valve surgery  Cardiac rehabilitation:  Your cardiologist or heart surgeon may recommend that you attend cardiac rehabilitation (rehab)  This is a program run by specialists who will help you safely strengthen your heart and prevent more heart disease  The plan includes exercise, relaxation, stress management, and heart-healthy nutrition  Healthcare providers will also check to make sure any medicines you are taking are working  The plan may also include instructions for when you can drive, return to work, and do other normal daily activities  Good nutrition for your heart:  Get enough calories, protein, vitamins, and minerals to help prevent poor nutrition and muscle wasting  You may be told to eat foods low in cholesterol or sodium (salt)  You also may be told to limit saturated and trans fats  Do eat foods that contain healthy fats, such as walnuts, salmon, and canola and soybean oils  Eat foods that help protect the heart, including plenty of fruits and vegetables, nuts, and sources of fiber  Ask what a healthy weight is for you  Set goals to reach and stay at that weight  Contact your healthcare provider if:   · You have a fever  · Your wound area is painful, red, or oozing fluid  · You feel too tired for normal activities weeks after your surgery  · Your hands, ankles, or feet are swollen  · You urinate less, or not at all  · You feel tired or weak and are short of breath  · Your arm or leg feels warm, tender, and painful  It may look swollen and red  · You have questions or concerns about your condition or care  Seek care immediately or call 911 if:   · You have blood in your bowel movements or urine  You are bleeding from your nose, mouth, or incision wound  · You have a severe headache  This may feel like the worst headache of your life  · You have weakness or numbness in your arm, leg, or face  This may happen on only 1 side of your body  · You feel lightheaded, dizzy, or sick to your stomach  You may have cold sweats and bluish skin, lips, or nail beds  · You have trouble breathing or are coughing up blood  You may have more pain when you take deep breaths or cough  · You have chest pain, or discomfort that spreads to your arms, jaw, or back, or sudden back pain  · You are confused or have problems speaking or understanding speech  You have vision changes or loss of vision  © 2017 [employer]  Information is for End User's use only and may not be sold, redistributed or otherwise used for commercial purposes  All illustrations and images included in CareNotes® are the copyrighted property of [employer]  or [employer]  The above information is an  only  It is not intended as medical advice for individual conditions or treatments  Talk to your doctor, nurse or pharmacist before following any medical regimen to see if it is safe and effective for you

## 2018-08-03 PROBLEM — E11.9 DIABETES (HCC): Status: ACTIVE | Noted: 2018-08-03

## 2018-08-03 PROBLEM — I35.9 AORTIC VALVE DISORDER: Status: ACTIVE | Noted: 2018-08-03

## 2018-08-09 ENCOUNTER — HOSPITAL ENCOUNTER (OUTPATIENT)
Dept: NON INVASIVE DIAGNOSTICS | Facility: CLINIC | Age: 83
Discharge: HOME | End: 2018-08-09
Payer: COMMERCIAL

## 2018-08-09 DIAGNOSIS — I25.10 CORONARY ARTERY DISEASE INVOLVING NATIVE CORONARY ARTERY OF NATIVE HEART WITHOUT ANGINA PECTORIS: ICD-10-CM

## 2018-08-09 PROCEDURE — 93306 TTE W/DOPPLER COMPLETE: CPT | Performed by: INTERNAL MEDICINE

## 2018-08-09 PROCEDURE — 93306 TTE W/DOPPLER COMPLETE: CPT

## 2018-08-22 DIAGNOSIS — F02.80 LATE ONSET ALZHEIMER'S DISEASE WITHOUT BEHAVIORAL DISTURBANCE (HCC): Chronic | ICD-10-CM

## 2018-08-22 DIAGNOSIS — D50.0 IRON DEFICIENCY ANEMIA DUE TO CHRONIC BLOOD LOSS: ICD-10-CM

## 2018-08-22 DIAGNOSIS — F32.A DEPRESSION, UNSPECIFIED DEPRESSION TYPE: ICD-10-CM

## 2018-08-22 DIAGNOSIS — G30.1 LATE ONSET ALZHEIMER'S DISEASE WITHOUT BEHAVIORAL DISTURBANCE (HCC): Chronic | ICD-10-CM

## 2018-08-23 RX ORDER — DONEPEZIL HYDROCHLORIDE 5 MG/1
TABLET, FILM COATED ORAL
Qty: 90 TABLET | Refills: 0 | Status: SHIPPED | OUTPATIENT
Start: 2018-08-23 | End: 2018-10-21 | Stop reason: SDUPTHER

## 2018-09-06 ENCOUNTER — PROCEDURE VISIT (OUTPATIENT)
Dept: UROLOGY | Facility: CLINIC | Age: 83
End: 2018-09-06
Payer: COMMERCIAL

## 2018-09-06 VITALS
HEIGHT: 73 IN | SYSTOLIC BLOOD PRESSURE: 130 MMHG | HEART RATE: 56 BPM | WEIGHT: 156 LBS | BODY MASS INDEX: 20.67 KG/M2 | DIASTOLIC BLOOD PRESSURE: 70 MMHG

## 2018-09-06 DIAGNOSIS — C67.2 MALIGNANT NEOPLASM OF LATERAL WALL OF URINARY BLADDER (HCC): Primary | ICD-10-CM

## 2018-09-06 PROCEDURE — 52000 CYSTOURETHROSCOPY: CPT | Performed by: UROLOGY

## 2018-09-06 NOTE — PROGRESS NOTES
Cystoscopy  Date/Time: 9/6/2018 11:26 AM  Performed by: Trixie Boss  Authorized by: Trixie Boss     Procedure details: cystoscopy    Patient tolerance: Patient tolerated the procedure well with no immediate complications    Additional Procedure Details:   Cystoscopy procedure note:    Risk and benefits of flexible cystoscopy were discussed  Informed consent was obtained  Urine dip was adequate for cystoscopy  The patient was placed in the supine position  His genitalia was prepped with Betadine and draped in a sterile fashion  Viscous 2% lidocaine jelly was instilled into the urethra and allowed dwell time for local anesthesia  Flexible cystoscopy was then performed using a 16F scope  The distal urethra and prostatic urethra were evaluated and were normal in course and caliber  At the 11 to 12:00 position at the bladder neck, there was a small papillary lesion  Once inside the bladder, it was carefully inspected in a 360 degree fashion  There was no evidence of mucosal abnormalities, lesions, diverticula or stones  Both ureteral orifices in their orthotopic location were visualized with clear efflux of urine  Retroflexion for evaluation of the bladder neck was normal      Overall this was a cystoscopy that demonstrated a small papillary lesion at the anterior bladder neck  This may be consistent with the patient's history of low-grade disease

## 2018-09-06 NOTE — PROGRESS NOTES
UROLOGY FOLLOW UP NOTE     CHIEF COMPLAINT   Rafy Almazan is a 81 y o  male with a complaint of   Chief Complaint   Patient presents with    Cystoscopy       History of Present Illness:     28-year-old male who was previously a patient of an outside urologist   Patient has had a recent hospitalization was significant gross hematuria  He was taken to the operating room on 5/7/2018 identified  This was resected  Patient's hematuria improved dramatically over his hospital stay  Given his deconditioning and other medical issues, he was transferred to the 5th floor for rehabilitation  He presents today for discussion his pathology  Of note, the wife of the patient is a former operating room nurse at [employer]    He has now been transitioned to home  His wife reports continued agitation and irritation at home  He has not had any recurrent bleeding  Returns for cystoscopy  Wife reports worsening of dementia  Some occasional urinary urgency  Past Medical History:     Past Medical History:   Diagnosis Date    Abnormal stress test     Abnormal weight loss     Alzheimer's disease 5/2/2018    Aortic insufficiency with aortic stenosis     [s/p AVR (tissue) 4/2013]     Bradycardia, unspecified     Coronary artery disease      [s/p CABG x3 4/2013]    Diabetes ([address] )     Disease of thyroid gland     Dyslipidemia     Gastric ulcer     last assessed: 2/24/14    H/O cardiovascular stress test      (EF 0 67, Not suspicious for significant occlusive CAD  ) - 11/28/2007; [facility] (Normal EF, Inferior wall post stress HK, markedly positive EKG with chest pressure) - 4/10/2013    History of echocardiogram 08/05/2016    2-D w/ CFD:  EF 0 55 (55%), Normal LVSF  Normal functioning bioprosthetic AV      History of EKG     EKG shows sinus bradycardia, 49 bpm, otherwise normal     History of EKG      (Sinus Rhythm) - 3/25/2013;  (Sinus Drew) - 5/20/2013;  9/25/2013     History of Holter monitoring      (SR w/ rates ranging from 44-78BPM  No afib, no heart block, no vtach, no pauses, and no symptoms per pt  there were few short atrial runs, highest being 140BPM, the longest being 8 beats; though overall atrial and ventricular ectopic beats were rare ) - 8/3/2016     History of Holter monitoring 08/22/2016    24 hr    Hx of echocardiogram     (EF 0 70, Without regional wall abnormalities  Possibly bicuspid aortic valve with mild AS, no aortic insufficiency ) 3/22/2007;  (EF 0 55 (55%), Normal LVSF  Normal functioning bioprosthetic AV ) - 8/3/2016     Hypertension     Hypothyroid 5/2/2018    ICAO (internal carotid artery occlusion)     Duplex (There is known occlusion of ICA in rt carotid and the vertebral artery is antegrade  0-19% ICA stenosis of Lt carotid  The vertebral artery is antegrade  Widely patent endarterectomy site on the left ) - 1/30/2014 Carotid Duplex (No change  Known occlusion of ICA in right carotid   0-19% ICA stenosis in left carotid, and widely patent endarterectomy site on left ) - 8/28/2014    Inguinal hernia, left     last assessed: 10/8/14    Peptic ulcer     last assessed: 5/21/13    Prostate enlargement 5/2/2018       PAST SURGICAL HISTORY:     Past Surgical History:   Procedure Laterality Date    AORTIC VALVE REPLACEMENT      CAROTID ENDARTARECTOMY  04/26/2013    CATARACT EXTRACTION Bilateral     CORONARY ARTERY BYPASS GRAFT       (3V CABG: LIMA-LAD, VG-RCA, VG-CX, Tissue AVR (#23 [address])) - 4/26/2013     INGUINAL HERNIA REPAIR      NEPHRECTOMY Right     IA CYSTOURETHROSCOPY W/IRRIG & EVAC CLOTS N/A 5/7/2018    Procedure: CYSTOSCOPY EVACUATION OF CLOTS AND TURBT;  Surgeon: Shannon Monterroso MD;  Location: [facility];  Service: Urology    THROMBOENDARTERECTOMY      Carotid       CURRENT MEDICATIONS:     Current Outpatient Prescriptions   Medication Sig Dispense Refill    aspirin (ECOTRIN LOW STRENGTH) 81 mg EC tablet Take 81 mg by mouth daily      atorvastatin (LIPITOR) 80 mg tablet Take 80 mg by mouth daily      B Complex-C (SUPER B/C) CAPS Take by mouth take 1 tablet daily   cholecalciferol (VITAMIN D3) 1,000 units tablet Take 2,000 Units by mouth daily      co-enzyme Q-10 30 MG capsule Take 100 mg by mouth 3 (three) times a day      COCONUT OIL-FLAXSEED OIL PO Take by mouth      cyanocobalamin (VITAMIN B-12) 1,000 mcg tablet Take 1,000 mcg by mouth daily      donepezil (ARICEPT) 5 mg tablet TAKE 1 TABLET DAILY AT     BEDTIME 90 tablet 0    DULoxetine (CYMBALTA) 30 mg delayed release capsule Take 20 mg by mouth 2 (two) times a day      levothyroxine 50 mcg tablet Take 50 mcg by mouth daily      levothyroxine 75 mcg tablet Take 1 tablet (75 mcg total) by mouth daily 90 tablet 3    multivitamin (THERAGRAN) TABS Take 1 tablet by mouth daily      Omega-3 Fatty Acids (OMEGA 3 PO) Take by mouth Omega 3 350 mg- 400 mg capsule [id number] mg every day      pantoprazole (PROTONIX) 20 mg tablet Take 20 mg by mouth daily      tamsulosin (FLOMAX) 0 4 mg Take 0 4 mg by mouth daily with dinner       No current facility-administered medications for this visit          ALLERGIES:     Allergies   Allergen Reactions    Contrast [Iodinated Diagnostic Agents]     Iodine Throat Swelling     IVP contrast dye    Morphine Rash       SOCIAL HISTORY:     Social History     Social History    Marital status: /Civil Union     Spouse name: N/A    Number of children: N/A    Years of education: N/A     Social History Main Topics    Smoking status: Former Smoker     Types: Cigarettes    Smokeless tobacco: Never Used      Comment: quit 20 years ago    Alcohol use Yes      Comment: social    Drug use: No    Sexual activity: Not Asked     Other Topics Concern    None     Social History Narrative    None       SOCIAL HISTORY:     Family History   Problem Relation Age of Onset    Sudden death Mother         unexpected death   Laura Angulo Other Father         Coal workers' Pneumoconiosis    Alzheimer's disease Brother        REVIEW OF SYSTEMS:     Review of Systems   Constitutional: Negative  Respiratory: Negative  Cardiovascular: Negative  Gastrointestinal: Negative  Genitourinary: Negative  Musculoskeletal: Negative  Skin: Negative  Neurological: Negative  Psychiatric/Behavioral: Positive for behavioral problems and confusion  PHYSICAL EXAM:     /70   Pulse 56   Ht 6' 1" (1 854 m)   Wt 70 8 kg (156 lb)   BMI 20 58 kg/m²     General:  Frail-appearing elderly male in no acute distress  HEENT:  Normocephalic, atraumatic  Neck is supple without any palpable lymphadenopathy  Cardiovascular:  Patient has normal palpable distal radial pulses  There is no significant peripheral edema  No JVD is noted  Respiratory:  Patient has unlabored respirations  There is no audible wheeze or rhonchi  Abdomen:   Abdomen is soft and nontender  There is no tympany  Inguinal and umbilical hernia are not appreciated  : Uncircumcised, orthotopic meatus  Musculoskeletal:  Patient does not have significant CVA tenderness in the  flank with palpation or percussion  They full range of motion in all 4 extremities  Strength in all 4 extremities appears congruent  Patient is able to ambulate without assistance or difficulty  Dermatologic:  Patient has no skin abnormalities or rashes        LABS:     CBC:   Lab Results   Component Value Date    WBC 6 14 2018    HGB 10 5 (L) 2018    HCT 33 2 (L) 2018    MCV 93 2018     2018       BMP:   Lab Results   Component Value Date    GLUCOSE 97 2015    CALCIUM 9 1 2018     2018    K 5 2 2018    CO2 31 2018     2018    BUN 23 2018    CREATININE 1 37 (H) 2018       IMAGIN/2/18  CT ABDOMEN AND PELVIS WITHOUT IV CONTRAST     INDICATION:   hematuria      COMPARISON: None      TECHNIQUE:  CT examination of the abdomen and pelvis was performed without intravenous contrast   Axial, sagittal, and coronal 2D reformatted images were created from the source data and submitted for interpretation       Radiation dose length product (DLP) for this visit:  186 37 mGy-cm   This examination, like all CT scans performed in the [facility], was performed utilizing techniques to minimize radiation dose exposure, including the use of iterative   reconstruction and automated exposure control       Enteric contrast was administered       FINDINGS:     ABDOMEN     LOWER CHEST:  No clinically significant abnormality identified in the visualized lower chest      LIVER/BILIARY TREE:  Unremarkable      GALLBLADDER:  No calcified gallstones  No pericholecystic inflammatory change      SPLEEN:  Unremarkable      PANCREAS:  Unremarkable      ADRENAL GLANDS:  Unremarkable      KIDNEYS/URETERS:  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  Noncontrast evaluation of left kidney is grossly unremarkable      STOMACH AND BOWEL:  Unremarkable      APPENDIX:  No findings to suggest appendicitis      ABDOMINOPELVIC CAVITY:  No ascites or free intraperitoneal air  No lymphadenopathy      VESSELS:  Unremarkable for patient's age      PELVIS     REPRODUCTIVE ORGANS:  Status post hysterectomy      URINARY BLADDER:  Loyd catheter is present  Hyperdensity within the bladder lumen is consistent with history of hematuria      ABDOMINAL WALL/INGUINAL REGIONS:  Unremarkable      OSSEOUS STRUCTURES:  No acute fracture or destructive osseous lesion      IMPRESSION:     No etiology for hematuria identified  Urologic follow-up is recommended  Hyperdensity within the bladder lumen likely representing hemorrhage  PATHOLOGY:     Final Diagnosis   A  Urinary bladder tumor curettings:  - Low grade papillary urothelial carcinoma  - No invasive malignancy is seen    - No detrusor muscle is seen in this material        ASSESSMENT:     81 y o  male with LGTa bladder cancer    PLAN: Return 2 months for cystoscopy with fulguration and biopsy of the anterior bladder neck lesion  Given the patient's progressive dementia and history of low-grade disease, we will attempt to avoid operative biopsy  Should the patient's symptoms worsen or he developed significant hematuria in the next 2 months, we will expedite the time frame

## 2018-10-01 ENCOUNTER — APPOINTMENT (OUTPATIENT)
Dept: LAB | Facility: MEDICAL CENTER | Age: 83
End: 2018-10-01
Payer: COMMERCIAL

## 2018-10-01 DIAGNOSIS — I65.29 OCCLUSION AND STENOSIS OF UNSPECIFIED CAROTID ARTERY: ICD-10-CM

## 2018-10-01 DIAGNOSIS — D64.9 ANEMIA: ICD-10-CM

## 2018-10-01 DIAGNOSIS — F02.80 LATE ONSET ALZHEIMER'S DISEASE WITHOUT BEHAVIORAL DISTURBANCE (HCC): Chronic | ICD-10-CM

## 2018-10-01 DIAGNOSIS — G30.1 LATE ONSET ALZHEIMER'S DISEASE WITHOUT BEHAVIORAL DISTURBANCE (HCC): Chronic | ICD-10-CM

## 2018-10-01 DIAGNOSIS — E78.00 PURE HYPERCHOLESTEROLEMIA: ICD-10-CM

## 2018-10-01 DIAGNOSIS — I25.10 ATHEROSCLEROTIC HEART DISEASE OF NATIVE CORONARY ARTERY WITHOUT ANGINA PECTORIS: ICD-10-CM

## 2018-10-01 DIAGNOSIS — N18.9 CHRONIC KIDNEY DISEASE: ICD-10-CM

## 2018-10-01 DIAGNOSIS — E03.9 HYPOTHYROIDISM, UNSPECIFIED TYPE: Chronic | ICD-10-CM

## 2018-10-01 DIAGNOSIS — F32.9 MAJOR DEPRESSIVE DISORDER, SINGLE EPISODE: ICD-10-CM

## 2018-10-01 DIAGNOSIS — E03.9 HYPOTHYROIDISM: ICD-10-CM

## 2018-10-01 DIAGNOSIS — I25.10 ARTERIOSCLEROTIC HEART DISEASE: ICD-10-CM

## 2018-10-01 DIAGNOSIS — N18.30 STAGE 3 CHRONIC KIDNEY DISEASE (HCC): ICD-10-CM

## 2018-10-01 DIAGNOSIS — R00.1 BRADYCARDIA: ICD-10-CM

## 2018-10-01 DIAGNOSIS — D50.0 IRON DEFICIENCY ANEMIA DUE TO CHRONIC BLOOD LOSS: ICD-10-CM

## 2018-10-01 DIAGNOSIS — I10 ESSENTIAL (PRIMARY) HYPERTENSION: ICD-10-CM

## 2018-10-01 LAB
ALBUMIN SERPL BCP-MCNC: 3.8 G/DL (ref 3.5–5)
ALP SERPL-CCNC: 62 U/L (ref 46–116)
ALT SERPL W P-5'-P-CCNC: 43 U/L (ref 12–78)
ANION GAP SERPL CALCULATED.3IONS-SCNC: 7 MMOL/L (ref 4–13)
AST SERPL W P-5'-P-CCNC: 45 U/L (ref 5–45)
BASOPHILS # BLD AUTO: 0.07 THOUSANDS/ΜL (ref 0–0.1)
BASOPHILS NFR BLD AUTO: 1 % (ref 0–1)
BILIRUB SERPL-MCNC: 0.47 MG/DL (ref 0.2–1)
BUN SERPL-MCNC: 31 MG/DL (ref 5–25)
CALCIUM SERPL-MCNC: 9.3 MG/DL (ref 8.3–10.1)
CHLORIDE SERPL-SCNC: 103 MMOL/L (ref 100–108)
CHOLEST SERPL-MCNC: 177 MG/DL (ref 50–200)
CO2 SERPL-SCNC: 28 MMOL/L (ref 21–32)
CREAT SERPL-MCNC: 1.29 MG/DL (ref 0.6–1.3)
EOSINOPHIL # BLD AUTO: 0.26 THOUSAND/ΜL (ref 0–0.61)
EOSINOPHIL NFR BLD AUTO: 4 % (ref 0–6)
ERYTHROCYTE [DISTWIDTH] IN BLOOD BY AUTOMATED COUNT: 15.6 % (ref 11.6–15.1)
FERRITIN SERPL-MCNC: 33 NG/ML (ref 8–388)
GFR SERPL CREATININE-BSD FRML MDRD: 51 ML/MIN/1.73SQ M
GLUCOSE P FAST SERPL-MCNC: 88 MG/DL (ref 65–99)
HCT VFR BLD AUTO: 33.7 % (ref 36.5–49.3)
HDLC SERPL-MCNC: 45 MG/DL (ref 40–60)
HGB BLD-MCNC: 10.8 G/DL (ref 12–17)
IMM GRANULOCYTES # BLD AUTO: 0.02 THOUSAND/UL (ref 0–0.2)
IMM GRANULOCYTES NFR BLD AUTO: 0 % (ref 0–2)
IRON SATN MFR SERPL: 14 %
IRON SERPL-MCNC: 47 UG/DL (ref 65–175)
LDLC SERPL CALC-MCNC: 120 MG/DL (ref 0–100)
LYMPHOCYTES # BLD AUTO: 1.63 THOUSANDS/ΜL (ref 0.6–4.47)
LYMPHOCYTES NFR BLD AUTO: 25 % (ref 14–44)
MCH RBC QN AUTO: 29.2 PG (ref 26.8–34.3)
MCHC RBC AUTO-ENTMCNC: 32 G/DL (ref 31.4–37.4)
MCV RBC AUTO: 91 FL (ref 82–98)
MONOCYTES # BLD AUTO: 0.63 THOUSAND/ΜL (ref 0.17–1.22)
MONOCYTES NFR BLD AUTO: 10 % (ref 4–12)
NEUTROPHILS # BLD AUTO: 3.89 THOUSANDS/ΜL (ref 1.85–7.62)
NEUTS SEG NFR BLD AUTO: 60 % (ref 43–75)
NONHDLC SERPL-MCNC: 132 MG/DL
NRBC BLD AUTO-RTO: 0 /100 WBCS
PLATELET # BLD AUTO: 242 THOUSANDS/UL (ref 149–390)
PMV BLD AUTO: 11.5 FL (ref 8.9–12.7)
POTASSIUM SERPL-SCNC: 4.3 MMOL/L (ref 3.5–5.3)
PROT SERPL-MCNC: 7.5 G/DL (ref 6.4–8.2)
RBC # BLD AUTO: 3.7 MILLION/UL (ref 3.88–5.62)
SODIUM SERPL-SCNC: 138 MMOL/L (ref 136–145)
TIBC SERPL-MCNC: 329 UG/DL (ref 250–450)
TRIGL SERPL-MCNC: 59 MG/DL
TSH SERPL DL<=0.05 MIU/L-ACNC: 4.66 UIU/ML (ref 0.36–3.74)
WBC # BLD AUTO: 6.5 THOUSAND/UL (ref 4.31–10.16)

## 2018-10-01 PROCEDURE — 84443 ASSAY THYROID STIM HORMONE: CPT

## 2018-10-01 PROCEDURE — 83550 IRON BINDING TEST: CPT

## 2018-10-01 PROCEDURE — 80053 COMPREHEN METABOLIC PANEL: CPT

## 2018-10-01 PROCEDURE — 82728 ASSAY OF FERRITIN: CPT

## 2018-10-01 PROCEDURE — 83540 ASSAY OF IRON: CPT

## 2018-10-01 PROCEDURE — 85025 COMPLETE CBC W/AUTO DIFF WBC: CPT

## 2018-10-01 PROCEDURE — 36415 COLL VENOUS BLD VENIPUNCTURE: CPT

## 2018-10-01 PROCEDURE — 80061 LIPID PANEL: CPT

## 2018-10-21 DIAGNOSIS — D50.0 IRON DEFICIENCY ANEMIA DUE TO CHRONIC BLOOD LOSS: ICD-10-CM

## 2018-10-21 DIAGNOSIS — F32.A DEPRESSION, UNSPECIFIED DEPRESSION TYPE: ICD-10-CM

## 2018-10-21 DIAGNOSIS — F02.80 LATE ONSET ALZHEIMER'S DISEASE WITHOUT BEHAVIORAL DISTURBANCE (HCC): Chronic | ICD-10-CM

## 2018-10-21 DIAGNOSIS — G30.1 LATE ONSET ALZHEIMER'S DISEASE WITHOUT BEHAVIORAL DISTURBANCE (HCC): Chronic | ICD-10-CM

## 2018-10-21 RX ORDER — DONEPEZIL HYDROCHLORIDE 5 MG/1
TABLET, FILM COATED ORAL
Qty: 90 TABLET | Refills: 3 | Status: SHIPPED | OUTPATIENT
Start: 2018-10-21 | End: 2018-10-22 | Stop reason: SDUPTHER

## 2018-10-21 RX ORDER — DULOXETIN HYDROCHLORIDE 30 MG/1
CAPSULE, DELAYED RELEASE ORAL
Qty: 180 CAPSULE | Refills: 3 | Status: SHIPPED | OUTPATIENT
Start: 2018-10-21 | End: 2018-10-22 | Stop reason: SDUPTHER

## 2018-10-22 ENCOUNTER — OFFICE VISIT (OUTPATIENT)
Dept: INTERNAL MEDICINE CLINIC | Facility: CLINIC | Age: 83
End: 2018-10-22
Payer: COMMERCIAL

## 2018-10-22 VITALS
TEMPERATURE: 97.2 F | SYSTOLIC BLOOD PRESSURE: 90 MMHG | WEIGHT: 156 LBS | HEART RATE: 48 BPM | HEIGHT: 73 IN | DIASTOLIC BLOOD PRESSURE: 60 MMHG | BODY MASS INDEX: 20.67 KG/M2 | OXYGEN SATURATION: 97 %

## 2018-10-22 DIAGNOSIS — G30.1 LATE ONSET ALZHEIMER'S DISEASE WITHOUT BEHAVIORAL DISTURBANCE (HCC): Chronic | ICD-10-CM

## 2018-10-22 DIAGNOSIS — E78.00 HYPERCHOLESTEROLEMIA: ICD-10-CM

## 2018-10-22 DIAGNOSIS — F02.80 LATE ONSET ALZHEIMER'S DISEASE WITHOUT BEHAVIORAL DISTURBANCE (HCC): Chronic | ICD-10-CM

## 2018-10-22 DIAGNOSIS — N40.0 PROSTATE ENLARGEMENT: Chronic | ICD-10-CM

## 2018-10-22 DIAGNOSIS — Z00.00 MEDICARE ANNUAL WELLNESS VISIT, SUBSEQUENT: ICD-10-CM

## 2018-10-22 DIAGNOSIS — Z23 ENCOUNTER FOR IMMUNIZATION: ICD-10-CM

## 2018-10-22 DIAGNOSIS — F32.A DEPRESSION, UNSPECIFIED DEPRESSION TYPE: ICD-10-CM

## 2018-10-22 DIAGNOSIS — R45.4 IRRITABILITY AND ANGER: ICD-10-CM

## 2018-10-22 DIAGNOSIS — E03.9 HYPOTHYROIDISM, UNSPECIFIED TYPE: Chronic | ICD-10-CM

## 2018-10-22 DIAGNOSIS — Z91.81 RISK FOR FALLS: ICD-10-CM

## 2018-10-22 DIAGNOSIS — D50.0 IRON DEFICIENCY ANEMIA DUE TO CHRONIC BLOOD LOSS: ICD-10-CM

## 2018-10-22 DIAGNOSIS — Z23 NEED FOR INFLUENZA VACCINATION: ICD-10-CM

## 2018-10-22 DIAGNOSIS — I10 ESSENTIAL HYPERTENSION: Primary | ICD-10-CM

## 2018-10-22 PROCEDURE — 90662 IIV NO PRSV INCREASED AG IM: CPT | Performed by: FAMILY MEDICINE

## 2018-10-22 PROCEDURE — 1160F RVW MEDS BY RX/DR IN RCRD: CPT | Performed by: FAMILY MEDICINE

## 2018-10-22 PROCEDURE — 1125F AMNT PAIN NOTED PAIN PRSNT: CPT | Performed by: FAMILY MEDICINE

## 2018-10-22 PROCEDURE — 99214 OFFICE O/P EST MOD 30 MIN: CPT | Performed by: FAMILY MEDICINE

## 2018-10-22 PROCEDURE — 3008F BODY MASS INDEX DOCD: CPT | Performed by: FAMILY MEDICINE

## 2018-10-22 PROCEDURE — 4040F PNEUMOC VAC/ADMIN/RCVD: CPT | Performed by: FAMILY MEDICINE

## 2018-10-22 PROCEDURE — 1170F FXNL STATUS ASSESSED: CPT | Performed by: FAMILY MEDICINE

## 2018-10-22 PROCEDURE — G0008 ADMIN INFLUENZA VIRUS VAC: HCPCS | Performed by: FAMILY MEDICINE

## 2018-10-22 PROCEDURE — G0439 PPPS, SUBSEQ VISIT: HCPCS | Performed by: FAMILY MEDICINE

## 2018-10-22 RX ORDER — DULOXETIN HYDROCHLORIDE 30 MG/1
30 CAPSULE, DELAYED RELEASE ORAL 2 TIMES DAILY
Qty: 180 CAPSULE | Refills: 1 | Status: SHIPPED | OUTPATIENT
Start: 2018-10-22 | End: 2020-02-10 | Stop reason: SDUPTHER

## 2018-10-22 RX ORDER — ATORVASTATIN CALCIUM 80 MG/1
80 TABLET, FILM COATED ORAL DAILY
Qty: 90 TABLET | Refills: 3 | Status: SHIPPED | OUTPATIENT
Start: 2018-10-22 | End: 2019-11-01 | Stop reason: SDUPTHER

## 2018-10-22 RX ORDER — TAMSULOSIN HYDROCHLORIDE 0.4 MG/1
0.4 CAPSULE ORAL
Qty: 90 CAPSULE | Refills: 3 | Status: SHIPPED | OUTPATIENT
Start: 2018-10-22 | End: 2020-04-23 | Stop reason: SDUPTHER

## 2018-10-22 RX ORDER — DONEPEZIL HYDROCHLORIDE 5 MG/1
5 TABLET, FILM COATED ORAL
Qty: 90 TABLET | Refills: 3 | Status: SHIPPED | OUTPATIENT
Start: 2018-10-22 | End: 2019-12-23 | Stop reason: ALTCHOICE

## 2018-10-22 NOTE — PATIENT INSTRUCTIONS
Obesity   AMBULATORY CARE:   Obesity  is when your body mass index (BMI) is greater than 30  Your healthcare provider will use your height and weight to measure your BMI  The risks of obesity include  many health problems, such as injuries or physical disability  You may need tests to check for the following:  · Diabetes     · High blood pressure or high cholesterol     · Heart disease     · Gallbladder or liver disease     · Cancer of the colon, breast, prostate, liver, or kidney     · Sleep apnea     · Arthritis or gout  Seek care immediately if:   · You have a severe headache, confusion, or difficulty speaking  · You have weakness on one side of your body  · You have chest pain, sweating, or shortness of breath  Contact your healthcare provider if:   · You have symptoms of gallbladder or liver disease, such as pain in your upper abdomen  · You have knee or hip pain and discomfort while walking  · You have symptoms of diabetes, such as intense hunger and thirst, and frequent urination  · You have symptoms of sleep apnea, such as snoring or daytime sleepiness  · You have questions or concerns about your condition or care  Treatment for obesity  focuses on helping you lose weight to improve your health  Even a small decrease in BMI can reduce the risk for many health problems  Your healthcare provider will help you set a weight-loss goal   · Lifestyle changes  are the first step in treating obesity  These include making healthy food choices and getting regular physical activity  Your healthcare provider may suggest a weight-loss program that involves coaching, education, and therapy  · Medicine  may help you lose weight when it is used with a healthy diet and physical activity  · Surgery  can help you lose weight if you are very obese and have other health problems  There are several types of weight-loss surgery  Ask your healthcare provider for more information    Be successful losing weight:   · Set small, realistic goals  An example of a small goal is to walk for 20 minutes 5 days a week  Anther goal is to lose 5% of your body weight  · Tell friends, family members, and coworkers about your goals  and ask for their support  Ask a friend to lose weight with you, or join a weight-loss support group  · Identify foods or triggers that may cause you to overeat , and find ways to avoid them  Remove tempting high-calorie foods from your home and workplace  Place a bowl of fresh fruit on your kitchen counter  If stress causes you to eat, then find other ways to cope with stress  · Keep a diary to track what you eat and drink  Also write down how many minutes of physical activity you do each day  Weigh yourself once a week and record it in your diary  Eating changes: You will need to eat 500 to 1,000 fewer calories each day than you currently eat to lose 1 to 2 pounds a week  The following changes will help you cut calories:  · Eat smaller portions  Use small plates, no larger than 9 inches in diameter  Fill your plate half full of fruits and vegetables  Measure your food using measuring cups until you know what a serving size looks like  · Eat 3 meals and 1 or 2 snacks each day  Plan your meals in advance  Rosio Hamilton and eat at home most of the time  Eat slowly  · Eat fruits and vegetables at every meal   They are low in calories and high in fiber, which makes you feel full  Do not add butter, margarine, or cream sauce to vegetables  Use herbs to season steamed vegetables  · Eat less fat and fewer fried foods  Eat more baked or grilled chicken and fish  These protein sources are lower in calories and fat than red meat  Limit fast food  Dress your salads with olive oil and vinegar instead of bottled dressing  · Limit the amount of sugar you eat  Do not drink sugary beverages  Limit alcohol  Activity changes:  Physical activity is good for your body in many ways   It helps you burn calories and build strong muscles  It decreases stress and depression, and improves your mood  It can also help you sleep better  Talk to your healthcare provider before you begin an exercise program   · Exercise for at least 30 minutes 5 days a week  Start slowly  Set aside time each day for physical activity that you enjoy and that is convenient for you  It is best to do both weight training and an activity that increases your heart rate, such as walking, bicycling, or swimming  · Find ways to be more active  Do yard work and housecleaning  Walk up the stairs instead of using elevators  Spend your leisure time going to events that require walking, such as outdoor festivals or fairs  This extra physical activity can help you lose weight and keep it off  Follow up with your healthcare provider as directed: You may need to meet with a dietitian  Write down your questions so you remember to ask them during your visits  © 2017 [address] [employer] Information is for End User's use only and may not be sold, redistributed or otherwise used for commercial purposes  All illustrations and images included in CareNotes® are the copyrighted property of [employer]  or Julius Mccoy  The above information is an  only  It is not intended as medical advice for individual conditions or treatments  Talk to your doctor, nurse or pharmacist before following any medical regimen to see if it is safe and effective for you  Urinary Incontinence   WHAT YOU NEED TO KNOW:   What is urinary incontinence? Urinary incontinence (UI) is when you lose control of your bladder  What causes UI? UI occurs because your bladder cannot store or empty urine properly  The following are the most common types of UI:  · Stress incontinence  is when you leak urine due to increased bladder pressure  This may happen when you cough, sneeze, or exercise       · Urge incontinence  is when you feel the need to urinate right away and leak urine accidentally  · Mixed incontinence  is when you have both stress and urge UI  What are the signs and symptoms of UI?   · You feel like your bladder does not empty completely when you urinate  · You urinate often and need to urinate immediately  · You leak urine when you sleep, or you wake up with the urge to urinate  · You leak urine when you cough, sneeze, exercise, or laugh  How is UI diagnosed? Your healthcare provider will ask how often you leak urine and whether you have stress or urge symptoms  Tell him which medicines you take, how often you urinate, and how much liquid you drink each day  You may need any of the following tests:  · Urine tests  may show infection or kidney function  · A pelvic exam  may be done to check for blockages  A pelvic exam will also show if your bladder, uterus, or other organs have moved out of place  · An x-ray, ultrasound, or CT  may show problems with parts of your urinary system  You may be given contrast liquid to help your organs show up better in the pictures  Tell the healthcare provider if you have ever had an allergic reaction to contrast liquid  Do not enter the MRI room with anything metal  Metal can cause serious injury  Tell the healthcare provider if you have any metal in or on your body  · A bladder scan  will show how much urine is left in your bladder after you urinate  You will be asked to urinate and then healthcare providers will use a small ultrasound machine to check the urine left in your bladder  · Cystometry  is used to check the function of your urinary system  Your healthcare provider checks the pressure in your bladder while filling it with fluid  Your bladder pressure may also be tested when your bladder is full and while you urinate  How is UI treated? · Medicines  can help strengthen your bladder control      · Electrical stimulation  is used to send a small amount of electrical energy to your pelvic floor muscles  This helps control your bladder function  Electrodes may be placed outside your body or in your rectum  For women, the electrodes may be placed in the vagina  · A bulking agent  may be injected into the wall of your urethra to make it thicker  This helps keep your urethra closed and decreases urine leakage  · Devices  such as a clamp, pessary, or tampon may help stop urine leaks  Ask your healthcare provider for more information about these and other devices  · Surgery  may be needed if other treatments do not work  Several types of surgery can help improve your bladder control  Ask your healthcare provider for more information about the surgery you may need  How can I manage my symptoms? · Do pelvic muscle exercises often  Your pelvic muscles help you stop urinating  Squeeze these muscles tight for 5 seconds, then relax for 5 seconds  Gradually work up to squeezing for 10 seconds  Do 3 sets of 15 repetitions a day, or as directed  This will help strengthen your pelvic muscles and improve bladder control  · A catheter  may be used to help empty your bladder  A catheter is a tiny, plastic tube that is put into your bladder to drain your urine  Your healthcare provider may tell you to use a catheter to prevent your bladder from getting too full and leaking urine  · Keep a UI record  Write down how often you leak urine and how much you leak  Make a note of what you were doing when you leaked urine  · Train your bladder  Go to the bathroom at set times, such as every 2 hours, even if you do not feel the urge to go  You can also try to hold your urine when you feel the urge to go  For example, hold your urine for 5 minutes when you feel the urge to go  As that becomes easier, hold your urine for 10 minutes  · Drink liquids as directed  Ask your healthcare provider how much liquid to drink each day and which liquids are best for you   You may need to limit the amount of liquid you drink to help control your urine leakage  Limit or do not have drinks that contain caffeine or alcohol  Do not drink any liquid right before you go to bed  · Prevent constipation  Eat a variety of high-fiber foods  Good examples are high-fiber cereals, beans, vegetables, and whole-grain breads  Prune juice may help make your bowel movement softer  Walking is the best way to trigger your intestines to have a bowel movement  · Exercise regularly and maintain a healthy weight  Ask your healthcare provider how much you should weigh and about the best exercise plan for you  Weight loss and exercise will decrease pressure on your bladder and help you control your leakage  Ask him to help you create a weight loss plan if you are overweight  When should I seek immediate care? · You have severe pain  · You are confused or cannot think clearly  When should I contact my healthcare provider? · You have a fever  · You see blood in your urine  · You have pain when you urinate  · You have new or worse pain, even after treatment  · Your mouth feels dry or you have vision changes  · Your urine is cloudy or smells bad  · You have questions or concerns about your condition or care  CARE AGREEMENT:   You have the right to help plan your care  Learn about your health condition and how it may be treated  Discuss treatment options with your caregivers to decide what care you want to receive  You always have the right to refuse treatment  The above information is an  only  It is not intended as medical advice for individual conditions or treatments  Talk to your doctor, nurse or pharmacist before following any medical regimen to see if it is safe and effective for you  © 2017 [address] [employer] Information is for End User's use only and may not be sold, redistributed or otherwise used for commercial purposes   All illustrations and images included in CareNotes® are the copyrighted property of [employer]  or Julius Mccoy  Cigarette Smoking and Your Health   AMBULATORY CARE:   Risks to your health if you smoke:  Nicotine and other chemicals found in tobacco damage every cell in your body  Even if you are a light smoker, you have an increased risk for cancer, heart disease, and lung disease  If you are pregnant or have diabetes, smoking increases your risk for complications  Benefits to your health if you stop smoking:   · You decrease respiratory symptoms such as coughing, wheezing, and shortness of breath  · You reduce your risk for cancers of the lung, mouth, throat, kidney, bladder, pancreas, stomach, and cervix  If you already have cancer, you increase the benefits of chemotherapy  You also reduce your risk for cancer returning or a second cancer from developing  · You reduce your risk for heart disease, blood clots, heart attack, and stroke  · You reduce your risk for lung infections, and diseases such as pneumonia, asthma, chronic bronchitis, and emphysema  · Your circulation improves  More oxygen can be delivered to your body  If you have diabetes, you lower your risk for complications, such as kidney, artery, and eye diseases  You also lower your risk for nerve damage  Nerve damage can lead to amputations, poor vision, and blindness  · You improve your body's ability to heal and to fight infections  Benefits to the health of others if you stop smoking:  Tobacco is harmful to nonsmokers who breathe in your secondhand smoke  The following are ways the health of others around you may improve when you stop smoking:  · You lower the risks for lung cancer and heart disease in nonsmoking adults  · If you are pregnant, you lower the risk for miscarriage, early delivery, low birth weight, and stillbirth  You also lower your baby's risk for SIDS, obesity, developmental delay, and neurobehavioral problems, such as ADHD  · If you have children, you lower their risk for ear infections, colds, pneumonia, bronchitis, and asthma  For more information and support to stop smoking:   · Smokefree  gov  Phone: 9- 442 - 869-1442  Web Address: www smokefrDouble the [employer]  Follow up with your healthcare provider as directed:  Write down your questions so you remember to ask them during your visits  © 2017 [address] [employer] Information is for End User's use only and may not be sold, redistributed or otherwise used for commercial purposes  All illustrations and images included in CareNotes® are the copyrighted property of [employer]  or Julius Mccoy  The above information is an  only  It is not intended as medical advice for individual conditions or treatments  Talk to your doctor, nurse or pharmacist before following any medical regimen to see if it is safe and effective for you  Fall Prevention   WHAT YOU NEED TO KNOW:   What is fall prevention? Fall prevention includes ways to make your home and other areas safer  It also includes ways you can move more carefully to prevent a fall  What increases my risk for falls? · Lack of vitamin D    · Not getting enough sleep each night    · Trouble walking or keeping your balance, or foot problems    · Health conditions that cause changes in your blood pressure, vision, or muscle strength and coordination    · Medicines that make you dizzy, weak, or sleepy    · Problems seeing clearly    · Shoes that have high heels or are not supportive    · Tripping hazards, such as items left on the floor, no handrails on the stairs, or broken steps  How can I help protect myself from falls? · Stand or sit up slowly  This may help you keep your balance and prevent falls  If you need to get up during the night, sit up first  Be sure you are fully awake before you stand  Turn on the light before you start walking  Go slowly in case you are still sleepy   Make sure you will not trip over any pets sleeping in the bedroom  · Use assistive devices as directed  Your healthcare provider may suggest that you use a cane or walker to help you keep your balance  You may need to have grab bars put in your bathroom near the toilet or in the shower  · Wear shoes that fit well and have soles that   Wear shoes both inside and outside  Use slippers with good   Do not wear shoes with high heels  · Wear a personal alarm  This is a device that allows you to call 911 if you fall and need help  Ask your healthcare provider for more information  · Stay active  Exercise can help strengthen your muscles and improve your balance  Your healthcare provider may recommend water aerobics or walking  He or she may also recommend physical therapy to improve your coordination  Never start an exercise program without talking to your healthcare provider first      · Manage medical conditions  Keep all appointments with your healthcare providers  Visit your eye doctor as directed  How can I make my home safer? · Add items to prevent falls in the bathroom  Put nonslip strips on your bath or shower floor to prevent you from slipping  Use a bath mat if you do not have carpet in the bathroom  This will prevent you from falling when you step out of the bath or shower  Use a shower seat so you do not need to stand while you shower  Sit on the toilet or a chair in your bathroom to dry yourself and put on clothing  This will prevent you from losing your balance from drying or dressing yourself while you are standing  · Keep paths clear  Remove books, shoes, and other objects from walkways and stairs  Place cords for telephones and lamps out of the way so that you do not need to walk over them  Tape them down if you cannot move them  Remove small rugs  If you cannot remove a rug, secure it with double-sided tape  This will prevent you from tripping  · Install bright lights in your home  Use night lights to help light paths to the bathroom or kitchen  Always turn on the light before you start walking  · Keep items you use often on shelves within reach  Do not use a step stool to help you reach an item  · Paint or place reflective tape on the edges of your stairs  This will help you see the stairs better  Call 911 or have someone else call if:   · You have fallen and are unconscious  · You have fallen and cannot move part of your body  Contact your healthcare provider if:   · You have fallen and have pain or a headache  · You have questions or concerns about your condition or care  CARE AGREEMENT:   You have the right to help plan your care  Learn about your health condition and how it may be treated  Discuss treatment options with your caregivers to decide what care you want to receive  You always have the right to refuse treatment  The above information is an  only  It is not intended as medical advice for individual conditions or treatments  Talk to your doctor, nurse or pharmacist before following any medical regimen to see if it is safe and effective for you  © 2017 [address] [facility] Information is for End User's use only and may not be sold, redistributed or otherwise used for commercial purposes  All illustrations and images included in CareNotes® are the copyrighted property of [employer]  or Julius Mccoy  Advance Directives   WHAT YOU NEED TO KNOW:   What are advance directives? Advance directives are legal documents that state your wishes and plans for medical care  These plans are made ahead of time in case you lose your ability to make decisions for yourself  Advance directives can apply to any medical decision, such as the treatments you want, and if you want to donate organs  What are the types of advance directives? There are many types of advance directives, and each state has rules about how to use them   You may choose a combination of any of the following:  · Living will: This is a written record of the treatment you want  You can also choose which treatments you do not want, which to limit, and which to stop at a certain time  This includes surgery, medicine, IV fluid, and tube feedings  · Durable power of  for healthcare [facility]): This is a written record that states who you want to make healthcare choices for you when you are unable to make them for yourself  This person, called a proxy, is usually a family member or a friend  You may choose more than 1 proxy  · Do not resuscitate (DNR) order:  A DNR order is used in case your heart stops beating or you stop breathing  It is a request not to have certain forms of treatment, such as CPR  A DNR order may be included in other types of advance directives  · Medical directive: This covers the care that you want if you are in a coma, near death, or unable to make decisions for yourself  You can list the treatments you want for each condition  Treatment may include pain medicine, surgery, blood transfusions, dialysis, IV or tube feedings, and a ventilator (breathing machine)  · Values history: This document has questions about your views, beliefs, and how you feel and think about life  This information can help others choose the care that you would choose  Why are advance directives important? An advance directive helps you control your care  Although spoken wishes may be used, it is better to have your wishes written down  Spoken wishes can be misunderstood, or not followed  Treatments may be given even if you do not want them  An advance directive may make it easier for your family to make difficult choices about your care  How do I decide what to put in my advance directives? · Make informed decisions:  Make sure you fully understand treatments or care you may receive   Think about the benefits and problems your decisions could cause for you or your family  Talk to healthcare providers if you have concerns or questions before you write down your wishes  You may also want to talk with your Lutheran or , or a   Check your state laws to make sure that what you put in your advance directive is legal      · Sign all forms:  Sign and date your advance directive when you have finished  You may also need 2 witnesses to sign the forms  Witnesses cannot be your doctor or his staff, your spouse, heirs or beneficiaries, people you owe money to, or your chosen proxy  Talk to your family, proxy, and healthcare providers about your advance directive  Give each person a copy, and keep one for yourself in a place you can get to easily  Do not keep it hidden or locked away  · Review and revise your plans: You can revise your advance directive at any time, as long as you are able to make decisions  Review your plan every year, and when there are changes in your life, or your health  When you make changes, let your family, proxy, and healthcare providers know  Give each a new copy  Where can I find more information? · [employer]  [address] [place] , [address]  Phone: 3- [phone] - [phone]  Phone: 0- 421 - [phone]  Web Address: http://www  aafp org  · [address] [facility]  [address] [facility], [address] [place] [address] , [address] [place] [address]  Phone: [phone]  Phone: [phone]  Web Address: Jose hedrick  CARE AGREEMENT:   You have the right to help plan your care  To help with this plan, you must learn about your health condition and treatment options  You must also learn about advance directives and how they are used  Work with your healthcare providers to decide what care will be used to treat you  You always have the right to refuse treatment  The above information is an  only   It is not intended as medical advice for individual conditions or treatments  Talk to your doctor, nurse or pharmacist before following any medical regimen to see if it is safe and effective for you  © 2017 [address] [employer] Information is for End User's use only and may not be sold, redistributed or otherwise used for commercial purposes  All illustrations and images included in CareNotes® are the copyrighted property of [employer]  or Julius Mccoy

## 2018-10-22 NOTE — PROGRESS NOTES
Assessment/Plan:    Hypertension  Blood pressure controlled off of all medications  Blood pressure still is running relatively low  Discussed with him importance of staying well hydrated  Will abstain from any blood pressure medications  Alzheimer's disease  He has definite dementia but whether this is truly Alzheimer's verses of vascular dementia is uncertain  Continue the Aricept  Discussed possibly increasing the dosage but they are concerned about potential side effects  Watch for worsening behavioral issues  Should consider referral to Neurology but there hesitant to do this since it would involve traveling  Hypothyroidism  TSH remains mildly elevated  Discussed with them potentially increasing the dosage but they wish to maintain the 75 mcg and recheck this with the next laboratory testing  He had been increased during his hospitalization to 88 mcg but had resumed his 76 after discharge  This may need to be increased  Anemia  Hemoglobin has improved significantly  Will continue to follow this  Watch for any recurrence of gross hematuria  Depression  He has definite depression symptoms which are likely worsening the dementia  Continue with the Cymbalta  Refill placed for this  Hypercholesterolemia  Cholesterol controlled  Continue with the atorvastatin  Watch diet somewhat although this is difficult with his dementia  Irritability and anger  Discussed options with his wife  Discussed trying to redirect him when he becomes more irritable  Does not have any current suicidal or homicidal ideation  His wife has removed his access to any weapons  Diagnoses and all orders for this visit:    Essential hypertension  -     CBC and differential; Future  -     Comprehensive metabolic panel; Future    Depression, unspecified depression type  -     DULoxetine (CYMBALTA) 30 mg delayed release capsule;  Take 1 capsule (30 mg total) by mouth 2 (two) times a day  -     donepezil (ARICEPT) 5 mg tablet; Take 1 tablet (5 mg total) by mouth daily at bedtime    Late onset Alzheimer's disease without behavioral disturbance  -     donepezil (ARICEPT) 5 mg tablet; Take 1 tablet (5 mg total) by mouth daily at bedtime    Iron deficiency anemia due to chronic blood loss  -     donepezil (ARICEPT) 5 mg tablet; Take 1 tablet (5 mg total) by mouth daily at bedtime  -     Iron Panel; Future    Irritability and anger  -     TSH, 3rd generation; Future  -     Lipid panel; Future    Prostate enlargement  -     tamsulosin (FLOMAX) 0 4 mg; Take 1 capsule (0 4 mg total) by mouth daily with dinner    Hypercholesterolemia  -     atorvastatin (LIPITOR) 80 mg tablet; Take 1 tablet (80 mg total) by mouth daily  -     Comprehensive metabolic panel; Future  -     TSH, 3rd generation; Future  -     Lipid panel; Future    Need for influenza vaccination  -     influenza vaccine, [id number], high-dose, PF 0 5 mL, for patients 65 yr+ (FLUZONE HIGH-DOSE)    Medicare annual wellness visit, subsequent    Risk for falls    Encounter for immunization    Hypothyroidism, unspecified type        Reviewed recent laboratory testing with them  Orders recommendations as noted above  Recommended continuing to follow up with Cardiology  It has been awhile since they have seen Cardiology and they do not have another appointment upcoming  Watch for worsening of the dementia symptoms  Refills given as noted above  Continue to follow up with Urology  He is up-to-date on the pneumonia vaccines  Flu shot was given  Will have them follow up in about 3 months with laboratory testing prior to that visit  Follow up sooner if needed  Subjective:      Patient ID: Vinicio Dillon is a 81 y o  male  He presents for follow-up as well as Medicare wellness  He continues to have behavioral issues at home  His wife states that he is verbally abusive at times    Has not been physically abusive but she does have concerns at times regarding this   Memory is worsening  Aricept may be helping somewhat but his wife does not feel it is helping significantly  She does wish to continue it  Has depression symptoms  At times states that he wishes he was dead but does not have the means to do this since his wife has removed weapons from the house  She states that he is worse in the morning upon awakening in then does improve as the day goes on  They will likely be going to stay with their son over the holidays  Continues to follow-up with Urology  Did have scope done in September  Will be following up again with them over the next week or 2  Has been having some trouble controlling his urine  Does leak urine at times  Needs to wear briefs  Denies any dysuria  Tolerating the atorvastatin without difficulty  Denies any significant muscle aches or weakness  He did follow-up with vascular over the last few months  No changes were made  Tolerating his levothyroxine without difficulty  Energy level has worsened over the past year after his hospitalization  Appetite has improved somewhat  Denies any nausea or vomiting  Denies any abdominal pain  The following portions of the patient's history were reviewed and updated as appropriate:   He  has a past medical history of Abnormal stress test; Abnormal weight loss; Alzheimer's disease (5/2/2018); Aortic insufficiency with aortic stenosis; Bradycardia, unspecified; Coronary artery disease; Diabetes ([facility] [address] ); Disease of thyroid gland; Dyslipidemia; Gastric ulcer; H/O cardiovascular stress test; History of echocardiogram (08/05/2016); History of EKG; History of EKG; History of Holter monitoring; History of Holter monitoring (08/22/2016); echocardiogram; Hypertension; Hypothyroid (5/2/2018); ICAO (internal carotid artery occlusion); Inguinal hernia, left; Peptic ulcer; and Prostate enlargement (5/2/2018)    He   Patient Active Problem List    Diagnosis Date Noted    History of left-sided carotid endarterectomy 08/27/2018    Aortic valve disorder 08/03/2018    [facility] ) 08/03/2018    Presence of aortocoronary bypass graft 07/13/2018    Presence of prosthetic heart valve 07/13/2018    Personal history of nicotine dependence 07/13/2018    Irritability and anger 06/05/2018    Malignant neoplasm of bladder ([facility] ) 05/08/2018    Syncope and collapse 05/05/2018    Bradycardia 05/05/2018    Hypothyroidism 05/02/2018    Alzheimer's disease 05/02/2018    Prostate enlargement 05/02/2018    Acute post-hemorrhagic anemia 05/02/2018    Asymptomatic bilateral carotid artery stenosis 04/24/2017    Chronic kidney disease 01/23/2017    Nocturia 09/06/2016    Weight loss 09/06/2016    Peptic ulcer 12/17/2015    Memory difficulties 12/08/2015    Occlusion and stenosis of carotid artery without mention of cerebral infarction 03/11/2015    Arthralgia 05/19/2014    Dermatitis, eczematoid 02/24/2014    Arteriosclerotic heart disease 06/07/2013    Anemia 02/18/2013    Carotid artery stenosis 11/13/2012    Depression 11/13/2012    Hypercholesterolemia 05/29/2012    Hypertension 05/29/2012     He  has a past surgical history that includes Aortic valve replacement; Thromboendarterectomy; Cataract extraction (Bilateral); Inguinal hernia repair; Nephrectomy (Right); Coronary artery bypass graft; pr cystourethroscopy w/irrig & evac clots (N/A, 5/7/2018); and CAROTID ENDARTARECTOMY (04/26/2013)  His family history includes Alzheimer's disease in his brother; Other in his father; Sudden death in his mother  He  reports that he has quit smoking  His smoking use included Cigarettes  He has never used smokeless tobacco  He reports that he drinks alcohol  He reports that he does not use drugs    Current Outpatient Prescriptions   Medication Sig Dispense Refill    aspirin (ECOTRIN LOW STRENGTH) 81 mg EC tablet Take 81 mg by mouth daily      atorvastatin (LIPITOR) 80 mg tablet Take 1 tablet (80 mg total) by mouth daily 90 tablet 3    B Complex-C (SUPER B/C) CAPS Take by mouth take 1 tablet daily   cholecalciferol (VITAMIN D3) 1,000 units tablet Take 2,000 Units by mouth daily      co-enzyme Q-10 30 MG capsule Take 100 mg by mouth 3 (three) times a day      COCONUT OIL-FLAXSEED OIL PO Take by mouth      cyanocobalamin (VITAMIN B-12) 1,000 mcg tablet Take 1,000 mcg by mouth daily      donepezil (ARICEPT) 5 mg tablet Take 1 tablet (5 mg total) by mouth daily at bedtime 90 tablet 3    DULoxetine (CYMBALTA) 30 mg delayed release capsule Take 1 capsule (30 mg total) by mouth 2 (two) times a day 180 capsule 1    levothyroxine 75 mcg tablet Take 1 tablet (75 mcg total) by mouth daily 90 tablet 3    multivitamin (THERAGRAN) TABS Take 1 tablet by mouth daily      Omega-3 Fatty Acids (OMEGA 3 PO) Take by mouth Omega 3 350 mg- 400 mg capsule [id number] mg every day      pantoprazole (PROTONIX) 20 mg tablet Take 20 mg by mouth daily      tamsulosin (FLOMAX) 0 4 mg Take 1 capsule (0 4 mg total) by mouth daily with dinner 90 capsule 3     No current facility-administered medications for this visit  Current Outpatient Prescriptions on File Prior to Visit   Medication Sig    aspirin (ECOTRIN LOW STRENGTH) 81 mg EC tablet Take 81 mg by mouth daily    B Complex-C (SUPER B/C) CAPS Take by mouth take 1 tablet daily      cholecalciferol (VITAMIN D3) 1,000 units tablet Take 2,000 Units by mouth daily    co-enzyme Q-10 30 MG capsule Take 100 mg by mouth 3 (three) times a day    COCONUT OIL-FLAXSEED OIL PO Take by mouth    cyanocobalamin (VITAMIN B-12) 1,000 mcg tablet Take 1,000 mcg by mouth daily    levothyroxine 75 mcg tablet Take 1 tablet (75 mcg total) by mouth daily    multivitamin (THERAGRAN) TABS Take 1 tablet by mouth daily    Omega-3 Fatty Acids (OMEGA 3 PO) Take by mouth Omega 3 350 mg- 400 mg capsule [id number] mg every day    pantoprazole (PROTONIX) 20 mg tablet Take 20 mg by mouth daily    [DISCONTINUED] levothyroxine 50 mcg tablet Take 50 mcg by mouth daily     No current facility-administered medications on file prior to visit  He is allergic to contrast [iodinated diagnostic agents]; iodine; and morphine       Review of Systems   Constitutional: Positive for activity change  Negative for appetite change, chills and fever  HENT: Negative for congestion and hearing loss  Eyes: Negative for pain and visual disturbance  Respiratory: Negative for chest tightness and shortness of breath  Cardiovascular: Negative for chest pain and palpitations  Gastrointestinal: Negative for abdominal pain, blood in stool, diarrhea, nausea and vomiting  Endocrine: Negative for polydipsia, polyphagia and polyuria  Genitourinary: Positive for frequency and urgency  Negative for dysuria  Incontinence at times   Musculoskeletal: Negative for arthralgias and gait problem  Skin: Negative for color change  Neurological: Negative for dizziness, weakness and headaches  Hematological: Does not bruise/bleed easily  Psychiatric/Behavioral: Positive for agitation, confusion and decreased concentration  Negative for behavioral problems  The patient is not nervous/anxious  Objective:      BP 90/60 (BP Location: Left arm, Patient Position: Sitting, Cuff Size: Standard)   Pulse (!) 48   Temp (!) 97 2 °F (36 2 °C) (Temporal)   Ht 6' 1" (1 854 m)   Wt 70 8 kg (156 lb)   SpO2 97%   BMI 20 58 kg/m²          Physical Exam   Constitutional: He is cooperative  No distress  HENT:   Head: Normocephalic and atraumatic  Nose: Nose normal    Moist mucous membranes   Eyes: Pupils are equal, round, and reactive to light  Conjunctivae are normal    Neck: No JVD present  Cardiovascular: Normal rate and regular rhythm  Exam reveals no gallop and no friction rub  Murmur heard  Systolic murmur is present with a grade of 1/6   Pulmonary/Chest: He has no wheezes  He has no rales   He exhibits no tenderness  Abdominal: He exhibits no distension  There is no tenderness  There is no rebound and no guarding  Lymphadenopathy:     He has no cervical adenopathy  Neurological: He is alert  Skin: Skin is warm and dry  Psychiatric: His behavior is normal  His affect is labile  Cognition and memory are impaired  He expresses no homicidal and no suicidal ideation  He expresses no suicidal plans and no homicidal plans  He exhibits abnormal recent memory and abnormal remote memory  Nursing note and vitals reviewed  Below is the patient's most recent value for Albumin, ALT, AST, BUN, Calcium, Chloride, Cholesterol, CO2, Creatinine, GFR, Glucose, HDL, Hematocrit, Hemoglobin, Hemoglobin A1C, LDL, Magnesium, Phosphorus, Platelets, Potassium, PSA, Sodium, Triglycerides, and WBC  Lab Results   Component Value Date    ALT 43 10/01/2018    AST 45 10/01/2018    BUN 31 (H) 10/01/2018    CALCIUM 9 3 10/01/2018     10/01/2018    CHOL 197 12/01/2015    CO2 28 10/01/2018    CREATININE 1 29 10/01/2018    HDL 45 10/01/2018    HCT 33 7 (L) 10/01/2018    HGB 10 8 (L) 10/01/2018    MG 1 7 05/10/2018    PHOS 3 6 05/09/2018     10/01/2018    K 4 3 10/01/2018     12/01/2015    TRIG 59 10/01/2018    WBC 6 50 10/01/2018     Note: for a comprehensive list of the patient's lab results, access the Results Review activity

## 2018-10-22 NOTE — PROGRESS NOTES
Assessment and Plan:    Problem List Items Addressed This Visit     Hypothyroidism (Chronic)     TSH remains mildly elevated  Discussed with them potentially increasing the dosage but they wish to maintain the 75 mcg and recheck this with the next laboratory testing  He had been increased during his hospitalization to 88 mcg but had resumed his 76 after discharge  This may need to be increased  Alzheimer's disease (Chronic)     He has definite dementia but whether this is truly Alzheimer's verses of vascular dementia is uncertain  Continue the Aricept  Discussed possibly increasing the dosage but they are concerned about potential side effects  Watch for worsening behavioral issues  Should consider referral to Neurology but there hesitant to do this since it would involve traveling  Relevant Medications    DULoxetine (CYMBALTA) 30 mg delayed release capsule    donepezil (ARICEPT) 5 mg tablet    Prostate enlargement (Chronic)    Relevant Medications    tamsulosin (FLOMAX) 0 4 mg    Anemia     Hemoglobin has improved significantly  Will continue to follow this  Watch for any recurrence of gross hematuria  Relevant Medications    donepezil (ARICEPT) 5 mg tablet    Other Relevant Orders    Iron Panel    Depression     He has definite depression symptoms which are likely worsening the dementia  Continue with the Cymbalta  Refill placed for this  Relevant Medications    DULoxetine (CYMBALTA) 30 mg delayed release capsule    donepezil (ARICEPT) 5 mg tablet    Hypercholesterolemia     Cholesterol controlled  Continue with the atorvastatin  Watch diet somewhat although this is difficult with his dementia  Relevant Medications    atorvastatin (LIPITOR) 80 mg tablet    Other Relevant Orders    Comprehensive metabolic panel    TSH, 3rd generation    Lipid panel    Hypertension - Primary     Blood pressure controlled off of all medications    Blood pressure still is running relatively low  Discussed with him importance of staying well hydrated  Will abstain from any blood pressure medications  Relevant Orders    CBC and differential    Comprehensive metabolic panel    Irritability and anger     Discussed options with his wife  Discussed trying to redirect him when he becomes more irritable  Does not have any current suicidal or homicidal ideation  His wife has removed his access to any weapons  Relevant Orders    TSH, 3rd generation    Lipid panel      Other Visit Diagnoses     Need for influenza vaccination        Relevant Orders    influenza vaccine, [id number], high-dose, PF 0 5 mL, for patients 65 yr+ (FLUZONE HIGH-DOSE) (Completed)    Medicare annual wellness visit, subsequent        Risk for falls        Encounter for immunization            Health Maintenance Due   Topic Date Due    HEMOGLOBIN A1C  1935    SLP PLAN OF CARE  1935    Diabetic Foot Exam  09/28/1946    DM Eye Exam  09[birth year]28/1945    URINE MICROALBUMIN  [birth year]28/1945         HPI:  Azul Jones is a 81 y o  male here for his Subsequent Wellness Visit      Patient Active Problem List   Diagnosis    Hypothyroidism    Alzheimer's disease    Prostate enlargement    Acute post-hemorrhagic anemia    Syncope and collapse    Bradycardia    Malignant neoplasm of bladder (HCC)    Anemia    Arteriosclerotic heart disease    Arthralgia    Asymptomatic bilateral carotid artery stenosis    Carotid artery stenosis    Chronic kidney disease    Depression    Dermatitis, eczematoid    Hypercholesterolemia    Hypertension    Memory difficulties    Nocturia    Occlusion and stenosis of carotid artery without mention of cerebral infarction    Peptic ulcer    Weight loss    Irritability and anger    Presence of aortocoronary bypass graft    Presence of prosthetic heart valve    Personal history of nicotine dependence    Aortic valve disorder    Diabetes ([address] )    History of left-sided carotid endarterectomy     Past Medical History:   Diagnosis Date    Abnormal stress test     Abnormal weight loss     Alzheimer's disease 5/2/2018    Aortic insufficiency with aortic stenosis     [s/p AVR (tissue) 4/2013]     Bradycardia, unspecified     Coronary artery disease      [s/p CABG x3 4/2013]    Diabetes ([address] )     Disease of thyroid gland     Dyslipidemia     Gastric ulcer     last assessed: 2/24/14    H/O cardiovascular stress test      (EF 0 67, Not suspicious for significant occlusive CAD  ) - 11/28/2007; [facility] (Normal EF, Inferior wall post stress HK, markedly positive EKG with chest pressure) - 4/10/2013    History of echocardiogram 08/05/2016    2-D w/ CFD:  EF 0 55 (55%), Normal LVSF  Normal functioning bioprosthetic AV   History of EKG     EKG shows sinus bradycardia, 49 bpm, otherwise normal     History of EKG      (Sinus Rhythm) - 3/25/2013;  (Sinus Delynn Gonzales) - 5/20/2013;  9/25/2013     History of Holter monitoring      (SR w/ rates ranging from 44-78BPM  No afib, no heart block, no vtach, no pauses, and no symptoms per pt  there were few short atrial runs, highest being 140BPM, the longest being 8 beats; though overall atrial and ventricular ectopic beats were rare ) - 8/3/2016     History of Holter monitoring 08/22/2016    24 hr    Hx of echocardiogram     (EF 0 70, Without regional wall abnormalities  Possibly bicuspid aortic valve with mild AS, no aortic insufficiency ) 3/22/2007;  (EF 0 55 (55%), Normal LVSF  Normal functioning bioprosthetic AV ) - 8/3/2016     Hypertension     Hypothyroid 5/2/2018    ICAO (internal carotid artery occlusion)     Duplex (There is known occlusion of ICA in rt carotid and the vertebral artery is antegrade  0-19% ICA stenosis of Lt carotid  The vertebral artery is antegrade  Widely patent endarterectomy site on the left ) - 1/30/2014 Carotid Duplex (No change  Known occlusion of ICA in right carotid   0-19% ICA stenosis in left carotid, and widely patent endarterectomy site on left ) - 8/28/2014    Inguinal hernia, left     last assessed: 10/8/14    Peptic ulcer     last assessed: 5/21/13    Prostate enlargement 5/2/2018     Past Surgical History:   Procedure Laterality Date    AORTIC VALVE REPLACEMENT      CAROTID ENDARTARECTOMY  04/26/2013    CATARACT EXTRACTION Bilateral     CORONARY ARTERY BYPASS GRAFT       (3V CABG: LIMA-LAD, VG-RCA, VG-CX, Tissue AVR (#[address])) - 4/26/2013     INGUINAL HERNIA REPAIR      NEPHRECTOMY Right     CO CYSTOURETHROSCOPY W/IRRIG & EVAC CLOTS N/A 5/7/2018    Procedure: CYSTOSCOPY EVACUATION OF CLOTS AND TURBT;  Surgeon: Aaliyah Louis MD;  Location: [facility];  Service: Urology    THROMBOENDARTERECTOMY      Carotid     Family History   Problem Relation Age of Onset   Debbie Mare Sudden death Mother         unexpected death   Debbie Mare Other Father         Coal workers' Pneumoconiosis    Alzheimer's disease Brother      History   Smoking Status    Former Smoker    Types: Cigarettes   Smokeless Tobacco    Never Used     Comment: quit 20 years ago     History   Alcohol Use    Yes     Comment: social      History   Drug Use No       Current Outpatient Prescriptions   Medication Sig Dispense Refill    aspirin (ECOTRIN LOW STRENGTH) 81 mg EC tablet Take 81 mg by mouth daily      atorvastatin (LIPITOR) 80 mg tablet Take 1 tablet (80 mg total) by mouth daily 90 tablet 3    B Complex-C (SUPER B/C) CAPS Take by mouth take 1 tablet daily        cholecalciferol (VITAMIN D3) 1,000 units tablet Take 2,000 Units by mouth daily      co-enzyme Q-10 30 MG capsule Take 100 mg by mouth 3 (three) times a day      COCONUT OIL-FLAXSEED OIL PO Take by mouth      cyanocobalamin (VITAMIN B-12) 1,000 mcg tablet Take 1,000 mcg by mouth daily      donepezil (ARICEPT) 5 mg tablet Take 1 tablet (5 mg total) by mouth daily at bedtime 90 tablet 3    DULoxetine (CYMBALTA) 30 mg delayed release capsule Take 1 capsule (30 mg total) by mouth 2 (two) times a day 180 capsule 1    levothyroxine 75 mcg tablet Take 1 tablet (75 mcg total) by mouth daily 90 tablet 3    multivitamin (THERAGRAN) TABS Take 1 tablet by mouth daily      Omega-3 Fatty Acids (OMEGA 3 PO) Take by mouth Omega 3 350 mg- 400 mg capsule [id number] mg every day      pantoprazole (PROTONIX) 20 mg tablet Take 20 mg by mouth daily      tamsulosin (FLOMAX) 0 4 mg Take 1 capsule (0 4 mg total) by mouth daily with dinner 90 capsule 3     No current facility-administered medications for this visit  Allergies   Allergen Reactions    Contrast [Iodinated Diagnostic Agents]     Iodine Throat Swelling     IVP contrast dye    Morphine Rash     Immunization History   Administered Date(s) Administered    Influenza 12/02/2005, 11/13/2012, 10/18/2013, 10/06/2015, 09/06/2016    Influenza Split High Dose Preservative Free IM 10/08/2014, 10/06/2015, 09/06/2016, 09/14/2017    Influenza TIV (IM) 11/13/2012, 10/18/2013    Influenza, high dose seasonal 0 5 mL 10/22/2018    Pneumococcal Conjugate 13-Valent 12/07/2015    Pneumococcal Polysaccharide PPV23 12/13/2006    Tdap 08/04/2015    Zoster 02/07/2013       Patient Care Team:  Scar Camarena MD as PCP - General  Rivera Lewis MD    Medicare Screening Tests and Risk Assessments: ZEN is here for his Subsequent Wellness visit  Last Medicare Wellness visit information reviewed, patient interviewed and updates made to the record today  Health Risk Assessment:  Patient rates overall health as fair  Patient feels that their physical health rating is Slightly worse  Eyesight was rated as Slightly worse  Hearing was rated as Slightly worse  Patient feels that their emotional and mental health rating is Slightly worse  Pain experienced by patient in the last 7 days has been None  Patient states that he has experienced no weight loss or gain in last 6 months   (Additional comments: See regular note for details regarding his medical issues and his worsening dementia  He has known depression symptoms which are relatively severe but he is able to contract for safety and his wife has removed weapons from the house )    Emotional/Mental Health:  Patient has been feeling nervous/anxious  PHQ-9 Depression Screening:    Frequency of the following problems over the past two weeks:      1  Little interest or pleasure in doing things: 3 - nearly every day      2  Feeling down, depressed, or hopeless: 3 - nearly every day      3  Trouble falling or staying asleep, or sleeping too much: 3 - nearly every day      4  Feeling tired or having little energy: 3 - nearly every day      5  Poor appetite or overeating: 3 - nearly every day      6  Feeling bad about yourself - or that you are a failure or have let yourself or your family down: 3 - nearly every day      7  Trouble concentrating on things, such as reading the newspaper or watching television: 3 - nearly every day      8  Moving or speaking so slowly that other people could have noticed  Or the opposite - being so fidgety or restless that you have been moving around a lot more than usual: 3 - nearly every day      9  Thoughts that you would be better off dead, or of hurting yourself in some way: 3 - nearly every day  PHQ-2 Score: 6  PHQ-9 Score: 27    Broken Bones/Falls: Fall Risk Assessment:    In the past year, patient has experienced: No history of falling in past year          Bladder/Bowel:  Patient has leaked urine accidently in the last six months  Patient reports loss of bowel control  Immunizations:  Patient has had a flu vaccination within the last year  Patient has received a pneumonia shot  Patient has received a shingles shot  Patient has received tetanus/diphtheria shot  Home Safety:  Patient does not have trouble with stairs inside or outside of their home     Patient currently reports that there are safety hazards present in home , working smoke alarms, working carbon monoxide detectors  Preventative Screenings:   prostate cancer screen performed, colon cancer screen completed, cholesterol screen completed, glaucoma eye exam completed,     Nutrition:  Current diet: Regular with servings of the following:    Medications:  Patient is not currently taking any over-the-counter supplements  Patient is not able to manage medications  Lifestyle Choices:  Patient reports no tobacco use  Patient has not smoked or used tobacco in the past   Patient reports no alcohol use  Patient does not drive a vehicle  Patient wears seat belt  Current level of exercise of physical activity described by patient as: limited  Activities of Daily Living:  Can get out of bed by his or her self, able to dress self, able to make own meals, able to do own shopping, able to bathe self, can do own laundry/housekeeping, can manage own money, pay bills and track expenses    Previous Hospitalizations:  Hospitalization or ED visit in past 12 months  Number of hospitalizations within the last year: 1-2        Advanced Directives:  Patient has decided on a power of   Patient has spoken to designated power of   Patient has completed advanced directive          Preventative Screening/Counseling:      Cardiovascular:      General: Screening Current      Counseling: Healthy Diet, Healthy Weight and Improve Exercise Tolerance          Diabetes:      General: Screening Current      Counseling: Healthy Diet, Healthy Weight and Improve Physical Activity          Colorectal Cancer:      General: Screening Current          Prostate Cancer:      General: Screening Current          Osteoporosis:      General: Screening Not Indicated          AAA:      General: Screening Not Indicated          Glaucoma:      General: Screening Current          HIV:      General: Screening Not Indicated          Hepatitis C:      General: Risks and Benefits Discussed        Advanced Directives:   Patient has living will for healthcare, has durable POA for healthcare, patient has an advanced directive  5 wishes given  Provider agrees with end of life decisions   Immunizations:      Influenza: Influenza Recommended Annually and Influenza Due Today      Pneumococcal: Lifetime Vaccine Completed      Other Preventative Counseling (Non-Medicare):   Fall Prevention, Increase physical activity and Car/seat belt/driving safety reviewed

## 2018-11-01 ENCOUNTER — PROCEDURE VISIT (OUTPATIENT)
Dept: UROLOGY | Facility: CLINIC | Age: 83
End: 2018-11-01
Payer: COMMERCIAL

## 2018-11-01 DIAGNOSIS — R35.1 NOCTURIA: Primary | ICD-10-CM

## 2018-11-01 DIAGNOSIS — N40.0 PROSTATE ENLARGEMENT: Chronic | ICD-10-CM

## 2018-11-01 DIAGNOSIS — C67.2 MALIGNANT NEOPLASM OF LATERAL WALL OF URINARY BLADDER (HCC): ICD-10-CM

## 2018-11-01 PROCEDURE — 52234 CYSTOSCOPY AND TREATMENT: CPT | Performed by: UROLOGY

## 2018-11-01 PROCEDURE — 99213 OFFICE O/P EST LOW 20 MIN: CPT | Performed by: UROLOGY

## 2018-11-01 NOTE — PROGRESS NOTES
Cystoscopy  Date/Time: 11/1/2018 11:21 AM  Performed by: Néstor Duque  Authorized by: Néstor Duque     Procedure details: fulguration/excision, tumors of bladder    Tumor Size (cm):  2  Patient tolerance: Patient tolerated the procedure well with no immediate complications    Additional Procedure Details:   Cystoscopy procedure note:    Risk and benefits of flexible cystoscopy were discussed  Informed consent was obtained  Urine dip was adequate for cystoscopy  The patient was placed in the supine position  His genitalia was prepped with Betadine and draped in a sterile fashion  Viscous 2% lidocaine jelly was instilled into the urethra and allowed dwell time for local anesthesia  Flexible cystoscopy was then performed using a 16F scope  The distal urethra and prostatic urethra were evaluated and were normal in course and caliber  At the right anterior bladder neck, there was a recurrent <2cm tumor  Once inside the bladder, it was carefully inspected in a 360 degree fashion  There was no evidence of mucosal abnormalities, lesions, diverticula or stones  Both ureteral orifices in their orthotopic location were visualized with clear efflux of urine  Retroflexion for evaluation of the bladder neck demonstrated the tumor  We then utilized the Bugbee electrocautery device to completed fulgurate the tumor down to its base  There was complete obliteration of the tumor with good hemostasis  Overall this was a cystoscopy and fulguration of a <2cm right anterior bladder neck tumor

## 2018-11-01 NOTE — PATIENT INSTRUCTIONS
Bladder Cancer   WHAT YOU NEED TO KNOW:   Bladder cancer starts in the cells that line your bladder  DISCHARGE INSTRUCTIONS:   Call 911 for any of the following:   · You suddenly feel lightheaded and short of breath  · You cough up blood  Seek care immediately if:   · Your arm or leg feels warm, tender, and painful  It may look swollen and red  · You are unable to urinate  Contact your healthcare provider or oncologist if:   · You have a fever  · You vomit and cannot keep any liquids or food down  · You have new or worsening pain  · Your pain gets worse or does not go away after you take pain medicine  · You have questions or concerns about your condition or care  Self-care:   · Do not smoke  Nicotine can damage blood vessels and make it hard to manage your bladder cancer  Smoking also increases your risk for new or returning cancer  Ask your healthcare provider for information if you currently smoke and need help to quit  E-cigarettes or smokeless tobacco still contain nicotine  Talk to your healthcare provider before you use these products  · Limit or do not drink alcohol  Alcohol may cause you to become dehydrated  Ask your oncologist if it is safe for you to drink alcohol, and how much is safe to drink  · Eat healthy foods  Healthy foods include fruit, vegetables, whole-grain breads, low-fat dairy products, beans, lean meats, and fish  Your healthcare provider may recommend that you eat less red meat  You need to eat enough calories to help prevent weight loss and increase your energy level  You also need protein to give you strength  If you do not feel hungry, eat small amounts often instead of large meals  · Drink liquids as directed  You may need to drink more liquids than usual to prevent dehydration  Ask how much liquid to drink each day and which liquids are best for you  · Exercise as directed  Exercise may help increase your energy level and appetite  Ask your healthcare provider how much exercise you need and which exercises are best for you  For more information and support: It may be difficult for you and your family to go through cancer and cancer treatments  Join a support group or talk with others who have gone through treatment  · [address]  [place] ,   [place] [address]  Phone: [phone]- [phone]  Web Address: http://[employer]/  [employer]  · [address] [place] [address], [address] [place] [address]  Phone: 9- 246 - [phone]  Web Address: http://[employer]/  [employer]  Follow up with your healthcare provider or oncologist as directed: You will need to see your oncologist for ongoing treatment  Write down your questions so you remember to ask them during your visits  © 2017 [facility] Information is for End User's use only and may not be sold, redistributed or otherwise used for commercial purposes  All illustrations and images included in CareNotes® are the copyrighted property of [employer] D A M [employer] Inc  or Julius Mccoy  The above information is an  only  It is not intended as medical advice for individual conditions or treatments  Talk to your doctor, nurse or pharmacist before following any medical regimen to see if it is safe and effective for you

## 2018-11-01 NOTE — PROGRESS NOTES
UROLOGY FOLLOW UP NOTE     CHIEF COMPLAINT   Kaushal List is a 81 y o  male with a complaint of   Chief Complaint   Patient presents with    Cystoscopy    fulgeration       History of Present Illness:     81 y o  male who was previously a patient of an outside urologist   Patient has had a recent hospitalization was significant gross hematuria  He was taken to the operating room on 5/7/2018 for resection  Patient's hematuria improved dramatically over his hospital stay  Given his deconditioning and other medical issues, he was transferred to the 5th floor for rehabilitation  Of note, the wife of the patient is a former operating room nurse at [employer] [facility] [employer]    He has now been transitioned to home  His wife reports continued agitation and irritation at home  He has not had any recurrent bleeding  Returns for cystoscopy  Wife reports continued worsening of dementia  Some occasional urinary urgency but no recurrent gross hematuria  Past Medical History:     Past Medical History:   Diagnosis Date    Abnormal stress test     Abnormal weight loss     Alzheimer's disease 5/2/2018    Aortic insufficiency with aortic stenosis     [s/p AVR (tissue) 4/2013]     Bradycardia, unspecified     Coronary artery disease      [s/p CABG x3 4/2013]    Diabetes ([facility] [address] )     Disease of thyroid gland     Dyslipidemia     Gastric ulcer     last assessed: 2/24/14    H/O cardiovascular stress test      (EF 0 67, Not suspicious for significant occlusive CAD  ) - 11/28/2007; [facility] (Normal EF, Inferior wall post stress HK, markedly positive EKG with chest pressure) - 4/10/2013    History of echocardiogram 08/05/2016    2-D w/ CFD:  EF 0 55 (55%), Normal LVSF  Normal functioning bioprosthetic AV      History of EKG     EKG shows sinus bradycardia, 49 bpm, otherwise normal     History of EKG      (Sinus Rhythm) - 3/25/2013;  (Sinus Drew) - 5/20/2013;  9/25/2013     History of Holter monitoring      (SR w/ rates ranging from 44-78BPM  No afib, no heart block, no vtach, no pauses, and no symptoms per pt  there were few short atrial runs, highest being 140BPM, the longest being 8 beats; though overall atrial and ventricular ectopic beats were rare ) - 8/3/2016     History of Holter monitoring 08/22/2016    24 hr    Hx of echocardiogram     (EF 0 70, Without regional wall abnormalities  Possibly bicuspid aortic valve with mild AS, no aortic insufficiency ) 3/22/2007;  (EF 0 55 (55%), Normal LVSF  Normal functioning bioprosthetic AV ) - 8/3/2016     Hypertension     Hypothyroid 5/2/2018    ICAO (internal carotid artery occlusion)     Duplex (There is known occlusion of ICA in rt carotid and the vertebral artery is antegrade  0-19% ICA stenosis of Lt carotid  The vertebral artery is antegrade  Widely patent endarterectomy site on the left ) - 1/30/2014 Carotid Duplex (No change  Known occlusion of ICA in right carotid   0-19% ICA stenosis in left carotid, and widely patent endarterectomy site on left ) - 8/28/2014    Inguinal hernia, left     last assessed: 10/8/14    Peptic ulcer     last assessed: 5/21/13    Prostate enlargement 5/2/2018       PAST SURGICAL HISTORY:     Past Surgical History:   Procedure Laterality Date    AORTIC VALVE REPLACEMENT      CAROTID ENDARTARECTOMY  04/26/2013    CATARACT EXTRACTION Bilateral     CORONARY ARTERY BYPASS GRAFT       (3V CABG: LIMA-LAD, VG-RCA, VG-CX, Tissue AVR (#23 [address])) - 4/26/2013     INGUINAL HERNIA REPAIR      NEPHRECTOMY Right     DE CYSTOURETHROSCOPY W/IRRIG & EVAC CLOTS N/A 5/7/2018    Procedure: CYSTOSCOPY EVACUATION OF CLOTS AND TURBT;  Surgeon: Emily Cade MD;  Location: [facility];  Service: Urology    THROMBOENDARTERECTOMY      Carotid       CURRENT MEDICATIONS:     Current Outpatient Prescriptions   Medication Sig Dispense Refill    aspirin (ECOTRIN LOW STRENGTH) 81 mg EC tablet Take 81 mg by mouth daily      atorvastatin (LIPITOR) 80 mg tablet Take 1 tablet (80 mg total) by mouth daily 90 tablet 3    B Complex-C (SUPER B/C) CAPS Take by mouth take 1 tablet daily   cholecalciferol (VITAMIN D3) 1,000 units tablet Take 2,000 Units by mouth daily      co-enzyme Q-10 30 MG capsule Take 100 mg by mouth 3 (three) times a day      COCONUT OIL-FLAXSEED OIL PO Take by mouth      cyanocobalamin (VITAMIN B-12) 1,000 mcg tablet Take 1,000 mcg by mouth daily      donepezil (ARICEPT) 5 mg tablet Take 1 tablet (5 mg total) by mouth daily at bedtime 90 tablet 3    DULoxetine (CYMBALTA) 30 mg delayed release capsule Take 1 capsule (30 mg total) by mouth 2 (two) times a day 180 capsule 1    levothyroxine 50 mcg tablet Take 50 mcg by mouth daily      levothyroxine 75 mcg tablet Take 1 tablet (75 mcg total) by mouth daily 90 tablet 3    multivitamin (THERAGRAN) TABS Take 1 tablet by mouth daily      Omega-3 Fatty Acids (OMEGA 3 PO) Take by mouth Omega 3 350 mg- 400 mg capsule [id number] mg every day      pantoprazole (PROTONIX) 20 mg tablet Take 20 mg by mouth daily      tamsulosin (FLOMAX) 0 4 mg Take 1 capsule (0 4 mg total) by mouth daily with dinner 90 capsule 3     No current facility-administered medications for this visit          ALLERGIES:     Allergies   Allergen Reactions    Contrast [Iodinated Diagnostic Agents]     Iodine Throat Swelling     IVP contrast dye    Morphine Rash       SOCIAL HISTORY:     Social History     Social History    Marital status: /Civil Union     Spouse name: N/A    Number of children: N/A    Years of education: N/A     Social History Main Topics    Smoking status: Former Smoker     Types: Cigarettes    Smokeless tobacco: Never Used      Comment: quit 20 years ago    Alcohol use Yes      Comment: social    Drug use: No    Sexual activity: Not Asked     Other Topics Concern    None     Social History Narrative    None       SOCIAL HISTORY:     Family History   Problem Relation Age of Onset    Sudden death Mother         unexpected death    Other Father         Coal workers' Pneumoconiosis    Alzheimer's disease Brother        REVIEW OF SYSTEMS:     Review of Systems   Unable to perform ROS: Dementia (Relied on wife for review)   Constitutional: Negative  Respiratory: Negative  Cardiovascular: Negative  Gastrointestinal: Negative  Genitourinary: Negative  Musculoskeletal: Negative  Skin: Negative  Neurological: Negative  Psychiatric/Behavioral: Positive for behavioral problems and confusion  PHYSICAL EXAM:     There were no vitals taken for this visit  General:  Frail-appearing elderly male in no acute distress  HEENT:  Normocephalic, atraumatic  Neck is supple without any palpable lymphadenopathy  Cardiovascular:  Patient has normal palpable distal radial pulses  There is no significant peripheral edema  No JVD is noted  Respiratory:  Patient has unlabored respirations  There is no audible wheeze or rhonchi  Abdomen:   Abdomen is soft and nontender  There is no tympany  Inguinal and umbilical hernia are not appreciated  : Uncircumcised, orthotopic meatus  Musculoskeletal:  Patient does not have significant CVA tenderness in the  flank with palpation or percussion  They full range of motion in all 4 extremities  Strength in all 4 extremities appears congruent  Patient is able to ambulate without assistance or difficulty  Dermatologic:  Patient has no skin abnormalities or rashes        LABS:     CBC:   Lab Results   Component Value Date    WBC 6 50 10/01/2018    HGB 10 8 (L) 10/01/2018    HCT 33 7 (L) 10/01/2018    MCV 91 10/01/2018     10/01/2018       BMP:   Lab Results   Component Value Date    GLUCOSE 97 2015    CALCIUM 9 3 10/01/2018     10/01/2018    K 4 3 10/01/2018    CO2 28 10/01/2018     10/01/2018    BUN 31 (H) 10/01/2018    CREATININE 1 29 10/01/2018       IMAGIN/2/18  CT ABDOMEN AND PELVIS WITHOUT IV CONTRAST     INDICATION:   hematuria      COMPARISON: None      TECHNIQUE:  CT examination of the abdomen and pelvis was performed without intravenous contrast   Axial, sagittal, and coronal 2D reformatted images were created from the source data and submitted for interpretation       Radiation dose length product (DLP) for this visit:  186 37 mGy-cm   This examination, like all CT scans performed in the [facility], was performed utilizing techniques to minimize radiation dose exposure, including the use of iterative   reconstruction and automated exposure control       Enteric contrast was administered       FINDINGS:     ABDOMEN     LOWER CHEST:  No clinically significant abnormality identified in the visualized lower chest      LIVER/BILIARY TREE:  Unremarkable      GALLBLADDER:  No calcified gallstones  No pericholecystic inflammatory change      SPLEEN:  Unremarkable      PANCREAS:  Unremarkable      ADRENAL GLANDS:  Unremarkable      KIDNEYS/URETERS:  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  Noncontrast evaluation of left kidney is grossly unremarkable      STOMACH AND BOWEL:  Unremarkable      APPENDIX:  No findings to suggest appendicitis      ABDOMINOPELVIC CAVITY:  No ascites or free intraperitoneal air  No lymphadenopathy      VESSELS:  Unremarkable for patient's age      PELVIS     REPRODUCTIVE ORGANS:  Status post hysterectomy      URINARY BLADDER:  Loyd catheter is present  Hyperdensity within the bladder lumen is consistent with history of hematuria      ABDOMINAL WALL/INGUINAL REGIONS:  Unremarkable      OSSEOUS STRUCTURES:  No acute fracture or destructive osseous lesion      IMPRESSION:     No etiology for hematuria identified  Urologic follow-up is recommended  Hyperdensity within the bladder lumen likely representing hemorrhage  PATHOLOGY:     Final Diagnosis   A  Urinary bladder tumor curettings:  - Low grade papillary urothelial carcinoma    - No invasive malignancy is seen   - No detrusor muscle is seen in this material        PROCEDURE:     SEE NOTE    ASSESSMENT:     81 y o  male with LGTa bladder cancer    PLAN:     Patient underwent cystoscopy and fulguration of the right anterior bladder neck lesion today  We perform this in the office to try to attempt to avoid anesthesia an operative intervention given the patient's progressive dementia  I would typically recommend continued follow-up every 3 months was cystoscopies given the patient's dementia and the wife's possible planned to travel to [place] for the winter, I have recommended a return visit in 6 months  We will plan a cystoscopy at that visit unless patient's mental status significantly worsens  Certainly if he has any recurrent symptoms or hematuria between now and next visit, the wife knows to call me

## 2018-11-09 PROBLEM — Z98.890 HISTORY OF LEFT-SIDED CAROTID ENDARTERECTOMY: Status: ACTIVE | Noted: 2018-08-27

## 2018-11-09 NOTE — ASSESSMENT & PLAN NOTE
He has definite dementia but whether this is truly Alzheimer's verses of vascular dementia is uncertain  Continue the Aricept  Discussed possibly increasing the dosage but they are concerned about potential side effects  Watch for worsening behavioral issues  Should consider referral to Neurology but there hesitant to do this since it would involve traveling

## 2018-11-09 NOTE — ASSESSMENT & PLAN NOTE
Hemoglobin has improved significantly  Will continue to follow this  Watch for any recurrence of gross hematuria

## 2018-11-09 NOTE — ASSESSMENT & PLAN NOTE
Cholesterol controlled  Continue with the atorvastatin  Watch diet somewhat although this is difficult with his dementia

## 2018-11-09 NOTE — ASSESSMENT & PLAN NOTE
He has definite depression symptoms which are likely worsening the dementia  Continue with the Cymbalta  Refill placed for this

## 2018-11-09 NOTE — ASSESSMENT & PLAN NOTE
Blood pressure controlled off of all medications  Blood pressure still is running relatively low  Discussed with him importance of staying well hydrated  Will abstain from any blood pressure medications

## 2018-11-09 NOTE — ASSESSMENT & PLAN NOTE
TSH remains mildly elevated  Discussed with them potentially increasing the dosage but they wish to maintain the 75 mcg and recheck this with the next laboratory testing  He had been increased during his hospitalization to 88 mcg but had resumed his 76 after discharge  This may need to be increased

## 2018-11-09 NOTE — ASSESSMENT & PLAN NOTE
Discussed options with his wife  Discussed trying to redirect him when he becomes more irritable  Does not have any current suicidal or homicidal ideation  His wife has removed his access to any weapons

## 2018-11-26 ENCOUNTER — TELEPHONE (OUTPATIENT)
Dept: INTERNAL MEDICINE CLINIC | Facility: CLINIC | Age: 83
End: 2018-11-26

## 2018-11-26 NOTE — TELEPHONE ENCOUNTER
Patient wife called the office and stated that they will be going to [facility] soon and is requesting a zpak be called into [facility]

## 2018-11-27 DIAGNOSIS — J06.9 UPPER RESPIRATORY TRACT INFECTION, UNSPECIFIED TYPE: Primary | ICD-10-CM

## 2018-11-27 RX ORDER — AZITHROMYCIN 250 MG/1
TABLET, FILM COATED ORAL
Qty: 6 TABLET | Refills: 0 | Status: SHIPPED | OUTPATIENT
Start: 2018-11-27 | End: 2018-12-02

## 2019-05-07 ENCOUNTER — PROCEDURE VISIT (OUTPATIENT)
Dept: UROLOGY | Facility: CLINIC | Age: 84
End: 2019-05-07
Payer: COMMERCIAL

## 2019-05-07 VITALS
SYSTOLIC BLOOD PRESSURE: 124 MMHG | WEIGHT: 156 LBS | HEART RATE: 60 BPM | HEIGHT: 73 IN | BODY MASS INDEX: 20.67 KG/M2 | DIASTOLIC BLOOD PRESSURE: 70 MMHG

## 2019-05-07 DIAGNOSIS — C67.2 MALIGNANT NEOPLASM OF LATERAL WALL OF URINARY BLADDER (HCC): Primary | ICD-10-CM

## 2019-05-07 PROCEDURE — 52000 CYSTOURETHROSCOPY: CPT | Performed by: UROLOGY

## 2019-05-13 ENCOUNTER — APPOINTMENT (OUTPATIENT)
Dept: LAB | Facility: MEDICAL CENTER | Age: 84
End: 2019-05-13
Payer: COMMERCIAL

## 2019-05-13 DIAGNOSIS — E78.00 HYPERCHOLESTEROLEMIA: ICD-10-CM

## 2019-05-13 DIAGNOSIS — R45.4 IRRITABILITY AND ANGER: ICD-10-CM

## 2019-05-13 DIAGNOSIS — I10 ESSENTIAL HYPERTENSION: ICD-10-CM

## 2019-05-13 DIAGNOSIS — D50.0 IRON DEFICIENCY ANEMIA DUE TO CHRONIC BLOOD LOSS: ICD-10-CM

## 2019-05-13 LAB
ALBUMIN SERPL BCP-MCNC: 3.7 G/DL (ref 3.5–5)
ALP SERPL-CCNC: 63 U/L (ref 46–116)
ALT SERPL W P-5'-P-CCNC: 37 U/L (ref 12–78)
ANION GAP SERPL CALCULATED.3IONS-SCNC: 3 MMOL/L (ref 4–13)
AST SERPL W P-5'-P-CCNC: 36 U/L (ref 5–45)
BASOPHILS # BLD AUTO: 0.07 THOUSANDS/ΜL (ref 0–0.1)
BASOPHILS NFR BLD AUTO: 1 % (ref 0–1)
BILIRUB SERPL-MCNC: 0.65 MG/DL (ref 0.2–1)
BUN SERPL-MCNC: 20 MG/DL (ref 5–25)
CALCIUM SERPL-MCNC: 8.8 MG/DL (ref 8.3–10.1)
CHLORIDE SERPL-SCNC: 104 MMOL/L (ref 100–108)
CHOLEST SERPL-MCNC: 184 MG/DL (ref 50–200)
CO2 SERPL-SCNC: 30 MMOL/L (ref 21–32)
CREAT SERPL-MCNC: 1.31 MG/DL (ref 0.6–1.3)
EOSINOPHIL # BLD AUTO: 0.27 THOUSAND/ΜL (ref 0–0.61)
EOSINOPHIL NFR BLD AUTO: 4 % (ref 0–6)
ERYTHROCYTE [DISTWIDTH] IN BLOOD BY AUTOMATED COUNT: 13.8 % (ref 11.6–15.1)
FERRITIN SERPL-MCNC: 29 NG/ML (ref 8–388)
GFR SERPL CREATININE-BSD FRML MDRD: 50 ML/MIN/1.73SQ M
GLUCOSE P FAST SERPL-MCNC: 111 MG/DL (ref 65–99)
HCT VFR BLD AUTO: 36 % (ref 36.5–49.3)
HDLC SERPL-MCNC: 41 MG/DL (ref 40–60)
HGB BLD-MCNC: 11.5 G/DL (ref 12–17)
IMM GRANULOCYTES # BLD AUTO: 0.01 THOUSAND/UL (ref 0–0.2)
IMM GRANULOCYTES NFR BLD AUTO: 0 % (ref 0–2)
IRON SATN MFR SERPL: 20 %
IRON SERPL-MCNC: 66 UG/DL (ref 65–175)
LDLC SERPL CALC-MCNC: 124 MG/DL (ref 0–100)
LYMPHOCYTES # BLD AUTO: 1.39 THOUSANDS/ΜL (ref 0.6–4.47)
LYMPHOCYTES NFR BLD AUTO: 20 % (ref 14–44)
MCH RBC QN AUTO: 30.1 PG (ref 26.8–34.3)
MCHC RBC AUTO-ENTMCNC: 31.9 G/DL (ref 31.4–37.4)
MCV RBC AUTO: 94 FL (ref 82–98)
MONOCYTES # BLD AUTO: 0.6 THOUSAND/ΜL (ref 0.17–1.22)
MONOCYTES NFR BLD AUTO: 9 % (ref 4–12)
NEUTROPHILS # BLD AUTO: 4.57 THOUSANDS/ΜL (ref 1.85–7.62)
NEUTS SEG NFR BLD AUTO: 66 % (ref 43–75)
NONHDLC SERPL-MCNC: 143 MG/DL
NRBC BLD AUTO-RTO: 0 /100 WBCS
PLATELET # BLD AUTO: 208 THOUSANDS/UL (ref 149–390)
PMV BLD AUTO: 11.2 FL (ref 8.9–12.7)
POTASSIUM SERPL-SCNC: 4.4 MMOL/L (ref 3.5–5.3)
PROT SERPL-MCNC: 7.3 G/DL (ref 6.4–8.2)
RBC # BLD AUTO: 3.82 MILLION/UL (ref 3.88–5.62)
SODIUM SERPL-SCNC: 137 MMOL/L (ref 136–145)
TIBC SERPL-MCNC: 322 UG/DL (ref 250–450)
TRIGL SERPL-MCNC: 95 MG/DL
TSH SERPL DL<=0.05 MIU/L-ACNC: 3.65 UIU/ML (ref 0.36–3.74)
WBC # BLD AUTO: 6.91 THOUSAND/UL (ref 4.31–10.16)

## 2019-05-13 PROCEDURE — 82728 ASSAY OF FERRITIN: CPT

## 2019-05-13 PROCEDURE — 80053 COMPREHEN METABOLIC PANEL: CPT

## 2019-05-13 PROCEDURE — 36415 COLL VENOUS BLD VENIPUNCTURE: CPT

## 2019-05-13 PROCEDURE — 84443 ASSAY THYROID STIM HORMONE: CPT

## 2019-05-13 PROCEDURE — 85025 COMPLETE CBC W/AUTO DIFF WBC: CPT

## 2019-05-13 PROCEDURE — 80061 LIPID PANEL: CPT

## 2019-05-13 PROCEDURE — 83540 ASSAY OF IRON: CPT

## 2019-05-13 PROCEDURE — 83550 IRON BINDING TEST: CPT

## 2019-07-22 ENCOUNTER — OFFICE VISIT (OUTPATIENT)
Dept: INTERNAL MEDICINE CLINIC | Facility: CLINIC | Age: 84
End: 2019-07-22
Payer: COMMERCIAL

## 2019-07-22 VITALS
TEMPERATURE: 98.6 F | DIASTOLIC BLOOD PRESSURE: 70 MMHG | HEIGHT: 73 IN | HEART RATE: 57 BPM | OXYGEN SATURATION: 95 % | BODY MASS INDEX: 19.61 KG/M2 | SYSTOLIC BLOOD PRESSURE: 100 MMHG | WEIGHT: 148 LBS

## 2019-07-22 DIAGNOSIS — I10 ESSENTIAL HYPERTENSION: Primary | ICD-10-CM

## 2019-07-22 DIAGNOSIS — F02.80 LATE ONSET ALZHEIMER'S DISEASE WITHOUT BEHAVIORAL DISTURBANCE (HCC): Chronic | ICD-10-CM

## 2019-07-22 DIAGNOSIS — R45.4 IRRITABILITY AND ANGER: ICD-10-CM

## 2019-07-22 DIAGNOSIS — E78.00 HYPERCHOLESTEROLEMIA: ICD-10-CM

## 2019-07-22 DIAGNOSIS — E03.9 HYPOTHYROIDISM, UNSPECIFIED TYPE: Chronic | ICD-10-CM

## 2019-07-22 DIAGNOSIS — N18.30 STAGE 3 CHRONIC KIDNEY DISEASE (HCC): ICD-10-CM

## 2019-07-22 DIAGNOSIS — G30.1 LATE ONSET ALZHEIMER'S DISEASE WITHOUT BEHAVIORAL DISTURBANCE (HCC): Chronic | ICD-10-CM

## 2019-07-22 PROBLEM — D62 ACUTE POST-HEMORRHAGIC ANEMIA: Status: RESOLVED | Noted: 2018-05-02 | Resolved: 2019-07-22

## 2019-07-22 PROBLEM — R55 SYNCOPE AND COLLAPSE: Status: RESOLVED | Noted: 2018-05-05 | Resolved: 2019-07-22

## 2019-07-22 PROCEDURE — 99214 OFFICE O/P EST MOD 30 MIN: CPT | Performed by: FAMILY MEDICINE

## 2019-07-22 PROCEDURE — 3074F SYST BP LT 130 MM HG: CPT | Performed by: FAMILY MEDICINE

## 2019-07-22 NOTE — PATIENT INSTRUCTIONS
Memory Loss in Older Adults   AMBULATORY CARE:   Some memory loss  is common with aging  You may have sharp long-term memories from many years ago but have trouble remembering new information  Normal memory loss does not get worse and does not affect daily activities  Memory loss that gets worse over time or affects daily activities can be a sign of a serious medical problem, such as Alzheimer disease  Talk with your healthcare provider if you or someone close to you notices that your memory is worsening  Signs and symptoms of memory loss that may happen with aging:   · Not remembering where you put your keys or glasses    · Trouble recalling a familiar person's name, but then remembering it    · Trouble remembering why you walked into a room    · Missing an appointment because you forgot about it    · Forgetting someone's birthday or an anniversary  Signs and symptoms of severe memory loss: The following may be signs of a more serious health problem that needs treatment:  · Not knowing how to do something you used to do    · Trouble learning new facts, or trouble learning skills that need you to remember steps    · Trouble following directions, or getting lost, even in an area you know    · Not remembering if you took your medicine or finished a task    · Not being able to remember events from your past, such as a trip you took    · Thinking events that happened years ago happened recently    · Forgetting to bathe, brush your teeth, or do other daily care tasks    · Not recognizing a family member or friend you have known a long time  You or someone close to you should contact your healthcare provider if:   · You have new, sudden, or worsening memory problems  · You have questions or concerns about your condition or care  Follow up with your healthcare provider as directed: You may need to have regular memory tests to check for new or worsening problems   Write down your questions so you remember to ask them during your visits  Manage memory loss:  Some memory loss cannot be treated, but you may be able to stop it from getting worse  Your healthcare provider may need to stop or change certain medicines you are taking, or change the dose  The provider may also recommend vitamins or supplements to help improve your memory  The following are ways to help manage memory loss:  · Ask someone to help you if needed  Ask the person to help you create lists of things you need to do or set up medicine reminders  The person might be able to call you to remind you of an upcoming task, event, or anniversary  · Find a place for items you use often  You may be able to remember where your keys, wallet, glasses, or other items are if you create a place for each item  It may also help if you say that you are returning an item to its place  This may trigger your memory later when you are looking for the item  · Set up reminders  Calendars, timers, or alarm clocks can help you remember to do tasks such as taking your medicine  Some medicine dispensers can be set to sound an alarm when it is time to take the medicine  Containers are also available that are labelled for each day of the week  These containers can help you remember if you need to take the medicine or already took it  You may be able to set up reminders with your bank to help you remember to pay your bills on time  · Write down anything you need to remember  Examples include upcoming healthcare appointments and medication refills  Put the reminders where you will see them, such as on your bathroom mirror or refrigerator  You may want to make a list of things you need to do each day  You can cross the item off when the task is finished  · Create a quiet learning environment  This may help you remember new information more easily  Try to find a place where you will not be distracted by noise, light, or other people   Go slowly and repeat the information to help you remember  Prevent your memory loss from getting worse:   · Play brain games  Games such as crossword puzzles, jigsaw puzzles, or math games can help sharpen your memory  · Spend time with other people  This can help strengthen your memory, especially if you talk about past or upcoming events  · Take a class to learn something new  This will help you think in new ways and make your memory work harder  · Eat a variety of healthy foods  Healthy foods include fruits, vegetables, low-fat dairy products, lean meats, fish, whole-grain breads, and cooked beans  Eat more foods with high amounts of omega-3 fatty acids  Examples include salmon, tuna, walnuts, and flaxseeds  Ask your healthcare provider for a list of foods that contain fatty acids and how much you should eat each day  · Exercise regularly  Exercise improves blood flow and can help you think and remember more easily  Most healthy adults should try to get at least 30 minutes of exercise on most days of the week  Ask your healthcare provider how much exercise you need and which exercises are best for you  · Create a sleep routine  Try to go to bed and wake up at the same times each day  Sleep is important for memory  Talk to your healthcare provider if you are having trouble sleeping  · Do not smoke  Nicotine and other chemicals in cigarettes and cigars can reduce the amount of oxygen going to your brain  This can make memory problems worse  Ask your healthcare provider for information if you currently smoke and need help to quit  E-cigarettes or smokeless tobacco still contain nicotine  Talk to your healthcare provider before you use these products  · Limit or do not drink alcohol  Alcohol can lead to both short-term and long-term memory problems  Ask your healthcare provider if alcohol is safe for you, and how much is safe to drink    © 2017 [employer] Information is for End User's use only and may not be sold, redistributed or otherwise used for commercial purposes  All illustrations and images included in CareNotes® are the copyrighted property of [employer]  or [employer] M[employer]  The above information is an  only  It is not intended as medical advice for individual conditions or treatments  Talk to your doctor, nurse or pharmacist before following any medical regimen to see if it is safe and effective for you

## 2019-07-22 NOTE — PROGRESS NOTES
Assessment/Plan:    Hypothyroidism  TSH stable with most recent laboratory testing  Continue with the levothyroxine 75 mcg on a daily basis  Will continue follow this with routine laboratory testing  Hypertension  Blood pressure controlled  Not currently on any medications since he did have some issues with hypotension over the past few years  Alzheimer's disease  His dementia symptoms have been steadily worsening  He has no recollection of parts of the conversation that took place even 5-10 minutes previously  Discussed with them management of his behavioral issues  Wife does not want to change the dosage of the Aricept at present  She wishes to try over-the-counter supplementation in the form of Prevegen  Recommend following up with Neurology but they wish to hold off at present  Watch for any worsening behavioral issues  His wife does understand that there may come a time that she will not be able to manage him at home  Chronic kidney disease  Creatinine relatively stable  He does not stay well hydrated which is likely worsening some of his kidney issues  Discussed with him trying to drink at least 48 oz of clear liquid daily  Preferably somewhat more than this if possible  Avoid nephrotoxins  Hypercholesterolemia  Continue with the atorvastatin  Since he has been losing some weight, will not have him watch his diet strictly  Increase fiber in diet  Try to increase activity level since he is now not very active at all and had been significantly active in the past   This may help his overall health status  Irritability and anger  He has no recollection of the irritability and anger issues  Wife declines any medications to treat this at present  He has not been acting any of his anger out of present  Any of the potential weapons have been removed from the house  Advised his wife to watch for any worsening and continue to try to redirect him as much as possible         Diagnoses and all orders for this visit:    Essential hypertension  -     CBC and differential; Future  -     Comprehensive metabolic panel; Future  -     Lipid panel; Future    Hypothyroidism, unspecified type    Late onset Alzheimer's disease without behavioral disturbance    Stage 3 chronic kidney disease ([facility] [address] )  -     Comprehensive metabolic panel; Future    Hypercholesterolemia  -     Comprehensive metabolic panel; Future  -     Lipid panel; Future  -     TSH, 3rd generation; Future    Irritability and anger  -     TSH, 3rd generation; Future    Other orders  -     Cancel: HEMOGLOBIN A1C W/ EAG ESTIMATION; Future  -     Cancel: Ambulatory referral to Podiatry; Future  -     Cancel: Ambulatory referral to Ophthalmology; Future  -     Cancel: Microalbumin / creatinine urine ratio        Orders recommendations as noted above  He is up-to-date on his pneumonia vaccines  Recommended flu shot in the fall  Will have him follow up in about 3-4 months or sooner if needed  Subjective:      Patient ID: Olamide Mullins is a 81 y o  male  He presents with his wife for routine follow-up  His memory has been steadily worsening  Throughout the visit he does not recall conversations that were held less than 5 minutes before  He does have worsening of repetition  On discussing some of the behavioral issues with his wife, he has no recollection of what happened this morning  Appetite has been decreasing over the last 6 months or so  No difficulty swallowing yet  Does have difficulty with hygiene  He will not bathe regularly  He does continue to have some issues with leakage of urine  Often times will refuse to change clothes regularly  Has not been wandering  Continues on the Aricept but his wife is unsure whether this is helping  She is hesitant to increase the dosage  She wishes to try an over-the-counter supplement, Prevegen, to see if it is helpful    His wife states that in the mornings he has more behavioral issues and often yells and is verbally abusive  He has told her that he wants a divorce  He has verbally threatened her but never acted any of these threats out  Their son has removed any weapons from the house  She does not feel that she is at risk at present  He does not recall any of these events  Denies any nausea or vomiting  Denies any gross blood in the urine  Does have difficulty controlling his urine chronically  Denies any dysuria  Tolerating statin medication without difficulty  Denies muscle aches or weakness  Denies abdominal pain  Denies chest pain or palpitations  Continues to follow-up with Cardiology on a regular basis  Continues with the Cymbalta which is wife feels helps more than the Aricept  The following portions of the patient's history were reviewed and updated as appropriate:   He  has a past medical history of Abnormal stress test, Abnormal weight loss, Alzheimer's disease (5/2/2018), Aortic insufficiency with aortic stenosis, Bradycardia, unspecified, Coronary artery disease, Diabetes ([facility] [address] ), Disease of thyroid gland, Dyslipidemia, Gastric ulcer, H/O cardiovascular stress test, History of echocardiogram (08/05/2016), History of EKG, History of EKG, History of Holter monitoring, History of Holter monitoring (08/22/2016), echocardiogram, Hypertension, Hypothyroid (5/2/2018), ICAO (internal carotid artery occlusion), Inguinal hernia, left, Peptic ulcer, and Prostate enlargement (5/2/2018)    He   Patient Active Problem List    Diagnosis Date Noted    History of left-sided carotid endarterectomy 08/27/2018    Aortic valve disorder 08/03/2018    Presence of aortocoronary bypass graft 07/13/2018    Presence of prosthetic heart valve 07/13/2018    Personal history of nicotine dependence 07/13/2018    Irritability and anger 06/05/2018    Malignant neoplasm of bladder ([facility] [address] ) 05/08/2018    Bradycardia 05/05/2018    Hypothyroidism 05/02/2018    Alzheimer's disease 05/02/2018    Prostate enlargement 05/02/2018    Asymptomatic bilateral carotid artery stenosis 04/24/2017    Chronic kidney disease 01/23/2017    Nocturia 09/06/2016    Weight loss 09/06/2016    Peptic ulcer 12/17/2015    Memory difficulties 12/08/2015    Occlusion and stenosis of carotid artery without mention of cerebral infarction 03/11/2015    Arthralgia 05/19/2014    Dermatitis, eczematoid 02/24/2014    Arteriosclerotic heart disease 06/07/2013    Carotid artery stenosis 11/13/2012    Depression 11/13/2012    Hypercholesterolemia 05/29/2012    Hypertension 05/29/2012     He  has a past surgical history that includes Aortic valve replacement; Thromboendarterectomy; Cataract extraction (Bilateral); Inguinal hernia repair; Nephrectomy (Right); Coronary artery bypass graft; pr cystourethroscopy w/irrig & evac clots (N/A, 5/7/2018); and CAROTID ENDARTARECTOMY (04/26/2013)  His family history includes Alzheimer's disease in his brother; Other in his father; Sudden death in his mother  He  reports that he has quit smoking  His smoking use included cigarettes  He has never used smokeless tobacco  He reports that he drinks alcohol  He reports that he does not use drugs  Current Outpatient Medications   Medication Sig Dispense Refill    aspirin (ECOTRIN LOW STRENGTH) 81 mg EC tablet Take 81 mg by mouth daily      atorvastatin (LIPITOR) 80 mg tablet Take 1 tablet (80 mg total) by mouth daily 90 tablet 3    B Complex-C (SUPER B/C) CAPS Take by mouth take 1 tablet daily        cholecalciferol (VITAMIN D3) 1,000 units tablet Take 2,000 Units by mouth daily      co-enzyme Q-10 30 MG capsule Take 100 mg by mouth 3 (three) times a day      COCONUT OIL-FLAXSEED OIL PO Take by mouth      cyanocobalamin (VITAMIN B-12) 1,000 mcg tablet Take 1,000 mcg by mouth daily      donepezil (ARICEPT) 5 mg tablet Take 1 tablet (5 mg total) by mouth daily at bedtime 90 tablet 3    DULoxetine (CYMBALTA) 30 mg delayed release capsule Take 1 capsule (30 mg total) by mouth 2 (two) times a day 180 capsule 1    levothyroxine 75 mcg tablet Take 1 tablet (75 mcg total) by mouth daily 90 tablet 3    multivitamin (THERAGRAN) TABS Take 1 tablet by mouth daily      Omega-3 Fatty Acids (OMEGA 3 PO) Take by mouth Omega 3 350 mg- 400 mg capsule [id number] mg every day      pantoprazole (PROTONIX) 20 mg tablet Take 20 mg by mouth daily      tamsulosin (FLOMAX) 0 4 mg Take 1 capsule (0 4 mg total) by mouth daily with dinner 90 capsule 3     No current facility-administered medications for this visit  Current Outpatient Medications on File Prior to Visit   Medication Sig    aspirin (ECOTRIN LOW STRENGTH) 81 mg EC tablet Take 81 mg by mouth daily    atorvastatin (LIPITOR) 80 mg tablet Take 1 tablet (80 mg total) by mouth daily    B Complex-C (SUPER B/C) CAPS Take by mouth take 1 tablet daily   cholecalciferol (VITAMIN D3) 1,000 units tablet Take 2,000 Units by mouth daily    co-enzyme Q-10 30 MG capsule Take 100 mg by mouth 3 (three) times a day    COCONUT OIL-FLAXSEED OIL PO Take by mouth    cyanocobalamin (VITAMIN B-12) 1,000 mcg tablet Take 1,000 mcg by mouth daily    donepezil (ARICEPT) 5 mg tablet Take 1 tablet (5 mg total) by mouth daily at bedtime    DULoxetine (CYMBALTA) 30 mg delayed release capsule Take 1 capsule (30 mg total) by mouth 2 (two) times a day    levothyroxine 75 mcg tablet Take 1 tablet (75 mcg total) by mouth daily    multivitamin (THERAGRAN) TABS Take 1 tablet by mouth daily    Omega-3 Fatty Acids (OMEGA 3 PO) Take by mouth Omega 3 350 mg- 400 mg capsule [id number] mg every day    pantoprazole (PROTONIX) 20 mg tablet Take 20 mg by mouth daily    tamsulosin (FLOMAX) 0 4 mg Take 1 capsule (0 4 mg total) by mouth daily with dinner     No current facility-administered medications on file prior to visit  He is allergic to contrast [iodinated diagnostic agents]; iodine; and morphine       Review of Systems   Constitutional: Positive for activity change, appetite change and fatigue  Negative for chills and fever  HENT: Negative for hearing loss  Eyes: Negative for pain and visual disturbance  Respiratory: Negative for cough, chest tightness and shortness of breath  Cardiovascular: Negative for chest pain and palpitations  Gastrointestinal: Negative for abdominal pain, blood in stool, diarrhea, nausea and vomiting  Endocrine: Negative for polydipsia, polyphagia and polyuria  Genitourinary: Negative for dysuria  As per HPI   Musculoskeletal: Negative for arthralgias and gait problem  Skin: Negative for color change  Neurological: Negative for dizziness and headaches  Hematological: Does not bruise/bleed easily  Psychiatric/Behavioral: Positive for agitation, behavioral problems and confusion  Negative for hallucinations and self-injury  The patient is not nervous/anxious  Objective:      /70 (BP Location: Left arm, Patient Position: Sitting, Cuff Size: Standard)   Pulse 57   Temp 98 6 °F (37 °C) (Temporal)   Ht 6' 1" (1 854 m)   Wt 67 1 kg (148 lb)   SpO2 95%   BMI 19 53 kg/m²          Physical Exam   Constitutional: He is cooperative  No distress  HENT:   Head: Normocephalic and atraumatic  Nose: Nose normal    Slight cerumen   Eyes: Pupils are equal, round, and reactive to light  Conjunctivae are normal    Cardiovascular: Normal rate and regular rhythm  Exam reveals no gallop and no friction rub  Murmur heard  Systolic murmur is present with a grade of 1/6  Pulmonary/Chest: He has no wheezes  He has no rales  He exhibits no tenderness  Abdominal: He exhibits no distension  There is no tenderness  There is no rebound and no guarding  Lymphadenopathy:     He has no cervical adenopathy  Neurological: He is alert  He is disoriented  Skin: Skin is warm and dry  Psychiatric: His behavior is normal  His affect is labile   Cognition and memory are impaired  He expresses inappropriate judgment  He exhibits abnormal recent memory  He is inattentive  Nursing note and vitals reviewed  Below is the patient's most recent value for Albumin, ALT, AST, BUN, Calcium, Chloride, Cholesterol, CO2, Creatinine, GFR, Glucose, HDL, Hematocrit, Hemoglobin, Hemoglobin A1C, LDL, Magnesium, Phosphorus, Platelets, Potassium, PSA, Sodium, Triglycerides, and WBC  Lab Results   Component Value Date    ALT 37 05/13/2019    AST 36 05/13/2019    BUN 20 05/13/2019    CALCIUM 8 8 05/13/2019     05/13/2019    CHOL 197 12/01/2015    CO2 30 05/13/2019    CREATININE 1 31 (H) 05/13/2019    HDL 41 05/13/2019    HCT 36 0 (L) 05/13/2019    HGB 11 5 (L) 05/13/2019    MG 1 7 05/10/2018    PHOS 3 6 05/09/2018     05/13/2019    K 4 4 05/13/2019     12/01/2015    TRIG 95 05/13/2019    WBC 6 91 05/13/2019     Note: for a comprehensive list of the patient's lab results, access the Results Review activity

## 2019-07-23 NOTE — ASSESSMENT & PLAN NOTE
Continue with the atorvastatin  Since he has been losing some weight, will not have him watch his diet strictly  Increase fiber in diet  Try to increase activity level since he is now not very active at all and had been significantly active in the past   This may help his overall health status

## 2019-07-23 NOTE — ASSESSMENT & PLAN NOTE
TSH stable with most recent laboratory testing  Continue with the levothyroxine 75 mcg on a daily basis  Will continue follow this with routine laboratory testing

## 2019-07-23 NOTE — ASSESSMENT & PLAN NOTE
He has no recollection of the irritability and anger issues  Wife declines any medications to treat this at present  He has not been acting any of his anger out of present  Any of the potential weapons have been removed from the house  Advised his wife to watch for any worsening and continue to try to redirect him as much as possible

## 2019-07-23 NOTE — ASSESSMENT & PLAN NOTE
His dementia symptoms have been steadily worsening  He has no recollection of parts of the conversation that took place even 5-10 minutes previously  Discussed with them management of his behavioral issues  Wife does not want to change the dosage of the Aricept at present  She wishes to try over-the-counter supplementation in the form of Prevegen  Recommend following up with Neurology but they wish to hold off at present  Watch for any worsening behavioral issues  His wife does understand that there may come a time that she will not be able to manage him at home

## 2019-07-23 NOTE — ASSESSMENT & PLAN NOTE
Blood pressure controlled  Not currently on any medications since he did have some issues with hypotension over the past few years

## 2019-07-23 NOTE — ASSESSMENT & PLAN NOTE
Creatinine relatively stable  He does not stay well hydrated which is likely worsening some of his kidney issues  Discussed with him trying to drink at least 48 oz of clear liquid daily  Preferably somewhat more than this if possible  Avoid nephrotoxins

## 2019-08-09 DIAGNOSIS — E02 SUBCLINICAL IODINE-DEFICIENCY HYPOTHYROIDISM: ICD-10-CM

## 2019-08-09 NOTE — TELEPHONE ENCOUNTER
Received a call from Derek Julio requesting a refill on levothyroxine  Verified sig and pharmacy  Aware of 48 hr fill policy  Please advise

## 2019-08-10 RX ORDER — LEVOTHYROXINE SODIUM 0.07 MG/1
75 TABLET ORAL DAILY
Qty: 90 TABLET | Refills: 3 | Status: SHIPPED | OUTPATIENT
Start: 2019-08-10 | End: 2020-07-21 | Stop reason: SDUPTHER

## 2019-08-13 ENCOUNTER — PROCEDURE VISIT (OUTPATIENT)
Dept: UROLOGY | Facility: CLINIC | Age: 84
End: 2019-08-13
Payer: COMMERCIAL

## 2019-08-13 VITALS
DIASTOLIC BLOOD PRESSURE: 70 MMHG | SYSTOLIC BLOOD PRESSURE: 130 MMHG | WEIGHT: 148 LBS | HEART RATE: 64 BPM | HEIGHT: 73 IN | BODY MASS INDEX: 19.61 KG/M2

## 2019-08-13 DIAGNOSIS — C67.2 MALIGNANT NEOPLASM OF LATERAL WALL OF URINARY BLADDER (HCC): Primary | ICD-10-CM

## 2019-08-13 PROCEDURE — 52214 CYSTOSCOPY AND TREATMENT: CPT | Performed by: UROLOGY

## 2019-08-13 NOTE — PROGRESS NOTES
Cystoscopy  Date/Time: 8/13/2019 2:18 PM  Performed by: Taye Kirkland MD  Authorized by: Taye Kirkland MD     Procedure details: fulguration    Width of defect (cm):  0 5  Patient tolerance: Patient tolerated the procedure well with no immediate complications    Additional Procedure Details:   Cystoscopy procedure note:    Risk and benefits of flexible cystoscopy were discussed  Informed consent was obtained  Urine dip was adequate for cystoscopy  The patient was placed in the supine position  His genitalia was prepped with Betadine and draped in a sterile fashion  Viscous 2% lidocaine jelly was instilled into the urethra and allowed dwell time for local anesthesia  Flexible cystoscopy was then performed using a 16F scope  The distal urethra and prostatic urethra were evaluated and were normal in course and caliber  Once inside the bladder, it was carefully inspected in a 360 degree fashion  At the 6 o'clock position on the bladder neck prostate there was 0 5 cm lesion  At the right dome there was a 0 5 cm lesion and the right lateral wall there was 0 25 cm lesion  These areas were fulgurated with Bugbee electrocautery  Both ureteral orifices in their orthotopic location were visualized with clear efflux of urine  Overall this was a  Cystoscopy and fulguration of known low-grade bladder cancer

## 2019-08-13 NOTE — PROGRESS NOTES
UROLOGY FOLLOW UP NOTE     CHIEF COMPLAINT   Sergio Eason is a 81 y o  male with a complaint of   Chief Complaint   Patient presents with    Cystoscopy       History of Present Illness:     81 y o  male who was previously a patient of an outside urologist   Patient has had a recent hospitalization was significant gross hematuria  He was taken to the operating room on 5/7/2018 for resection  Patient's hematuria improved dramatically over his hospital stay  Given his deconditioning and other medical issues, he was transferred to the 5th floor for rehabilitation  Of note, the wife of the patient is a former operating room nurse at [employer]    He has now been transitioned to home  His wife reports continued agitation and irritation at home  He has not had any recurrent bleeding  Returns for cystoscopy  Wife reports continued worsening of dementia  Some occasional urinary urgency but no recurrent gross hematuria  At prior visit, underwent fulguration of lesion given significant dementia, history of LG lesions and desire to avoid operative intervention  3 months ago, the patient presented and was found to have multifocal recurrence of the papillary lesions  Wife declined operative intervention at that time  He returns today to reinspect  Past Medical History:     Past Medical History:   Diagnosis Date    Abnormal stress test     Abnormal weight loss     Alzheimer's disease 5/2/2018    Aortic insufficiency with aortic stenosis     [s/p AVR (tissue) 4/2013]     Bradycardia, unspecified     Coronary artery disease      [s/p CABG x3 4/2013]    Diabetes ([facility] [address] )     Disease of thyroid gland     Dyslipidemia     Gastric ulcer     last assessed: 2/24/14    H/O cardiovascular stress test      (EF 0 67, Not suspicious for significant occlusive CAD  ) - 11/28/2007; [facility] (Normal EF, Inferior wall post stress HK, markedly positive EKG with chest pressure) - 4/10/2013    History of echocardiogram 08/05/2016    2-D w/ CFD:  EF 0 55 (55%), Normal LVSF  Normal functioning bioprosthetic AV   History of EKG     EKG shows sinus bradycardia, 49 bpm, otherwise normal     History of EKG      (Sinus Rhythm) - 3/25/2013;  (Sinus Jannis Littler) - 5/20/2013;  9/25/2013     History of Holter monitoring      (SR w/ rates ranging from 44-78BPM  No afib, no heart block, no vtach, no pauses, and no symptoms per pt  there were few short atrial runs, highest being 140BPM, the longest being 8 beats; though overall atrial and ventricular ectopic beats were rare ) - 8/3/2016     History of Holter monitoring 08/22/2016    24 hr    Hx of echocardiogram     (EF 0 70, Without regional wall abnormalities  Possibly bicuspid aortic valve with mild AS, no aortic insufficiency ) 3/22/2007;  (EF 0 55 (55%), Normal LVSF  Normal functioning bioprosthetic AV ) - 8/3/2016     Hypertension     Hypothyroid 5/2/2018    ICAO (internal carotid artery occlusion)     Duplex (There is known occlusion of ICA in rt carotid and the vertebral artery is antegrade  0-19% ICA stenosis of Lt carotid  The vertebral artery is antegrade  Widely patent endarterectomy site on the left ) - 1/30/2014 Carotid Duplex (No change  Known occlusion of ICA in right carotid   0-19% ICA stenosis in left carotid, and widely patent endarterectomy site on left ) - 8/28/2014    Inguinal hernia, left     last assessed: 10/8/14    Peptic ulcer     last assessed: 5/21/13    Prostate enlargement 5/2/2018       PAST SURGICAL HISTORY:     Past Surgical History:   Procedure Laterality Date    AORTIC VALVE REPLACEMENT      CAROTID ENDARTARECTOMY  04/26/2013    CATARACT EXTRACTION Bilateral     CORONARY ARTERY BYPASS GRAFT       (3V CABG: LIMA-LAD, VG-RCA, VG-CX, Tissue AVR (#23 [address])) - 4/26/2013     INGUINAL HERNIA REPAIR      NEPHRECTOMY Right     TN CYSTOURETHROSCOPY W/IRRIG & EVAC CLOTS N/A 5/7/2018    Procedure: CYSTOSCOPY EVACUATION OF CLOTS AND TURBT; Surgeon: Oneal Emerson MD;  Location: [facility];  Service: Urology    THROMBOENDARTERECTOMY      Carotid       CURRENT MEDICATIONS:     Current Outpatient Medications   Medication Sig Dispense Refill    Apoaequorin (PREVAGEN) 10 MG CAPS Take by mouth      aspirin (ECOTRIN LOW STRENGTH) 81 mg EC tablet Take 81 mg by mouth daily      atorvastatin (LIPITOR) 80 mg tablet Take 1 tablet (80 mg total) by mouth daily 90 tablet 3    B Complex-C (SUPER B/C) CAPS Take by mouth take 1 tablet daily   cholecalciferol (VITAMIN D3) 1,000 units tablet Take 2,000 Units by mouth daily      co-enzyme Q-10 30 MG capsule Take 100 mg by mouth 3 (three) times a day      COCONUT OIL-FLAXSEED OIL PO Take by mouth      cyanocobalamin (VITAMIN B-12) 1,000 mcg tablet Take 1,000 mcg by mouth daily      DULoxetine (CYMBALTA) 30 mg delayed release capsule Take 1 capsule (30 mg total) by mouth 2 (two) times a day 180 capsule 1    levothyroxine 75 mcg tablet Take 1 tablet (75 mcg total) by mouth daily 90 tablet 3    multivitamin (THERAGRAN) TABS Take 1 tablet by mouth daily      Omega-3 Fatty Acids (OMEGA 3 PO) Take by mouth Omega 3 350 mg- 400 mg capsule [id number] mg every day      pantoprazole (PROTONIX) 20 mg tablet Take 20 mg by mouth daily      tamsulosin (FLOMAX) 0 4 mg Take 1 capsule (0 4 mg total) by mouth daily with dinner 90 capsule 3    donepezil (ARICEPT) 5 mg tablet Take 1 tablet (5 mg total) by mouth daily at bedtime (Patient not taking: Reported on 8/13/2019) 90 tablet 3     No current facility-administered medications for this visit          ALLERGIES:     Allergies   Allergen Reactions    Contrast [Iodinated Diagnostic Agents]     Iodine Throat Swelling     IVP contrast dye    Morphine Rash       SOCIAL HISTORY:     Social History     Socioeconomic History    Marital status: /Civil Union     Spouse name: None    Number of children: None    Years of education: None    Highest education level: None   Occupational History    None   Social Needs    Financial resource strain: None    Food insecurity:     Worry: None     Inability: None    Transportation needs:     Medical: None     Non-medical: None   Tobacco Use    Smoking status: Former Smoker     Types: Cigarettes    Smokeless tobacco: Never Used    Tobacco comment: quit 20 years ago   Substance and Sexual Activity    Alcohol use: Yes     Comment: social    Drug use: No    Sexual activity: None   Lifestyle    Physical activity:     Days per week: None     Minutes per session: None    Stress: None   Relationships    Social connections:     Talks on phone: None     Gets together: None     Attends Worship service: None     Active member of club or organization: None     Attends meetings of clubs or organizations: None     Relationship status: None    Intimate partner violence:     Fear of current or ex partner: None     Emotionally abused: None     Physically abused: None     Forced sexual activity: None   Other Topics Concern    None   Social History Narrative    None       SOCIAL HISTORY:     Family History   Problem Relation Age of Onset    Sudden death Mother         unexpected death    Other Father         Coal workers' Pneumoconiosis    Alzheimer's disease Brother        REVIEW OF SYSTEMS:     Review of Systems   Unable to perform ROS: Dementia (Relied on wife for review)   Constitutional: Negative  Respiratory: Negative  Cardiovascular: Negative  Gastrointestinal: Negative  Genitourinary: Negative  Musculoskeletal: Negative  Skin: Negative  Neurological: Negative  Psychiatric/Behavioral: Positive for behavioral problems and confusion  PHYSICAL EXAM:     /70   Pulse 64   Ht 6' 1" (1 854 m)   Wt 67 1 kg (148 lb)   BMI 19 53 kg/m²     General:  Frail-appearing elderly male in no acute distress  HEENT:  Normocephalic, atraumatic    Neck is supple without any palpable lymphadenopathy  Cardiovascular:  Patient has normal palpable distal radial pulses  There is no significant peripheral edema  No JVD is noted  Respiratory:  Patient has unlabored respirations  There is no audible wheeze or rhonchi  Abdomen:   Abdomen is soft and nontender  There is no tympany  Inguinal and umbilical hernia are not appreciated  : Uncircumcised, orthotopic meatus  Musculoskeletal:  Patient does not have significant CVA tenderness in the  flank with palpation or percussion  They full range of motion in all 4 extremities  Strength in all 4 extremities appears congruent  Patient is able to ambulate without assistance or difficulty  Dermatologic:  Patient has no skin abnormalities or rashes  LABS:     CBC:   Lab Results   Component Value Date    WBC 6 91 2019    HGB 11 5 (L) 2019    HCT 36 0 (L) 2019    MCV 94 2019     2019       BMP:   Lab Results   Component Value Date    GLUCOSE 97 2015    CALCIUM 8 8 2019     2015    K 4 4 2019    CO2 30 2019     2019    BUN 20 2019    CREATININE 1 31 (H) 2019       IMAGIN/2/18  CT ABDOMEN AND PELVIS WITHOUT IV CONTRAST     INDICATION:   hematuria      COMPARISON: None      TECHNIQUE:  CT examination of the abdomen and pelvis was performed without intravenous contrast   Axial, sagittal, and coronal 2D reformatted images were created from the source data and submitted for interpretation       Radiation dose length product (DLP) for this visit:  186 37 mGy-cm     This examination, like all CT scans performed in the [facility], was performed utilizing techniques to minimize radiation dose exposure, including the use of iterative   reconstruction and automated exposure control       Enteric contrast was administered       FINDINGS:     ABDOMEN     LOWER CHEST:  No clinically significant abnormality identified in the visualized lower chest      LIVER/BILIARY TREE:  Unremarkable      GALLBLADDER:  No calcified gallstones  No pericholecystic inflammatory change      SPLEEN:  Unremarkable      PANCREAS:  Unremarkable      ADRENAL GLANDS:  Unremarkable      KIDNEYS/URETERS:  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  Noncontrast evaluation of left kidney is grossly unremarkable      STOMACH AND BOWEL:  Unremarkable      APPENDIX:  No findings to suggest appendicitis      ABDOMINOPELVIC CAVITY:  No ascites or free intraperitoneal air  No lymphadenopathy      VESSELS:  Unremarkable for patient's age      PELVIS     REPRODUCTIVE ORGANS:  Status post hysterectomy      URINARY BLADDER:  Loyd catheter is present  Hyperdensity within the bladder lumen is consistent with history of hematuria      ABDOMINAL WALL/INGUINAL REGIONS:  Unremarkable      OSSEOUS STRUCTURES:  No acute fracture or destructive osseous lesion      IMPRESSION:     No etiology for hematuria identified  Urologic follow-up is recommended  Hyperdensity within the bladder lumen likely representing hemorrhage  PATHOLOGY:     Final Diagnosis   A  Urinary bladder tumor curettings:  - Low grade papillary urothelial carcinoma  - No invasive malignancy is seen  - No detrusor muscle is seen in this material        PROCEDURE:     SEE NOTE    ASSESSMENT:     81 y o  male with LGTa bladder cancer    PLAN:       Patient fulgurated in multiple spots today  Given worsening dementia and low-grade disease, will see in 6 months for possible cysto and fulguration again  If patient has bleeding or recurrent symptoms in the interim, will see back sooner  His cases challenging given the worsening cognitive decline

## 2019-10-17 ENCOUNTER — APPOINTMENT (OUTPATIENT)
Dept: LAB | Facility: MEDICAL CENTER | Age: 84
End: 2019-10-17
Payer: COMMERCIAL

## 2019-10-17 DIAGNOSIS — E78.00 HYPERCHOLESTEROLEMIA: ICD-10-CM

## 2019-10-17 DIAGNOSIS — I10 ESSENTIAL HYPERTENSION: ICD-10-CM

## 2019-10-17 DIAGNOSIS — N18.30 STAGE 3 CHRONIC KIDNEY DISEASE (HCC): ICD-10-CM

## 2019-10-17 DIAGNOSIS — R45.4 IRRITABILITY AND ANGER: ICD-10-CM

## 2019-10-17 LAB
ALBUMIN SERPL BCP-MCNC: 3.7 G/DL (ref 3.5–5)
ALP SERPL-CCNC: 61 U/L (ref 46–116)
ALT SERPL W P-5'-P-CCNC: 28 U/L (ref 12–78)
ANION GAP SERPL CALCULATED.3IONS-SCNC: 5 MMOL/L (ref 4–13)
AST SERPL W P-5'-P-CCNC: 28 U/L (ref 5–45)
BASOPHILS # BLD AUTO: 0.06 THOUSANDS/ΜL (ref 0–0.1)
BASOPHILS NFR BLD AUTO: 1 % (ref 0–1)
BILIRUB SERPL-MCNC: 0.45 MG/DL (ref 0.2–1)
BUN SERPL-MCNC: 29 MG/DL (ref 5–25)
CALCIUM SERPL-MCNC: 9.1 MG/DL (ref 8.3–10.1)
CHLORIDE SERPL-SCNC: 105 MMOL/L (ref 100–108)
CHOLEST SERPL-MCNC: 204 MG/DL (ref 50–200)
CO2 SERPL-SCNC: 30 MMOL/L (ref 21–32)
CREAT SERPL-MCNC: 1.22 MG/DL (ref 0.6–1.3)
EOSINOPHIL # BLD AUTO: 0.36 THOUSAND/ΜL (ref 0–0.61)
EOSINOPHIL NFR BLD AUTO: 5 % (ref 0–6)
ERYTHROCYTE [DISTWIDTH] IN BLOOD BY AUTOMATED COUNT: 13.9 % (ref 11.6–15.1)
GFR SERPL CREATININE-BSD FRML MDRD: 54 ML/MIN/1.73SQ M
GLUCOSE P FAST SERPL-MCNC: 96 MG/DL (ref 65–99)
HCT VFR BLD AUTO: 33.4 % (ref 36.5–49.3)
HDLC SERPL-MCNC: 44 MG/DL (ref 40–60)
HGB BLD-MCNC: 10.5 G/DL (ref 12–17)
IMM GRANULOCYTES # BLD AUTO: 0.01 THOUSAND/UL (ref 0–0.2)
IMM GRANULOCYTES NFR BLD AUTO: 0 % (ref 0–2)
LDLC SERPL CALC-MCNC: 144 MG/DL (ref 0–100)
LYMPHOCYTES # BLD AUTO: 1.62 THOUSANDS/ΜL (ref 0.6–4.47)
LYMPHOCYTES NFR BLD AUTO: 23 % (ref 14–44)
MCH RBC QN AUTO: 29.8 PG (ref 26.8–34.3)
MCHC RBC AUTO-ENTMCNC: 31.4 G/DL (ref 31.4–37.4)
MCV RBC AUTO: 95 FL (ref 82–98)
MONOCYTES # BLD AUTO: 0.64 THOUSAND/ΜL (ref 0.17–1.22)
MONOCYTES NFR BLD AUTO: 9 % (ref 4–12)
NEUTROPHILS # BLD AUTO: 4.51 THOUSANDS/ΜL (ref 1.85–7.62)
NEUTS SEG NFR BLD AUTO: 62 % (ref 43–75)
NONHDLC SERPL-MCNC: 160 MG/DL
NRBC BLD AUTO-RTO: 0 /100 WBCS
PLATELET # BLD AUTO: 216 THOUSANDS/UL (ref 149–390)
PMV BLD AUTO: 11.2 FL (ref 8.9–12.7)
POTASSIUM SERPL-SCNC: 4.2 MMOL/L (ref 3.5–5.3)
PROT SERPL-MCNC: 7.3 G/DL (ref 6.4–8.2)
RBC # BLD AUTO: 3.52 MILLION/UL (ref 3.88–5.62)
SODIUM SERPL-SCNC: 140 MMOL/L (ref 136–145)
TRIGL SERPL-MCNC: 78 MG/DL
TSH SERPL DL<=0.05 MIU/L-ACNC: 4.06 UIU/ML (ref 0.36–3.74)
WBC # BLD AUTO: 7.2 THOUSAND/UL (ref 4.31–10.16)

## 2019-10-17 PROCEDURE — 80053 COMPREHEN METABOLIC PANEL: CPT

## 2019-10-17 PROCEDURE — 85025 COMPLETE CBC W/AUTO DIFF WBC: CPT

## 2019-10-17 PROCEDURE — 36415 COLL VENOUS BLD VENIPUNCTURE: CPT

## 2019-10-17 PROCEDURE — 80061 LIPID PANEL: CPT

## 2019-10-17 PROCEDURE — 84443 ASSAY THYROID STIM HORMONE: CPT

## 2019-10-23 ENCOUNTER — OFFICE VISIT (OUTPATIENT)
Dept: INTERNAL MEDICINE CLINIC | Facility: CLINIC | Age: 84
End: 2019-10-23
Payer: COMMERCIAL

## 2019-10-23 VITALS
TEMPERATURE: 97.5 F | DIASTOLIC BLOOD PRESSURE: 70 MMHG | SYSTOLIC BLOOD PRESSURE: 100 MMHG | OXYGEN SATURATION: 98 % | BODY MASS INDEX: 19.61 KG/M2 | WEIGHT: 148 LBS | HEART RATE: 57 BPM | HEIGHT: 73 IN

## 2019-10-23 DIAGNOSIS — E03.9 HYPOTHYROIDISM, UNSPECIFIED TYPE: Chronic | ICD-10-CM

## 2019-10-23 DIAGNOSIS — I25.10 ARTERIOSCLEROTIC HEART DISEASE: ICD-10-CM

## 2019-10-23 DIAGNOSIS — F03.91 DEMENTIA WITH BEHAVIORAL DISTURBANCE, UNSPECIFIED DEMENTIA TYPE (HCC): Primary | ICD-10-CM

## 2019-10-23 DIAGNOSIS — F32.A DEPRESSION, UNSPECIFIED DEPRESSION TYPE: ICD-10-CM

## 2019-10-23 DIAGNOSIS — Z00.00 MEDICARE ANNUAL WELLNESS VISIT, SUBSEQUENT: ICD-10-CM

## 2019-10-23 DIAGNOSIS — E78.00 HYPERCHOLESTEROLEMIA: ICD-10-CM

## 2019-10-23 DIAGNOSIS — R45.4 IRRITABILITY AND ANGER: ICD-10-CM

## 2019-10-23 DIAGNOSIS — Z23 NEED FOR INFLUENZA VACCINATION: ICD-10-CM

## 2019-10-23 PROBLEM — F03.90 DEMENTIA (HCC): Status: ACTIVE | Noted: 2018-05-02

## 2019-10-23 PROCEDURE — 1125F AMNT PAIN NOTED PAIN PRSNT: CPT | Performed by: FAMILY MEDICINE

## 2019-10-23 PROCEDURE — 1170F FXNL STATUS ASSESSED: CPT | Performed by: FAMILY MEDICINE

## 2019-10-23 PROCEDURE — G0008 ADMIN INFLUENZA VIRUS VAC: HCPCS | Performed by: FAMILY MEDICINE

## 2019-10-23 PROCEDURE — 99214 OFFICE O/P EST MOD 30 MIN: CPT | Performed by: FAMILY MEDICINE

## 2019-10-23 PROCEDURE — G0439 PPPS, SUBSEQ VISIT: HCPCS | Performed by: FAMILY MEDICINE

## 2019-10-23 PROCEDURE — 1101F PT FALLS ASSESS-DOCD LE1/YR: CPT | Performed by: FAMILY MEDICINE

## 2019-10-23 PROCEDURE — 90662 IIV NO PRSV INCREASED AG IM: CPT | Performed by: FAMILY MEDICINE

## 2019-10-23 NOTE — PROGRESS NOTES
Assessment and Plan:     Problem List Items Addressed This Visit        Endocrine    Hypothyroidism (Chronic)     TSH mildly elevated  Will continue current dose of levothyroxine and continue follow the TSH regularly  Will increase the dose of levothyroxine if his TSH continues to rise  Relevant Orders    Comprehensive metabolic panel    Lipid panel    TSH, 3rd generation       Cardiovascular and Mediastinum    Arteriosclerotic heart disease     Asymptomatic from a cardiac standpoint but he is a poor historian  Has not had recent cardiology follow-up  Our office will arrange for this for him and his wife  Continue with the control of risk factors as previously  Watch for any anginal symptoms  Relevant Orders    Comprehensive metabolic panel    Lipid panel    TSH, 3rd generation    Ambulatory referral to Cardiology       Nervous and Auditory    Dementia [facility]) - Primary     His symptoms are not classic for Alzheimer's  With his history of significant vascular abnormalities as well as the very slow but persistent decline in cognitive function, will refer to Neurology for their opinion  His wife had stop the Aricept  She had tried over-the-counter Prevagen and this had also not been significantly affective  Watch for any worsening behavioral issues  Methods for redirection discussed  Continue to use the soothing music since this seems to have a calming effect  Relevant Orders    CBC and differential    Comprehensive metabolic panel    Lipid panel    TSH, 3rd generation    Ambulatory referral to Neurology       Other    Depression     Some depression symptoms but these have been relatively well controlled with the Cymbalta in the past   Doubt that the depression symptoms are causing cognitive decline  Await recommendations by Neurology           Relevant Orders    Comprehensive metabolic panel    Lipid panel    TSH, 3rd generation    Hypercholesterolemia     Continue with the atorvastatin  Remain as active as possible  Continue to watch diet  Irritability and anger     Discussed with his wife ways to redirect him  All potential weapons have been essentially removed from the house  Unfortunately they do not have a lot of local family help  Relevant Orders    Comprehensive metabolic panel    Lipid panel    TSH, 3rd generation    Ambulatory referral to Neurology      Other Visit Diagnoses     Need for influenza vaccination        Relevant Orders    influenza vaccine, [id number], high-dose, PF 0 5 mL (FLUZONE HIGH-DOSE) (Completed)    Medicare annual wellness visit, subsequent               Preventive health issues were discussed with patient, and age appropriate screening tests were ordered as noted in patient's After Visit Summary  Personalized health advice and appropriate referrals for health education or preventive services given if needed, as noted in patient's After Visit Summary       History of Present Illness:     Patient presents for Medicare Annual Wellness visit    Patient Care Team:  Marge Langley MD as PCP - General  Mayela Trejo MD     Problem List:     Patient Active Problem List   Diagnosis    Hypothyroidism    [facility]ementia [facility])    Prostate enlargement    Bradycardia    Malignant neoplasm of bladder ([facility] [address] )    Arteriosclerotic heart disease    Arthralgia    Asymptomatic bilateral carotid artery stenosis    Carotid artery stenosis    Chronic kidney disease    Depression    Dermatitis, eczematoid    Hypercholesterolemia    Hypertension    Memory difficulties    Nocturia    Occlusion and stenosis of carotid artery without mention of cerebral infarction    Peptic ulcer    Weight loss    Irritability and anger    Presence of aortocoronary bypass graft    Presence of prosthetic heart valve    Personal history of nicotine dependence    Aortic valve disorder    History of left-sided carotid endarterectomy      Past Medical and Surgical History:     Past Medical History:   Diagnosis Date    Abnormal stress test     Abnormal weight loss     Alzheimer's disease ([facility] [address] ) 5/2/2018    Aortic insufficiency with aortic stenosis     [s/p AVR (tissue) 4/2013]     Bradycardia, unspecified     Coronary artery disease      [s/p CABG x3 4/2013]    [facility]     Disease of thyroid gland     Dyslipidemia     Gastric ulcer     last assessed: 2/24/14    H/O cardiovascular stress test      (EF 0 67, Not suspicious for significant occlusive CAD  ) - 11/28/2007; [facility] (Normal EF, Inferior wall post stress HK, markedly positive EKG with chest pressure) - 4/10/2013    History of echocardiogram 08/05/2016    2-D w/ CFD:  EF 0 55 (55%), Normal LVSF  Normal functioning bioprosthetic AV   History of EKG     EKG shows sinus bradycardia, 49 bpm, otherwise normal     History of EKG      (Sinus Rhythm) - 3/25/2013;  (Sinus [place] [facility]) - 5/20/2013;  9/25/2013     History of Holter monitoring      (SR w/ rates ranging from 44-78BPM  No afib, no heart block, no vtach, no pauses, and no symptoms per pt  there were few short atrial runs, highest being 140BPM, the longest being 8 beats; though overall atrial and ventricular ectopic beats were rare ) - 8/3/2016     History of Holter monitoring 08/22/2016    24 hr    Hx of echocardiogram     (EF 0 70, Without regional wall abnormalities  Possibly bicuspid aortic valve with mild AS, no aortic insufficiency ) 3/22/2007;  (EF 0 55 (55%), Normal LVSF  Normal functioning bioprosthetic AV ) - 8/3/2016     Hypertension     Hypothyroid 5/2/2018    ICAO (internal carotid artery occlusion)     Duplex (There is known occlusion of ICA in rt carotid and the vertebral artery is antegrade  0-19% ICA stenosis of Lt carotid  The vertebral artery is antegrade  Widely patent endarterectomy site on the left ) - 1/30/2014 Carotid Duplex (No change  Known occlusion of ICA in right carotid   0-19% ICA stenosis in left carotid, and widely patent endarterectomy site on left ) - 8/28/2014    Inguinal hernia, left     last assessed: 10/8/14    Peptic ulcer     last assessed: 5/21/13    Prostate enlargement 5/2/2018     Past Surgical History:   Procedure Laterality Date    AORTIC VALVE REPLACEMENT      CAROTID ENDARTARECTOMY  04/26/2013    CATARACT EXTRACTION Bilateral     CORONARY ARTERY BYPASS GRAFT       (3V CABG: LIMA-LAD, VG-RCA, VG-CX, Tissue AVR (#[address])) - 4/26/2013     INGUINAL HERNIA REPAIR      NEPHRECTOMY Right     NV CYSTOURETHROSCOPY W/IRRIG & EVAC CLOTS N/A 5/7/2018    Procedure: CYSTOSCOPY EVACUATION OF CLOTS AND TURBT;  Surgeon: Tami Bolaños MD;  Location: [facility];  Service: Urology    THROMBOENDARTERECTOMY      Carotid      Family History:     Family History   Problem Relation Age of Onset   Hai Bones Sudden death Mother         unexpected death   Hai Bones Other Father         Coal workers' Pneumoconiosis    Alzheimer's disease Brother       Social History:     Social History     Socioeconomic History    Marital status: /Civil Union     Spouse name: None    Number of children: None    Years of education: None    Highest education level: None   Occupational History    None   Social Needs    Financial resource strain: None    Food insecurity:     Worry: None     Inability: None    Transportation needs:     Medical: None     Non-medical: None   Tobacco Use    Smoking status: Former Smoker     Types: Cigarettes    Smokeless tobacco: Never Used    Tobacco comment: quit 20 years ago   Substance and Sexual Activity    Alcohol use: Yes     Comment: social    Drug use: No    Sexual activity: None   Lifestyle    Physical activity:     Days per week: None     Minutes per session: None    Stress: None   Relationships    Social connections:     Talks on phone: None     Gets together: None     Attends [employer] service: None     Active member of club or organization: None     Attends meetings of clubs or organizations: None     Relationship status: None    Intimate partner violence:     Fear of current or ex partner: None     Emotionally abused: None     Physically abused: None     Forced sexual activity: None   Other Topics Concern    None   Social History Narrative    None       Medications and Allergies:     Current Outpatient Medications   Medication Sig Dispense Refill    Apoaequorin (PREVAGEN) 10 MG CAPS Take by mouth      aspirin (ECOTRIN LOW STRENGTH) 81 mg EC tablet Take 81 mg by mouth daily      atorvastatin (LIPITOR) 80 mg tablet Take 1 tablet (80 mg total) by mouth daily 90 tablet 3    B Complex-C (SUPER B/C) CAPS Take by mouth take 1 tablet daily   cholecalciferol (VITAMIN D3) 1,000 units tablet Take 2,000 Units by mouth daily      co-enzyme Q-10 30 MG capsule Take 100 mg by mouth 3 (three) times a day      COCONUT OIL-FLAXSEED OIL PO Take by mouth      cyanocobalamin (VITAMIN B-12) 1,000 mcg tablet Take 1,000 mcg by mouth daily      donepezil (ARICEPT) 5 mg tablet Take 1 tablet (5 mg total) by mouth daily at bedtime 90 tablet 3    DULoxetine (CYMBALTA) 30 mg delayed release capsule Take 1 capsule (30 mg total) by mouth 2 (two) times a day 180 capsule 1    levothyroxine 75 mcg tablet Take 1 tablet (75 mcg total) by mouth daily 90 tablet 3    multivitamin (THERAGRAN) TABS Take 1 tablet by mouth daily      Omega-3 Fatty Acids (OMEGA 3 PO) Take by mouth Omega 3 350 mg- 400 mg capsule [id number] mg every day      pantoprazole (PROTONIX) 20 mg tablet Take 20 mg by mouth daily      tamsulosin (FLOMAX) 0 4 mg Take 1 capsule (0 4 mg total) by mouth daily with dinner 90 capsule 3     No current facility-administered medications for this visit        Allergies   Allergen Reactions    Contrast [Iodinated Diagnostic Agents]     Iodine Throat Swelling     IVP contrast dye    Morphine Rash      Immunizations:     Immunization History   Administered Date(s) Administered    INFLUENZA 12/02/2005, 11/13/2012, 10/18/2013, 10/06/2015, 09/06/2016    Influenza Split High Dose Preservative Free IM 10/08/2014, 10/06/2015, 09/06/2016, 09/14/2017    Influenza TIV (IM) 11/13/2012, 10/18/2013    Influenza, high dose seasonal 0 5 mL 10/22/2018, 10/23/2019    Pneumococcal Conjugate 13-Valent 12/07/2015    Pneumococcal Polysaccharide PPV23 12/13/2006    Tdap 08/04/2015    Zoster 02/07/2013    Zoster Vaccine Recombinant 02/07/2013      Health Maintenance: There are no preventive care reminders to display for this patient  Topic Date Due    HEPATITIS B VACCINES (1 of 3 - Risk 3-dose series) 09/28/1954      Medicare Health Risk Assessment:     /70 (BP Location: Left arm, Patient Position: Sitting, Cuff Size: Standard)   Pulse 57   Temp 97 5 °F (36 4 °C) (Temporal)   Ht 6' 1" (1 854 m)   Wt 67 1 kg (148 lb)   SpO2 98%   BMI 19 53 kg/m²      Arya Bustillo is here for his Subsequent Wellness visit  Last Medicare Wellness visit information reviewed, patient interviewed and updates made to the record today  Health Risk Assessment:   Patient rates overall health as good  Patient feels that their physical health rating is same  Eyesight was rated as same  Hearing was rated as slightly worse  Patient feels that their emotional and mental health rating is slightly worse  Pain experienced in the last 7 days has been none  Patient states that he has experienced no weight loss or gain in last 6 months  Depression Screening:   PHQ-2 Score: 4  PHQ-9 Score: 14      Fall Risk Screening: In the past year, patient has experienced: no history of falling in past year      Home Safety:  Patient has trouble with stairs inside or outside of their home  Patient has working smoke alarms and has working carbon monoxide detector  Home safety hazards include: none  Nutrition:   Current diet is Regular  Medications:   Patient is not currently taking any over-the-counter supplements   Patient is able to manage medications  Activities of Daily Living (ADLs)/Instrumental Activities of Daily Living (IADLs):   Walk and transfer into and out of bed and chair?: Yes  Dress and groom yourself?: Yes    Bathe or shower yourself?: Yes    Feed yourself? Yes  Do your laundry/housekeeping?: No  Manage your money, pay your bills and track your expenses?: No  Make your own meals?: No    Do your own shopping?: No    Previous Hospitalizations:   Any hospitalizations or ED visits within the last 12 months?: No      Advance Care Planning:   Living will: Yes    Durable POA for healthcare: Yes    Advanced directive: Yes    Five wishes given: Yes    Provider agrees with end of life decisions: Yes      Cognitive Screening:   Provider or family/friend/caregiver concerned regarding cognition?: Yes    PREVENTIVE SCREENINGS      Cardiovascular Screening:    General: History Lipid Disorder and Screening Current      Diabetes Screening:     General: Screening Current      Colorectal Cancer Screening:     General: Screening Not Indicated      Prostate Cancer Screening:    General: Screening Not Indicated      Osteoporosis Screening:    General: Screening Not Indicated      Abdominal Aortic Aneurysm (AAA) Screening:    Risk factors include: tobacco use        General: Screening Not Indicated      Lung Cancer Screening:     General: Screening Not Indicated      Hepatitis C Screening:    General: Screening Not Indicated      Luma Crenshaw MD  Depression Screening Follow-up Plan: Patient's depression screening was positive with a PHQ-2 score of 4  Their PHQ-9 score was 14  Patient assessed for underlying major depression  They have no active suicidal ideations  Brief counseling provided and recommend additional follow-up/re-evaluation next office visit  Patient's depressive symptoms likely due to other medical condition  Would recommend treatment of underlying condition  Will continue to monitor at next office visit

## 2019-10-23 NOTE — PROGRESS NOTES
Assessment/Plan:    Hypothyroidism  TSH mildly elevated  Will continue current dose of levothyroxine and continue follow the TSH regularly  Will increase the dose of levothyroxine if his TSH continues to rise  Arteriosclerotic heart disease  Asymptomatic from a cardiac standpoint but he is a poor historian  Has not had recent cardiology follow-up  Our office will arrange for this for him and his wife  Continue with the control of risk factors as previously  Watch for any anginal symptoms  Dementia ([address] )  His symptoms are not classic for Alzheimer's  With his history of significant vascular abnormalities as well as the very slow but persistent decline in cognitive function, will refer to Neurology for their opinion  His wife had stop the Aricept  She had tried over-the-counter Prevagen and this had also not been significantly affective  Watch for any worsening behavioral issues  Methods for redirection discussed  Continue to use the soothing music since this seems to have a calming effect  Irritability and anger  Discussed with his wife ways to redirect him  All potential weapons have been essentially removed from the house  Unfortunately they do not have a lot of local family help  Hypercholesterolemia  Continue with the atorvastatin  Remain as active as possible  Continue to watch diet  Depression  Some depression symptoms but these have been relatively well controlled with the Cymbalta in the past   Doubt that the depression symptoms are causing cognitive decline  Await recommendations by Neurology  Diagnoses and all orders for this visit:    Dementia with behavioral disturbance, unspecified dementia type (HCC)  -     CBC and differential; Future  -     Comprehensive metabolic panel; Future  -     Lipid panel; Future  -     TSH, 3rd generation; Future  -     Ambulatory referral to Neurology;  Future    Need for influenza vaccination  -     influenza vaccine, [id number], high-dose, PF 0 5 mL (FLUZONE HIGH-DOSE)    Hypothyroidism, unspecified type  -     Comprehensive metabolic panel; Future  -     Lipid panel; Future  -     TSH, 3rd generation; Future    Arteriosclerotic heart disease  -     Comprehensive metabolic panel; Future  -     Lipid panel; Future  -     TSH, 3rd generation; Future  -     Ambulatory referral to Cardiology; Future    Depression, unspecified depression type  -     Comprehensive metabolic panel; Future  -     Lipid panel; Future  -     TSH, 3rd generation; Future    Irritability and anger  -     Comprehensive metabolic panel; Future  -     Lipid panel; Future  -     TSH, 3rd generation; Future  -     Ambulatory referral to Neurology; Future    Hypercholesterolemia        Orders recommendations as noted above  Remains at home for now  Discussed with his wife that there may come a time that she is unable to care for him at home especially if he begins wondering  Continue follow-up with Neurology regarding the previous urinary symptoms  Flu shot given today  He is up-to-date on his pneumonia vaccines  Will have him follow up in about 3-5 months or sooner if needed  Subjective:      Patient ID: Ary Sommers is a 81 y o  male  He presents for routine follow-up as well as Medicare wellness visit  Has generally been doing relatively the same  Wife notices that his memory has been on a very slow but steady decline  He continues with some verbal anger issues especially in the morning  His wife states that this is the time for his sundowning rather than at night  He continues to be very negative in the morning  He has stated that he wished she was dead  Has not tried to hurt himself or anyone else  All weapons were removed from the house  He has not begun with any wondering yet  Not nearly as active as he had been in the past but continues to walk around the house frequently  His wife has noticed that certain types of music seem to soothe him  She has been using this to help calm him  Tolerating statin medication without difficulty  Denies muscle aches or weakness  Denies abdominal pain  Denies chest pain or palpitations  Blood pressure has been stable  Tolerating the atorvastatin well  Wife had him on the Aricept but since this was ineffective, she tried an over-the-counter supplement  This was also ineffective to help his memory  Continues to follow-up with Urology on a regular basis  Appetite has been stable  Denies any nausea, vomiting, or diarrhea  Usually sleeps relatively well  Has not followed up with Cardiology in the recent past       The following portions of the patient's history were reviewed and updated as appropriate:   He  has a past medical history of Abnormal stress test, Abnormal weight loss, Alzheimer's disease ([email or web address] ) (5/2/2018), Aortic insufficiency with aortic stenosis, Bradycardia, unspecified, Coronary artery disease, Diabetes ([email or web address] ), Disease of thyroid gland, Dyslipidemia, Gastric ulcer, H/O cardiovascular stress test, History of echocardiogram (08/05/2016), History of EKG, History of EKG, History of Holter monitoring, History of Holter monitoring (08/22/2016), echocardiogram, Hypertension, Hypothyroid (5/2/2018), ICAO (internal carotid artery occlusion), Inguinal hernia, left, Peptic ulcer, and Prostate enlargement (5/2/2018)    He   Patient Active Problem List    Diagnosis Date Noted    History of left-sided carotid endarterectomy 08/27/2018    Aortic valve disorder 08/03/2018    Presence of aortocoronary bypass graft 07/13/2018    Presence of prosthetic heart valve 07/13/2018    Personal history of nicotine dependence 07/13/2018    Irritability and anger 06/05/2018    [facility] ) 05/08/2018    Bradycardia 05/05/2018    Hypothyroidism 05/02/2018    D[facility] ([facility] ) 05/02/2018    Prostate enlargement 05/02/2018    Asymptomatic bilateral carotid artery stenosis 04/24/2017    Chronic kidney disease 01/23/2017    Nocturia 09/06/2016    Weight loss 09/06/2016    Peptic ulcer 12/17/2015    Memory difficulties 12/08/2015    Occlusion and stenosis of carotid artery without mention of cerebral infarction 03/11/2015    Arthralgia 05/19/2014    Dermatitis, eczematoid 02/24/2014    Arteriosclerotic heart disease 06/07/2013    Carotid artery stenosis 11/13/2012    Depression 11/13/2012    Hypercholesterolemia 05/29/2012    Hypertension 05/29/2012     He  has a past surgical history that includes Aortic valve replacement; Thromboendarterectomy; Cataract extraction (Bilateral); Inguinal hernia repair; Nephrectomy (Right); Coronary artery bypass graft; pr cystourethroscopy w/irrig & evac clots (N/A, 5/7/2018); and CAROTID ENDARTARECTOMY (04/26/2013)  His family history includes Alzheimer's disease in his brother; Other in his father; Sudden death in his mother  He  reports that he has quit smoking  His smoking use included cigarettes  He has never used smokeless tobacco  He reports that he drinks alcohol  He reports that he does not use drugs  Current Outpatient Medications   Medication Sig Dispense Refill    Apoaequorin (PREVAGEN) 10 MG CAPS Take by mouth      aspirin (ECOTRIN LOW STRENGTH) 81 mg EC tablet Take 81 mg by mouth daily      atorvastatin (LIPITOR) 80 mg tablet Take 1 tablet (80 mg total) by mouth daily 90 tablet 3    B Complex-C (SUPER B/C) CAPS Take by mouth take 1 tablet daily        cholecalciferol (VITAMIN D3) 1,000 units tablet Take 2,000 Units by mouth daily      co-enzyme Q-10 30 MG capsule Take 100 mg by mouth 3 (three) times a day      COCONUT OIL-FLAXSEED OIL PO Take by mouth      cyanocobalamin (VITAMIN B-12) 1,000 mcg tablet Take 1,000 mcg by mouth daily      donepezil (ARICEPT) 5 mg tablet Take 1 tablet (5 mg total) by mouth daily at bedtime 90 tablet 3    DULoxetine (CYMBALTA) 30 mg delayed release capsule Take 1 capsule (30 mg total) by mouth 2 (two) times a day 180 capsule 1    levothyroxine 75 mcg tablet Take 1 tablet (75 mcg total) by mouth daily 90 tablet 3    multivitamin (THERAGRAN) TABS Take 1 tablet by mouth daily      Omega-3 Fatty Acids (OMEGA 3 PO) Take by mouth Omega 3 350 mg- 400 mg capsule [id number] mg every day      pantoprazole (PROTONIX) 20 mg tablet Take 20 mg by mouth daily      tamsulosin (FLOMAX) 0 4 mg Take 1 capsule (0 4 mg total) by mouth daily with dinner 90 capsule 3     No current facility-administered medications for this visit  Current Outpatient Medications on File Prior to Visit   Medication Sig    Apoaequorin (PREVAGEN) 10 MG CAPS Take by mouth    aspirin (ECOTRIN LOW STRENGTH) 81 mg EC tablet Take 81 mg by mouth daily    atorvastatin (LIPITOR) 80 mg tablet Take 1 tablet (80 mg total) by mouth daily    B Complex-C (SUPER B/C) CAPS Take by mouth take 1 tablet daily   cholecalciferol (VITAMIN D3) 1,000 units tablet Take 2,000 Units by mouth daily    co-enzyme Q-10 30 MG capsule Take 100 mg by mouth 3 (three) times a day    COCONUT OIL-FLAXSEED OIL PO Take by mouth    cyanocobalamin (VITAMIN B-12) 1,000 mcg tablet Take 1,000 mcg by mouth daily    donepezil (ARICEPT) 5 mg tablet Take 1 tablet (5 mg total) by mouth daily at bedtime    DULoxetine (CYMBALTA) 30 mg delayed release capsule Take 1 capsule (30 mg total) by mouth 2 (two) times a day    levothyroxine 75 mcg tablet Take 1 tablet (75 mcg total) by mouth daily    multivitamin (THERAGRAN) TABS Take 1 tablet by mouth daily    Omega-3 Fatty Acids (OMEGA 3 PO) Take by mouth Omega 3 350 mg- 400 mg capsule [id number] mg every day    pantoprazole (PROTONIX) 20 mg tablet Take 20 mg by mouth daily    tamsulosin (FLOMAX) 0 4 mg Take 1 capsule (0 4 mg total) by mouth daily with dinner     No current facility-administered medications on file prior to visit  He is allergic to contrast [iodinated diagnostic agents]; iodine; and morphine       Review of Systems Constitutional: Negative for activity change, appetite change, chills and fever  HENT: Negative for hearing loss  Eyes: Negative for pain and visual disturbance  Respiratory: Negative for chest tightness and shortness of breath  Cardiovascular: Negative for chest pain and palpitations  Gastrointestinal: Negative for abdominal pain, blood in stool, diarrhea, nausea and vomiting  Endocrine: Negative for polydipsia, polyphagia and polyuria  Genitourinary: Negative for dysuria  Nocturia   Musculoskeletal: Negative for arthralgias and gait problem  Skin: Negative for color change  Neurological: Negative for dizziness and headaches  Hematological: Does not bruise/bleed easily  Psychiatric/Behavioral: Positive for agitation, behavioral problems, confusion, decreased concentration and dysphoric mood  Negative for hallucinations  The patient is not nervous/anxious  Objective:      /70 (BP Location: Left arm, Patient Position: Sitting, Cuff Size: Standard)   Pulse 57   Temp 97 5 °F (36 4 °C) (Temporal)   Ht 6' 1" (1 854 m)   Wt 67 1 kg (148 lb)   SpO2 98%   BMI 19 53 kg/m²          Physical Exam   Constitutional: He is oriented to person, place, and time  No distress  HENT:   Head: Normocephalic and atraumatic  Nose: Nose normal    Eyes: Pupils are equal, round, and reactive to light  Conjunctivae are normal    Cardiovascular: Normal rate and regular rhythm  Exam reveals no gallop and no friction rub  Murmur heard  Systolic murmur is present with a grade of 1/6  Pulmonary/Chest: He has no wheezes  He has no rales  He exhibits no tenderness  Abdominal: He exhibits no distension  There is no tenderness  There is no rebound and no guarding  Lymphadenopathy:     He has no cervical adenopathy  Neurological: He is alert and oriented to person, place, and time  Skin: Skin is warm and dry  Psychiatric: He has a normal mood and affect   His behavior is normal  Nursing note and vitals reviewed

## 2019-10-24 NOTE — ASSESSMENT & PLAN NOTE
TSH mildly elevated  Will continue current dose of levothyroxine and continue follow the TSH regularly  Will increase the dose of levothyroxine if his TSH continues to rise

## 2019-10-24 NOTE — ASSESSMENT & PLAN NOTE
His symptoms are not classic for Alzheimer's  With his history of significant vascular abnormalities as well as the very slow but persistent decline in cognitive function, will refer to Neurology for their opinion  His wife had stop the Aricept  She had tried over-the-counter Prevagen and this had also not been significantly affective  Watch for any worsening behavioral issues  Methods for redirection discussed  Continue to use the soothing music since this seems to have a calming effect

## 2019-10-24 NOTE — ASSESSMENT & PLAN NOTE
Some depression symptoms but these have been relatively well controlled with the Cymbalta in the past   Doubt that the depression symptoms are causing cognitive decline  Await recommendations by Neurology

## 2019-10-24 NOTE — ASSESSMENT & PLAN NOTE
Discussed with his wife ways to redirect him  All potential weapons have been essentially removed from the house  Unfortunately they do not have a lot of local family help

## 2019-10-24 NOTE — ASSESSMENT & PLAN NOTE
Asymptomatic from a cardiac standpoint but he is a poor historian  Has not had recent cardiology follow-up  Our office will arrange for this for him and his wife  Continue with the control of risk factors as previously  Watch for any anginal symptoms

## 2019-10-25 ENCOUNTER — TELEPHONE (OUTPATIENT)
Dept: INTERNAL MEDICINE CLINIC | Facility: CLINIC | Age: 84
End: 2019-10-25

## 2019-10-25 NOTE — TELEPHONE ENCOUNTER
Patients wife called and stated Dr Joseph Savage is scheduled out until June in the office up here  She said they were able to get an appointment with him for Dec 23rd in [place]  Do you think this is ok? She did say there was another provider who had openings but they refused him as they wanted to see Dr Joseph Savage

## 2019-10-30 NOTE — TELEPHONE ENCOUNTER
Patients wife called back  She is going to call them to schedule with Dr Elliott Cary as they had sooner appointments

## 2019-10-30 NOTE — TELEPHONE ENCOUNTER
Numerous providers can help with evaluation but he was coming locally  If they half to travel to see him anyway, they should consider seeing someone sooner  They could always request follow-up locally in the future

## 2019-11-01 DIAGNOSIS — E78.00 HYPERCHOLESTEROLEMIA: ICD-10-CM

## 2019-11-02 RX ORDER — ATORVASTATIN CALCIUM 80 MG/1
TABLET, FILM COATED ORAL
Qty: 90 TABLET | Refills: 3 | Status: SHIPPED | OUTPATIENT
Start: 2019-11-02 | End: 2020-07-21 | Stop reason: SDUPTHER

## 2019-12-19 ENCOUNTER — OFFICE VISIT (OUTPATIENT)
Dept: CARDIOLOGY CLINIC | Facility: CLINIC | Age: 84
End: 2019-12-19
Payer: COMMERCIAL

## 2019-12-19 VITALS
HEIGHT: 73 IN | DIASTOLIC BLOOD PRESSURE: 78 MMHG | HEART RATE: 53 BPM | BODY MASS INDEX: 19.35 KG/M2 | SYSTOLIC BLOOD PRESSURE: 126 MMHG | WEIGHT: 146 LBS

## 2019-12-19 DIAGNOSIS — Z95.1 PRESENCE OF AORTOCORONARY BYPASS GRAFT: ICD-10-CM

## 2019-12-19 DIAGNOSIS — R00.1 BRADYCARDIA: ICD-10-CM

## 2019-12-19 DIAGNOSIS — I65.29 STENOSIS OF CAROTID ARTERY, UNSPECIFIED LATERALITY: Primary | ICD-10-CM

## 2019-12-19 DIAGNOSIS — Z98.890 HISTORY OF LEFT-SIDED CAROTID ENDARTERECTOMY: ICD-10-CM

## 2019-12-19 DIAGNOSIS — Z95.2 PRESENCE OF PROSTHETIC HEART VALVE: ICD-10-CM

## 2019-12-19 DIAGNOSIS — I25.10 ARTERIOSCLEROTIC HEART DISEASE: ICD-10-CM

## 2019-12-19 PROCEDURE — 99214 OFFICE O/P EST MOD 30 MIN: CPT | Performed by: INTERNAL MEDICINE

## 2019-12-19 PROCEDURE — 93000 ELECTROCARDIOGRAM COMPLETE: CPT | Performed by: INTERNAL MEDICINE

## 2019-12-19 NOTE — PATIENT INSTRUCTIONS
Coronary Artery Disease   AMBULATORY CARE:   Coronary artery disease (CAD)  is narrowing of the arteries to your heart caused by a buildup of plaque  Plaque is made up of cholesterol and other substances  The narrowing in your arteries decreases the amount of blood that can flow to your heart  This causes your heart to get less oxygen  You may not have any symptoms of CAD  It is important for you to manage CAD even if you feel well  CAD can lead to a heart attack if it is not managed  Common symptoms include the following:   · Chest pain (angina), causing burning, squeezing, or crushing tightness in your chest    · Pain that spreads to your neck, jaw, or shoulder blade    · Nausea, vomiting, sweating, fainting, and hands and feet that are cold to the touch  Call 911 for any of the following:   · You have any of the following signs of a heart attack:      ¨ Squeezing, pressure, or pain in your chest that lasts longer than 5 minutes or returns    ¨ Discomfort or pain in your back, neck, jaw, stomach, or arm     ¨ Trouble breathing    ¨ Nausea or vomiting    ¨ Lightheadedness or a sudden cold sweat, especially with chest pain or trouble breathing    Contact your healthcare provider if:   · You have chest pain that is more frequent, or you have chest pain at rest     · You have questions or concerns about your condition or care  Medicines used to treat CAD:   · Blood pressure medicines  are given to lower your blood pressure  ACE inhibitors help keep your blood vessels relaxed and open to help keep blood flowing into your heart  Beta-blockers keep your heart pumping strongly and regularly so it does not work too hard to get oxygen  · Cholesterol medicines  help lower blood cholesterol levels  · Nitrates , such as nitroglycerin, relax the arteries of your heart so it gets more oxygen  They help to relieve your chest pain  · Antiplatelets , such as aspirin, help prevent blood clots   Take your antiplatelet medicine exactly as directed  These medicines make it more likely for you to bleed or bruise  If you are told to take aspirin, do not take acetaminophen or ibuprofen instead  · Blood thinners  keep clots from forming in your blood  Clots may cause heart attacks, strokes, or death  This medicine makes it more likely for you to bleed or bruise  · Do not take certain medicines without asking your healthcare provider first   These include NSAIDs, herbal or vitamin supplements, or hormones (estrogen or progestin)  Procedures used to treat CAD:   · Angioplasty  may be done to open an artery blocked by plaque  A tube with a balloon on the end is threaded into the blocked artery  Once the tube is in the artery, the balloon is inflated  As the balloon inflates, it presses the plaque against the artery wall to open the artery  A stent may be placed in your artery to keep it open  · Coronary artery bypass graft surgery (CABG)  is open heart surgery  Healthcare providers take arteries or veins from other areas in your body and use them to bypass or go around the blocked arteries of your heart  Cardiac rehabilitation:  Your healthcare provider may recommend that you attend cardiac rehabilitation (rehab)  This is a program run by specialists who will help you safely strengthen your heart and reduce the risk for more heart disease  The plan includes exercise, relaxation, stress management, and heart-healthy nutrition  Healthcare providers will also check to make sure any medicines you are taking are working  Manage CAD to prevent a heart attack:   · Do not smoke  Nicotine and other chemicals in cigarettes and cigars can cause heart and lung damage  Ask your healthcare provider for information if you currently smoke and need help to quit  E-cigarettes or smokeless tobacco still contain nicotine  Talk to your healthcare provider before you use these products  · Exercise regularly    Exercise at least 30 minutes each day, on most days of the week  Exercise helps to lower high cholesterol and high blood pressure  It can also help you maintain a healthy weight  Ask your healthcare provider about the kind of exercise you should do and how to get started  · Maintain a healthy weight  If you are overweight, talk to your healthcare provider about how to lose weight  A weight loss of 10% can improve your heart health  · Eat heart-healthy foods  Include fresh fruits and vegetables in your meal plan  Choose low-fat foods, such as skim or 1% fat milk, low-fat cheese and yogurt, fish, chicken (without skin), and lean meats  Eat two 4-ounce servings of fish high in omega-3 fats each week, such as salmon, fresh tuna, and herring  Do not eat foods that are high in sodium, such as canned foods, potato chips, salty snacks, and cold cuts  Put less table salt on your food  · Limit or do not drink alcohol  A drink of alcohol is 12 ounces of beer, 5 ounces of wine, or 1½ ounces of liquor  · Manage other health conditions  Follow your healthcare provider's advice on how to manage other conditions that can affect your heart health  These include diabetes, high blood pressure, and high cholesterol  You may need to take medicines for these conditions and make other lifestyle changes  · Ask if you should have a flu vaccine  The flu can be dangerous for a person who has CAD  The flu vaccine is available every year in the fall  Follow up with your healthcare provider as directed: You may need to return for other tests  You may also be referred to a cardiac surgeon  Write down your questions so you remember to ask them during your visits  © 2017 [employer]  Information is for End User's use only and may not be sold, redistributed or otherwise used for commercial purposes  All illustrations and images included in CareNotes® are the copyrighted property of [employer]  or [employer]    The above information is an  only  It is not intended as medical advice for individual conditions or treatments  Talk to your doctor, nurse or pharmacist before following any medical regimen to see if it is safe and effective for you

## 2019-12-19 NOTE — PROGRESS NOTES
Subjective:        Patient ID: Francie Cuellar is a 81 y o  male  Chief Complaint:  Christina Shelley is having more behavioral in memory issues at home per his wife Franco Chau who is his primary caregiver  He is certainly repetitive and was not sure who I was on initially presenting but I did jar his memory a bit talking about hunting in things wheeze discuss much in the past   It is certainly difficult watching him get like this  I understand your sending him to Neurology light of his failing memory, hopefully they can help here  Franco Chau denies him ever complaining of any chest pain alarming shortness of breath palpitations  She has not seen him pass out or near blackout, only gets mildly lightheaded at times very briefly in transiently when standing from a seated position  No presyncope or syncope  The following portions of the patient's history were reviewed and updated as appropriate: allergies, current medications, past family history, past medical history, past social history, past surgical history and problem list   Review of Systems   Constitution: Negative for chills, diaphoresis, malaise/fatigue and weight gain  HENT: Negative for nosebleeds and stridor  Eyes: Negative for double vision, vision loss in left eye, vision loss in right eye and visual disturbance  Cardiovascular: Negative for chest pain, claudication, cyanosis, dyspnea on exertion, irregular heartbeat, leg swelling, near-syncope, orthopnea, palpitations, paroxysmal nocturnal dyspnea and syncope  Respiratory: Negative for cough, shortness of breath, snoring and wheezing  Endocrine: Negative for polydipsia, polyphagia and polyuria  Hematologic/Lymphatic: Negative for bleeding problem  Does not bruise/bleed easily  Skin: Negative for flushing and rash  Musculoskeletal: Negative for falls and myalgias  Gastrointestinal: Negative for abdominal pain, heartburn, hematemesis, hematochezia, melena and nausea     Genitourinary: Negative for hematuria  Neurological: Positive for difficulty with concentration  Negative for brief paralysis, dizziness, focal weakness, headaches, light-headedness, loss of balance and vertigo  Psychiatric/Behavioral: Positive for memory loss  Negative for altered mental status and substance abuse  Allergic/Immunologic: Negative for hives  Objective:      /78   Pulse (!) 53   Ht 6' 1" (1 854 m)   Wt 66 2 kg (146 lb)   BMI 19 26 kg/m²   Physical Exam   Constitutional: He appears well-developed and well-nourished  No distress  HENT:   Head: Normocephalic and atraumatic  Eyes: Pupils are equal, round, and reactive to light  EOM are normal  No scleral icterus  Neck: Normal range of motion  Neck supple  No JVD present  No thyromegaly present  Cardiovascular: Normal rate and regular rhythm  Exam reveals no gallop and no friction rub  Murmur (Grade 1 short outflow murmur, no diastolic murmur ) heard  Pulmonary/Chest: Effort normal and breath sounds normal  No stridor  No respiratory distress  He has no wheezes  He has no rales  Abdominal: Soft  Bowel sounds are normal  He exhibits no distension and no mass  There is no tenderness  Musculoskeletal: Normal range of motion  He exhibits no edema or deformity  Neurological: He is alert  No cranial nerve deficit  He exhibits normal muscle tone  Coordination normal    Skin: Skin is warm and dry  No erythema  No pallor  Psychiatric: He has a normal mood and affect  His behavior is normal        Lab Review:   No visits with results within 2 Month(s) from this visit     Latest known visit with results is:   Appointment on 10/17/2019   Component Date Value    WBC 10/17/2019 7 20     RBC 10/17/2019 3 52*    Hemoglobin 10/17/2019 10 5*    Hematocrit 10/17/2019 33 4*    MCV 10/17/2019 95     [address] 10/17/2019 29 8     MCHC 10/17/2019 31 4     RDW 10/17/2019 13 9     MPV 10/17/2019 11 2     Platelets 27/01/3070 216     nRBC 10/17/2019 0     Neutrophils Relative 10/17/2019 62     Immat GRANS % 10/17/2019 0     Lymphocytes Relative 10/17/2019 23     Monocytes Relative 10/17/2019 9     Eosinophils Relative 10/17/2019 5     Basophils Relative 10/17/2019 1     Neutrophils Absolute 10/17/2019 4 51     Immature Grans Absolute 10/17/2019 0 01     Lymphocytes Absolute 10/17/2019 1 62     Monocytes Absolute 10/17/2019 0 64     Eosinophils Absolute 10/17/2019 0 36     Basophils Absolute 10/17/2019 0 06     Sodium 10/17/2019 140     Potassium 10/17/2019 4 2     Chloride 10/17/2019 105     CO2 10/17/2019 30     ANION GAP 10/17/2019 5     BUN 10/17/2019 29*    Creatinine 10/17/2019 1 22     Glucose, Fasting 10/17/2019 96     Calcium 10/17/2019 9 1     AST 10/17/2019 28     ALT 10/17/2019 28     Alkaline Phosphatase 10/17/2019 61     Total Protein 10/17/2019 7 3     Albumin 10/17/2019 3 7     Total Bilirubin 10/17/2019 0 45     eGFR 10/17/2019 54     Cholesterol 10/17/2019 204*    Triglycerides 10/17/2019 78     HDL, Direct 10/17/2019 44     LDL Calculated 10/17/2019 144*    Non-HDL-Chol (CHOL-HDL) 10/17/2019 160     TSH 3RD GENERATON 10/17/2019 4 060*     No results found  Assessment:       1  Stenosis of carotid artery, unspecified laterality     2  Arteriosclerotic heart disease  Ambulatory referral to Cardiology    POCT ECG   3  Bradycardia     4  Presence of aortocoronary bypass graft     5  Presence of prosthetic heart valve     6  History of left-sided carotid endarterectomy          Plan:       Anusha Aaron does not have any clear signs or symptoms reminiscent of angina pectoris, heart failure nor electrical instability  He has some mild bradycardia that does not seem clinically important, myself and his wife have decided against ambulatory monitoring at this time  Was he would become severely symptomatic with bradycardia neither of us really want to subject him to consideration of pacing at this time    Would generally avoid negative chronotropic agents  He is tolerating maximal statin therapy, no changes here recommended  He remains on low-dose aspirin therapy as well that I advise be continued indefinitely  Would focus on supportive/conservative care, hopefully Neurology can improve his declining memory and behavioral status  I will ask to see any back in about 6 months time, [place] will call should she notice any major changes or should he start complaining of any potential cardiac symptoms in the meantime

## 2019-12-22 NOTE — PROGRESS NOTES
DEPARTMENT OF NEUROLOGICAL SCIENCES  [address] [facility]        NEW PATIENT EVALUATION NOTE    Patient: Ary Sommers  Medical Record Number: # [id number]  YOB: 1935  Date of visit: 12/23/2019    Referring provider: Kofi Dwyer MD    ASSESSMENT     Diagnoses for this encounter:  1  Dementia with behavioral disturbance, unspecified dementia type [facility])  Ambulatory referral to Neurology    Ceruloplasmin    Magnesium    Vitamin B12    Folate    MRI brain NeuroQuant wo and w contrast    LORazepam (ATIVAN) 0 5 mg tablet   2  Irritability and anger  Ambulatory referral to Neurology    LORazepam (ATIVAN) 0 5 mg tablet     Impression of this 82 yo gentleman with 4+ year history of increasing forgetfulness and most notably behavioral changes apparent in the early daytime rather than the nighttime  His course is more remarkable for the negativity and irritability expressed in the daytime, when he is most threatening (once told wife he was going to shoot her) bodily harm  He has never to this date acted accordingly and attempted any of his threats  Music and certain television content seem to placate him  All weapons in the home have been secured and his firearms the same  MOCA was 11/30 with 0/5 word recall  He has been on Aricept for years  Overall his course resembles a dementia such as Alzheimer's - FrontoTemporal Dementia type, given his level of dependence, poor insight to deficits and his impulsiveness  Proceed as below for workup  PLAN     · Most recent laboratory test results from 10/2019 were normal CMP, elevated LDL on panel, persisting anemia on CBC, and slightly elevated TSH  · Check magnesium, vitamin b12, folate, ceruloplasmin  · No recent or relevant neuroimaging results to review  CT head reviewed as normal  He has regular annual carotid ultrasounds @ [facility]  · Check MRI Brain NeuroQuant  · Consider NM PET scan if suspicion for FTD  · We will send a letter to [place] as pt continues to retain his 's license despite not driving  · He will continue the Omega 3 Fish oil supplements, turmeric (curcumin) 500-750mg daily and gingko biloba for memory  · Recommended Mediterranean diet for stroke risk and for memory  · Encouraged to remain both physically and mentally active, including crossword puzzles, brain teasers  Online resources at [facility] [employer] for cognitive training games for free  · Regarding his mood instability, considered about potentially increasing Cymbalta dose to 90mg a day (30mg / 60mg) and smoothing out his morning  We will defer to Dr Mehrdad Edwards who is prescribing this  Pt's wife is trying to carefully manage the medications that pt will take  · We discussed about Namenda but given it's low chance of altering his course right now, we will hold off on this  · Thank you very much for sending me this interesting patient  · The patient has been instructed to call us about any new neurological problems or medication side effects  · Return to Clinic in 4 months OVL in [facility] per availability  A total of 80 minutes were spent face-to-face with this patient, of which 25% was spent on counseling and coordination of care  We discussed the natural history of the patient's condition, differential diagnosis, level of diagnostic certainty, treatment alternatives and their side effects and possible complications  HISTORY OF PRESENT ILLNESS:     Mr Edinson Pike is a 81 y o  right handed male with known chronic hypothyroidism, arteriosclerosis, hx of L CEA and prior hx of dementia who has been referred to the [facility] for consultation for dementia with behavioral disturbance  The patient was accompanied today  History was obtained from patient and spouse  Pt referred from his PCP    At baseline she describes his personality as more "negative-slanting"  Pt denies feeling depressed   They do not recall any recent head trauma  His wife recalls first noticing a decline in his memory in 2016  At his last PCP visit, pt's wife noted a slow and steady decline in his cognition, with most trouble in the morning rather than nighttime  He typically has negative thoughts during this time and often expressed wishing he were dead, but without previous attempts  He never used to swear but now in the morning he would do so profusely at at the slightest provocation  He was very contrary and expressed strong repeated desire to return home from his son's home  Pt was only happy when he was accompanied  She denies any hallucinations or delusional thinking  Certain music helps calm him down - "easy listening" type  She has also found vu broadcasts to be very good for him  Per records, he requires redirection for his irritability and flashes of anger  His wife had to stop the Aricept recently as it was "ineffective" and was not improving his decline  It was started "years ago"  CT head from 2018 for delirium was normal for age  He had significant family history of dementia in all of his siblings (now  from various reasons), along with hyperlipidemia  He has a proclivity to candy, junk food and fruit and small  His wife prepares the meals and he does eat some of it  Current Relevant Medications:  Lipitor, Levothyroxine, Aricept 5mg qDay, Cymbalta, Prevagen, ASA 81mg, super B complex vitamin, Coq10, Flaxseed oil, vitamin B12, MVI, omega 3 fatty acids   Living Situation + ADLs:  He does not regularly shave or wash on his own  His last known shower was many weeks ago - in contrast to his baseline which was very fastidious and clean  He never did any of the financial management; he does not drive any longer  Durable Power of : wife  Living Will: yes     Family History: Number of First Degree Relatives With Parkinsonism/Dementia: one of six children and rest of siblings all had dementia   Eldest brother  at age 72  REVIEW OF PAST MEDICAL, SOCIAL AND FAMILY HISTORY:  This is the list of problems as per our Medical Records:    Patient Active Problem List    Diagnosis Date Noted    History of left-sided carotid endarterectomy 2018    Aortic valve disorder 2018    Presence of aortocoronary bypass graft 2018    Presence of prosthetic heart valve 2018    Personal history of nicotine dependence 2018    Irritability and anger 2018    Malignant neoplasm of bladder ([place] [address] ) 2018    Bradycardia 2018    Hypothyroidism 2018    Dementia ([place] [address] ) 2018    Prostate enlargement 2018    Asymptomatic bilateral carotid artery stenosis 2017    Chronic kidney disease 2017    Nocturia 2016    Weight loss 2016    Peptic ulcer 2015    Memory difficulties 2015    Occlusion and stenosis of carotid artery without mention of cerebral infarction 2015    Arthralgia 2014    Dermatitis, eczematoid 2014    Arteriosclerotic heart disease 2013    Carotid artery stenosis 2012    Depression 2012    Hypercholesterolemia 2012    Hypertension 2012     Past Medical History:   Diagnosis Date    Abnormal stress test     Abnormal weight loss     Alzheimer's disease ([place] [address] ) 2018    Aortic insufficiency with aortic stenosis     [s/p AVR (tissue) 2013]     Bradycardia, unspecified     Coronary artery disease      [s/p CABG x3 2013]    Diabetes ([place] [address] )     Disease of thyroid gland     Dyslipidemia     Gastric ulcer     last assessed: 14    H/O cardiovascular stress test      (EF 0 67, Not suspicious for significant occlusive CAD  ) - 2007; [facility] (Normal EF, Inferior wall post stress HK, markedly positive EKG with chest pressure) - 4/10/2013    History of echocardiogram 2016    2-D w/ CFD:  EF 0 55 (55%), Normal LVSF  Normal functioning bioprosthetic AV   History of EKG     EKG shows sinus bradycardia, 49 bpm, otherwise normal     History of EKG      (Sinus Rhythm) - 3/25/2013;  (Sinus [place]) - 5/20/2013;  9/25/2013     History of Holter monitoring      (SR w/ rates ranging from 44-78BPM  No afib, no heart block, no vtach, no pauses, and no symptoms per pt  there were few short atrial runs, highest being 140BPM, the longest being 8 beats; though overall atrial and ventricular ectopic beats were rare ) - 8/3/2016     History of Holter monitoring 08/22/2016    24 hr    Hx of echocardiogram     (EF 0 70, Without regional wall abnormalities  Possibly bicuspid aortic valve with mild AS, no aortic insufficiency ) 3/22/2007;  (EF 0 55 (55%), Normal LVSF  Normal functioning bioprosthetic AV ) - 8/3/2016     Hypertension     Hypothyroid 5/2/2018    ICAO (internal carotid artery occlusion)     Duplex (There is known occlusion of ICA in rt carotid and the vertebral artery is antegrade  0-19% ICA stenosis of Lt carotid  The vertebral artery is antegrade  Widely patent endarterectomy site on the left ) - 1/30/2014 Carotid Duplex (No change  Known occlusion of ICA in right carotid   0-19% ICA stenosis in left carotid, and widely patent endarterectomy site on left ) - 8/28/2014    Inguinal hernia, left     last assessed: 10/8/14    Peptic ulcer     last assessed: 5/21/13    Prostate enlargement 5/2/2018      Past Surgical History:   Procedure Laterality Date    AORTIC VALVE REPLACEMENT      CAROTID ENDARTARECTOMY  04/26/2013    CATARACT EXTRACTION Bilateral     CORONARY ARTERY BYPASS GRAFT       (3V CABG: LIMA-LAD, VG-RCA, VG-CX, Tissue AVR (#23 [place])) - 4/26/2013     INGUINAL HERNIA REPAIR      NEPHRECTOMY Right     [place] CYSTOURETHROSCOPY W/IRRIG & EVAC CLOTS N/A 5/7/2018    Procedure: CYSTOSCOPY EVACUATION OF CLOTS AND TURBT;  Surgeon: Aidee Fairchild MD;  Location: [facility];  Service: Urology    THROMBOENDARTERECTOMY      Carotid Allergies   Allergen Reactions    Contrast [Iodinated Diagnostic Agents]     Iodine Throat Swelling     IVP contrast dye    Morphine Rash      Outpatient Encounter Medications as of 12/23/2019   Medication Sig Dispense Refill    aspirin (ECOTRIN LOW STRENGTH) 81 mg EC tablet Take 81 mg by mouth daily      atorvastatin (LIPITOR) 80 mg tablet TAKE 1 TABLET DAILY 90 tablet 3    B Complex-C (SUPER B/C) CAPS Take by mouth take 1 tablet daily   cholecalciferol (VITAMIN D3) 1,000 units tablet Take 2,000 Units by mouth daily      co-enzyme Q-10 30 MG capsule Take 100 mg by mouth 3 (three) times a day      COCONUT OIL-FLAXSEED OIL PO Take by mouth      cyanocobalamin (VITAMIN B-12) 1,000 mcg tablet Take 1,000 mcg by mouth daily      DULoxetine (CYMBALTA) 30 mg delayed release capsule Take 1 capsule (30 mg total) by mouth 2 (two) times a day 180 capsule 1    levothyroxine 75 mcg tablet Take 1 tablet (75 mcg total) by mouth daily 90 tablet 3    multivitamin (THERAGRAN) TABS Take 1 tablet by mouth daily      Omega-3 Fatty Acids (OMEGA 3 PO) Take by mouth Omega 3 350 mg- 400 mg capsule [id number] mg every day      pantoprazole (PROTONIX) 20 mg tablet Take 20 mg by mouth daily      tamsulosin (FLOMAX) 0 4 mg Take 1 capsule (0 4 mg total) by mouth daily with dinner 90 capsule 3    LORazepam (ATIVAN) 0 5 mg tablet Take 1 tablet (0 5 mg total) by mouth every 8 (eight) hours as needed for anxiety Can take 0 5 to 1 tablet q8h as needed for agitation 60 tablet 2    [DISCONTINUED] Apoaequorin (PREVAGEN) 10 MG CAPS Take by mouth      [DISCONTINUED] donepezil (ARICEPT) 5 mg tablet Take 1 tablet (5 mg total) by mouth daily at bedtime (Patient not taking: Reported on 12/23/2019) 90 tablet 3     No facility-administered encounter medications on file as of 12/23/2019        Social History     Tobacco Use    Smoking status: Former Smoker     Types: Cigarettes    Smokeless tobacco: Never Used    Tobacco comment: quit 20 years ago   Substance Use Topics    Alcohol use: Yes     Comment: social      Family History   Problem Relation Age of Onset   [address] [facility] Sudden death Mother         unexpected death   [address] [facility] Other Father         Coal workers' Pneumoconiosis    Alzheimer's disease Brother         REVIEW OF SYSTEMS:  The patient has entered data on an intake form regarding present illness, past medical and surgical history, medications, allergies, family and social history, and a full review of 14 systems  I have reviewed this form with the patient, and all the relevant information has been included on this note  The full review of systems was negative except as stated in HPI and below  Review of Systems   Constitutional: Negative  Negative for appetite change and fever  HENT: Negative  Negative for hearing loss, tinnitus, trouble swallowing and voice change  Eyes: Negative  Negative for photophobia and pain  Respiratory: Negative  Negative for shortness of breath  Cardiovascular: Negative  Negative for palpitations  Gastrointestinal: Negative  Negative for nausea and vomiting  Endocrine: Negative  Negative for cold intolerance and heat intolerance  Genitourinary: Positive for frequency  Negative for dysuria and urgency  Musculoskeletal: Positive for back pain, gait problem and joint swelling  Negative for myalgias and neck pain  Skin: Negative  Negative for rash  Neurological: Negative for dizziness, tremors, seizures, syncope, facial asymmetry, speech difficulty, weakness, light-headedness, numbness and headaches  Memory Problems   Double vision   Confusion   Taste and smell change   Hematological: Bruises/bleeds easily  Psychiatric/Behavioral: Negative  Negative for confusion, hallucinations and sleep disturbance       PHYSICAL EXAMINATION:     Vital signs:  /63 (BP Location: Left arm, Patient Position: Sitting, Cuff Size: Large)   Pulse 55   Ht 6' (1 829 m)   Wt 67 1 kg (148 lb)   BMI 20 07 kg/m² General:  Well-appearing, well nourished, pleasant patient in no acute distress  Mood appropriate  Head:  Normocephalic, atraumatic  Oropharynx and conjunctiva are clear  Speech  No hypophonia, no bradylalia  No scanning speech  Language: Comprehension intact mostly  He had confused a hair-comb with glasses  He was able to read a short passage without trouble  Neck:  Supple, strong 5/5 forward flexion and retroflexion  Extremities: Range of motion is normal       Cognitive and Mental Exam:  MOCA version 1 0    Points MAX   Visuospatial  ----------   Trails A 0 1   Cube Drawing 0 1   Clock Drawing 0 3   Naming Objects 3 3   Attention  ----------   Digit Span 2 2   Letter Reading 0 1   Serial 7s 0 3   Language  ----------   Repetition 2 2   Fluency 0 1   Abstraction 2 2   Delayed Recall 0 5   Orientation               1           6              10 30   + 1 for 12th grade education = 11    Apraxia: none  Frontal Release Signs: none except for bilateral palmomental    Cranial Nerves:  CN II:  Direct and consensual light reflexes were equally reactive to light symmetrically  No afferent pupillary defect   Visual fields are full to confrontation  CN III / IV / VI: Extraocular movements were full, with normal pursuit and saccades  CN V:   Facial sensation to light touch was intact  CN VII: Face is symmetric with normal strength  CN VIII: Hearing was assessed using the Calibrated Finger Rub Auditory Screening Test (CALFRAST) and was not abnormal (Better than CALFRAST-Strong-70)  CN X:   Palate is up going bilaterally and symmetrically  CN XI:  Neck muscles are strong  CN XII: Tongue protrusion is at midline with normal movements  No dysarthria  Motor:    Dystonia: none  Dyskinesia: none  Myoclonus: none  Chorea: none  Tics: none      [id number]                Time since last dose:   12/23/19       Speech  1     Facial Expression  2    Postural Tremor (Right) 1    Postural Tremor (Left) 1 Kinetic Tremor (Right)  1    Kinetic Tremor (Left)  1    Rest tremor amplitude RUE 0    Rest tremor amplitude LUE 0    Rest tremor amplitude RLE 0    Rest tremor amplitude LLE 0    Lip/Jaw Tremor  0    Consistency of tremor 0    Finger Taps (Right)   1    Finger Taps (Left)  1    Hand Movement (Right)  1    Hand Movement (Left)   1    Pronation/Supination (Right)  1    Pronation/Supination (Left)   1    Toe Tapping (Right) -    Toe Tapping (Left) -    Leg Agility (Right)  -    Leg Agility (Left)   -     Rigidity - Neck  1     Rigidity - Upper Extremity (Right)  0      Rigidity - Upper Extremity (Left)   0     Rigidity - Lower Extremity (Right)  0    Rigidity - Lower Extremity (Left)   0    Arising from Chair   1      Gait   0     Freezing of Gait 0     Postural Stability  -     Posture 0     Global spontaneity of movement 0       -------------------------------------------------------------------------------------    Muscle Strength Right Left  Muscle Strength Right Left   Deltoid 5/5 5/5  Hip Adductors 5/5 5/5   Biceps 5/5 5/5  Hip Abductors 5/5 5/5   Triceps 5/5 5/5  Knee Extensors 5/5 5/5   Wrist Extensors 5/5 5/5  Knee Flexors 5/5 5/5   Wrist Flexors 5/5 5/5  Ankle Extensors 5/5 5/5    5/5 5/5  Ankle Flexors 5/5 5/5   Finger Abductors 5/5 5/5       Hip Flexors 5/5 5/5   Hip Extensors 5/5 5/5     Coordination:  Finger-to-nose-finger: normal with mild bilateral intention tremor     Gait:  Slow pacing forward, normal stride length, slightly forward hunched posture, slow turns, impaired Tandem gait and cannot stand on either foot for more then 2 sec        Reflexes:    Right Left   Biceps 2/4 2/4   Brachioradialis 2/4 2/4   Triceps 2/4 2/4   Knee 2/4 2/4   Ankle 2/4 2/4      Plantar cutaneous reflex:  Right: flexor  Left: flexor      REVIEW OF ANCILLARY TESTS:   Results for orders placed or performed during the hospital encounter of 05/02/18   CT head wo contrast    Narrative    CT BRAIN - WITHOUT CONTRAST    INDICATION:   Confusion/delirium, altered LOC, unexplained  COMPARISON:  None  TECHNIQUE:  CT examination of the brain was performed  In addition to axial images, coronal 2D reformatted images were created and submitted for interpretation  Radiation dose length product (DLP) for this visit:  0688 [id number] 99 mGy-cm   This examination, like all CT scans performed in the [facility], was performed utilizing techniques to minimize radiation dose exposure, including the use of iterative   reconstruction and automated exposure control  IMAGE QUALITY:  Diagnostic  FINDINGS:    PARENCHYMA:  No intracranial mass, mass effect or midline shift  No CT signs of acute infarction  No acute intracranial hemorrhage  Scattered areas of decreased white matter attenuation, consistent with chronic microvascular ischemic change  VENTRICLES AND EXTRA-AXIAL SPACES:  Generalized enlargement, probably normal for the patient's age  VISUALIZED ORBITS AND PARANASAL SINUSES:  Retention cysts or polyps in the floor the right maxillary sinus  Paranasal sinuses otherwise clear  Orbits unremarkable  CALVARIUM AND EXTRACRANIAL SOFT TISSUES:  Normal       Impression    No acute intracranial abnormality

## 2019-12-23 ENCOUNTER — CONSULT (OUTPATIENT)
Dept: NEUROLOGY | Facility: CLINIC | Age: 84
End: 2019-12-23
Payer: COMMERCIAL

## 2019-12-23 VITALS
HEART RATE: 55 BPM | WEIGHT: 148 LBS | DIASTOLIC BLOOD PRESSURE: 63 MMHG | HEIGHT: 72 IN | SYSTOLIC BLOOD PRESSURE: 147 MMHG | BODY MASS INDEX: 20.05 KG/M2

## 2019-12-23 DIAGNOSIS — F03.91 DEMENTIA WITH BEHAVIORAL DISTURBANCE, UNSPECIFIED DEMENTIA TYPE (HCC): Primary | ICD-10-CM

## 2019-12-23 DIAGNOSIS — R45.4 IRRITABILITY AND ANGER: ICD-10-CM

## 2019-12-23 PROCEDURE — 99205 OFFICE O/P NEW HI 60 MIN: CPT | Performed by: PSYCHIATRY & NEUROLOGY

## 2019-12-23 RX ORDER — LORAZEPAM 0.5 MG/1
0.5 TABLET ORAL EVERY 8 HOURS PRN
Qty: 60 TABLET | Refills: 2 | Status: SHIPPED | OUTPATIENT
Start: 2019-12-23 | End: 2020-07-21 | Stop reason: SDUPTHER

## 2019-12-23 NOTE — PATIENT INSTRUCTIONS
· Most recent laboratory test results from 10/2019 were normal CMP, elevated LDL on panel, persisting anemia on CBC, and slightly elevated TSH  · Check magnesium, vitamin b12, folate, ceruloplasmin  · No recent or relevant neuroimaging results to review  CT head reviewed as normal  He has regular annual carotid ultrasounds @ [facility]  · Check MRI Brain NeuroQuant  · Consider NM PET scan if suspicion for FTD  · We will send a letter to [place] as pt continues to retain his 's license despite not driving  · He will continue the Omega 3 Fish oil supplements, turmeric (curcumin) 500-750mg daily and gingko biloba for memory  · Recommended Mediterranean diet for stroke risk and for memory  · Encouraged to remain both physically and mentally active, including crossword puzzles, brain teasers  Online resources at [employer] for cognitive training games for free  · Regarding his mood instability, considered about potentially increasing Cymbalta dose to 90mg a day (30mg / 60mg) and smoothing out his morning  We will defer to Dr Loni Guillory who is prescribing this  Pt's wife is trying to carefully manage the medications that pt will take  · We discussed about Namenda but given it's low chance of altering his course right now, we will hold off on this  · Thank you very much for sending me this interesting patient  · The patient has been instructed to call us about any new neurological problems or medication side effects  · Return to Clinic in 4 months OVL in [place] per availability

## 2019-12-23 NOTE — LETTER
December 23, 2019     Allison Tolentino, [address]    Patient: Candi Porras   YOB: 1935   Date of Visit: 12/23/2019       Dear Dr Annette Barkley:    Thank you for referring Candi Porras to me for evaluation  Below are my notes for this consultation  If you have questions, please do not hesitate to call me  I look forward to following your patient along with you  Sincerely,        Bety Martinez MD        CC: No Recipients  Bety Martinez MD  12/23/2019 11:19 PM  Sign at close encounter  [address] [place] [address] and [facility]        NEW PATIENT EVALUATION NOTE    Patient: Candi Porras  Medical Record Number: # [id number]  YOB: 1935  Date of visit: 12/23/2019    Referring provider: Michelle Roe MD    ASSESSMENT     Diagnoses for this encounter:  1  Dementia with behavioral disturbance, unspecified dementia type [facility])  Ambulatory referral to Neurology    Ceruloplasmin    Magnesium    Vitamin B12    Folate    MRI brain NeuroQuant wo and w contrast    LORazepam (ATIVAN) 0 5 mg tablet   2  Irritability and anger  Ambulatory referral to Neurology    LORazepam (ATIVAN) 0 5 mg tablet     Impression of this 80 yo gentleman with 4+ year history of increasing forgetfulness and most notably behavioral changes apparent in the early daytime rather than the nighttime  His course is more remarkable for the negativity and irritability expressed in the daytime, when he is most threatening (once told wife he was going to shoot her) bodily harm  He has never to this date acted accordingly and attempted any of his threats  Music and certain television content seem to placate him  All weapons in the home have been secured and his firearms the same  MOCA was 11/30 with 0/5 word recall  He has been on Aricept for years   Overall his course resembles a dementia such as Alzheimer's - FrontoTemporal Dementia type, given his level of dependence, poor insight to deficits and his impulsiveness  Proceed as below for workup  PLAN     · Most recent laboratory test results from 10/2019 were normal CMP, elevated LDL on panel, persisting anemia on CBC, and slightly elevated TSH  · Check magnesium, vitamin b12, folate, ceruloplasmin  · No recent or relevant neuroimaging results to review  CT head reviewed as normal  He has regular annual carotid ultrasounds @ [facility]  · Check MRI Brain NeuroQuant  · Consider NM PET scan if suspicion for FTD  · We will send a letter to [place] as pt continues to retain his 's license despite not driving  · He will continue the Omega 3 Fish oil supplements, turmeric (curcumin) 500-750mg daily and gingko biloba for memory  · Recommended Mediterranean diet for stroke risk and for memory  · Encouraged to remain both physically and mentally active, including crossword puzzles, brain teasers  Online resources at [employer] for cognitive training games for free  · Regarding his mood instability, considered about potentially increasing Cymbalta dose to 90mg a day (30mg / 60mg) and smoothing out his morning  We will defer to Dr Myron Mccoy who is prescribing this  Pt's wife is trying to carefully manage the medications that pt will take  · We discussed about Namenda but given it's low chance of altering his course right now, we will hold off on this  · Thank you very much for sending me this interesting patient  · The patient has been instructed to call us about any new neurological problems or medication side effects  · [place] [facility]  A total of 80 minutes were spent face-to-face with this patient, of which 25% was spent on counseling and coordination of care   We discussed the natural history of the patient's condition, differential diagnosis, level of diagnostic certainty, treatment alternatives and their side effects and possible complications  HISTORY OF PRESENT ILLNESS:     Mr Stacia Brown is a 81 y o  right handed male with known chronic hypothyroidism, arteriosclerosis, hx of L CEA and prior hx of dementia who has been referred to the [facility] for consultation for dementia with behavioral disturbance  The patient was accompanied today  History was obtained from patient and spouse  Pt referred from his PCP    At baseline she describes his personality as more "negative-slanting"  Pt denies feeling depressed  They do not recall any recent head trauma  His wife recalls first noticing a decline in his memory in 2016  At his last PCP visit, pt's wife noted a slow and steady decline in his cognition, with most trouble in the morning rather than nighttime  He typically has negative thoughts during this time and often expressed wishing he were dead, but without previous attempts  He never used to swear but now in the morning he would do so profusely at at the slightest provocation  He was very contrary and expressed strong repeated desire to return home from his son's home  Pt was only happy when he was accompanied  She denies any hallucinations or delusional thinking  Certain music helps calm him down - "easy listening" type  She has also found vu broadcasts to be very good for him  Per records, he requires redirection for his irritability and flashes of anger  His wife had to stop the Aricept recently as it was "ineffective" and was not improving his decline  It was started "years ago"  CT head from 2018 for delirium was normal for age  He had significant family history of dementia in all of his siblings (now  from various reasons), along with hyperlipidemia  He has a proclivity to candy, junk food and fruit and small  His wife prepares the meals and he does eat some of it       Current Relevant Medications:  Lipitor, Levothyroxine, Aricept 5mg qDay, Cymbalta, Prevagen, ASA 81mg, super B complex vitamin, Coq10, Flaxseed oil, vitamin B12, MVI, omega 3 fatty acids   Living Situation + ADLs:  He does not regularly shave or wash on his own  His last known shower was many weeks ago - in contrast to his baseline which was very fastidious and clean  He never did any of the financial management; he does not drive any longer  Durable Power of : wife  Living Will: yes     Family History: Number of First Degree Relatives With Parkinsonism/Dementia: one of six children and rest of siblings all had dementia  Eldest brother  at age 72       REVIEW OF PAST MEDICAL, SOCIAL AND FAMILY HISTORY:  This is the list of problems as per our Medical Records:    Patient Active Problem List    Diagnosis Date Noted    History of left-sided carotid endarterectomy 2018    Aortic valve disorder 2018    Presence of aortocoronary bypass graft 2018    Presence of prosthetic heart valve 2018    Personal history of nicotine dependence 2018    Irritability and anger 2018    Malignant neoplasm of bladder ([facility] ) 2018    Bradycardia 2018    Hypothyroidism 2018    Dementia ([facility] ) 2018    Prostate enlargement 2018    Asymptomatic bilateral carotid artery stenosis 2017    Chronic kidney disease 2017    Nocturia 2016    Weight loss 2016    Peptic ulcer 2015    Memory difficulties 2015    Occlusion and stenosis of carotid artery without mention of cerebral infarction 2015    Arthralgia 2014    Dermatitis, eczematoid 2014    Arteriosclerotic heart disease 2013    Carotid artery stenosis 2012    Depression 2012    Hypercholesterolemia 2012    Hypertension 2012     Past Medical History:   Diagnosis Date    Abnormal stress test     Abnormal weight loss     Alzheimer's disease ([facility] ) 2018    Aortic insufficiency with aortic stenosis     [s/p AVR (tissue) 4/2013]     Bradycardia, unspecified     Coronary artery disease      [s/p CABG x3 4/2013]    Diabetes ([address] )     Disease of thyroid gland     Dyslipidemia     Gastric ulcer     last assessed: 2/24/14    H/O cardiovascular stress test      (EF 0 67, Not suspicious for significant occlusive CAD  ) - 11/28/2007; [facility] (Normal EF, Inferior wall post stress HK, markedly positive EKG with chest pressure) - 4/10/2013    History of echocardiogram 08/05/2016    2-D w/ CFD:  EF 0 55 (55%), Normal LVSF  Normal functioning bioprosthetic AV   History of EKG     EKG shows sinus bradycardia, 49 bpm, otherwise normal     History of EKG      (Sinus Rhythm) - 3/25/2013;  (Sinus Barron Nolan) - 5/20/2013;  9/25/2013     History of Holter monitoring      (SR w/ rates ranging from 44-78BPM  No afib, no heart block, no vtach, no pauses, and no symptoms per pt  there were few short atrial runs, highest being 140BPM, the longest being 8 beats; though overall atrial and ventricular ectopic beats were rare ) - 8/3/2016     History of Holter monitoring 08/22/2016    24 hr    Hx of echocardiogram     (EF 0 70, Without regional wall abnormalities  Possibly bicuspid aortic valve with mild AS, no aortic insufficiency ) 3/22/2007;  (EF 0 55 (55%), Normal LVSF  Normal functioning bioprosthetic AV ) - 8/3/2016     Hypertension     Hypothyroid 5/2/2018    ICAO (internal carotid artery occlusion)     Duplex (There is known occlusion of ICA in rt carotid and the vertebral artery is antegrade  0-19% ICA stenosis of Lt carotid  The vertebral artery is antegrade  Widely patent endarterectomy site on the left ) - 1/30/2014 Carotid Duplex (No change  Known occlusion of ICA in right carotid   0-19% ICA stenosis in left carotid, and widely patent endarterectomy site on left ) - 8/28/2014    Inguinal hernia, left     last assessed: 10/8/14    Peptic ulcer     last assessed: 5/21/13    Prostate enlargement 5/2/2018 Past Surgical History:   Procedure Laterality Date    AORTIC VALVE REPLACEMENT      CAROTID ENDARTARECTOMY  04/26/2013    CATARACT EXTRACTION Bilateral     CORONARY ARTERY BYPASS GRAFT       (3V CABG: LIMA-LAD, VG-RCA, VG-CX, Tissue AVR (#23 [address])) - 4/26/2013     INGUINAL HERNIA REPAIR      NEPHRECTOMY Right     MS CYSTOURETHROSCOPY W/IRRIG & EVAC CLOTS N/A 5/7/2018    Procedure: CYSTOSCOPY EVACUATION OF CLOTS AND TURBT;  Surgeon: Antoinette Banerjee MD;  Location: [facility];  Service: Urology    THROMBOENDARTERECTOMY      Carotid      Allergies   Allergen Reactions    Contrast [Iodinated Diagnostic Agents]     Iodine Throat Swelling     IVP contrast dye    Morphine Rash      Outpatient Encounter Medications as of 12/23/2019   Medication Sig Dispense Refill    aspirin (ECOTRIN LOW STRENGTH) 81 mg EC tablet Take 81 mg by mouth daily      atorvastatin (LIPITOR) 80 mg tablet TAKE 1 TABLET DAILY 90 tablet 3    B Complex-C (SUPER B/C) CAPS Take by mouth take 1 tablet daily        cholecalciferol (VITAMIN D3) 1,000 units tablet Take 2,000 Units by mouth daily      co-enzyme Q-10 30 MG capsule Take 100 mg by mouth 3 (three) times a day      COCONUT OIL-FLAXSEED OIL PO Take by mouth      cyanocobalamin (VITAMIN B-12) 1,000 mcg tablet Take 1,000 mcg by mouth daily      DULoxetine (CYMBALTA) 30 mg delayed release capsule Take 1 capsule (30 mg total) by mouth 2 (two) times a day 180 capsule 1    levothyroxine 75 mcg tablet Take 1 tablet (75 mcg total) by mouth daily 90 tablet 3    multivitamin (THERAGRAN) TABS Take 1 tablet by mouth daily      Omega-3 Fatty Acids (OMEGA 3 PO) Take by mouth Omega 3 350 mg- 400 mg capsule [id number] mg every day      pantoprazole (PROTONIX) 20 mg tablet Take 20 mg by mouth daily      tamsulosin (FLOMAX) 0 4 mg Take 1 capsule (0 4 mg total) by mouth daily with dinner 90 capsule 3    LORazepam (ATIVAN) 0 5 mg tablet Take 1 tablet (0 5 mg total) by mouth every 8 (eight) hours as needed for anxiety Can take 0 5 to 1 tablet q8h as needed for agitation 60 tablet 2    [DISCONTINUED] Apoaequorin (PREVAGEN) 10 MG CAPS Take by mouth      [DISCONTINUED] donepezil (ARICEPT) 5 mg tablet Take 1 tablet (5 mg total) by mouth daily at bedtime (Patient not taking: Reported on 12/23/2019) 90 tablet 3     No facility-administered encounter medications on file as of 12/23/2019  Social History     Tobacco Use    Smoking status: Former Smoker     Types: Cigarettes    Smokeless tobacco: Never Used    Tobacco comment: quit 20 years ago   Substance Use Topics    Alcohol use: Yes     Comment: social      Family History   Problem Relation Age of Onset   [facility] Sudden death Mother         unexpected death   [facility] Other Father         Coal workers' Pneumoconiosis    Alzheimer's disease Brother         REVIEW OF SYSTEMS:  The patient has entered data on an intake form regarding present illness, past medical and surgical history, medications, allergies, family and social history, and a full review of 14 systems  I have reviewed this form with the patient, and all the relevant information has been included on this note  The full review of systems was negative except as stated in HPI and below  Review of Systems   Constitutional: Negative  Negative for appetite change and fever  HENT: Negative  Negative for hearing loss, tinnitus, trouble swallowing and voice change  Eyes: Negative  Negative for photophobia and pain  Respiratory: Negative  Negative for shortness of breath  Cardiovascular: Negative  Negative for palpitations  Gastrointestinal: Negative  Negative for nausea and vomiting  Endocrine: Negative  Negative for cold intolerance and heat intolerance  Genitourinary: Positive for frequency  Negative for dysuria and urgency  Musculoskeletal: Positive for back pain, gait problem and joint swelling  Negative for myalgias and neck pain  Skin: Negative   Negative for rash    Neurological: Negative for dizziness, tremors, seizures, syncope, facial asymmetry, speech difficulty, weakness, light-headedness, numbness and headaches  Memory Problems   Double vision   Confusion   Taste and smell change   Hematological: Bruises/bleeds easily  Psychiatric/Behavioral: Negative  Negative for confusion, hallucinations and sleep disturbance  PHYSICAL EXAMINATION:     Vital signs:  /63 (BP Location: Left arm, Patient Position: Sitting, Cuff Size: Large)   Pulse 55   Ht 6' (1 829 m)   Wt 67 1 kg (148 lb)   BMI 20 07 kg/m²      General:  Well-appearing, well nourished, pleasant patient in no acute distress  Mood appropriate  Head:  Normocephalic, atraumatic  Oropharynx and conjunctiva are clear  Speech  No hypophonia, no bradylalia  No scanning speech  Language: Comprehension intact mostly  He had confused a hair-comb with glasses  He was able to read a short passage without trouble  Neck:  Supple, strong 5/5 forward flexion and retroflexion  Extremities: Range of motion is normal       Cognitive and Mental Exam:  MOCA version 1 0    Points MAX   Visuospatial  ----------   Trails A 0 1   Cube Drawing 0 1   Clock Drawing 0 3   Naming Objects 3 3   Attention  ----------   Digit Span 2 2   Letter Reading 0 1   Serial 7s 0 3   Language  ----------   Repetition 2 2   Fluency 0 1   Abstraction 2 2   Delayed Recall 0 5   Orientation               1           6              10 30   + 1 for 12th grade education = 11    Apraxia: none  Frontal Release Signs: none except for bilateral palmomental    Cranial Nerves:  CN II:  Direct and consensual light reflexes were equally reactive to light symmetrically  No afferent pupillary defect   Visual fields are full to confrontation  CN III / IV / VI: Extraocular movements were full, with normal pursuit and saccades  CN V:   Facial sensation to light touch was intact  CN VII: Face is symmetric with normal strength     CN VIII: Hearing was assessed using the Calibrated Finger Rub Auditory Screening Test (CALFRAST) and was not abnormal (Better than CALFRAST-Strong-70)  CN X:   Palate is up going bilaterally and symmetrically  CN XI:  Neck muscles are strong  CN XII: Tongue protrusion is at midline with normal movements  No dysarthria  Motor:    Dystonia: none  Dyskinesia: none  Myoclonus: none  Chorea: none  Tics: none      UPDRSIII                Time since last dose:   12/23/19       Speech  1     Facial Expression  2    Postural Tremor (Right) 1    Postural Tremor (Left) 1    Kinetic Tremor (Right)  1    Kinetic Tremor (Left)  1    Rest tremor amplitude RUE 0    Rest tremor amplitude LUE 0    Rest tremor amplitude RLE 0    Rest tremor amplitude LLE 0    Lip/Jaw Tremor  0    Consistency of tremor 0    Finger Taps (Right)   1    Finger Taps (Left)  1    Hand Movement (Right)  1    Hand Movement (Left)   1    Pronation/Supination (Right)  1    Pronation/Supination (Left)   1    Toe Tapping (Right) -    Toe Tapping (Left) -    Leg Agility (Right)  -    Leg Agility (Left)   -     Rigidity - Neck  1     Rigidity - Upper Extremity (Right)  0      Rigidity - Upper Extremity (Left)   0     Rigidity - Lower Extremity (Right)  0    Rigidity - Lower Extremity (Left)   0    Arising from Chair   1      Gait    0     Freezing of Gait 0     Postural Stability  -     Posture 0     Global spontaneity of movement 0       -------------------------------------------------------------------------------------    Muscle Strength Right Left  Muscle Strength Right Left   Deltoid 5/5 5/5  Hip Adductors 5/5 5/5   Biceps 5/5 5/5  Hip Abductors 5/5 5/5   Triceps 5/5 5/5  Knee Extensors 5/5 5/5   Wrist Extensors 5/5 5/5  Knee Flexors 5/5 5/5   Wrist Flexors 5/5 5/5  Ankle Extensors 5/5 5/5    5/5 5/5  Ankle Flexors 5/5 5/5   Finger Abductors 5/5 5/5       Hip Flexors 5/5 5/5   Hip Extensors 5/5 5/5     Coordination:  Finger-to-nose-finger: normal with mild bilateral intention tremor     Gait:  Slow pacing forward, normal stride length, slightly forward hunched posture, slow turns, impaired Tandem gait and cannot stand on either foot for more then 2 sec  Reflexes:    Right Left   Biceps 2/4 2/4   Brachioradialis 2/4 2/4   Triceps 2/4 2/4   Knee 2/4 2/4   Ankle 2/4 2/4      Plantar cutaneous reflex:  Right: flexor  Left: flexor      REVIEW OF ANCILLARY TESTS:   Results for orders placed or performed during the hospital encounter of 05/02/18   CT head wo contrast    Narrative    CT BRAIN - WITHOUT CONTRAST    INDICATION:   Confusion/delirium, altered LOC, unexplained  COMPARISON:  None  TECHNIQUE:  CT examination of the brain was performed  In addition to axial images, coronal 2D reformatted images were created and submitted for interpretation  Radiation dose length product (DLP) for this visit:  [id number] 99 mGy-cm   This examination, like all CT scans performed in the [facility], was performed utilizing techniques to minimize radiation dose exposure, including the use of iterative   reconstruction and automated exposure control  IMAGE QUALITY:  Diagnostic  FINDINGS:    PARENCHYMA:  No intracranial mass, mass effect or midline shift  No CT signs of acute infarction  No acute intracranial hemorrhage  Scattered areas of decreased white matter attenuation, consistent with chronic microvascular ischemic change  VENTRICLES AND EXTRA-AXIAL SPACES:  Generalized enlargement, probably normal for the patient's age  VISUALIZED ORBITS AND PARANASAL SINUSES:  Retention cysts or polyps in the floor the right maxillary sinus  Paranasal sinuses otherwise clear  Orbits unremarkable  CALVARIUM AND EXTRACRANIAL SOFT TISSUES:  Normal       Impression    No acute intracranial abnormality

## 2019-12-23 NOTE — PROGRESS NOTES
Review of Systems   Constitutional: Negative  Negative for appetite change and fever  HENT: Negative  Negative for hearing loss, tinnitus, trouble swallowing and voice change  Eyes: Negative  Negative for photophobia and pain  Respiratory: Negative  Negative for shortness of breath  Cardiovascular: Negative  Negative for palpitations  Gastrointestinal: Negative  Negative for nausea and vomiting  Endocrine: Negative  Negative for cold intolerance and heat intolerance  Genitourinary: Positive for frequency  Negative for dysuria and urgency  Musculoskeletal: Positive for back pain, gait problem and joint swelling  Negative for myalgias and neck pain  Skin: Negative  Negative for rash  Neurological: Negative for dizziness, tremors, seizures, syncope, facial asymmetry, speech difficulty, weakness, light-headedness, numbness and headaches  Memory Problems  Double vision  Confusion  Taste and smell change   Hematological: Bruises/bleeds easily  Psychiatric/Behavioral: Negative  Negative for confusion, hallucinations and sleep disturbance

## 2020-01-07 ENCOUNTER — TELEPHONE (OUTPATIENT)
Dept: NEUROLOGY | Facility: CLINIC | Age: 85
End: 2020-01-07

## 2020-01-07 NOTE — TELEPHONE ENCOUNTER
Patient's wife calling to inform us that the patient is scheduled for his brain  MRI with neuroQuant on 1/20/2020  She's unsure if this requires prior auth

## 2020-01-20 ENCOUNTER — LAB (OUTPATIENT)
Dept: LAB | Facility: HOSPITAL | Age: 85
End: 2020-01-20
Attending: PSYCHIATRY & NEUROLOGY
Payer: COMMERCIAL

## 2020-01-20 ENCOUNTER — HOSPITAL ENCOUNTER (OUTPATIENT)
Dept: MRI IMAGING | Facility: HOSPITAL | Age: 85
Discharge: HOME | End: 2020-01-20
Attending: PSYCHIATRY & NEUROLOGY
Payer: COMMERCIAL

## 2020-01-20 DIAGNOSIS — F03.91 DEMENTIA WITH BEHAVIORAL DISTURBANCE, UNSPECIFIED DEMENTIA TYPE (HCC): ICD-10-CM

## 2020-01-20 LAB
FOLATE SERPL-MCNC: 11.5 NG/ML (ref 3.1–17.5)
MAGNESIUM SERPL-MCNC: 1.7 MG/DL (ref 1.6–2.6)
VIT B12 SERPL-MCNC: 952 PG/ML (ref 100–900)

## 2020-01-20 PROCEDURE — 82390 ASSAY OF CERULOPLASMIN: CPT

## 2020-01-20 PROCEDURE — 83735 ASSAY OF MAGNESIUM: CPT

## 2020-01-20 PROCEDURE — 82607 VITAMIN B-12: CPT

## 2020-01-20 PROCEDURE — 36415 COLL VENOUS BLD VENIPUNCTURE: CPT

## 2020-01-20 PROCEDURE — 76377 3D RENDER W/INTRP POSTPROCES: CPT

## 2020-01-20 PROCEDURE — 70553 MRI BRAIN STEM W/O & W/DYE: CPT

## 2020-01-20 PROCEDURE — A9585 GADOBUTROL INJECTION: HCPCS | Performed by: PSYCHIATRY & NEUROLOGY

## 2020-01-20 PROCEDURE — 82746 ASSAY OF FOLIC ACID SERUM: CPT

## 2020-01-20 RX ADMIN — GADOBUTROL 6 ML: 604.72 INJECTION INTRAVENOUS at 14:28

## 2020-01-21 LAB — CERULOPLASMIN SERPL-MCNC: 34.5 MG/DL (ref 16–31)

## 2020-01-21 NOTE — RESULT ENCOUNTER NOTE
Pt does not have MyChart  Please let him and his wife (pt has dementia) know the blood tests from me were overall unremarkable  Though ceruloplasmin was slightly above upper range of normal, it is not high enough to indicate any particular problem  I would be more interested in low levels for his symptoms  As for the vitamin B12, it was high but one can't overdose on it as excess is renally eliminated and only low levels would be concerning  Please proceed with rest of the same plan  I understand the MRI is completed, but we'll review the report when it comes out with them  Thank you

## 2020-01-22 ENCOUNTER — TELEPHONE (OUTPATIENT)
Dept: NEUROLOGY | Facility: CLINIC | Age: 85
End: 2020-01-22

## 2020-01-22 DIAGNOSIS — I65.21 STENOSIS OF INTRACRANIAL PORTIONS OF RIGHT INTERNAL CAROTID ARTERY: ICD-10-CM

## 2020-01-22 DIAGNOSIS — F03.91 DEMENTIA WITH BEHAVIORAL DISTURBANCE, UNSPECIFIED DEMENTIA TYPE (HCC): Primary | ICD-10-CM

## 2020-01-22 NOTE — TELEPHONE ENCOUNTER
----- Message from Lydia Sommer MD sent at 1/21/2020  8:40 AM EST -----  Pt does not have MyChart  Please let him and his wife (pt has dementia) know the blood tests from me were overall unremarkable  Though ceruloplasmin was slightly above upper range of normal, it is not high enough to indicate any particular problem  I would be more interested in low levels for his symptoms  As for the vitamin B12, it was high but one can't overdose on it as excess is renally eliminated and only low levels would be concerning  Please proceed with rest of the same plan  I understand the MRI is completed, but we'll review the report when it comes out with them  Thank you

## 2020-01-22 NOTE — TELEPHONE ENCOUNTER
Pt's wife made aware  She verbalizes understanding  MRI report is now in  Please review and advise on message you would like us to share with pt's wife

## 2020-01-22 NOTE — TELEPHONE ENCOUNTER
MRI reviewed and it did show findings consistent with neurodegenerative process with atrophy of the memory centers of the brain, consistent with either Alzheimer's disease most likely, or with the frontotemporal dementia we were considering  We cannot conclusively tell, however  Furthermore, they detected abnormal flow of the right internal carotid artery, a major vessel that feeds into the front half of the brain  We cannot tell if this artery is totally or partially blocked from the scan  Such a blockage might pose a future stroke risk and I'd like to have him undergo a CT angiogram of the brain to understand the full scope of any other occlusions  This blockage is on the opposite side of his previous carotid surgery  Plan:   - Ordering and scheduling the CTA of the head and neck to assess this risk  Script in    - Please continue the lipitor from Dr Trudy Doyle and the aspirin for now  I could find no record of any previous stroke or TIA that would make replacement with Plavix or Aggrenox needed  - After that, since the suspicion for FTD is there, we can proceed with nuclear medicine PET scan to better tell which pattern of dementia we are dealing with  Script in     - If he is declining, will suggest starting on memantine 5mg BID for a week and then 10mg BID afterwards to help slow down decline  06-Oct-2019 23:59

## 2020-01-23 RX ORDER — MEMANTINE HYDROCHLORIDE 10 MG/1
10 TABLET ORAL 2 TIMES DAILY
Qty: 60 TABLET | Refills: 1 | Status: SHIPPED | OUTPATIENT
Start: 2020-01-23 | End: 2020-07-21 | Stop reason: SDUPTHER

## 2020-01-23 NOTE — TELEPHONE ENCOUNTER
Understood about the carotid status  No CTA then  The benefit of PET scan would mainly be to help differentiate between the two types of dementia possible based on the pattern of brain activity that lights up  Functionally the beginning treatment is the same, and it would not dramatically  at this time except for establishing expectations  Alternatively, another option would be a formal neuropsychological evaluation to help document depth of his issues with other test scales  However, given his crankiness he might not be amenable to sitting still for the test  This might not be fruitful at this time  Perhaps once his agitation is more under control? I will then cancel PET unless they change their mind  Script for memantine now sent  Instructions are to start 1/2 tab BID for a week, then go 1 full tab BID if tolerated  Good about the Ativan   If he's not too sedated with it (as it sounds like) and it shows some effectiveness, I would not be opposed to try a touch greater of 1 5 to 2 tablets if he still has breakthrough crankiness

## 2020-01-23 NOTE — TELEPHONE ENCOUNTER
pt's wife made aware  she states that they are aware that the right is 100% blocked  she states that the dr told that it was inoperable  she states that it is hard to get pt to do anything and she states that she will not have him do CTA  she states that she has been following up with vascular and was recently advised that they did not have to follow up with them  i looks like pt has carotid doppler every year since 2015  Per vascular office note in care everywhere-Imaging: I personally reviewed images of carotid duplex performed in the office today which demonstrates occluded right internal carotid artery and patent left carotid endarterectomy  Assessment/Plan:   Asymptomatic bilateral carotid artery stenosis  History of left-sided carotid endarterectomy  Stable findings on ultrasound  Given degree of disability from dementia would not recommend any prophylactic intervention at this time  F/U PRN  Cont ASA + Statin    She is asking if he does PET scan,  what is the benefit of putting him through this test and what would we benefit form it   she states that he gets very agitated when she takes him for anything  he will continue lipitor and asa  she is agreeable to memantine  please send 30 day to local pharm  i then asked her to call back to back sure he is tolerating med and dose and we can send antoher script to [employer] [facility]  she also wanted to let you know that she has been giving him ativan 0 5mg 1 tab at hs   she states that it has helped slightly, he still says things but not with such vengance     rosalie advise   [phone] to leave a detailed message  [phone] to leave a detailed message

## 2020-02-03 ENCOUNTER — APPOINTMENT (OUTPATIENT)
Dept: LAB | Facility: MEDICAL CENTER | Age: 85
End: 2020-02-03
Payer: COMMERCIAL

## 2020-02-03 DIAGNOSIS — R45.4 IRRITABILITY AND ANGER: ICD-10-CM

## 2020-02-03 DIAGNOSIS — E03.9 HYPOTHYROIDISM, UNSPECIFIED TYPE: Chronic | ICD-10-CM

## 2020-02-03 DIAGNOSIS — F32.A DEPRESSION, UNSPECIFIED DEPRESSION TYPE: ICD-10-CM

## 2020-02-03 DIAGNOSIS — I25.10 ARTERIOSCLEROTIC HEART DISEASE: ICD-10-CM

## 2020-02-03 DIAGNOSIS — F03.91 DEMENTIA WITH BEHAVIORAL DISTURBANCE, UNSPECIFIED DEMENTIA TYPE (HCC): ICD-10-CM

## 2020-02-03 LAB
ALBUMIN SERPL BCP-MCNC: 3.4 G/DL (ref 3.5–5)
ALP SERPL-CCNC: 71 U/L (ref 46–116)
ALT SERPL W P-5'-P-CCNC: 36 U/L (ref 12–78)
ANION GAP SERPL CALCULATED.3IONS-SCNC: 4 MMOL/L (ref 4–13)
AST SERPL W P-5'-P-CCNC: 33 U/L (ref 5–45)
BASOPHILS # BLD AUTO: 0.07 THOUSANDS/ΜL (ref 0–0.1)
BASOPHILS NFR BLD AUTO: 1 % (ref 0–1)
BILIRUB SERPL-MCNC: 0.56 MG/DL (ref 0.2–1)
BUN SERPL-MCNC: 23 MG/DL (ref 5–25)
CALCIUM SERPL-MCNC: 9.6 MG/DL (ref 8.3–10.1)
CHLORIDE SERPL-SCNC: 103 MMOL/L (ref 100–108)
CHOLEST SERPL-MCNC: 208 MG/DL (ref 50–200)
CO2 SERPL-SCNC: 29 MMOL/L (ref 21–32)
CREAT SERPL-MCNC: 1.15 MG/DL (ref 0.6–1.3)
EOSINOPHIL # BLD AUTO: 0.35 THOUSAND/ΜL (ref 0–0.61)
EOSINOPHIL NFR BLD AUTO: 4 % (ref 0–6)
ERYTHROCYTE [DISTWIDTH] IN BLOOD BY AUTOMATED COUNT: 13.3 % (ref 11.6–15.1)
GFR SERPL CREATININE-BSD FRML MDRD: 59 ML/MIN/1.73SQ M
GLUCOSE P FAST SERPL-MCNC: 98 MG/DL (ref 65–99)
HCT VFR BLD AUTO: 36.6 % (ref 36.5–49.3)
HDLC SERPL-MCNC: 43 MG/DL
HGB BLD-MCNC: 11.7 G/DL (ref 12–17)
IMM GRANULOCYTES # BLD AUTO: 0.02 THOUSAND/UL (ref 0–0.2)
IMM GRANULOCYTES NFR BLD AUTO: 0 % (ref 0–2)
LDLC SERPL CALC-MCNC: 146 MG/DL (ref 0–100)
LYMPHOCYTES # BLD AUTO: 2.05 THOUSANDS/ΜL (ref 0.6–4.47)
LYMPHOCYTES NFR BLD AUTO: 22 % (ref 14–44)
MCH RBC QN AUTO: 30.2 PG (ref 26.8–34.3)
MCHC RBC AUTO-ENTMCNC: 32 G/DL (ref 31.4–37.4)
MCV RBC AUTO: 95 FL (ref 82–98)
MONOCYTES # BLD AUTO: 0.77 THOUSAND/ΜL (ref 0.17–1.22)
MONOCYTES NFR BLD AUTO: 8 % (ref 4–12)
NEUTROPHILS # BLD AUTO: 6.21 THOUSANDS/ΜL (ref 1.85–7.62)
NEUTS SEG NFR BLD AUTO: 65 % (ref 43–75)
NONHDLC SERPL-MCNC: 165 MG/DL
NRBC BLD AUTO-RTO: 0 /100 WBCS
PLATELET # BLD AUTO: 253 THOUSANDS/UL (ref 149–390)
PMV BLD AUTO: 11.2 FL (ref 8.9–12.7)
POTASSIUM SERPL-SCNC: 4.2 MMOL/L (ref 3.5–5.3)
PROT SERPL-MCNC: 7.6 G/DL (ref 6.4–8.2)
RBC # BLD AUTO: 3.87 MILLION/UL (ref 3.88–5.62)
SODIUM SERPL-SCNC: 136 MMOL/L (ref 136–145)
TRIGL SERPL-MCNC: 95 MG/DL
TSH SERPL DL<=0.05 MIU/L-ACNC: 5.06 UIU/ML (ref 0.36–3.74)
WBC # BLD AUTO: 9.47 THOUSAND/UL (ref 4.31–10.16)

## 2020-02-03 PROCEDURE — 80061 LIPID PANEL: CPT

## 2020-02-03 PROCEDURE — 36415 COLL VENOUS BLD VENIPUNCTURE: CPT

## 2020-02-03 PROCEDURE — 80053 COMPREHEN METABOLIC PANEL: CPT

## 2020-02-03 PROCEDURE — 85025 COMPLETE CBC W/AUTO DIFF WBC: CPT

## 2020-02-03 PROCEDURE — 84443 ASSAY THYROID STIM HORMONE: CPT

## 2020-02-06 ENCOUNTER — OFFICE VISIT (OUTPATIENT)
Dept: INTERNAL MEDICINE CLINIC | Facility: CLINIC | Age: 85
End: 2020-02-06
Payer: COMMERCIAL

## 2020-02-06 VITALS
DIASTOLIC BLOOD PRESSURE: 74 MMHG | BODY MASS INDEX: 19.34 KG/M2 | HEIGHT: 72 IN | WEIGHT: 142.8 LBS | SYSTOLIC BLOOD PRESSURE: 138 MMHG | TEMPERATURE: 97.8 F

## 2020-02-06 DIAGNOSIS — I65.23 ASYMPTOMATIC BILATERAL CAROTID ARTERY STENOSIS: ICD-10-CM

## 2020-02-06 DIAGNOSIS — F03.91 DEMENTIA WITH BEHAVIORAL DISTURBANCE, UNSPECIFIED DEMENTIA TYPE (HCC): Primary | ICD-10-CM

## 2020-02-06 DIAGNOSIS — I25.10 ARTERIOSCLEROTIC HEART DISEASE: ICD-10-CM

## 2020-02-06 DIAGNOSIS — R63.4 WEIGHT LOSS: ICD-10-CM

## 2020-02-06 DIAGNOSIS — C67.9 MALIGNANT NEOPLASM OF URINARY BLADDER, UNSPECIFIED SITE (HCC): ICD-10-CM

## 2020-02-06 DIAGNOSIS — E78.00 HYPERCHOLESTEROLEMIA: ICD-10-CM

## 2020-02-06 DIAGNOSIS — E03.9 HYPOTHYROIDISM, UNSPECIFIED TYPE: Chronic | ICD-10-CM

## 2020-02-06 DIAGNOSIS — I10 ESSENTIAL HYPERTENSION: ICD-10-CM

## 2020-02-06 PROCEDURE — 1160F RVW MEDS BY RX/DR IN RCRD: CPT | Performed by: FAMILY MEDICINE

## 2020-02-06 PROCEDURE — 3075F SYST BP GE 130 - 139MM HG: CPT | Performed by: FAMILY MEDICINE

## 2020-02-06 PROCEDURE — 3008F BODY MASS INDEX DOCD: CPT | Performed by: FAMILY MEDICINE

## 2020-02-06 PROCEDURE — 4040F PNEUMOC VAC/ADMIN/RCVD: CPT | Performed by: FAMILY MEDICINE

## 2020-02-06 PROCEDURE — 3078F DIAST BP <80 MM HG: CPT | Performed by: FAMILY MEDICINE

## 2020-02-06 PROCEDURE — 1036F TOBACCO NON-USER: CPT | Performed by: FAMILY MEDICINE

## 2020-02-06 PROCEDURE — 99215 OFFICE O/P EST HI 40 MIN: CPT | Performed by: FAMILY MEDICINE

## 2020-02-07 NOTE — PROGRESS NOTES
Assessment/Plan:    No problem-specific Assessment & Plan notes found for this encounter  Diagnoses and all orders for this visit:    Dementia with behavioral disturbance, unspecified dementia type (HCC)  -     CBC and differential; Future    Hypothyroidism, unspecified type  -     CBC and differential; Future  -     TSH, 3rd generation; Future    Essential hypertension  -     CBC and differential; Future  -     Comprehensive metabolic panel; Future    Asymptomatic bilateral carotid artery stenosis  -     CBC and differential; Future    Arteriosclerotic heart disease  -     CBC and differential; Future    Weight loss  -     CBC and differential; Future    Hypercholesterolemia  -     CBC and differential; Future  -     Comprehensive metabolic panel; Future  -     Lipid panel; Future    Malignant neoplasm of urinary bladder, unspecified site (HCC)  -     CBC and differential; Future        Orders recommendations as noted above  Had part of the visit with him present and then part of the visit with his wife alone  Over 45 minutes was spent discussing his worsening dementia as well as plans for the future  Over half of this time was spent in counseling  Discussed with his wife frankly that his dementia is worsening and that she will likely not be able to care for him alone at home for much longer  Discussed options with her regarding his care  Most likely will need memory unit/locked unit  She plans on discussing this with her son who unfortunately lives out of the area in [place]  Continue with the current medications  Continue to try to increase the dose of the Namenda  Attempted to discuss with him the importance of taking his medications regularly  Discussed the possibility of palliative care versus hospice with his wife  She will consider this as well  It is difficult to assess how he is doing overall from a medication standpoint since he has not been taking his medications regularly    TSH is mildly elevated but he has not been taking this regularly  Stressed him the importance of this  Will continue same dosage of levothyroxine since he has not been taking this as regularly as prescribed  Blood pressure controlled  Continue current medications  Cholesterol remains mildly elevated with an LDL in the 140s  Is questionable whether he is taking the atorvastatin regularly  Continue same dosage  Did discuss with his wife trying to get rid of some of his medications since he is so severely demented and would likely qualify for at least palliative care if not hospice at present  She with her she now  Continue with risk factor modification  Stressed to him the importance of trying to eat regular meals and not refusing them especially with the weight loss  For now will have him continue follow-up with Urology for the bladder cancer  It is unlikely that he will be able to continue with these follow-ups for much longer and follow-up for this will likely not improve the quality or quantity of his life  Be very careful to avoid falls  Assistive device discussed but it is unlikely he will use it  Will have them follow up in about 3-4 months or sooner if needed  Subjective:      Patient ID: Marin Jimenez is a 81 y o  male  He presents with his wife for routine visit  His dementia symptoms have been steadily worsening  They did follow-up with Neurology and had extensive evaluation completed  He has been started on the Namenda but she has had a lot of difficulty with him taking his medications  She is gradually trying to increase this  He denies any chest pain or palpitations  Denies any significant shortness of breath  Denies any nausea, vomiting, or diarrhea  He denies almost everything, however  He denies any nausea or vomiting  States that he eats his food despite his wife stating that he often skips meals or eats very little    Continuing to have morbid thoughts especially in the morning  He wishes for death  He wishes for the end of the world  Wife denies any threatening behavior recently  Continues on the simple of but his wife is unsure exactly how much this is helping or how regularly he is state it  Tolerating his atorvastatin without difficulty  Denies any muscle aches or weakness with this  Has missed some doses of this as well  Blood pressure is controlled now off of all medications  His wife feels this is likely related to a significant change in his diet as well as the significant weight loss  She has been giving him some doses of the lorazepam at bedtime to keep him calmer and is hopeful that this may help with his morning symptoms as well  Has not been taking the levothyroxine regularly  Possibly 2 to 3 times a week  Urination has been stable  Follows up with Urology for now for the history of the bladder cancer  Continues on the Flomax  Has had a significant fall about 3 weeks ago  Fell backward and hit his head  Did have a laceration but his wife was able to care for this at home  No loss of consciousness  The following portions of the patient's history were reviewed and updated as appropriate:   He  has a past medical history of Abnormal stress test, Abnormal weight loss, Alzheimer's disease ([facility] [address] ) (5/2/2018), Aortic insufficiency with aortic stenosis, Bradycardia, unspecified, Coronary artery disease, Diabetes ([facility] [address] ), Disease of thyroid gland, Dyslipidemia, Gastric ulcer, H/O cardiovascular stress test, History of echocardiogram (08/05/2016), History of EKG, History of EKG, History of Holter monitoring, History of Holter monitoring (08/22/2016), echocardiogram, Hypertension, Hypothyroid (5/2/2018), ICAO (internal carotid artery occlusion), Inguinal hernia, left, Peptic ulcer, and Prostate enlargement (5/2/2018)    He   Patient Active Problem List    Diagnosis Date Noted    History of left-sided carotid endarterectomy 08/27/2018    Aortic valve disorder 08/03/2018    Presence of aortocoronary bypass graft 07/13/2018    Presence of prosthetic heart valve 07/13/2018    Personal history of nicotine dependence 07/13/2018    Irritability and anger 06/05/2018    Malignant neoplasm of bladder ([facility] ) 05/08/2018    Bradycardia 05/05/2018    Hypothyroidism 05/02/2018    Dementia ([facility] ) 05/02/2018    Prostate enlargement 05/02/2018    Asymptomatic bilateral carotid artery stenosis 04/24/2017    Chronic kidney disease 01/23/2017    Benign prostatic hyperplasia with urinary obstruction 11/02/2016    Nocturia 09/06/2016    Weight loss 09/06/2016    Peptic ulcer 12/17/2015    Occlusion and stenosis of carotid artery without mention of cerebral infarction 03/11/2015    Arthralgia 05/19/2014    Dermatitis, eczematoid 02/24/2014    Arteriosclerotic heart disease 06/07/2013    Carotid artery stenosis 11/13/2012    Depression 11/13/2012    Hypercholesterolemia 05/29/2012    Hypertension 05/29/2012     He  has a past surgical history that includes Aortic valve replacement; Thromboendarterectomy; Cataract extraction (Bilateral); Inguinal hernia repair; Nephrectomy (Right); Coronary artery bypass graft; pr cystourethroscopy w/irrig & evac clots (N/A, 5/7/2018); and CAROTID ENDARTARECTOMY (04/26/2013)  His family history includes Alzheimer's disease in his brother; Other in his father; Sudden death in his mother  He  reports that he has quit smoking  His smoking use included cigarettes  He has never used smokeless tobacco  He reports that he drinks alcohol  He reports that he does not use drugs  Current Outpatient Medications   Medication Sig Dispense Refill    aspirin (ECOTRIN LOW STRENGTH) 81 mg EC tablet Take 81 mg by mouth daily      atorvastatin (LIPITOR) 80 mg tablet TAKE 1 TABLET DAILY 90 tablet 3    B Complex-C (SUPER B/C) CAPS Take by mouth take 1 tablet daily        cholecalciferol (VITAMIN D3) 1,000 units tablet Take 2,000 Units by mouth daily      co-enzyme Q-10 30 MG capsule Take 100 mg by mouth 3 (three) times a day      COCONUT OIL-FLAXSEED OIL PO Take by mouth      cyanocobalamin (VITAMIN B-12) 1,000 mcg tablet Take 1,000 mcg by mouth daily      DULoxetine (CYMBALTA) 30 mg delayed release capsule Take 1 capsule (30 mg total) by mouth 2 (two) times a day 180 capsule 1    levothyroxine 75 mcg tablet Take 1 tablet (75 mcg total) by mouth daily 90 tablet 3    LORazepam (ATIVAN) 0 5 mg tablet Take 1 tablet (0 5 mg total) by mouth every 8 (eight) hours as needed for anxiety Can take 0 5 to 1 tablet q8h as needed for agitation 60 tablet 2    memantine (NAMENDA) 10 mg tablet Take 1 tablet (10 mg total) by mouth 2 (two) times a day 5mg twice a day for a week and then increasing to 1 full tablet twice a day 60 tablet 1    multivitamin (THERAGRAN) TABS Take 1 tablet by mouth daily      Omega-3 Fatty Acids (OMEGA 3 PO) Take by mouth Omega 3 350 mg- 400 mg capsule [id number] mg every day      pantoprazole (PROTONIX) 20 mg tablet Take 20 mg by mouth daily      tamsulosin (FLOMAX) 0 4 mg Take 1 capsule (0 4 mg total) by mouth daily with dinner 90 capsule 3     No current facility-administered medications for this visit  Current Outpatient Medications on File Prior to Visit   Medication Sig    aspirin (ECOTRIN LOW STRENGTH) 81 mg EC tablet Take 81 mg by mouth daily    atorvastatin (LIPITOR) 80 mg tablet TAKE 1 TABLET DAILY    B Complex-C (SUPER B/C) CAPS Take by mouth take 1 tablet daily      cholecalciferol (VITAMIN D3) 1,000 units tablet Take 2,000 Units by mouth daily    co-enzyme Q-10 30 MG capsule Take 100 mg by mouth 3 (three) times a day    COCONUT OIL-FLAXSEED OIL PO Take by mouth    cyanocobalamin (VITAMIN B-12) 1,000 mcg tablet Take 1,000 mcg by mouth daily    DULoxetine (CYMBALTA) 30 mg delayed release capsule Take 1 capsule (30 mg total) by mouth 2 (two) times a day    levothyroxine 75 mcg tablet Take 1 tablet (75 mcg total) by mouth daily    LORazepam (ATIVAN) 0 5 mg tablet Take 1 tablet (0 5 mg total) by mouth every 8 (eight) hours as needed for anxiety Can take 0 5 to 1 tablet q8h as needed for agitation    memantine (NAMENDA) 10 mg tablet Take 1 tablet (10 mg total) by mouth 2 (two) times a day 5mg twice a day for a week and then increasing to 1 full tablet twice a day    multivitamin (THERAGRAN) TABS Take 1 tablet by mouth daily    Omega-3 Fatty Acids (OMEGA 3 PO) Take by mouth Omega 3 350 mg- 400 mg capsule [id number] mg every day    pantoprazole (PROTONIX) 20 mg tablet Take 20 mg by mouth daily    tamsulosin (FLOMAX) 0 4 mg Take 1 capsule (0 4 mg total) by mouth daily with dinner     No current facility-administered medications on file prior to visit  He is allergic to contrast [iodinated diagnostic agents]; iodine; and morphine       Review of Systems   Constitutional: Positive for activity change, appetite change and unexpected weight change  Negative for chills and fever  HENT: Negative for hearing loss  Eyes: Negative for pain and visual disturbance  Respiratory: Negative for cough, chest tightness and shortness of breath  Cardiovascular: Negative for chest pain and palpitations  Gastrointestinal: Negative for abdominal pain, blood in stool, diarrhea, nausea and vomiting  Endocrine: Negative for polydipsia, polyphagia and polyuria  Genitourinary: Negative for dysuria  Musculoskeletal: Negative for arthralgias and gait problem  Skin: Negative for color change  Neurological: Negative for dizziness and headaches  Hematological: Does not bruise/bleed easily  Psychiatric/Behavioral: Positive for agitation, confusion and decreased concentration  Negative for behavioral problems and self-injury  The patient is nervous/anxious            Objective:      /74 (BP Location: Left arm, Patient Position: Sitting, Cuff Size: Standard)   Temp 97 8 °F (36 6 °C) (Temporal)   Ht 6' (1 829 m)   Wt 64 8 kg (142 lb 12 8 oz)   BMI 19 37 kg/m²          Physical Exam   Constitutional: He is cooperative  No distress  HENT:   Head: Normocephalic and atraumatic  Nose: Nose normal    Eyes: Pupils are equal, round, and reactive to light  Conjunctivae are normal    Cardiovascular: Normal rate and regular rhythm  Exam reveals no gallop and no friction rub  Murmur heard  Systolic murmur is present with a grade of 1/6  Pulmonary/Chest: He has no wheezes  He has no rales  He exhibits no tenderness  Abdominal: He exhibits no distension  There is no tenderness  There is no rebound and no guarding  Lymphadenopathy:     He has no cervical adenopathy  Neurological: He is alert  He is disoriented  Skin: Skin is warm and dry  Psychiatric: His affect is labile  His speech is delayed  He is slowed  Cognition and memory are impaired  He expresses inappropriate judgment  He exhibits abnormal recent memory  Nursing note and vitals reviewed  Below is the patient's most recent value for Albumin, ALT, AST, BUN, Calcium, Chloride, Cholesterol, CO2, Creatinine, GFR, Glucose, HDL, Hematocrit, Hemoglobin, Hemoglobin A1C, LDL, Magnesium, Phosphorus, Platelets, Potassium, PSA, Sodium, Triglycerides, and WBC  Lab Results   Component Value Date    ALT 36 02/03/2020    AST 33 02/03/2020    BUN 23 02/03/2020    CALCIUM 9 6 02/03/2020     02/03/2020    CHOL 197 12/01/2015    CO2 29 02/03/2020    CREATININE 1 15 02/03/2020    HDL 43 02/03/2020    HCT 36 6 02/03/2020    HGB 11 7 (L) 02/03/2020    MG 1 7 01/20/2020    PHOS 3 6 05/09/2018     02/03/2020    K 4 2 02/03/2020     12/01/2015    TRIG 95 02/03/2020    WBC 9 47 02/03/2020     Note: for a comprehensive list of the patient's lab results, access the Results Review activity

## 2020-02-10 DIAGNOSIS — F32.A DEPRESSION, UNSPECIFIED DEPRESSION TYPE: ICD-10-CM

## 2020-02-11 RX ORDER — DULOXETIN HYDROCHLORIDE 30 MG/1
30 CAPSULE, DELAYED RELEASE ORAL 2 TIMES DAILY
Qty: 180 CAPSULE | Refills: 1 | Status: SHIPPED | OUTPATIENT
Start: 2020-02-11 | End: 2020-04-23 | Stop reason: SDUPTHER

## 2020-04-21 ENCOUNTER — TELEPHONE (OUTPATIENT)
Dept: NEUROLOGY | Facility: CLINIC | Age: 85
End: 2020-04-21

## 2020-04-23 ENCOUNTER — PROCEDURE VISIT (OUTPATIENT)
Dept: UROLOGY | Facility: CLINIC | Age: 85
End: 2020-04-23
Payer: COMMERCIAL

## 2020-04-23 ENCOUNTER — OFFICE VISIT (OUTPATIENT)
Dept: NEUROLOGY | Facility: CLINIC | Age: 85
End: 2020-04-23
Payer: COMMERCIAL

## 2020-04-23 VITALS
DIASTOLIC BLOOD PRESSURE: 70 MMHG | BODY MASS INDEX: 18.69 KG/M2 | HEART RATE: 62 BPM | HEIGHT: 72 IN | WEIGHT: 138 LBS | SYSTOLIC BLOOD PRESSURE: 118 MMHG

## 2020-04-23 VITALS
WEIGHT: 139.2 LBS | SYSTOLIC BLOOD PRESSURE: 158 MMHG | HEIGHT: 72 IN | DIASTOLIC BLOOD PRESSURE: 74 MMHG | HEART RATE: 54 BPM | BODY MASS INDEX: 18.85 KG/M2

## 2020-04-23 DIAGNOSIS — R45.4 IRRITABILITY AND ANGER: ICD-10-CM

## 2020-04-23 DIAGNOSIS — F02.81 ALZHEIMER'S DEMENTIA WITH BEHAVIORAL DISTURBANCE, UNSPECIFIED TIMING OF DEMENTIA ONSET (HCC): Primary | ICD-10-CM

## 2020-04-23 DIAGNOSIS — F32.A DEPRESSION, UNSPECIFIED DEPRESSION TYPE: ICD-10-CM

## 2020-04-23 DIAGNOSIS — G30.9 ALZHEIMER'S DEMENTIA WITH BEHAVIORAL DISTURBANCE, UNSPECIFIED TIMING OF DEMENTIA ONSET (HCC): Primary | ICD-10-CM

## 2020-04-23 DIAGNOSIS — N40.0 PROSTATE ENLARGEMENT: Chronic | ICD-10-CM

## 2020-04-23 DIAGNOSIS — C67.2 MALIGNANT NEOPLASM OF LATERAL WALL OF URINARY BLADDER (HCC): Primary | ICD-10-CM

## 2020-04-23 PROCEDURE — 99213 OFFICE O/P EST LOW 20 MIN: CPT | Performed by: UROLOGY

## 2020-04-23 PROCEDURE — 4040F PNEUMOC VAC/ADMIN/RCVD: CPT | Performed by: UROLOGY

## 2020-04-23 PROCEDURE — 3078F DIAST BP <80 MM HG: CPT | Performed by: UROLOGY

## 2020-04-23 PROCEDURE — 1160F RVW MEDS BY RX/DR IN RCRD: CPT | Performed by: UROLOGY

## 2020-04-23 PROCEDURE — 1160F RVW MEDS BY RX/DR IN RCRD: CPT | Performed by: PSYCHIATRY & NEUROLOGY

## 2020-04-23 PROCEDURE — 3074F SYST BP LT 130 MM HG: CPT | Performed by: UROLOGY

## 2020-04-23 PROCEDURE — 1036F TOBACCO NON-USER: CPT | Performed by: PSYCHIATRY & NEUROLOGY

## 2020-04-23 PROCEDURE — 52000 CYSTOURETHROSCOPY: CPT | Performed by: UROLOGY

## 2020-04-23 PROCEDURE — 3077F SYST BP >= 140 MM HG: CPT | Performed by: PSYCHIATRY & NEUROLOGY

## 2020-04-23 PROCEDURE — 4040F PNEUMOC VAC/ADMIN/RCVD: CPT | Performed by: PSYCHIATRY & NEUROLOGY

## 2020-04-23 PROCEDURE — 99215 OFFICE O/P EST HI 40 MIN: CPT | Performed by: PSYCHIATRY & NEUROLOGY

## 2020-04-23 PROCEDURE — 3008F BODY MASS INDEX DOCD: CPT | Performed by: UROLOGY

## 2020-04-23 PROCEDURE — 3078F DIAST BP <80 MM HG: CPT | Performed by: PSYCHIATRY & NEUROLOGY

## 2020-04-23 PROCEDURE — 1036F TOBACCO NON-USER: CPT | Performed by: UROLOGY

## 2020-04-23 PROCEDURE — 3008F BODY MASS INDEX DOCD: CPT | Performed by: PSYCHIATRY & NEUROLOGY

## 2020-04-23 RX ORDER — DULOXETIN HYDROCHLORIDE 30 MG/1
30 CAPSULE, DELAYED RELEASE ORAL EVERY EVENING
Qty: 90 CAPSULE | Refills: 1 | Status: SHIPPED | OUTPATIENT
Start: 2020-04-23 | End: 2020-07-21 | Stop reason: SDUPTHER

## 2020-04-23 RX ORDER — DULOXETIN HYDROCHLORIDE 30 MG/1
30 CAPSULE, DELAYED RELEASE ORAL EVERY EVENING
Qty: 30 CAPSULE | Refills: 3 | Status: SHIPPED | OUTPATIENT
Start: 2020-04-23 | End: 2020-04-23 | Stop reason: SDUPTHER

## 2020-04-23 RX ORDER — DULOXETIN HYDROCHLORIDE 60 MG/1
60 CAPSULE, DELAYED RELEASE ORAL EVERY MORNING
Qty: 90 CAPSULE | Refills: 3 | Status: SHIPPED | OUTPATIENT
Start: 2020-04-23 | End: 2020-07-21 | Stop reason: SDUPTHER

## 2020-04-23 RX ORDER — TAMSULOSIN HYDROCHLORIDE 0.4 MG/1
0.4 CAPSULE ORAL
Qty: 90 CAPSULE | Refills: 3 | Status: SHIPPED | OUTPATIENT
Start: 2020-04-23 | End: 2020-07-22 | Stop reason: SDUPTHER

## 2020-04-23 RX ORDER — DULOXETIN HYDROCHLORIDE 60 MG/1
60 CAPSULE, DELAYED RELEASE ORAL EVERY MORNING
Qty: 30 CAPSULE | Refills: 3 | Status: SHIPPED | OUTPATIENT
Start: 2020-04-23 | End: 2020-04-23 | Stop reason: SDUPTHER

## 2020-04-23 RX ORDER — FINASTERIDE 5 MG/1
5 TABLET, FILM COATED ORAL DAILY
Qty: 90 TABLET | Refills: 3 | Status: SHIPPED | OUTPATIENT
Start: 2020-04-23 | End: 2020-07-22 | Stop reason: SDUPTHER

## 2020-05-05 ENCOUNTER — APPOINTMENT (OUTPATIENT)
Dept: LAB | Facility: MEDICAL CENTER | Age: 85
End: 2020-05-05
Payer: COMMERCIAL

## 2020-05-05 DIAGNOSIS — I65.23 ASYMPTOMATIC BILATERAL CAROTID ARTERY STENOSIS: ICD-10-CM

## 2020-05-05 DIAGNOSIS — I25.10 ARTERIOSCLEROTIC HEART DISEASE: ICD-10-CM

## 2020-05-05 DIAGNOSIS — E03.9 HYPOTHYROIDISM, UNSPECIFIED TYPE: Chronic | ICD-10-CM

## 2020-05-05 DIAGNOSIS — C67.9 MALIGNANT NEOPLASM OF URINARY BLADDER, UNSPECIFIED SITE (HCC): ICD-10-CM

## 2020-05-05 DIAGNOSIS — E78.00 HYPERCHOLESTEROLEMIA: ICD-10-CM

## 2020-05-05 DIAGNOSIS — F03.91 DEMENTIA WITH BEHAVIORAL DISTURBANCE, UNSPECIFIED DEMENTIA TYPE (HCC): ICD-10-CM

## 2020-05-05 DIAGNOSIS — R63.4 WEIGHT LOSS: ICD-10-CM

## 2020-05-05 DIAGNOSIS — I10 ESSENTIAL HYPERTENSION: ICD-10-CM

## 2020-05-05 LAB
ALBUMIN SERPL BCP-MCNC: 3.1 G/DL (ref 3.5–5)
ALP SERPL-CCNC: 93 U/L (ref 46–116)
ALT SERPL W P-5'-P-CCNC: 16 U/L (ref 12–78)
ANION GAP SERPL CALCULATED.3IONS-SCNC: 4 MMOL/L (ref 4–13)
AST SERPL W P-5'-P-CCNC: 19 U/L (ref 5–45)
BASOPHILS # BLD AUTO: 0.05 THOUSANDS/ΜL (ref 0–0.1)
BASOPHILS NFR BLD AUTO: 1 % (ref 0–1)
BILIRUB SERPL-MCNC: 0.52 MG/DL (ref 0.2–1)
BUN SERPL-MCNC: 21 MG/DL (ref 5–25)
CALCIUM SERPL-MCNC: 9.3 MG/DL (ref 8.3–10.1)
CHLORIDE SERPL-SCNC: 102 MMOL/L (ref 100–108)
CHOLEST SERPL-MCNC: 235 MG/DL (ref 50–200)
CO2 SERPL-SCNC: 30 MMOL/L (ref 21–32)
CREAT SERPL-MCNC: 1.26 MG/DL (ref 0.6–1.3)
EOSINOPHIL # BLD AUTO: 0.13 THOUSAND/ΜL (ref 0–0.61)
EOSINOPHIL NFR BLD AUTO: 2 % (ref 0–6)
ERYTHROCYTE [DISTWIDTH] IN BLOOD BY AUTOMATED COUNT: 12.7 % (ref 11.6–15.1)
GFR SERPL CREATININE-BSD FRML MDRD: 52 ML/MIN/1.73SQ M
GLUCOSE P FAST SERPL-MCNC: 101 MG/DL (ref 65–99)
HCT VFR BLD AUTO: 33.9 % (ref 36.5–49.3)
HDLC SERPL-MCNC: 34 MG/DL
HGB BLD-MCNC: 11.1 G/DL (ref 12–17)
IMM GRANULOCYTES # BLD AUTO: 0.01 THOUSAND/UL (ref 0–0.2)
IMM GRANULOCYTES NFR BLD AUTO: 0 % (ref 0–2)
LDLC SERPL CALC-MCNC: 183 MG/DL (ref 0–100)
LYMPHOCYTES # BLD AUTO: 1.17 THOUSANDS/ΜL (ref 0.6–4.47)
LYMPHOCYTES NFR BLD AUTO: 20 % (ref 14–44)
MCH RBC QN AUTO: 30.2 PG (ref 26.8–34.3)
MCHC RBC AUTO-ENTMCNC: 32.7 G/DL (ref 31.4–37.4)
MCV RBC AUTO: 92 FL (ref 82–98)
MONOCYTES # BLD AUTO: 0.49 THOUSAND/ΜL (ref 0.17–1.22)
MONOCYTES NFR BLD AUTO: 8 % (ref 4–12)
NEUTROPHILS # BLD AUTO: 4.14 THOUSANDS/ΜL (ref 1.85–7.62)
NEUTS SEG NFR BLD AUTO: 69 % (ref 43–75)
NONHDLC SERPL-MCNC: 201 MG/DL
NRBC BLD AUTO-RTO: 0 /100 WBCS
PLATELET # BLD AUTO: 297 THOUSANDS/UL (ref 149–390)
PMV BLD AUTO: 11 FL (ref 8.9–12.7)
POTASSIUM SERPL-SCNC: 5 MMOL/L (ref 3.5–5.3)
PROT SERPL-MCNC: 7 G/DL (ref 6.4–8.2)
RBC # BLD AUTO: 3.68 MILLION/UL (ref 3.88–5.62)
SODIUM SERPL-SCNC: 136 MMOL/L (ref 136–145)
TRIGL SERPL-MCNC: 89 MG/DL
TSH SERPL DL<=0.05 MIU/L-ACNC: 2 UIU/ML (ref 0.36–3.74)
WBC # BLD AUTO: 5.99 THOUSAND/UL (ref 4.31–10.16)

## 2020-05-05 PROCEDURE — 80061 LIPID PANEL: CPT

## 2020-05-05 PROCEDURE — 80053 COMPREHEN METABOLIC PANEL: CPT

## 2020-05-05 PROCEDURE — 84443 ASSAY THYROID STIM HORMONE: CPT

## 2020-05-05 PROCEDURE — 85025 COMPLETE CBC W/AUTO DIFF WBC: CPT

## 2020-05-05 PROCEDURE — 36415 COLL VENOUS BLD VENIPUNCTURE: CPT

## 2020-05-06 ENCOUNTER — OFFICE VISIT (OUTPATIENT)
Dept: INTERNAL MEDICINE CLINIC | Facility: CLINIC | Age: 85
End: 2020-05-06
Payer: COMMERCIAL

## 2020-05-06 VITALS — WEIGHT: 139 LBS | HEIGHT: 72 IN | BODY MASS INDEX: 18.83 KG/M2

## 2020-05-06 DIAGNOSIS — E03.9 HYPOTHYROIDISM, UNSPECIFIED TYPE: Chronic | ICD-10-CM

## 2020-05-06 DIAGNOSIS — F32.A DEPRESSION, UNSPECIFIED DEPRESSION TYPE: ICD-10-CM

## 2020-05-06 DIAGNOSIS — E78.00 HYPERCHOLESTEROLEMIA: ICD-10-CM

## 2020-05-06 DIAGNOSIS — I25.10 ARTERIOSCLEROTIC HEART DISEASE: ICD-10-CM

## 2020-05-06 DIAGNOSIS — I10 ESSENTIAL HYPERTENSION: ICD-10-CM

## 2020-05-06 DIAGNOSIS — F03.91 DEMENTIA WITH BEHAVIORAL DISTURBANCE, UNSPECIFIED DEMENTIA TYPE (HCC): Primary | ICD-10-CM

## 2020-05-06 PROCEDURE — 1160F RVW MEDS BY RX/DR IN RCRD: CPT | Performed by: FAMILY MEDICINE

## 2020-05-06 PROCEDURE — 99214 OFFICE O/P EST MOD 30 MIN: CPT | Performed by: FAMILY MEDICINE

## 2020-05-18 ENCOUNTER — TELEPHONE (OUTPATIENT)
Dept: NEUROLOGY | Facility: CLINIC | Age: 85
End: 2020-05-18

## 2020-05-20 DIAGNOSIS — J06.9 UPPER RESPIRATORY TRACT INFECTION, UNSPECIFIED TYPE: Primary | ICD-10-CM

## 2020-05-20 RX ORDER — AZITHROMYCIN 250 MG/1
TABLET, FILM COATED ORAL
Qty: 6 TABLET | Refills: 0 | Status: SHIPPED | OUTPATIENT
Start: 2020-05-20 | End: 2020-05-25

## 2020-06-09 ENCOUNTER — TELEPHONE (OUTPATIENT)
Dept: NEUROLOGY | Facility: CLINIC | Age: 85
End: 2020-06-09

## 2020-06-30 ENCOUNTER — TELEPHONE (OUTPATIENT)
Dept: INTERNAL MEDICINE CLINIC | Facility: CLINIC | Age: 85
End: 2020-06-30

## 2020-06-30 NOTE — TELEPHONE ENCOUNTER
Patients wife called to let us know he was accepted into [facility]  They do not have a date yet, but he will need a covid prior to placement  Can we order and swab him here?

## 2020-07-01 NOTE — TELEPHONE ENCOUNTER
Yes but would need to be visit and would need them to find out when he needs this done based on their requirements

## 2020-07-07 NOTE — TELEPHONE ENCOUNTER
Nereyda Caldwell returned call  They do not have an admit date yet as patients wife has not turned in any of the paperwork needed to start the process  They will call patients wife and discuss further with her

## 2020-07-10 ENCOUNTER — TELEMEDICINE (OUTPATIENT)
Dept: INTERNAL MEDICINE CLINIC | Facility: CLINIC | Age: 85
End: 2020-07-10
Payer: COMMERCIAL

## 2020-07-10 DIAGNOSIS — Z20.828 EXPOSURE TO SARS-ASSOCIATED CORONAVIRUS: Primary | ICD-10-CM

## 2020-07-10 PROCEDURE — 99442 PR PHYS/QHP TELEPHONE EVALUATION 11-20 MIN: CPT | Performed by: NURSE PRACTITIONER

## 2020-07-10 PROCEDURE — U0003 INFECTIOUS AGENT DETECTION BY NUCLEIC ACID (DNA OR RNA); SEVERE ACUTE RESPIRATORY SYNDROME CORONAVIRUS 2 (SARS-COV-2) (CORONAVIRUS DISEASE [COVID-19]), AMPLIFIED PROBE TECHNIQUE, MAKING USE OF HIGH THROUGHPUT TECHNOLOGIES AS DESCRIBED BY CMS-2020-01-R: HCPCS | Performed by: NURSE PRACTITIONER

## 2020-07-10 NOTE — PROGRESS NOTES
COVID-19 Virtual Visit     Assessment/Plan: COVID swab obtained prior to Nursing Home  Will notify them of results once back  Problem List Items Addressed This Visit        Other    Exposure to SARS-associated coronavirus - Primary    Relevant Orders    MISC COVID-19 TEST- Collected at Office      Other Visit Diagnoses     Exposure to COVID-19 virus            This virtual check-in was done via telephone  Disposition:      needs screening for COVID prior to Nursing Home    I spent 15 minutes directly with the patient during this visit    Encounter provider Nilda [id number], [address]     Provider located at [address] [place] [address] Rd  [address] [facility]  [id number]    Recent Visits  No visits were found meeting these conditions  Showing recent visits within past 7 days and meeting all other requirements     Today's Visits  Date Type Provider Dept   07/10/20 Telemedicine [facility]   Showing today's visits and meeting all other requirements     Future Appointments  No visits were found meeting these conditions  Showing future appointments within next 150 days and meeting all other requirements        Patient agrees to participate in a virtual check in via telephone or video visit instead of presenting to the office to address urgent/immediate medical needs  Patient is aware this is a billable service  After connecting through telephone, the patient was identified by name and date of birth  Violetta Browning was informed that this was a telemedicine visit and that the exam was being conducted confidentially over secure lines  My office door was closed  No one else was in the room  Violetta Browning acknowledged consent and understanding of privacy and security of the telemedicine visit    I informed the patient that I have reviewed his record in Epic and presented the opportunity for him to ask any questions regarding the visit today  The patient agreed to participate  Subjective  Tata Herzog is a 81 y o  male who is concerned about COVID-19  He reports no symptoms needs screening for NH  He has not experienced no symptoms He has not had contact with a person who is under investigation for or who is positive for COVID-19 within the last 14 days  He has not been hospitalized recently for fever and/or lower respiratory symptoms  Past Medical History:   Diagnosis Date    Abnormal stress test     Abnormal weight loss     [facility]'s disease ([facility] ) 5/2/2018    Aortic insufficiency with aortic stenosis     [s/p AVR (tissue) 4/2013]     Bradycardia, unspecified     Coronary artery disease      [s/p CABG x3 4/2013]    Diabetes ([facility] )     Disease of thyroid gland     Dyslipidemia     Gastric ulcer     last assessed: 2/24/14    H/O cardiovascular stress test      (EF 0 67, Not suspicious for significant occlusive CAD  ) - 11/28/2007; [facility] (Normal EF, Inferior wall post stress HK, markedly positive EKG with chest pressure) - 4/10/2013    History of echocardiogram 08/05/2016    2-D w/ CFD:  EF 0 55 (55%), Normal LVSF  Normal functioning bioprosthetic AV   History of EKG     EKG shows sinus bradycardia, 49 bpm, otherwise normal     History of EKG      (Sinus Rhythm) - 3/25/2013;  (Sinus Darleene Pauling) - 5/20/2013;  9/25/2013     History of Holter monitoring      (SR w/ rates ranging from 44-78BPM  No afib, no heart block, no vtach, no pauses, and no symptoms per pt  there were few short atrial runs, highest being 140BPM, the longest being 8 beats; though overall atrial and ventricular ectopic beats were rare ) - 8/3/2016     History of Holter monitoring 08/22/2016    24 hr    Hx of echocardiogram     (EF 0 70, Without regional wall abnormalities  Possibly bicuspid aortic valve with mild AS, no aortic insufficiency ) 3/22/2007;  (EF 0 55 (55%), Normal LVSF   Normal functioning bioprosthetic AV ) - 8/3/2016     Hypertension  Hypothyroid 5/2/2018    ICAO (internal carotid artery occlusion)     Duplex (There is known occlusion of ICA in rt carotid and the vertebral artery is antegrade  0-19% ICA stenosis of Lt carotid  The vertebral artery is antegrade  Widely patent endarterectomy site on the left ) - 1/30/2014 Carotid Duplex (No change  Known occlusion of ICA in right carotid  0-19% ICA stenosis in left carotid, and widely patent endarterectomy site on left ) - 8/28/2014    Inguinal hernia, left     last assessed: 10/8/14    Peptic ulcer     last assessed: 5/21/13    Prostate enlargement 5/2/2018       Past Surgical History:   Procedure Laterality Date    AORTIC VALVE REPLACEMENT      CAROTID ENDARTARECTOMY  04/26/2013    CATARACT EXTRACTION Bilateral     CORONARY ARTERY BYPASS GRAFT       (3V CABG: LIMA-LAD, VG-RCA, VG-CX, Tissue AVR (#23 [address])) - 4/26/2013     CYSTOSCOPY  04/23/2020    INGUINAL HERNIA REPAIR      NEPHRECTOMY Right     UT CYSTOURETHROSCOPY W/IRRIG & EVAC CLOTS N/A 5/7/2018    Procedure: CYSTOSCOPY EVACUATION OF CLOTS AND TURBT;  Surgeon: Ruthy Vasquez MD;  Location: [facility];  Service: Urology    THROMBOENDARTERECTOMY      Carotid       Current Outpatient Medications   Medication Sig Dispense Refill    aspirin (ECOTRIN LOW STRENGTH) 81 mg EC tablet Take 81 mg by mouth daily      atorvastatin (LIPITOR) 80 mg tablet TAKE 1 TABLET DAILY 90 tablet 3    B Complex-C (SUPER B/C) CAPS Take by mouth take 1 tablet daily        cholecalciferol (VITAMIN D3) 1,000 units tablet Take 2,000 Units by mouth daily      co-enzyme Q-10 30 MG capsule Take 100 mg by mouth 3 (three) times a day      COCONUT OIL-FLAXSEED OIL PO Take by mouth      cyanocobalamin (VITAMIN B-12) 1,000 mcg tablet Take 1,000 mcg by mouth daily      DULoxetine (CYMBALTA) 30 mg delayed release capsule Take 1 capsule (30 mg total) by mouth every evening 90 capsule 1    DULoxetine (CYMBALTA) 60 mg delayed release capsule Take 1 capsule (60 mg total) by mouth every morning 90 capsule 3    finasteride (PROSCAR) 5 mg tablet Take 1 tablet (5 mg total) by mouth daily for 30 doses 90 tablet 3    levothyroxine 75 mcg tablet Take 1 tablet (75 mcg total) by mouth daily 90 tablet 3    LORazepam (ATIVAN) 0 5 mg tablet Take 1 tablet (0 5 mg total) by mouth every 8 (eight) hours as needed for anxiety Can take 0 5 to 1 tablet q8h as needed for agitation 60 tablet 2    memantine (NAMENDA) 10 mg tablet Take 1 tablet (10 mg total) by mouth 2 (two) times a day 5mg twice a day for a week and then increasing to 1 full tablet twice a day 60 tablet 1    multivitamin (THERAGRAN) TABS Take 1 tablet by mouth daily      Omega-3 Fatty Acids (OMEGA 3 PO) Take by mouth Omega 3 350 mg- 400 mg capsule [id number] mg every day      pantoprazole (PROTONIX) 20 mg tablet Take 20 mg by mouth daily      tamsulosin (FLOMAX) 0 4 mg Take 1 capsule (0 4 mg total) by mouth daily with dinner 90 capsule 3     No current facility-administered medications for this visit  Allergies   Allergen Reactions    Contrast [Iodinated Diagnostic Agents]     Iodine Throat Swelling     IVP contrast dye    Morphine Rash       Review of Systems    Video Exam    There were no vitals filed for this visit  Wign Guillermo appears healthy, alert, no distress  Physical Exam   Constitutional: He appears well-developed and well-nourished  Psychiatric: He has a normal mood and affect  His behavior is normal  Judgment and thought content normal         VIRTUAL VISIT DISCLAIMER    Garo Agustin acknowledges that he has consented to an online visit or consultation  He understands that the online visit is based solely on information provided by him, and that, in the absence of a face-to-face physical evaluation by the physician, the diagnosis he receives is both limited and provisional in terms of accuracy and completeness   This is not intended to replace a full medical face-to-face evaluation by the physician  Eddie Lawton understands and accepts these terms

## 2020-07-17 ENCOUNTER — TELEPHONE (OUTPATIENT)
Dept: INTERNAL MEDICINE CLINIC | Facility: CLINIC | Age: 85
End: 2020-07-17

## 2020-07-17 NOTE — TELEPHONE ENCOUNTER
Patient's wife called inquiring about the results of the COVID text and we do not have the results yet  She said that it is a matter of life and death for her  Tirsh Batista spoke to wife and explained it takes up to 10 days to receive the results and we will call her right away when we get them

## 2020-07-20 LAB — SARS-COV-2 RNA SPEC QL NAA+PROBE: NOT DETECTED

## 2020-07-21 ENCOUNTER — TELEPHONE (OUTPATIENT)
Dept: NEUROLOGY | Facility: CLINIC | Age: 85
End: 2020-07-21

## 2020-07-21 ENCOUNTER — TELEPHONE (OUTPATIENT)
Dept: INTERNAL MEDICINE CLINIC | Facility: CLINIC | Age: 85
End: 2020-07-21

## 2020-07-21 DIAGNOSIS — E02 SUBCLINICAL IODINE-DEFICIENCY HYPOTHYROIDISM: ICD-10-CM

## 2020-07-21 DIAGNOSIS — N40.0 PROSTATE ENLARGEMENT: Chronic | ICD-10-CM

## 2020-07-21 DIAGNOSIS — F03.91 DEMENTIA WITH BEHAVIORAL DISTURBANCE, UNSPECIFIED DEMENTIA TYPE (HCC): ICD-10-CM

## 2020-07-21 DIAGNOSIS — E78.00 HYPERCHOLESTEROLEMIA: ICD-10-CM

## 2020-07-21 DIAGNOSIS — R45.4 IRRITABILITY AND ANGER: ICD-10-CM

## 2020-07-21 DIAGNOSIS — I25.10 ARTERIOSCLEROTIC HEART DISEASE: ICD-10-CM

## 2020-07-21 DIAGNOSIS — F32.A DEPRESSION, UNSPECIFIED DEPRESSION TYPE: ICD-10-CM

## 2020-07-21 DIAGNOSIS — K27.9 PEPTIC ULCER: Primary | ICD-10-CM

## 2020-07-21 DIAGNOSIS — C67.2 MALIGNANT NEOPLASM OF LATERAL WALL OF URINARY BLADDER (HCC): ICD-10-CM

## 2020-07-21 RX ORDER — LORAZEPAM 0.5 MG/1
0.5 TABLET ORAL EVERY 8 HOURS PRN
Qty: 60 TABLET | Refills: 2 | Status: SHIPPED | OUTPATIENT
Start: 2020-07-21 | End: 2020-07-22 | Stop reason: SDUPTHER

## 2020-07-21 RX ORDER — ASPIRIN 81 MG/1
81 TABLET ORAL DAILY
Qty: 30 TABLET | Refills: 5 | Status: SHIPPED | OUTPATIENT
Start: 2020-07-21

## 2020-07-21 RX ORDER — LANOLIN ALCOHOL/MO/W.PET/CERES
1000 CREAM (GRAM) TOPICAL DAILY
Qty: 30 TABLET | Refills: 5 | Status: SHIPPED | OUTPATIENT
Start: 2020-07-21

## 2020-07-21 RX ORDER — LEVOTHYROXINE SODIUM 0.07 MG/1
75 TABLET ORAL DAILY
Qty: 30 TABLET | Refills: 5 | Status: SHIPPED | OUTPATIENT
Start: 2020-07-21

## 2020-07-21 RX ORDER — MEMANTINE HYDROCHLORIDE 10 MG/1
10 TABLET ORAL 2 TIMES DAILY
Qty: 180 TABLET | Refills: 3 | Status: SHIPPED | OUTPATIENT
Start: 2020-07-21

## 2020-07-21 RX ORDER — ATORVASTATIN CALCIUM 80 MG/1
80 TABLET, FILM COATED ORAL DAILY
Qty: 30 TABLET | Refills: 5 | Status: SHIPPED | OUTPATIENT
Start: 2020-07-21

## 2020-07-21 RX ORDER — DULOXETIN HYDROCHLORIDE 60 MG/1
60 CAPSULE, DELAYED RELEASE ORAL EVERY MORNING
Qty: 90 CAPSULE | Refills: 3 | Status: SHIPPED | OUTPATIENT
Start: 2020-07-21

## 2020-07-21 RX ORDER — DULOXETIN HYDROCHLORIDE 30 MG/1
30 CAPSULE, DELAYED RELEASE ORAL EVERY EVENING
Qty: 90 CAPSULE | Refills: 3 | Status: SHIPPED | OUTPATIENT
Start: 2020-07-21

## 2020-07-21 RX ORDER — PANTOPRAZOLE SODIUM 20 MG/1
20 TABLET, DELAYED RELEASE ORAL DAILY
Qty: 30 TABLET | Refills: 5 | Status: SHIPPED | OUTPATIENT
Start: 2020-07-21

## 2020-07-21 NOTE — TELEPHONE ENCOUNTER
Zander [id number]      Reaching out to request refills of Tamsulosin and finasteride    She can be reached at [phone]

## 2020-07-21 NOTE — TELEPHONE ENCOUNTER
I made Rand Done aware COVID test is negative and result along with paperwork was faxed over to [facility]

## 2020-07-21 NOTE — TELEPHONE ENCOUNTER
Doug Nicole from [address] [facility] [address] calling in  Patient is going to be in [facility] living  She needs all new scripts from our office to be sent electronically to their pharmacy so patient can get his medications tonight  I have pended the medications from our office for your review   Please sign off if agreeable

## 2020-07-22 RX ORDER — TAMSULOSIN HYDROCHLORIDE 0.4 MG/1
0.4 CAPSULE ORAL
Qty: 90 CAPSULE | Refills: 3 | Status: SHIPPED | OUTPATIENT
Start: 2020-07-22

## 2020-07-22 RX ORDER — FINASTERIDE 5 MG/1
5 TABLET, FILM COATED ORAL DAILY
Qty: 90 TABLET | Refills: 3 | Status: SHIPPED | OUTPATIENT
Start: 2020-07-22

## 2020-07-22 RX ORDER — LORAZEPAM 0.5 MG/1
0.5 TABLET ORAL EVERY 8 HOURS PRN
Qty: 60 TABLET | Refills: 2 | Status: SHIPPED | OUTPATIENT
Start: 2020-07-22

## 2020-07-22 NOTE — TELEPHONE ENCOUNTER
Pharmacy calling to say that the patient's facility has strict rules and will not accept current lorazepam script with directions  Said script can't say that they can add an additional dose  Would have to say either 1 tab or 1 5 tabs  Please advise

## 2020-07-22 NOTE — TELEPHONE ENCOUNTER
The patient was last seen on 4/23/20 by Dr Benja Altman in the [place] [address] location; continuation of the medication was authorized at that time    Request for both medications, 90 day supplies with 3 refills for both, were queued and forwarded to the Advanced Practitioner covering the [place] [address] location for approval

## 2020-07-28 ENCOUNTER — HOSPITAL ENCOUNTER (EMERGENCY)
Facility: HOSPITAL | Age: 85
Discharge: HOME | End: 2020-07-28
Attending: EMERGENCY MEDICINE | Admitting: EMERGENCY MEDICINE
Payer: COMMERCIAL

## 2020-07-28 ENCOUNTER — APPOINTMENT (EMERGENCY)
Dept: CT IMAGING | Facility: HOSPITAL | Age: 85
End: 2020-07-28
Payer: COMMERCIAL

## 2020-07-28 ENCOUNTER — APPOINTMENT (EMERGENCY)
Dept: RADIOLOGY | Facility: HOSPITAL | Age: 85
End: 2020-07-28
Payer: COMMERCIAL

## 2020-07-28 VITALS
SYSTOLIC BLOOD PRESSURE: 151 MMHG | RESPIRATION RATE: 19 BRPM | BODY MASS INDEX: 16.09 KG/M2 | DIASTOLIC BLOOD PRESSURE: 82 MMHG | OXYGEN SATURATION: 98 % | HEART RATE: 63 BPM | TEMPERATURE: 97.9 F | WEIGHT: 118.61 LBS

## 2020-07-28 DIAGNOSIS — R53.83 FATIGUE: Primary | ICD-10-CM

## 2020-07-28 DIAGNOSIS — R63.4 WEIGHT LOSS: Primary | ICD-10-CM

## 2020-07-28 DIAGNOSIS — E44.0 MODERATE PROTEIN-CALORIE MALNUTRITION (HCC): ICD-10-CM

## 2020-07-28 DIAGNOSIS — E87.1 HYPONATREMIA: ICD-10-CM

## 2020-07-28 LAB
ALBUMIN SERPL BCP-MCNC: 3.1 G/DL (ref 3.5–5)
ALP SERPL-CCNC: 95 U/L (ref 46–116)
ALT SERPL W P-5'-P-CCNC: 18 U/L (ref 12–78)
ANION GAP SERPL CALCULATED.3IONS-SCNC: 4 MMOL/L (ref 4–13)
AST SERPL W P-5'-P-CCNC: 35 U/L (ref 5–45)
BASOPHILS # BLD AUTO: 0.03 THOUSANDS/ΜL (ref 0–0.1)
BASOPHILS NFR BLD AUTO: 0 % (ref 0–1)
BILIRUB SERPL-MCNC: 0.8 MG/DL (ref 0.2–1)
BUN SERPL-MCNC: 18 MG/DL (ref 5–25)
CALCIUM SERPL-MCNC: 9.5 MG/DL (ref 8.3–10.1)
CHLORIDE SERPL-SCNC: 97 MMOL/L (ref 100–108)
CO2 SERPL-SCNC: 31 MMOL/L (ref 21–32)
CREAT SERPL-MCNC: 1 MG/DL (ref 0.6–1.3)
EOSINOPHIL # BLD AUTO: 0 THOUSAND/ΜL (ref 0–0.61)
EOSINOPHIL NFR BLD AUTO: 0 % (ref 0–6)
ERYTHROCYTE [DISTWIDTH] IN BLOOD BY AUTOMATED COUNT: 13.1 % (ref 11.6–15.1)
GFR SERPL CREATININE-BSD FRML MDRD: 69 ML/MIN/1.73SQ M
GLUCOSE SERPL-MCNC: 107 MG/DL (ref 65–140)
HCT VFR BLD AUTO: 36.1 % (ref 36.5–49.3)
HGB BLD-MCNC: 11.8 G/DL (ref 12–17)
IMM GRANULOCYTES # BLD AUTO: 0.05 THOUSAND/UL (ref 0–0.2)
IMM GRANULOCYTES NFR BLD AUTO: 0 % (ref 0–2)
LYMPHOCYTES # BLD AUTO: 0.74 THOUSANDS/ΜL (ref 0.6–4.47)
LYMPHOCYTES NFR BLD AUTO: 6 % (ref 14–44)
MCH RBC QN AUTO: 29.1 PG (ref 26.8–34.3)
MCHC RBC AUTO-ENTMCNC: 32.7 G/DL (ref 31.4–37.4)
MCV RBC AUTO: 89 FL (ref 82–98)
MONOCYTES # BLD AUTO: 0.52 THOUSAND/ΜL (ref 0.17–1.22)
MONOCYTES NFR BLD AUTO: 4 % (ref 4–12)
NEUTROPHILS # BLD AUTO: 11.46 THOUSANDS/ΜL (ref 1.85–7.62)
NEUTS SEG NFR BLD AUTO: 90 % (ref 43–75)
NRBC BLD AUTO-RTO: 0 /100 WBCS
PLATELET # BLD AUTO: 399 THOUSANDS/UL (ref 149–390)
PMV BLD AUTO: 9.5 FL (ref 8.9–12.7)
POTASSIUM SERPL-SCNC: 4.2 MMOL/L (ref 3.5–5.3)
PROT SERPL-MCNC: 7.7 G/DL (ref 6.4–8.2)
RBC # BLD AUTO: 4.06 MILLION/UL (ref 3.88–5.62)
SARS-COV-2 RNA RESP QL NAA+PROBE: NEGATIVE
SODIUM SERPL-SCNC: 132 MMOL/L (ref 136–145)
TSH SERPL DL<=0.05 MIU/L-ACNC: 2.65 UIU/ML (ref 0.36–3.74)
WBC # BLD AUTO: 12.8 THOUSAND/UL (ref 4.31–10.16)

## 2020-07-28 PROCEDURE — 70450 CT HEAD/BRAIN W/O DYE: CPT

## 2020-07-28 PROCEDURE — 72125 CT NECK SPINE W/O DYE: CPT

## 2020-07-28 PROCEDURE — 99285 EMERGENCY DEPT VISIT HI MDM: CPT

## 2020-07-28 PROCEDURE — 84443 ASSAY THYROID STIM HORMONE: CPT | Performed by: EMERGENCY MEDICINE

## 2020-07-28 PROCEDURE — 85025 COMPLETE CBC W/AUTO DIFF WBC: CPT | Performed by: EMERGENCY MEDICINE

## 2020-07-28 PROCEDURE — 36415 COLL VENOUS BLD VENIPUNCTURE: CPT | Performed by: EMERGENCY MEDICINE

## 2020-07-28 PROCEDURE — 87635 SARS-COV-2 COVID-19 AMP PRB: CPT | Performed by: EMERGENCY MEDICINE

## 2020-07-28 PROCEDURE — 80053 COMPREHEN METABOLIC PANEL: CPT | Performed by: EMERGENCY MEDICINE

## 2020-07-28 PROCEDURE — 99285 EMERGENCY DEPT VISIT HI MDM: CPT | Performed by: EMERGENCY MEDICINE

## 2020-07-28 PROCEDURE — 96360 HYDRATION IV INFUSION INIT: CPT

## 2020-07-28 RX ORDER — DRONABINOL 5 MG/1
5 CAPSULE ORAL
Qty: 90 CAPSULE | Refills: 0 | Status: SHIPPED | OUTPATIENT
Start: 2020-07-28 | End: 2020-07-29 | Stop reason: SDUPTHER

## 2020-07-28 RX ADMIN — SODIUM CHLORIDE 500 ML: 0.9 INJECTION, SOLUTION INTRAVENOUS at 17:59

## 2020-07-28 RX ADMIN — SODIUM CHLORIDE 250 ML: 0.9 INJECTION, SOLUTION INTRAVENOUS at 19:17

## 2020-07-28 NOTE — ED PROVIDER NOTES
History  Chief Complaint   Patient presents with    Weakness - Generalized     pt has had recent falls at personal care home and they state he's failure to thrive but pt has no complaints     81-year-old male presents from [facility] assisted living for evaluation of failure to thrive   Staff is concerned the patient has not been eating well  Patient has no complaints the time  Staff also states he has had multiple falls  None of the staff is able to describe the circumstances of the fall snorted a tell us the duration of time since these falls have occurred  Patient is currently awake alert oriented  Intermittently minimally confused      History provided by:  Patient  Fatigue   Severity:  Mild  Onset quality:  Gradual  Timing:  Constant  Progression:  Worsening  Chronicity:  Chronic  Context: not alcohol use, not allergies, not change in medication and not decreased sleep    Relieved by:  Nothing  Worsened by:  Nothing  Ineffective treatments:  None tried  Associated symptoms: no abdominal pain, no anorexia, no aphasia, no arthralgias, no ataxia, no chest pain and no cough    Risk factors: no anemia, no congestive heart failure, no coronary artery disease, no excessive menstruation and no family hx of stroke        Prior to Admission Medications   Prescriptions Last Dose Informant Patient Reported? Taking? B Complex-C (SUPER B/C) CAPS  Spouse/Significant Other Yes No   Sig: Take by mouth take 1 tablet daily     COCONUT OIL-FLAXSEED OIL PO  Spouse/Significant Other Yes No   Sig: Take by mouth   DULoxetine (CYMBALTA) 30 mg delayed release capsule 7/27/2020 at Unknown time  No Yes   Sig: Take 1 capsule (30 mg total) by mouth every evening   DULoxetine (CYMBALTA) 60 mg delayed release capsule 7/27/2020 at Unknown time  No Yes   Sig: Take 1 capsule (60 mg total) by mouth every morning   LORazepam (ATIVAN) 0 5 mg tablet   No Yes   Sig: Take 1 tablet (0 5 mg total) by mouth every 8 (eight) hours as needed for anxiety (as needed for agitation)   Omega-3 Fatty Acids (OMEGA 3 PO)  Spouse/Significant Other Yes No   Sig: Take by mouth Omega 3 350 mg- 400 mg capsule [id number] mg every day   aspirin (ECOTRIN LOW STRENGTH) 81 mg EC tablet 7/27/2020 at Unknown time  No Yes   Sig: Take 1 tablet (81 mg total) by mouth daily   atorvastatin (LIPITOR) 80 mg tablet 7/27/2020 at Unknown time  No Yes   Sig: Take 1 tablet (80 mg total) by mouth daily   cholecalciferol (VITAMIN D3) 1,000 units tablet  Spouse/Significant Other Yes No   Sig: Take 2,000 Units by mouth daily   co-enzyme Q-10 30 MG capsule  Spouse/Significant Other Yes No   Sig: Take 100 mg by mouth 3 (three) times a day   dronabinol (MARINOL) 5 MG capsule   No No   Sig: Take 1 capsule (5 mg total) by mouth 2 (two) times a day before meals   finasteride (PROSCAR) 5 mg tablet 7/27/2020 at Unknown time  No Yes   Sig: Take 1 tablet (5 mg total) by mouth daily   levothyroxine 75 mcg tablet 7/28/2020 at Unknown time  No Yes   Sig: Take 1 tablet (75 mcg total) by mouth daily   memantine (NAMENDA) 10 mg tablet Past Week at Unknown time  No Yes   Sig: Take 1 tablet (10 mg total) by mouth 2 (two) times a day 5mg twice a day for a week and then increasing to 1 full tablet twice a day   multivitamin (THERAGRAN) TABS  Spouse/Significant Other Yes No   Sig: Take 1 tablet by mouth daily   pantoprazole (PROTONIX) 20 mg tablet   No Yes   Sig: Take 1 tablet (20 mg total) by mouth daily   tamsulosin (FLOMAX) 0 4 mg 7/28/2020 at Unknown time  No Yes   Sig: Take 1 capsule (0 4 mg total) by mouth daily with dinner   vitamin B-12 (VITAMIN B-12) 1,000 mcg tablet Past Week at Unknown time  No Yes   Sig: Take 1 tablet (1,000 mcg total) by mouth daily      Facility-Administered Medications: None       Past Medical History:   Diagnosis Date    Abnormal stress test     Abnormal weight loss     Alzheimer's disease ([facility] [address] ) 5/2/2018    Aortic insufficiency with aortic stenosis     [s/p AVR (tissue) 4/2013]  Bradycardia, unspecified     Coronary artery disease      [s/p CABG x3 4/2013]    Diabetes ([facility] [address] )     Disease of thyroid gland     Dyslipidemia     Gastric ulcer     last assessed: 2/24/14    H/O cardiovascular stress test      (EF 0 67, Not suspicious for significant occlusive CAD  ) - 11/28/2007; [facility] (Normal EF, Inferior wall post stress HK, markedly positive EKG with chest pressure) - 4/10/2013    History of echocardiogram 08/05/2016    2-D w/ CFD:  EF 0 55 (55%), Normal LVSF  Normal functioning bioprosthetic AV   History of EKG     EKG shows sinus bradycardia, 49 bpm, otherwise normal     History of EKG      (Sinus Rhythm) - 3/25/2013;  (Sinus Ramonia Rede) - 5/20/2013;  9/25/2013     History of Holter monitoring      (SR w/ rates ranging from 44-78BPM  No afib, no heart block, no vtach, no pauses, and no symptoms per pt  there were few short atrial runs, highest being 140BPM, the longest being 8 beats; though overall atrial and ventricular ectopic beats were rare ) - 8/3/2016     History of Holter monitoring 08/22/2016    24 hr    Hx of echocardiogram     (EF 0 70, Without regional wall abnormalities  Possibly bicuspid aortic valve with mild AS, no aortic insufficiency ) 3/22/2007;  (EF 0 55 (55%), Normal LVSF  Normal functioning bioprosthetic AV ) - 8/3/2016     Hypertension     Hypothyroid 5/2/2018    ICAO (internal carotid artery occlusion)     Duplex (There is known occlusion of ICA in rt carotid and the vertebral artery is antegrade  0-19% ICA stenosis of Lt carotid  The vertebral artery is antegrade  Widely patent endarterectomy site on the left ) - 1/30/2014 Carotid Duplex (No change  Known occlusion of ICA in right carotid   0-19% ICA stenosis in left carotid, and widely patent endarterectomy site on left ) - 8/28/2014    Inguinal hernia, left     last assessed: 10/8/14    Peptic ulcer     last assessed: 5/21/13    Prostate enlargement 5/2/2018       Past Surgical History:   Procedure Laterality Date    AORTIC VALVE REPLACEMENT      CAROTID ENDARTARECTOMY  04/26/2013    CATARACT EXTRACTION Bilateral     CORONARY ARTERY BYPASS GRAFT       (3V CABG: LIMA-LAD, VG-RCA, VG-CX, Tissue AVR (#[address])) - 4/26/2013     CYSTOSCOPY  04/23/2020    INGUINAL HERNIA REPAIR      NEPHRECTOMY Right     [place] CYSTOURETHROSCOPY W/IRRIG & EVAC CLOTS N/A 5/7/2018    Procedure: CYSTOSCOPY EVACUATION OF CLOTS AND TURBT;  Surgeon: Allie Sandoval MD;  Location: [facility];  Service: Urology    THROMBOENDARTERECTOMY      Carotid       Family History   Problem Relation Age of Onset    Sudden death Mother         unexpected death    Other Father         Coal workers' Pneumoconiosis    Alzheimer's disease Brother      I have reviewed and agree with the history as documented  E-Cigarette/Vaping    E-Cigarette Use Never User      E-Cigarette/Vaping Substances    Nicotine No     THC No     CBD No     Flavoring No     Other No     Unknown No      Social History     Tobacco Use    Smoking status: Former Smoker     Types: Cigarettes    Smokeless tobacco: Never Used    Tobacco comment: quit 20 years ago   Substance Use Topics    Alcohol use: Yes     Comment: social    Drug use: No       Review of Systems   Constitutional: Positive for fatigue  HENT: Negative  Eyes: Negative  Respiratory: Negative for cough  Cardiovascular: Negative for chest pain  Gastrointestinal: Negative for abdominal pain and anorexia  Endocrine: Negative  Genitourinary: Negative  Musculoskeletal: Negative for arthralgias  Skin: Negative  Allergic/Immunologic: Negative  Neurological: Negative  Hematological: Negative  Psychiatric/Behavioral: Negative  All other systems reviewed and are negative  Physical Exam  Physical Exam   Constitutional: He is oriented to person, place, and time  He appears well-developed and well-nourished  HENT:   Head: Normocephalic     Right Ear: External ear normal    Left Ear: External ear normal    Eyes: Pupils are equal, round, and reactive to light  Neck: Normal range of motion  No JVD present  No tracheal deviation present  No thyromegaly present  Cardiovascular: Normal rate, regular rhythm and normal heart sounds  Pulmonary/Chest: Effort normal  No stridor  No respiratory distress  He has no wheezes  Abdominal: Soft  He exhibits no distension and no mass  There is no tenderness  Musculoskeletal: Normal range of motion  He exhibits no edema or deformity  Neurological: He is alert and oriented to person, place, and time  Skin: Skin is warm  Capillary refill takes less than 2 seconds  No erythema  Psychiatric: He has a normal mood and affect  His behavior is normal  Judgment and thought content normal    Vitals reviewed  Vital Signs  ED Triage Vitals [07/28/20 1745]   Temperature Pulse Respirations Blood Pressure SpO2   97 9 °F (36 6 °C) 64 20 148/84 100 %      Temp Source Heart Rate Source Patient Position - Orthostatic VS BP Location FiO2 (%)   Temporal Left Sitting Left arm --      Pain Score       --           Vitals:    07/28/20 1745 07/28/20 1800   BP: 148/84 151/82   Pulse: 64 63   Patient Position - Orthostatic VS: Sitting Sitting         Visual Acuity  Visual Acuity      Most Recent Value   L Pupil Size (mm)  3   R Pupil Size (mm)  3          ED Medications  Medications   sodium chloride 0 9 % bolus 500 mL (500 mL Intravenous New Bag 7/28/20 1759)       Diagnostic Studies  Results Reviewed     Procedure Component Value Units Date/Time    [facility] [[id number]]  (Normal) Collected:  07/28/20 1759    Lab Status:  Final result Specimen:  Nares from Nose Updated:  07/28/20 1907     SARS-CoV-2 Negative    Narrative:        The specimen collection materials, transport medium, and/or testing methodology utilized in the production of these test results have been proven to be reliable in a limited validation with an abbreviated program under the Emergency Utilization Authorization provided by the FDA  Testing reported as "Presumptive positive" will be confirmed with secondary testing with a reference laboratory to ensure result accuracy  Clinical caution and judgement should be used with the interpretation of these results with consideration of the clinical impression and other laboratory testing  Testing reported as "Positive" or "Negative" has been proven to be accurate according to standard laboratory validation requirements  All testing is performed with control materials showing appropriate reactivity at standard intervals  TSH [[id number]]  (Normal) Collected:  07/28/20 1753    Lab Status:  Final result Specimen:  Blood from Arm, Right Updated:  07/28/20 1823     TSH 3RD GENERATON 2 651 uIU/mL     Narrative:       Patients undergoing fluorescein dye angiography may retain small amounts of fluorescein in the body for 48-72 hours post procedure  Samples containing fluorescein can produce falsely depressed TSH values  If the patient had this procedure,a specimen should be resubmitted post fluorescein clearance        Comprehensive metabolic panel [[id number]]  (Abnormal) Collected:  07/28/20 1753    Lab Status:  Final result Specimen:  Blood from Arm, Right Updated:  07/28/20 1814     Sodium 132 mmol/L      Potassium 4 2 mmol/L      Chloride 97 mmol/L      CO2 31 mmol/L      ANION GAP 4 mmol/L      BUN 18 mg/dL      Creatinine 1 00 mg/dL      Glucose 107 mg/dL      Calcium 9 5 mg/dL      AST 35 U/L      ALT 18 U/L      Alkaline Phosphatase 95 U/L      Total Protein 7 7 g/dL      Albumin 3 1 g/dL      Total Bilirubin 0 80 mg/dL      eGFR 69 ml/min/1 73sq m     Narrative:       [facility] guidelines for Chronic Kidney Disease (CKD):     Stage 1 with normal or high GFR (GFR > 90 mL/min/1 73 square meters)    Stage 2 Mild CKD (GFR = 60-89 mL/min/1 73 square meters)    Stage 3A Moderate CKD (GFR = 45-59 mL/min/1 73 square meters)    Stage 3B Moderate CKD (GFR = 30-44 mL/min/1 73 square meters)    Stage 4 Severe CKD (GFR = 15-29 mL/min/1 73 square meters)    Stage 5 End Stage CKD (GFR <15 mL/min/1 73 square meters)  Note: GFR calculation is accurate only with a steady state creatinine    CBC and differential [[id number]]  (Abnormal) Collected:  07/28/20 1753    Lab Status:  Preliminary result Specimen:  Blood from Arm, Right Updated:  07/28/20 1758     WBC 12 80 Thousand/uL      RBC 4 06 Million/uL      Hemoglobin 11 8 g/dL      Hematocrit 36 1 %      MCV 89 fL      MCH 29 1 pg      MCHC 32 7 g/dL      RDW 13 1 %      MPV 9 5 fL      Platelets 884 Thousands/uL     UA w Reflex to Microscopic w Reflex to Culture [[id number]]     Lab Status:  No result Specimen:  Urine                  CT cervical spine without contrast   Final Result by Noreen Winsotn DO (07/28 1850)   No cervical spine fracture or traumatic malalignment  Workstation performed: [id number]         CT head without contrast   Final Result by Noreen Winston DO (07/28 1851)      No acute intracranial abnormality  Workstation performed: [id number]                    Procedures  Procedures         ED Course             HEART Risk Score      Most Recent Value   Heart Score Risk Calculator   History  1 Filed at: 07/28/2020 1747   ECG  1 Filed at: 07/28/2020 1747   Age  2 Filed at: 07/28/2020 1747   Risk Factors  2 Filed at: 07/28/2020 1747   Troponin  0 Filed at: 07/28/2020 1747   HEART Score  6 Filed at: 07/28/2020 1747                                      MDM  Number of Diagnoses or Management Options  Diagnosis management comments: I spoke with patients wife Savita Abdulaziz [phone]  At this time they would not like cardiac evaluation performed  Differential diagnosis 1  Dehydration 2  Electrolyte abnormality 3  Intracranial hemorrhage 4  Pneumonia 5  UTI               Amount and/or Complexity of Data Reviewed  Clinical lab tests: ordered and reviewed  Tests in the radiology section of CPT®: ordered and reviewed  Tests in the medicine section of CPT®: reviewed and ordered    Risk of Complications, Morbidity, and/or Mortality  Presenting problems: high  Diagnostic procedures: high  Management options: high          Disposition  Final diagnoses:   Fatigue   Hyponatremia     Time reflects when diagnosis was documented in both MDM as applicable and the Disposition within this note     Time User Action Codes Description Comment    7/28/2020  7:09 PM Mariel Martinez Add [R53 83] Fatigue     7/28/2020  7:09 PM Theodor Peabody, [address] [E87 1] Hyponatremia       ED Disposition     ED Disposition Condition Date/Time Comment    Discharge Stable Tue Jul 28, 2020  7:05 PM Shay Brown discharge to home/self care  Follow-up Information    None         Patient's Medications   Discharge Prescriptions    No medications on file     No discharge procedures on file      PDMP Review       Value Time User    PDMP Reviewed  Yes 7/28/2020  1:05 PM Jairon Griffith MD          ED Provider  Electronically Signed by           Radha Cevallos DO  07/28/20 2129

## 2020-07-28 NOTE — ED NOTES
Attempted to call Nereyda Mathew for report x2  No answer  Pt's wife did speak with staff and states they are aware pt is returning back to facility   [address] wheelchair van en route to transport pt back to [address] [place] [address] [place], RN  07/28/20 7500 Franca Estrada, RN  07/28/20 1958

## 2020-07-29 DIAGNOSIS — R63.4 WEIGHT LOSS: ICD-10-CM

## 2020-07-29 DIAGNOSIS — E44.0 MODERATE PROTEIN-CALORIE MALNUTRITION (HCC): ICD-10-CM

## 2020-07-29 RX ORDER — DRONABINOL 5 MG/1
5 CAPSULE ORAL
Qty: 180 CAPSULE | Refills: 0 | Status: SHIPPED | OUTPATIENT
Start: 2020-07-29 | End: 2020-08-07 | Stop reason: SDUPTHER

## 2020-08-07 DIAGNOSIS — E44.0 MODERATE PROTEIN-CALORIE MALNUTRITION (HCC): ICD-10-CM

## 2020-08-07 DIAGNOSIS — R63.4 WEIGHT LOSS: ICD-10-CM

## 2020-08-07 RX ORDER — DRONABINOL 5 MG/1
5 CAPSULE ORAL
Qty: 180 CAPSULE | Refills: 0 | Status: SHIPPED | OUTPATIENT
Start: 2020-08-07

## 2020-08-07 NOTE — TELEPHONE ENCOUNTER
Pt doesn't go to [facility], but they need the med sent to them to profile them in the system  They did get the order, but filed it  She let me know they have a note in his file [facility]      Did let her know that when we send this script back to [facility], that it will show canceled in her system because [facility] is automatically canceling them, so she will make a  Note  Did also ask if we could just call the med changes over to them even with controlled, and she stated that we can do that so they can update his profile

## 2020-08-16 ENCOUNTER — APPOINTMENT (EMERGENCY)
Dept: RADIOLOGY | Facility: HOSPITAL | Age: 85
DRG: 682 | End: 2020-08-16
Payer: COMMERCIAL

## 2020-08-16 ENCOUNTER — APPOINTMENT (EMERGENCY)
Dept: CT IMAGING | Facility: HOSPITAL | Age: 85
DRG: 682 | End: 2020-08-16
Payer: COMMERCIAL

## 2020-08-16 ENCOUNTER — HOSPITAL ENCOUNTER (INPATIENT)
Facility: HOSPITAL | Age: 85
LOS: 3 days | Discharge: HOSPICE | DRG: 682 | End: 2020-08-19
Attending: EMERGENCY MEDICINE | Admitting: FAMILY MEDICINE
Payer: COMMERCIAL

## 2020-08-16 DIAGNOSIS — R62.7 FAILURE TO THRIVE IN ADULT: Primary | ICD-10-CM

## 2020-08-16 DIAGNOSIS — C34.90 LUNG CANCER (HCC): ICD-10-CM

## 2020-08-16 DIAGNOSIS — R06.00 DYSPNEA: ICD-10-CM

## 2020-08-16 DIAGNOSIS — E43 SEVERE MALNUTRITION (HCC): ICD-10-CM

## 2020-08-16 DIAGNOSIS — R52 MILD PAIN: ICD-10-CM

## 2020-08-16 DIAGNOSIS — K59.00 CONSTIPATION: ICD-10-CM

## 2020-08-16 DIAGNOSIS — E87.0 HYPERNATREMIA: ICD-10-CM

## 2020-08-16 DIAGNOSIS — N17.9 AKI (ACUTE KIDNEY INJURY) (HCC): ICD-10-CM

## 2020-08-16 DIAGNOSIS — E83.52 HYPERCALCEMIA: ICD-10-CM

## 2020-08-16 DIAGNOSIS — R62.7 ADULT FAILURE TO THRIVE: ICD-10-CM

## 2020-08-16 DIAGNOSIS — N18.30 STAGE 3 CHRONIC KIDNEY DISEASE (HCC): ICD-10-CM

## 2020-08-16 PROBLEM — R91.8 MASS OF LEFT LUNG: Status: ACTIVE | Noted: 2020-08-16

## 2020-08-16 PROBLEM — R91.8 PULMONARY NODULES: Status: ACTIVE | Noted: 2020-08-16

## 2020-08-16 PROBLEM — G93.41 ACUTE METABOLIC ENCEPHALOPATHY: Status: ACTIVE | Noted: 2020-08-16

## 2020-08-16 LAB
25(OH)D3 SERPL-MCNC: 52 NG/ML (ref 30–100)
ALBUMIN SERPL BCP-MCNC: 2.7 G/DL (ref 3.5–5)
ALP SERPL-CCNC: 105 U/L (ref 46–116)
ALT SERPL W P-5'-P-CCNC: 36 U/L (ref 12–78)
ANION GAP SERPL CALCULATED.3IONS-SCNC: 8 MMOL/L (ref 4–13)
ANION GAP SERPL CALCULATED.3IONS-SCNC: 8 MMOL/L (ref 4–13)
AST SERPL W P-5'-P-CCNC: 40 U/L (ref 5–45)
BACTERIA UR QL AUTO: ABNORMAL /HPF
BASOPHILS # BLD AUTO: 0.02 THOUSANDS/ΜL (ref 0–0.1)
BASOPHILS NFR BLD AUTO: 0 % (ref 0–1)
BILIRUB SERPL-MCNC: 0.8 MG/DL (ref 0.2–1)
BILIRUB UR QL STRIP: NEGATIVE
BUN SERPL-MCNC: 51 MG/DL (ref 5–25)
BUN SERPL-MCNC: 55 MG/DL (ref 5–25)
CALCIUM SERPL-MCNC: 11.4 MG/DL (ref 8.3–10.1)
CALCIUM SERPL-MCNC: 12.4 MG/DL (ref 8.3–10.1)
CHLORIDE SERPL-SCNC: 112 MMOL/L (ref 100–108)
CHLORIDE SERPL-SCNC: 117 MMOL/L (ref 100–108)
CLARITY UR: CLEAR
CO2 SERPL-SCNC: 30 MMOL/L (ref 21–32)
CO2 SERPL-SCNC: 34 MMOL/L (ref 21–32)
COLOR UR: YELLOW
CREAT SERPL-MCNC: 1.29 MG/DL (ref 0.6–1.3)
CREAT SERPL-MCNC: 1.51 MG/DL (ref 0.6–1.3)
CREAT UR-MCNC: 92.8 MG/DL
EOSINOPHIL # BLD AUTO: 0 THOUSAND/ΜL (ref 0–0.61)
EOSINOPHIL NFR BLD AUTO: 0 % (ref 0–6)
ERYTHROCYTE [DISTWIDTH] IN BLOOD BY AUTOMATED COUNT: 13.7 % (ref 11.6–15.1)
GFR SERPL CREATININE-BSD FRML MDRD: 42 ML/MIN/1.73SQ M
GFR SERPL CREATININE-BSD FRML MDRD: 51 ML/MIN/1.73SQ M
GLUCOSE SERPL-MCNC: 105 MG/DL (ref 65–140)
GLUCOSE SERPL-MCNC: 116 MG/DL (ref 65–140)
GLUCOSE UR STRIP-MCNC: NEGATIVE MG/DL
HCT VFR BLD AUTO: 43.9 % (ref 36.5–49.3)
HGB BLD-MCNC: 13.9 G/DL (ref 12–17)
HGB UR QL STRIP.AUTO: ABNORMAL
IMM GRANULOCYTES # BLD AUTO: 0.05 THOUSAND/UL (ref 0–0.2)
IMM GRANULOCYTES NFR BLD AUTO: 0 % (ref 0–2)
KETONES UR STRIP-MCNC: NEGATIVE MG/DL
LEUKOCYTE ESTERASE UR QL STRIP: ABNORMAL
LYMPHOCYTES # BLD AUTO: 0.79 THOUSANDS/ΜL (ref 0.6–4.47)
LYMPHOCYTES NFR BLD AUTO: 5 % (ref 14–44)
MAGNESIUM SERPL-MCNC: 2.3 MG/DL (ref 1.6–2.6)
MAGNESIUM SERPL-MCNC: 2.4 MG/DL (ref 1.6–2.6)
MCH RBC QN AUTO: 29.2 PG (ref 26.8–34.3)
MCHC RBC AUTO-ENTMCNC: 31.7 G/DL (ref 31.4–37.4)
MCV RBC AUTO: 92 FL (ref 82–98)
MONOCYTES # BLD AUTO: 0.64 THOUSAND/ΜL (ref 0.17–1.22)
MONOCYTES NFR BLD AUTO: 4 % (ref 4–12)
NEUTROPHILS # BLD AUTO: 14.89 THOUSANDS/ΜL (ref 1.85–7.62)
NEUTS SEG NFR BLD AUTO: 91 % (ref 43–75)
NITRITE UR QL STRIP: NEGATIVE
NON-SQ EPI CELLS URNS QL MICRO: ABNORMAL /HPF
NRBC BLD AUTO-RTO: 0 /100 WBCS
PH UR STRIP.AUTO: 6 [PH]
PLATELET # BLD AUTO: 311 THOUSANDS/UL (ref 149–390)
PMV BLD AUTO: 10.9 FL (ref 8.9–12.7)
POTASSIUM SERPL-SCNC: 3.6 MMOL/L (ref 3.5–5.3)
POTASSIUM SERPL-SCNC: 3.9 MMOL/L (ref 3.5–5.3)
PROCALCITONIN SERPL-MCNC: <0.05 NG/ML
PROT SERPL-MCNC: 7.3 G/DL (ref 6.4–8.2)
PROT UR STRIP-MCNC: ABNORMAL MG/DL
PROT UR-MCNC: 79 MG/DL
PROT/CREAT UR: 0.85 MG/G{CREAT} (ref 0–0.1)
PTH-INTACT SERPL-MCNC: 24.1 PG/ML (ref 18.4–80.1)
RBC # BLD AUTO: 4.76 MILLION/UL (ref 3.88–5.62)
RBC #/AREA URNS AUTO: ABNORMAL /HPF
SARS-COV-2 RNA RESP QL NAA+PROBE: NEGATIVE
SODIUM 24H UR-SCNC: 19 MOL/L
SODIUM SERPL-SCNC: 154 MMOL/L (ref 136–145)
SODIUM SERPL-SCNC: 155 MMOL/L (ref 136–145)
SP GR UR STRIP.AUTO: 1.02 (ref 1–1.03)
TROPONIN I SERPL-MCNC: 0.12 NG/ML
TROPONIN I SERPL-MCNC: 0.14 NG/ML
TROPONIN I SERPL-MCNC: 0.15 NG/ML
TSH SERPL DL<=0.05 MIU/L-ACNC: 2.31 UIU/ML (ref 0.36–3.74)
UROBILINOGEN UR QL STRIP.AUTO: 0.2 E.U./DL
WBC # BLD AUTO: 16.39 THOUSAND/UL (ref 4.31–10.16)
WBC #/AREA URNS AUTO: ABNORMAL /HPF

## 2020-08-16 PROCEDURE — 83735 ASSAY OF MAGNESIUM: CPT | Performed by: STUDENT IN AN ORGANIZED HEALTH CARE EDUCATION/TRAINING PROGRAM

## 2020-08-16 PROCEDURE — 93005 ELECTROCARDIOGRAM TRACING: CPT

## 2020-08-16 PROCEDURE — 99223 1ST HOSP IP/OBS HIGH 75: CPT | Performed by: FAMILY MEDICINE

## 2020-08-16 PROCEDURE — 82330 ASSAY OF CALCIUM: CPT | Performed by: STUDENT IN AN ORGANIZED HEALTH CARE EDUCATION/TRAINING PROGRAM

## 2020-08-16 PROCEDURE — 87449 NOS EACH ORGANISM AG IA: CPT | Performed by: STUDENT IN AN ORGANIZED HEALTH CARE EDUCATION/TRAINING PROGRAM

## 2020-08-16 PROCEDURE — 99285 EMERGENCY DEPT VISIT HI MDM: CPT | Performed by: EMERGENCY MEDICINE

## 2020-08-16 PROCEDURE — 81001 URINALYSIS AUTO W/SCOPE: CPT | Performed by: STUDENT IN AN ORGANIZED HEALTH CARE EDUCATION/TRAINING PROGRAM

## 2020-08-16 PROCEDURE — 96360 HYDRATION IV INFUSION INIT: CPT

## 2020-08-16 PROCEDURE — 82306 VITAMIN D 25 HYDROXY: CPT | Performed by: STUDENT IN AN ORGANIZED HEALTH CARE EDUCATION/TRAINING PROGRAM

## 2020-08-16 PROCEDURE — G1004 CDSM NDSC: HCPCS

## 2020-08-16 PROCEDURE — 80053 COMPREHEN METABOLIC PANEL: CPT | Performed by: STUDENT IN AN ORGANIZED HEALTH CARE EDUCATION/TRAINING PROGRAM

## 2020-08-16 PROCEDURE — 87081 CULTURE SCREEN ONLY: CPT | Performed by: STUDENT IN AN ORGANIZED HEALTH CARE EDUCATION/TRAINING PROGRAM

## 2020-08-16 PROCEDURE — 71250 CT THORAX DX C-: CPT

## 2020-08-16 PROCEDURE — 84443 ASSAY THYROID STIM HORMONE: CPT | Performed by: EMERGENCY MEDICINE

## 2020-08-16 PROCEDURE — 71045 X-RAY EXAM CHEST 1 VIEW: CPT

## 2020-08-16 PROCEDURE — 83970 ASSAY OF PARATHORMONE: CPT | Performed by: STUDENT IN AN ORGANIZED HEALTH CARE EDUCATION/TRAINING PROGRAM

## 2020-08-16 PROCEDURE — 84145 PROCALCITONIN (PCT): CPT | Performed by: STUDENT IN AN ORGANIZED HEALTH CARE EDUCATION/TRAINING PROGRAM

## 2020-08-16 PROCEDURE — 84156 ASSAY OF PROTEIN URINE: CPT | Performed by: STUDENT IN AN ORGANIZED HEALTH CARE EDUCATION/TRAINING PROGRAM

## 2020-08-16 PROCEDURE — 82397 CHEMILUMINESCENT ASSAY: CPT | Performed by: STUDENT IN AN ORGANIZED HEALTH CARE EDUCATION/TRAINING PROGRAM

## 2020-08-16 PROCEDURE — 99285 EMERGENCY DEPT VISIT HI MDM: CPT

## 2020-08-16 PROCEDURE — 80048 BASIC METABOLIC PNL TOTAL CA: CPT | Performed by: EMERGENCY MEDICINE

## 2020-08-16 PROCEDURE — 82570 ASSAY OF URINE CREATININE: CPT | Performed by: STUDENT IN AN ORGANIZED HEALTH CARE EDUCATION/TRAINING PROGRAM

## 2020-08-16 PROCEDURE — 84484 ASSAY OF TROPONIN QUANT: CPT | Performed by: STUDENT IN AN ORGANIZED HEALTH CARE EDUCATION/TRAINING PROGRAM

## 2020-08-16 PROCEDURE — 87040 BLOOD CULTURE FOR BACTERIA: CPT | Performed by: STUDENT IN AN ORGANIZED HEALTH CARE EDUCATION/TRAINING PROGRAM

## 2020-08-16 PROCEDURE — 87635 SARS-COV-2 COVID-19 AMP PRB: CPT | Performed by: EMERGENCY MEDICINE

## 2020-08-16 PROCEDURE — 84484 ASSAY OF TROPONIN QUANT: CPT | Performed by: EMERGENCY MEDICINE

## 2020-08-16 PROCEDURE — 87186 SC STD MICRODIL/AGAR DIL: CPT | Performed by: STUDENT IN AN ORGANIZED HEALTH CARE EDUCATION/TRAINING PROGRAM

## 2020-08-16 PROCEDURE — 94760 N-INVAS EAR/PLS OXIMETRY 1: CPT

## 2020-08-16 PROCEDURE — 36415 COLL VENOUS BLD VENIPUNCTURE: CPT | Performed by: EMERGENCY MEDICINE

## 2020-08-16 PROCEDURE — 85025 COMPLETE CBC W/AUTO DIFF WBC: CPT | Performed by: EMERGENCY MEDICINE

## 2020-08-16 PROCEDURE — 84300 ASSAY OF URINE SODIUM: CPT | Performed by: STUDENT IN AN ORGANIZED HEALTH CARE EDUCATION/TRAINING PROGRAM

## 2020-08-16 PROCEDURE — 87086 URINE CULTURE/COLONY COUNT: CPT | Performed by: STUDENT IN AN ORGANIZED HEALTH CARE EDUCATION/TRAINING PROGRAM

## 2020-08-16 PROCEDURE — 70450 CT HEAD/BRAIN W/O DYE: CPT

## 2020-08-16 RX ORDER — FINASTERIDE 5 MG/1
5 TABLET, FILM COATED ORAL DAILY
Status: DISCONTINUED | OUTPATIENT
Start: 2020-08-17 | End: 2020-08-19 | Stop reason: HOSPADM

## 2020-08-16 RX ORDER — DULOXETIN HYDROCHLORIDE 30 MG/1
30 CAPSULE, DELAYED RELEASE ORAL EVERY EVENING
Status: DISCONTINUED | OUTPATIENT
Start: 2020-08-16 | End: 2020-08-19 | Stop reason: HOSPADM

## 2020-08-16 RX ORDER — DRONABINOL 2.5 MG/1
5 CAPSULE ORAL
Status: DISCONTINUED | OUTPATIENT
Start: 2020-08-16 | End: 2020-08-19 | Stop reason: HOSPADM

## 2020-08-16 RX ORDER — LEVOTHYROXINE SODIUM 0.07 MG/1
75 TABLET ORAL
Status: DISCONTINUED | OUTPATIENT
Start: 2020-08-17 | End: 2020-08-19 | Stop reason: HOSPADM

## 2020-08-16 RX ORDER — ATORVASTATIN CALCIUM 40 MG/1
80 TABLET, FILM COATED ORAL EVERY EVENING
Status: DISCONTINUED | OUTPATIENT
Start: 2020-08-16 | End: 2020-08-19 | Stop reason: HOSPADM

## 2020-08-16 RX ORDER — ACETAMINOPHEN 325 MG/1
650 TABLET ORAL EVERY 6 HOURS PRN
Status: DISCONTINUED | OUTPATIENT
Start: 2020-08-16 | End: 2020-08-19 | Stop reason: HOSPADM

## 2020-08-16 RX ORDER — SODIUM CHLORIDE 9 MG/ML
125 INJECTION, SOLUTION INTRAVENOUS CONTINUOUS
Status: DISCONTINUED | OUTPATIENT
Start: 2020-08-16 | End: 2020-08-17

## 2020-08-16 RX ORDER — MEMANTINE HYDROCHLORIDE 10 MG/1
10 TABLET ORAL 2 TIMES DAILY
Status: DISCONTINUED | OUTPATIENT
Start: 2020-08-16 | End: 2020-08-19 | Stop reason: HOSPADM

## 2020-08-16 RX ORDER — PANTOPRAZOLE SODIUM 20 MG/1
20 TABLET, DELAYED RELEASE ORAL
Status: DISCONTINUED | OUTPATIENT
Start: 2020-08-17 | End: 2020-08-19 | Stop reason: HOSPADM

## 2020-08-16 RX ORDER — CEFEPIME HYDROCHLORIDE 1 G/50ML
1000 INJECTION, SOLUTION INTRAVENOUS EVERY 24 HOURS
Status: DISCONTINUED | OUTPATIENT
Start: 2020-08-17 | End: 2020-08-16

## 2020-08-16 RX ORDER — ALBUTEROL SULFATE 2.5 MG/3ML
2.5 SOLUTION RESPIRATORY (INHALATION) EVERY 6 HOURS PRN
Status: DISCONTINUED | OUTPATIENT
Start: 2020-08-16 | End: 2020-08-19 | Stop reason: HOSPADM

## 2020-08-16 RX ORDER — DULOXETIN HYDROCHLORIDE 30 MG/1
60 CAPSULE, DELAYED RELEASE ORAL EVERY MORNING
Status: DISCONTINUED | OUTPATIENT
Start: 2020-08-17 | End: 2020-08-19 | Stop reason: HOSPADM

## 2020-08-16 RX ORDER — POLYETHYLENE GLYCOL 3350 17 G/17G
17 POWDER, FOR SOLUTION ORAL DAILY PRN
Status: DISCONTINUED | OUTPATIENT
Start: 2020-08-16 | End: 2020-08-19 | Stop reason: HOSPADM

## 2020-08-16 RX ORDER — ASPIRIN 81 MG/1
81 TABLET ORAL DAILY
Status: DISCONTINUED | OUTPATIENT
Start: 2020-08-17 | End: 2020-08-19 | Stop reason: HOSPADM

## 2020-08-16 RX ORDER — CEFEPIME HYDROCHLORIDE 2 G/50ML
2000 INJECTION, SOLUTION INTRAVENOUS ONCE
Status: DISCONTINUED | OUTPATIENT
Start: 2020-08-16 | End: 2020-08-16

## 2020-08-16 RX ORDER — ONDANSETRON 2 MG/ML
4 INJECTION INTRAMUSCULAR; INTRAVENOUS EVERY 6 HOURS PRN
Status: DISCONTINUED | OUTPATIENT
Start: 2020-08-16 | End: 2020-08-19 | Stop reason: HOSPADM

## 2020-08-16 RX ORDER — LORAZEPAM 0.5 MG/1
0.5 TABLET ORAL EVERY 8 HOURS PRN
Status: DISCONTINUED | OUTPATIENT
Start: 2020-08-16 | End: 2020-08-19 | Stop reason: HOSPADM

## 2020-08-16 RX ORDER — POTASSIUM CHLORIDE 14.9 MG/ML
20 INJECTION INTRAVENOUS ONCE
Status: COMPLETED | OUTPATIENT
Start: 2020-08-16 | End: 2020-08-16

## 2020-08-16 RX ORDER — TAMSULOSIN HYDROCHLORIDE 0.4 MG/1
0.4 CAPSULE ORAL
Status: DISCONTINUED | OUTPATIENT
Start: 2020-08-16 | End: 2020-08-19 | Stop reason: HOSPADM

## 2020-08-16 RX ADMIN — SODIUM CHLORIDE 500 ML: 0.9 INJECTION, SOLUTION INTRAVENOUS at 08:56

## 2020-08-16 RX ADMIN — POTASSIUM CHLORIDE 20 MEQ: 14.9 INJECTION, SOLUTION INTRAVENOUS at 15:38

## 2020-08-16 RX ADMIN — ENOXAPARIN SODIUM 30 MG: 30 INJECTION SUBCUTANEOUS at 12:56

## 2020-08-16 RX ADMIN — SODIUM CHLORIDE 125 ML/HR: 0.9 INJECTION, SOLUTION INTRAVENOUS at 10:07

## 2020-08-16 NOTE — ASSESSMENT & PLAN NOTE
Malnutrition Findings:           BMI Findings: Body mass index is 14 2 kg/m²     CC adult failure to thrive

## 2020-08-16 NOTE — ED PROVIDER NOTES
History  Chief Complaint   Patient presents with    Weakness - Generalized     pt resident of [facility]  per staff "rapid decline in mental status" since 0600     This is an 78-year-old male with a history of dementia, chronic kidney disease, hypothyroidism, hypertension, hyperlipidemia, chronic respiratory failure on 4L NC at baseline who presents with weakness and altered mental status  The patient presents from the nursing home  Per the nursing home staff, the patient has experienced a rapid decline over the past 24 hours  He has not been eating or drinking  The patient was up at 4:00 a m  This morning complaining that he was cold  When nurses came to check on him at 6:00 a m , the patient was "unresponsive"  States that they were unable to get a blood pressure or pulse ox on him  When EMS arrived, patient was weak but able to answer questions  Vital signs were normal   Difficult time obtaining a pulse ox due to cold/cyanotic fingers  The patient has no complaints at this time  He is alert and oriented x2 (person and place)  He does open his eyes to voice, follows commands, appears to answer questions correctly  10:04 AM  on chart review, he does have an advanced directive on file from 2018  He was a DNR/DNI at that point  Prior to Admission Medications   Prescriptions Last Dose Informant Patient Reported? Taking?    DULoxetine (CYMBALTA) 30 mg delayed release capsule   No Yes   Sig: Take 1 capsule (30 mg total) by mouth every evening   DULoxetine (CYMBALTA) 60 mg delayed release capsule   No Yes   Sig: Take 1 capsule (60 mg total) by mouth every morning   LORazepam (ATIVAN) 0 5 mg tablet   No Yes   Sig: Take 1 tablet (0 5 mg total) by mouth every 8 (eight) hours as needed for anxiety (as needed for agitation)   aspirin (ECOTRIN LOW STRENGTH) 81 mg EC tablet   No Yes   Sig: Take 1 tablet (81 mg total) by mouth daily   atorvastatin (LIPITOR) 80 mg tablet   No Yes   Sig: Take 1 tablet (80 mg total) by mouth daily   dronabinol (MARINOL) 5 MG capsule   No Yes   Sig: Take 1 capsule (5 mg total) by mouth 2 (two) times a day before meals   finasteride (PROSCAR) 5 mg tablet   No Yes   Sig: Take 1 tablet (5 mg total) by mouth daily   levothyroxine 75 mcg tablet   No Yes   Sig: Take 1 tablet (75 mcg total) by mouth daily   memantine (NAMENDA) 10 mg tablet   No Yes   Sig: Take 1 tablet (10 mg total) by mouth 2 (two) times a day 5mg twice a day for a week and then increasing to 1 full tablet twice a day   pantoprazole (PROTONIX) 20 mg tablet   No Yes   Sig: Take 1 tablet (20 mg total) by mouth daily   tamsulosin (FLOMAX) 0 4 mg   No Yes   Sig: Take 1 capsule (0 4 mg total) by mouth daily with dinner   vitamin B-12 (VITAMIN B-12) 1,000 mcg tablet   No Yes   Sig: Take 1 tablet (1,000 mcg total) by mouth daily      Facility-Administered Medications: None       Past Medical History:   Diagnosis Date    Abnormal stress test     Abnormal weight loss     Alzheimer's disease ([place] [id number] ) 5/2/2018    Aortic insufficiency with aortic stenosis     [s/p AVR (tissue) 4/2013]     Bradycardia, unspecified     Coronary artery disease      [s/p CABG x3 4/2013]    Diabetes ([place] [address] )     Disease of thyroid gland     Dyslipidemia     Gastric ulcer     last assessed: 2/24/14    H/O cardiovascular stress test      (EF 0 67, Not suspicious for significant occlusive CAD  ) - 11/28/2007; [facility] (Normal EF, Inferior wall post stress HK, markedly positive EKG with chest pressure) - 4/10/2013    History of echocardiogram 08/05/2016    2-D w/ CFD:  EF 0 55 (55%), Normal LVSF  Normal functioning bioprosthetic AV      History of EKG     EKG shows sinus bradycardia, 49 bpm, otherwise normal     History of EKG      (Sinus Rhythm) - 3/25/2013;  (Sinus Valeen Maurice) - 5/20/2013;  9/25/2013     History of Holter monitoring      (SR w/ rates ranging from 44-78BPM  No afib, no heart block, no vtach, no pauses, and no symptoms per pt  there were few short atrial runs, highest being 140BPM, the longest being 8 beats; though overall atrial and ventricular ectopic beats were rare ) - 8/3/2016     History of Holter monitoring 08/22/2016    24 hr    Hx of echocardiogram     (EF 0 70, Without regional wall abnormalities  Possibly bicuspid aortic valve with mild AS, no aortic insufficiency ) 3/22/2007;  (EF 0 55 (55%), Normal LVSF  Normal functioning bioprosthetic AV ) - 8/3/2016     Hypertension     Hypothyroid 5/2/2018    ICAO (internal carotid artery occlusion)     Duplex (There is known occlusion of ICA in rt carotid and the vertebral artery is antegrade  0-19% ICA stenosis of Lt carotid  The vertebral artery is antegrade  Widely patent endarterectomy site on the left ) - 1/30/2014 Carotid Duplex (No change  Known occlusion of ICA in right carotid  0-19% ICA stenosis in left carotid, and widely patent endarterectomy site on left ) - 8/28/2014    Inguinal hernia, left     last assessed: 10/8/14    Peptic ulcer     last assessed: 5/21/13    Prostate enlargement 5/2/2018       Past Surgical History:   Procedure Laterality Date    AORTIC VALVE REPLACEMENT      CAROTID ENDARTARECTOMY  04/26/2013    CATARACT EXTRACTION Bilateral     CORONARY ARTERY BYPASS GRAFT       (3V CABG: LIMA-LAD, VG-RCA, VG-CX, Tissue AVR (#23 [address])) - 4/26/2013     CYSTOSCOPY  04/23/2020    INGUINAL HERNIA REPAIR      NEPHRECTOMY Right     NE CYSTOURETHROSCOPY W/IRRIG & EVAC CLOTS N/A 5/7/2018    Procedure: CYSTOSCOPY EVACUATION OF CLOTS AND TURBT;  Surgeon: Ted Burks MD;  Location: [facility];  Service: Urology    THROMBOENDARTERECTOMY      Carotid       Family History   Problem Relation Age of Onset    Sudden death Mother         unexpected death    Other Father         Coal workers' Pneumoconiosis    Alzheimer's disease Brother      I have reviewed and agree with the history as documented      E-Cigarette/Vaping    E-Cigarette Use Never User      E-Cigarette/Vaping Substances    Nicotine No     THC No     CBD No     Flavoring No     Other No     Unknown No      Social History     Tobacco Use    Smoking status: Former Smoker     Types: Cigarettes    Smokeless tobacco: Never Used    Tobacco comment: quit 20 years ago   Substance Use Topics    Alcohol use: Yes     Comment: social    Drug use: No       Review of Systems   Unable to perform ROS: Mental status change (Baseline dementia)       Physical Exam  Physical Exam  Constitutional:       General: He is not in acute distress  Appearance: He is cachectic  Comments: Chronically ill-appearing male  HENT:      Mouth/Throat:      Mouth: Mucous membranes are dry  Pharynx: Uvula midline  Eyes:      General: Lids are normal       Conjunctiva/sclera: Conjunctivae normal       Pupils: Pupils are equal, round, and reactive to light  Comments: Eyes are sunken   Neck:      Thyroid: No thyroid mass or thyromegaly  Trachea: Trachea normal    Cardiovascular:      Rate and Rhythm: Normal rate and regular rhythm  Pulses: Normal pulses  Heart sounds: Normal heart sounds  No murmur  Pulmonary:      Effort: Pulmonary effort is normal       Breath sounds: Normal breath sounds  Abdominal:      General: Bowel sounds are normal       Palpations: Abdomen is soft  Tenderness: There is no abdominal tenderness  There is no guarding or rebound  Negative signs include Oneill's sign  Genitourinary:     Comments: Patient rolled and skin examined  No skin breakdown or rashes  Musculoskeletal:      Comments: Finger tips are blue and cyanotic  Skin:     General: Skin is warm and dry  Neurological:      Mental Status: He is alert  GCS: GCS eye subscore is 3  GCS verbal subscore is 5  GCS motor subscore is 6  Comments: Patient is alert and oriented x2 (person and place)  He is able to tell me his name and date of birth    Patient has a weak voice and is difficult to understand  Psychiatric:         Behavior: Behavior normal  Behavior is cooperative  Thought Content: Thought content normal          Vital Signs  ED Triage Vitals   Temperature Pulse Respirations Blood Pressure SpO2   08/16/20 0831 08/16/20 0827 08/16/20 0827 08/16/20 0827 08/16/20 0827   98 4 °F (36 9 °C) 74 12 126/83 100 %      Temp Source Heart Rate Source Patient Position - Orthostatic VS BP Location FiO2 (%)   08/16/20 0831 08/16/20 0827 08/16/20 0827 08/16/20 0827 --   Rectal Monitor Lying Left arm       Pain Score       --                  Vitals:    08/16/20 0827 08/16/20 0900 08/16/20 0930   BP: 126/83 153/72 153/75   Pulse: 74 70 68   Patient Position - Orthostatic VS: Lying Lying Lying         Visual Acuity  Visual Acuity      Most Recent Value   L Pupil Size (mm)  4   R Pupil Size (mm)  4          ED Medications  Medications   sodium chloride 0 9 % infusion (125 mL/hr Intravenous New Bag 8/16/20 1007)   sodium chloride 0 9 % bolus 500 mL (0 mL Intravenous Stopped 8/16/20 0956)       Diagnostic Studies  Results Reviewed     Procedure Component Value Units Date/Time    Novel Coronavirus [facility] [[id number]]  (Normal) Collected:  08/16/20 0842    Lab Status:  Final result Specimen:  Nares from Nose Updated:  08/16/20 0946     SARS-CoV-2 Negative    Narrative: The specimen collection materials, transport medium, and/or testing methodology utilized in the production of these test results have been proven to be reliable in a limited validation with an abbreviated program under the Emergency Utilization Authorization provided by the FDA  Testing reported as "Presumptive positive" will be confirmed with secondary testing with a reference laboratory to ensure result accuracy  Clinical caution and judgement should be used with the interpretation of these results with consideration of the clinical impression and other laboratory testing    Testing reported as "Positive" or "Negative" has been proven to be accurate according to standard laboratory validation requirements  All testing is performed with control materials showing appropriate reactivity at standard intervals  Basic metabolic panel [[id number]]  (Abnormal) Collected:  08/16/20 0842    Lab Status:  Final result Specimen:  Blood from Arm, Right Updated:  08/16/20 0916     Sodium 154 mmol/L      Potassium 3 9 mmol/L      Chloride 112 mmol/L      CO2 34 mmol/L      ANION GAP 8 mmol/L      BUN 55 mg/dL      Creatinine 1 51 mg/dL      Glucose 116 mg/dL      Calcium 12 4 mg/dL      eGFR 42 ml/min/1 73sq m     Narrative:       [facility] guidelines for Chronic Kidney Disease (CKD):     Stage 1 with normal or high GFR (GFR > 90 mL/min/1 73 square meters)    Stage 2 Mild CKD (GFR = 60-89 mL/min/1 73 square meters)    Stage 3A Moderate CKD (GFR = 45-59 mL/min/1 73 square meters)    Stage 3B Moderate CKD (GFR = 30-44 mL/min/1 73 square meters)    Stage 4 Severe CKD (GFR = 15-29 mL/min/1 73 square meters)    Stage 5 End Stage CKD (GFR <15 mL/min/1 73 square meters)  Note: GFR calculation is accurate only with a steady state creatinine    TSH, 3rd generation with Free T4 reflex [[id number]]  (Normal) Collected:  08/16/20 0842    Lab Status:  Final result Specimen:  Blood from Arm, Right Updated:  08/16/20 0914     TSH 3RD GENERATON 2 314 uIU/mL     Narrative:       Patients undergoing fluorescein dye angiography may retain small amounts of fluorescein in the body for 48-72 hours post procedure  Samples containing fluorescein can produce falsely depressed TSH values  If the patient had this procedure,a specimen should be resubmitted post fluorescein clearance        Troponin I [[id number]]  (Abnormal) Collected:  08/16/20 0842    Lab Status:  Final result Specimen:  Blood from Arm, Right Updated:  08/16/20 0909     Troponin I 0 14 ng/mL     CBC and differential [[id number]]  (Abnormal) Collected:  08/16/20 7309    Lab Status:  Final result Specimen:  Blood from Arm, Right Updated:  08/16/20 0908     WBC 16 39 Thousand/uL      RBC 4 76 Million/uL      Hemoglobin 13 9 g/dL      Hematocrit 43 9 %      MCV 92 fL      MCH 29 2 pg      MCHC 31 7 g/dL      RDW 13 7 %      MPV 10 9 fL      Platelets 114 Thousands/uL      nRBC 0 /100 WBCs      Neutrophils Relative 91 %      Immat GRANS % 0 %      Lymphocytes Relative 5 %      Monocytes Relative 4 %      Eosinophils Relative 0 %      Basophils Relative 0 %      Neutrophils Absolute 14 89 Thousands/µL      Immature Grans Absolute 0 05 Thousand/uL      Lymphocytes Absolute 0 79 Thousands/µL      Monocytes Absolute 0 64 Thousand/µL      Eosinophils Absolute 0 00 Thousand/µL      Basophils Absolute 0 02 Thousands/µL     Narrative: This is an appended report  These results have been appended to a previously verified report  UA w Reflex to Microscopic w Reflex to Culture [[id number]]     Lab Status:  No result Specimen:  Urine                  CT chest without contrast   Final Result by Genesis Calloway MD (08/16 8250)      1   4 6 x 6 0 cm spiculated mass in the superior segment of the left lower lobe, most likely bronchogenic carcinoma  2   Several small bilateral pulmonary nodules as described above, many of which appear to be inflammatory in etiology  However, metastases and/or synchronous primary malignancies cannot be excluded  3   Emphysema  4   No evidence of mediastinal or hilar lymphadenopathy  5   4 3 x 5 1 cm soft tissue mass in the left lateral chest wall, 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 Procedures  ECG 12 Lead Documentation Only    Date/Time: 8/16/2020 8:41 AM  Performed by: Mani Castro MD  Authorized by: Mani Castro MD     ECG reviewed by me, the ED Provider: yes    Patient location:  ED  Previous ECG:     Previous ECG:  Compared to current    Comparison ECG info:  12/19/19    Similarity:  Changes noted    Comparison to cardiac monitor: Yes    Interpretation:     Interpretation: non-specific    Rate:     ECG rate:  79    ECG rate assessment: normal    Rhythm:     Rhythm: sinus rhythm    Ectopy:     Ectopy: none    QRS:     QRS axis:  Left    QRS intervals:  Normal  Conduction:     Conduction: abnormal      Abnormal conduction: LAFB    ST segments:     ST segments:  Normal  T waves:     T waves: normal               ED Course  ED Course as of Aug 16 1025   Sun Aug 16, 2020   0909 No evidence of ischemia on EKG, denies cp/sob   Troponin I(!): 0 14   0917 kuldip   Creatinine(!): 1 51   0917 Receiving fluids   Calcium(!!): 12 4       US AUDIT      Most Recent Value   Initial Alcohol Screen: US AUDIT-C    1  How often do you have a drink containing alcohol?  0 Filed at: 08/16/2020 0829   2  How many drinks containing alcohol do you have on a typical day you are drinking? 0 Filed at: 08/16/2020 0829   3a  Male UNDER 65: How often do you have five or more drinks on one occasion? 0 Filed at: 08/16/2020 0829   3b  FEMALE Any Age, or MALE 65+: How often do you have 4 or more drinks on one occassion? 0 Filed at: 08/16/2020 0829   Audit-C Score  0 Filed at: 08/16/2020 4423                  WANG/DAST-10      Most Recent Value   How many times in the past year have you    Used an illegal drug or used a prescription medication for non-medical reasons?   Never Filed at: 08/16/2020 9411 MDM  Number of Diagnoses or Management Options  Diagnosis management comments: Failure to thrive in an adult  Plan to check labs, EKG, chest x-ray  Urinalysis  CT head  Plan to speak with family  Disposition  Final diagnoses:   Failure to thrive in adult   Hypernatremia   Hypercalcemia   CONCHA (acute kidney injury) ([facility] )   Lung cancer ([facility] )     Time reflects when diagnosis was documented in both MDM as applicable and the Disposition within this note     Time User Action Codes Description Comment    8/16/2020  9:17 AM Vera Persons P Add [R62 7] Failure to thrive in adult     8/16/2020  9:17 AM Caverly Rex P Add [E87 0] Hypernatremia     8/16/2020  9:18 AM Vera Persons P Add [E83 52] Hypercalcemia     8/16/2020  9:18 AM Vera Persons P Add [N17 9] CONCHA (acute kidney injury) ([facility] )     8/16/2020 10:24 AM Ruslan Israel Add [C34 90] Lung cancer [facility])       ED Disposition     ED Disposition Condition Date/Time Comment    Admit Stable Sun Aug 16, 2020 10:24 AM         Follow-up Information    None         Patient's Medications   Discharge Prescriptions    No medications on file     No discharge procedures on file      PDMP Review       Value Time User    PDMP Reviewed  Yes 7/29/2020  2:49 PM Adam Cameron MD          ED Provider  Electronically Signed by           Conner Noonan MD  08/16/20 4928

## 2020-08-16 NOTE — ASSESSMENT & PLAN NOTE
Advanced dementia associated with irritability and anger  Continue home medication of vitamin B12 memantine, Ativan and Cymbalta

## 2020-08-16 NOTE — PLAN OF CARE
Problem: Potential for Falls  Goal: Patient will remain free of falls  Description: INTERVENTIONS:  - Assess patient frequently for physical needs  -  Identify cognitive and physical deficits and behaviors that affect risk of falls  -  Inez fall precautions as indicated by assessment   - Educate patient/family on patient safety including physical limitations  - Instruct patient to call for assistance with activity based on assessment  - Modify environment to reduce risk of injury  - Consider OT/PT consult to assist with strengthening/mobility  Outcome: Progressing     Problem: Prexisting or High Potential for Compromised Skin Integrity  Goal: Skin integrity is maintained or improved  Description: INTERVENTIONS:  - Identify patients at risk for skin breakdown  - Assess and monitor skin integrity  - Assess and monitor nutrition and hydration status  - Monitor labs   - Assess for incontinence   - Turn and reposition patient  - Assist with mobility/ambulation  - Relieve pressure over bony prominences  - Avoid friction and shearing  - Provide appropriate hygiene as needed including keeping skin clean and dry  - Evaluate need for skin moisturizer/barrier cream  - Collaborate with interdisciplinary team   - Patient/family teaching  - Consider wound care consult   Outcome: Progressing     Problem: Nutrition/Hydration-ADULT  Goal: Nutrient/Hydration intake appropriate for improving, restoring or maintaining nutritional needs  Description: Monitor and assess patient's nutrition/hydration status for malnutrition  Collaborate with interdisciplinary team and initiate plan and interventions as ordered  Monitor patient's weight and dietary intake as ordered or per policy  Utilize nutrition screening tool and intervene as necessary  Determine patient's food preferences and provide high-protein, high-caloric foods as appropriate       INTERVENTIONS:  - Monitor oral intake, urinary output, labs, and treatment plans  - Assess nutrition and hydration status and recommend course of action  - Evaluate amount of meals eaten  - Assist patient with eating if necessary   - Allow adequate time for meals  - Recommend/ encourage appropriate diets, oral nutritional supplements, and vitamin/mineral supplements  - Order, calculate, and assess calorie counts as needed  - Recommend, monitor, and adjust tube feedings and TPN/PPN based on assessed needs  - Assess need for intravenous fluids  - Provide specific nutrition/hydration education as appropriate  - Include patient/family/caregiver in decisions related to nutrition  Outcome: Progressing

## 2020-08-16 NOTE — ASSESSMENT & PLAN NOTE
As seen on CT chest:  Pneumonia versus malignancy  Will do infectious workup  See metabolic encephalopathy

## 2020-08-16 NOTE — ASSESSMENT & PLAN NOTE
Sodium 154 on admission most likely secondary to dehydration and malnutrition  IV fluids started  Continue to trend  Nephrology consulted and recommendations appreciated

## 2020-08-16 NOTE — ASSESSMENT & PLAN NOTE
Presented from [facility] due to acute decline in mental status over 24 hours most likely secondary to malnutrition and dehydration causing hypernatremia and hypercalcemia    Baseline oxygen 4 L nasal cannula    Trend fever curve  IV fluids D5 1/4 NS @125mL/hr  Ca correction 12 2  Ionized Ca 1 45  PTH 24 1   PTHrP pending   bld cx x2 pending  MRSA cx pending  UA with reflex showing trace leukocytes and moderate bacteruria  Urine strept and legionella negative  Procalcitonin <0 05 continue to trend  Troponin 0 14->0 12->0 15->0 16  Speech and swallow eval and treat

## 2020-08-16 NOTE — ASSESSMENT & PLAN NOTE
Patient is emaciated and cachectic  BMI 14  Nutrition consulted  Speech and swallow evaluation  Nutrition supplementation  Continue Dronabinol

## 2020-08-16 NOTE — RESPIRATORY THERAPY NOTE
RT Protocol Note  Violetta Browning 81 y o  male MRN: [id number]  Unit/Bed#: 836-93 Encounter: [id number]    Assessment    Principal Problem:    Acute metabolic encephalopathy  Active Problems:    Hypothyroidism    Dementia ([place] [address] )    Malignant neoplasm of bladder ([place] [address] )    Hypertension    Hypernatremia    Hypercalcemia    Acute renal failure superimposed on stage 3 chronic kidney disease (HCC)    Pulmonary nodules    Mass of left lung    Adult failure to thrive    Severe malnutrition (HCC)      Home Pulmonary Medications:  None       Past Medical History:   Diagnosis Date    Abnormal stress test     Abnormal weight loss     Alzheimer's disease ([place] [address] ) 5/2/2018    Aortic insufficiency with aortic stenosis     [s/p AVR (tissue) 4/2013]     Bradycardia, unspecified     Coronary artery disease      [s/p CABG x3 4/2013]    Diabetes ([place] [address] )     Disease of thyroid gland     Dyslipidemia     Gastric ulcer     last assessed: 2/24/14    H/O cardiovascular stress test      (EF 0 67, Not suspicious for significant occlusive CAD  ) - 11/28/2007; [facility] (Normal EF, Inferior wall post stress HK, markedly positive EKG with chest pressure) - 4/10/2013    History of echocardiogram 08/05/2016    2-D w/ CFD:  EF 0 55 (55%), Normal LVSF  Normal functioning bioprosthetic AV   History of EKG     EKG shows sinus bradycardia, 49 bpm, otherwise normal     History of EKG      (Sinus Rhythm) - 3/25/2013;  (Sinus Trixie Congregation) - 5/20/2013;  9/25/2013     History of Holter monitoring      (SR w/ rates ranging from 44-78BPM  No afib, no heart block, no vtach, no pauses, and no symptoms per pt  there were few short atrial runs, highest being 140BPM, the longest being 8 beats; though overall atrial and ventricular ectopic beats were rare ) - 8/3/2016     History of Holter monitoring 08/22/2016    24 hr    Hx of echocardiogram     (EF 0 70, Without regional wall abnormalities   Possibly bicuspid aortic valve with mild AS, no aortic insufficiency ) 3/22/2007;  (EF 0 55 (55%), Normal LVSF  Normal functioning bioprosthetic AV ) - 8/3/2016     Hypertension     Hypothyroid 5/2/2018    ICAO (internal carotid artery occlusion)     Duplex (There is known occlusion of ICA in rt carotid and the vertebral artery is antegrade  0-19% ICA stenosis of Lt carotid  The vertebral artery is antegrade  Widely patent endarterectomy site on the left ) - 1/30/2014 Carotid Duplex (No change  Known occlusion of ICA in right carotid   0-19% ICA stenosis in left carotid, and widely patent endarterectomy site on left ) - 8/28/2014    Inguinal hernia, left     last assessed: 10/8/14    Peptic ulcer     last assessed: 5/21/13    Prostate enlargement 5/2/2018     Social History     Socioeconomic History    Marital status: /Civil Union     Spouse name: None    Number of children: None    Years of education: None    Highest education level: None   Occupational History    None   Social Needs    Financial resource strain: None    Food insecurity     Worry: None     Inability: None    Transportation needs     Medical: None     Non-medical: None   Tobacco Use    Smoking status: Former Smoker     Types: Cigarettes    Smokeless tobacco: Never Used    Tobacco comment: quit 20 years ago   Substance and Sexual Activity    Alcohol use: Yes     Comment: social    Drug use: No    Sexual activity: None   Lifestyle    Physical activity     Days per week: None     Minutes per session: None    Stress: None   Relationships    Social connections     Talks on phone: None     Gets together: None     Attends Bahai service: None     Active member of club or organization: None     Attends meetings of clubs or organizations: None     Relationship status: None    Intimate partner violence     Fear of current or ex partner: None     Emotionally abused: None     Physically abused: None     Forced sexual activity: None   Other Topics Concern    None   Social History Narrative    None       Subjective         Objective    Physical Exam:   Assessment Type: Assess only  General Appearance: Alert, Awake  Respiratory Pattern: Normal  Chest Assessment: Chest expansion symmetrical  Bilateral Breath Sounds: Clear    Vitals:  Blood pressure (!) 172/92, pulse 74, temperature 98 5 °F (36 9 °C), temperature source Oral, resp  rate 18, weight 44 7 kg (98 lb 8 7 oz), SpO2 96 %  Imaging and other studies: I have personally reviewed pertinent reports  Plan    Respiratory Plan: No distress/Pulmonary history  Airway Clearance Plan: Incentive Spirometer       Pt has no respiratory history, pt has a left lung mass  Pt given IS and instructed   Ordering pt on PRN Albuterol

## 2020-08-16 NOTE — ASSESSMENT & PLAN NOTE
Calcium 12 4 on admission secondary to dehydration versus malnutrition versus malignancy  IV fluids  Corrected calcium 12 2  Ionized calcium 1 45, continue to monitor  +/- Zoledronic Acid and calcitonin  Continue to trend  Nephrology consult and recommendations appreciated  Concern for underlying malignancy, however, noted goals of care will be transitioned to hospice upon discharge so may defer to limited workup

## 2020-08-16 NOTE — ASSESSMENT & PLAN NOTE
POA with Cr of 1 51 most likely secondary to dehydration and malnutrition  Baseline creatinine 1 0-1 2  GFR baseline 54-69 stage III  Avoid nephrotoxin agents  IV fluids  Has history of metastatic bladder carcinoma has chronic indwelling Loyd  Nephrology consulted and recommendations appreciated  resolved

## 2020-08-16 NOTE — H&P
H&P Exam - Kristi Holguin 81 y o  male MRN: [id number]    Unit/Bed#: 376-53 Encounter: [id number]    Assessment:  Brianne Lindquist is an 80 y/o man brought from [address] [facility] with a PMH of dementia with irritability and anger, hypothyroidism, HTN, CKD stage 3 with baseline Cr 1 0-1 2, malignant bladder carcinoma, chronic respiratory failure baseline 4L NC,and adult failure to thrive with a BMI of 14 is being admitted to the medical-surgical floor for acute metabolic encephalopathy most likely due to dehydration and malnutrition  He is currently hemodynamically stable, in no acute respiratory distress on 4 L NC, and afebrile  He will most likely spine greater than 2 midnights  Case discussed with Dr Anisa Olson:  * Acute metabolic encephalopathy  Assessment & Plan  Presented from [address] due to acute decline in mental status over 24 hours most likely secondary to malnutrition and dehydration causing hypernatremia and hypercalcemia    Baseline oxygen 4 L nasal cannula    Ed Course:  VS: WNL  WT: wt loss of approximately 40 lb in the past 6 months  Labs: BMP: 154/3 9 112/34 55/1 51<116; Ca 12 4; troponin 0 14 CBC: 16 39  Imaging:  CT chest without contrast:1   4 6 x 6 0 cm spiculated mass in the superior segment of the left lower lobe, most likely bronchogenic carcinoma  2   Several small bilateral pulmonary nodules as described above, many of which appear to be inflammatory in etiology   However, metastases and/or synchronous primary malignancies cannot be excluded  3   Emphysema  4   No evidence of mediastinal or hilar lymphadenopathy  5   4 3 x 5 1 cm soft tissue mass in the left lateral chest wall, 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 with extension into the intercostal and extrapleural soft tissues     CT head w/o contrast:No acute intracranial abnormality   Microangiopathic changes   Chronic ventriculomegaly  CXR: final read pending  Meds: NS IV bolus 500 x1    Trend fever curve  IV fluids @125mL/hr  Need CMP for Ca correction  Ionized Ca  PTH and PTHrP  Repeat labs at 16:00 CMP and Mg  bld cx x2  MRSA cx  UA with reflex  Urine strept and legionella  Trend procalcitonin  Trend troponin  Speech and swallow eval and treat    Acute renal failure superimposed on stage 3 chronic kidney disease (HCC)  Assessment & Plan  POA with Cr of 1 51 most likely secondary to dehydration and malnutrition  Baseline creatinine 1 0-1 2  GFR baseline 54-69 stage III  Avoid nephrotoxin agents  IV fluids  Has history of metastatic bladder carcinoma has chronic indwelling Loyd  Nephrology consulted and recommendations appreciated    Pulmonary nodules  Assessment & Plan  As seen on CT chest:  Pneumonia versus malignancy  Will do infectious workup  See metabolic encephalopathy    Hypercalcemia  Assessment & Plan  Calcium 12 4 on admission secondary to dehydration versus malnutrition versus malignancy  IV fluids  CMP ordered for correction of calcium  Ionized calcium  +/- Zoledronic Acid and calcitonin  Continue to trend    Hypernatremia  Assessment & Plan  Sodium 154 on admission most likely secondary to dehydration and malnutrition  IV fluids started  Continue to trend    Hypothyroidism  Assessment & Plan  Continue levothyroxine    Severe malnutrition (HCC)  Assessment & Plan  Malnutrition Findings:           BMI Findings: Body mass index is 14 2 kg/m²     CC adult failure to thrive    Adult failure to thrive  Assessment & Plan  Patient is emaciated and cachectic  BMI 14  Nutrition consulted  Speech and swallow evaluation  Nutrition supplementation  Continue Dronabinol    Mass of left lung  Assessment & Plan  Noted on CT on admission  Most likely malignant in nature  F/u H/O outpt    Hypertension  Assessment & Plan  Blood pressure stable on admission  Not on any oral HTN meds  Continue to monitor    Malignant neoplasm of bladder [facility])  Assessment & Plan  History of bladder cancer  Has chronic indwelling FoleyContinue Flomax and Proscar    Dementia ([facility] [address] )  Assessment & Plan  Advanced dementia associated with irritability and anger  Continue home medication of vitamin B12 memantine, Ativan and Cymbalta        History of Present Illness   Víctor Pittman is an 82 y/o man brought from [address] [facility] facility with a PMH of dementia with irritability and anger, hypothyroidism, HTN, CKD stage 3 with baseline Cr 1 0-1 2, malignant bladder carcinoma, chronic respiratory failure baseline 4L NC,and adult failure to thrive with a BMI of 14  was brought to the ED due to rapid decline in mentation  Patient has severe dementia and history was taken from ED physician and note  Ed Course:  VS: WNL  WT: wt loss of approximately 40 lb in the past 6 months  Labs: BMP: 154/3 9 112/34 55/1 51<116; Ca 12 4; troponin 0 14 CBC: 16 39  Imaging:  CT chest without contrast:1   4 6 x 6 0 cm spiculated mass in the superior segment of the left lower lobe, most likely bronchogenic carcinoma  2   Several small bilateral pulmonary nodules as described above, many of which appear to be inflammatory in etiology   However, metastases and/or synchronous primary malignancies cannot be excluded  3   Emphysema  4   No evidence of mediastinal or hilar lymphadenopathy  5   4 3 x 5 1 cm soft tissue mass in the left lateral chest wall, 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 with extension into the intercostal and extrapleural soft tissues     CT head w/o contrast:No acute intracranial abnormality   Microangiopathic changes   Chronic ventriculomegaly  CXR: final read pending  Meds: NS IV bolus 500 x1      Review of Systems   Unable to perform ROS: Dementia       Historical Information   Past Medical History:   Diagnosis Date    Abnormal stress test     Abnormal weight loss     Alzheimer's disease ([facility] [address] ) 5/2/2018    Aortic insufficiency with aortic stenosis     [s/p AVR (tissue) 4/2013]     Bradycardia, unspecified     Coronary artery disease      [s/p CABG x3 4/2013]    Diabetes ([facility] [address] )     Disease of thyroid gland     Dyslipidemia     Gastric ulcer     last assessed: 2/24/14    H/O cardiovascular stress test      (EF 0 67, Not suspicious for significant occlusive CAD  ) - 11/28/2007; [facility] (Normal EF, Inferior wall post stress HK, markedly positive EKG with chest pressure) - 4/10/2013    History of echocardiogram 08/05/2016    2-D w/ CFD:  EF 0 55 (55%), Normal LVSF  Normal functioning bioprosthetic AV   History of EKG     EKG shows sinus bradycardia, 49 bpm, otherwise normal     History of EKG      (Sinus Rhythm) - 3/25/2013;  (Sinus Javy Jansky) - 5/20/2013;  9/25/2013     History of Holter monitoring      (SR w/ rates ranging from 44-78BPM  No afib, no heart block, no vtach, no pauses, and no symptoms per pt  there were few short atrial runs, highest being 140BPM, the longest being 8 beats; though overall atrial and ventricular ectopic beats were rare ) - 8/3/2016     History of Holter monitoring 08/22/2016    24 hr    Hx of echocardiogram     (EF 0 70, Without regional wall abnormalities  Possibly bicuspid aortic valve with mild AS, no aortic insufficiency ) 3/22/2007;  (EF 0 55 (55%), Normal LVSF  Normal functioning bioprosthetic AV ) - 8/3/2016     Hypertension     Hypothyroid 5/2/2018    ICAO (internal carotid artery occlusion)     Duplex (There is known occlusion of ICA in rt carotid and the vertebral artery is antegrade  0-19% ICA stenosis of Lt carotid  The vertebral artery is antegrade  Widely patent endarterectomy site on the left ) - 1/30/2014 Carotid Duplex (No change  Known occlusion of ICA in right carotid   0-19% ICA stenosis in left carotid, and widely patent endarterectomy site on left ) - 8/28/2014    Inguinal hernia, left     last assessed: 10/8/14    Peptic ulcer     last assessed: 5/21/13    Prostate enlargement 5/2/2018     Past Surgical History:   Procedure Laterality Date    AORTIC VALVE REPLACEMENT      CAROTID ENDARTARECTOMY 04/26/2013    CATARACT EXTRACTION Bilateral     CORONARY ARTERY BYPASS GRAFT       (3V CABG: LIMA-LAD, VG-RCA, VG-CX, Tissue AVR (#23 [address])) - 4/26/2013     CYSTOSCOPY  04/23/2020    INGUINAL HERNIA REPAIR      NEPHRECTOMY Right     MN CYSTOURETHROSCOPY W/IRRIG & EVAC CLOTS N/A 5/7/2018    Procedure: CYSTOSCOPY EVACUATION OF CLOTS AND TURBT;  Surgeon: Ebenezer Her MD;  Location: [facility];  Service: Urology    THROMBOENDARTERECTOMY      Carotid     Social History   Social History     Substance and Sexual Activity   Alcohol Use Yes    Comment: social     Social History     Substance and Sexual Activity   Drug Use No     Social History     Tobacco Use   Smoking Status Former Smoker    Types: Cigarettes   Smokeless Tobacco Never Used   Tobacco Comment    quit 20 years ago     E-Cigarette Use: Never User     E-Cigarette/Vaping Substances    Nicotine No     THC No     CBD No     Flavoring No     Other No     Unknown No        Family History:   Family History   Problem Relation Age of Onset    Sudden death Mother         unexpected death   Jose J Shelby Other Father         Coal workers' Pneumoconiosis    Alzheimer's disease Brother        Meds/Allergies   PTA meds:   Prior to Admission Medications   Prescriptions Last Dose Informant Patient Reported? Taking?    DULoxetine (CYMBALTA) 30 mg delayed release capsule   No Yes   Sig: Take 1 capsule (30 mg total) by mouth every evening   DULoxetine (CYMBALTA) 60 mg delayed release capsule   No Yes   Sig: Take 1 capsule (60 mg total) by mouth every morning   LORazepam (ATIVAN) 0 5 mg tablet   No Yes   Sig: Take 1 tablet (0 5 mg total) by mouth every 8 (eight) hours as needed for anxiety (as needed for agitation)   aspirin (ECOTRIN LOW STRENGTH) 81 mg EC tablet   No Yes   Sig: Take 1 tablet (81 mg total) by mouth daily   atorvastatin (LIPITOR) 80 mg tablet   No Yes   Sig: Take 1 tablet (80 mg total) by mouth daily   dronabinol (MARINOL) 5 MG capsule No Yes   Sig: Take 1 capsule (5 mg total) by mouth 2 (two) times a day before meals   finasteride (PROSCAR) 5 mg tablet   No Yes   Sig: Take 1 tablet (5 mg total) by mouth daily   levothyroxine 75 mcg tablet   No Yes   Sig: Take 1 tablet (75 mcg total) by mouth daily   memantine (NAMENDA) 10 mg tablet   No Yes   Sig: Take 1 tablet (10 mg total) by mouth 2 (two) times a day 5mg twice a day for a week and then increasing to 1 full tablet twice a day   pantoprazole (PROTONIX) 20 mg tablet   No Yes   Sig: Take 1 tablet (20 mg total) by mouth daily   tamsulosin (FLOMAX) 0 4 mg   No Yes   Sig: Take 1 capsule (0 4 mg total) by mouth daily with dinner   vitamin B-12 (VITAMIN B-12) 1,000 mcg tablet   No Yes   Sig: Take 1 tablet (1,000 mcg total) by mouth daily      Facility-Administered Medications: None     Allergies   Allergen Reactions    Contrast [Iodinated Diagnostic Agents]     Iodine Throat Swelling     IVP contrast dye    Morphine Rash       Objective   First Vitals:   Blood Pressure: 126/83 (08/16/20 0827)  Pulse: 74 (08/16/20 0827)  Temperature: 98 4 °F (36 9 °C) (08/16/20 0831)  Temp Source: Rectal (08/16/20 0831)  Respirations: 12 (08/16/20 0827)  Weight - Scale: 47 5 kg (104 lb 11 5 oz) (08/16/20 0827)  SpO2: 100 % (08/16/20 0827)    Current Vitals:   Blood Pressure: (!) 172/92 (08/16/20 1138)  Pulse: 74 (08/16/20 1210)  Temperature: 98 5 °F (36 9 °C) (08/16/20 1138)  Temp Source: Oral (08/16/20 1138)  Respirations: 18 (08/16/20 1210)  Weight - Scale: 44 7 kg (98 lb 8 7 oz) (08/16/20 1138)  SpO2: 96 % (08/16/20 1210)      Intake/Output Summary (Last 24 hours) at 8/16/2020 1303  Last data filed at 8/16/2020 0956  Gross per 24 hour   Intake 500 ml   Output    Net 500 ml       Invasive Devices     Peripheral Intravenous Line            Peripheral IV 08/16/20 Distal;Dorsal (posterior); Right Wrist less than 1 day    Peripheral IV 08/16/20 Right Antecubital less than 1 day                Physical Exam  Vitals signs and nursing note reviewed  Constitutional:       Comments: Fatigue somnolent cachectic thin frail malnourished and emaciated   HENT:      Head: Normocephalic and atraumatic  Comments: bitemporal wasting      Nose: Nose normal       Mouth/Throat:      Mouth: Mucous membranes are dry  Eyes:      Conjunctiva/sclera: Conjunctivae normal       Pupils: Pupils are equal, round, and reactive to light  Cardiovascular:      Rate and Rhythm: Normal rate and regular rhythm  Pulses: Normal pulses  Heart sounds: Normal heart sounds  Pulmonary:      Effort: Pulmonary effort is normal       Breath sounds: Normal breath sounds  Abdominal:      General: Abdomen is flat  Bowel sounds are normal       Palpations: Abdomen is soft  Neurological:      General: No focal deficit present        Comments: Able to follow some commands, inability to stay awake but responds to name         Lab Results:   Results for orders placed or performed during the hospital encounter of 08/16/20   Novel Coronavirus (Covid-19),PCR [facility]    Specimen: Nose; Nares   Result Value Ref Range    SARS-CoV-2 Negative Negative   CBC and differential   Result Value Ref Range    WBC 16 39 (H) 4 31 - 10 16 Thousand/uL    RBC 4 76 3 88 - 5 62 Million/uL    Hemoglobin 13 9 12 0 - 17 0 g/dL    Hematocrit 43 9 36 5 - 49 3 %    MCV 92 82 - 98 fL    MCH 29 2 26 8 - 34 3 pg    MCHC 31 7 31 4 - 37 4 g/dL    RDW 13 7 11 6 - 15 1 %    MPV 10 9 8 9 - 12 7 fL    Platelets 994 168 - 559 Thousands/uL    nRBC 0 /100 WBCs    Neutrophils Relative 91 (H) 43 - 75 %    Immat GRANS % 0 0 - 2 %    Lymphocytes Relative 5 (L) 14 - 44 %    Monocytes Relative 4 4 - 12 %    Eosinophils Relative 0 0 - 6 %    Basophils Relative 0 0 - 1 %    Neutrophils Absolute 14 89 (H) 1 85 - 7 62 Thousands/µL    Immature Grans Absolute 0 05 0 00 - 0 20 Thousand/uL    Lymphocytes Absolute 0 79 0 60 - 4 47 Thousands/µL    Monocytes Absolute 0 64 0 17 - 1 22 Thousand/µL    Eosinophils Absolute 0 00 0 00 - 0 61 Thousand/µL    Basophils Absolute 0 02 0 00 - 0 10 Thousands/µL   Basic metabolic panel   Result Value Ref Range    Sodium 154 (H) 136 - 145 mmol/L    Potassium 3 9 3 5 - 5 3 mmol/L    Chloride 112 (H) 100 - 108 mmol/L    CO2 34 (H) 21 - 32 mmol/L    ANION GAP 8 4 - 13 mmol/L    BUN 55 (H) 5 - 25 mg/dL    Creatinine 1 51 (H) 0 60 - 1 30 mg/dL    Glucose 116 65 - 140 mg/dL    Calcium 12 4 (HH) 8 3 - 10 1 mg/dL    eGFR 42 ml/min/1 73sq m   TSH, 3rd generation with Free T4 reflex   Result Value Ref Range    TSH 3RD GENERATON 2 314 0 358 - 3 740 uIU/mL   Troponin I   Result Value Ref Range    Troponin I 0 14 (H) <=0 04 ng/mL   Magnesium   Result Value Ref Range    Magnesium 2 4 1 6 - 2 6 mg/dL       Imaging:   CT chest without contrast   Final Result      1   4 6 x 6 0 cm spiculated mass in the superior segment of the left lower lobe, most likely bronchogenic carcinoma  2   Several small bilateral pulmonary nodules as described above, many of which appear to be inflammatory in etiology  However, metastases and/or synchronous primary malignancies cannot be excluded  3   Emphysema  4   No evidence of mediastinal or hilar lymphadenopathy  5   4 3 x 5 1 cm soft tissue mass in the left lateral chest wall, 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 Code Status: Level 3 - DNAR and DNI  Advance Directive and Living Will: Yes    Power of : Yes  POLST:      Counseling / Coordination of Care:    Total floor / unit time spent today 35 minutes

## 2020-08-17 PROBLEM — R77.8 ELEVATED TROPONIN LEVEL NOT DUE MYOCARDIAL INFARCTION: Status: ACTIVE | Noted: 2020-08-17

## 2020-08-17 PROBLEM — N30.01 ACUTE CYSTITIS WITH HEMATURIA: Status: ACTIVE | Noted: 2020-08-17

## 2020-08-17 LAB
ALBUMIN SERPL BCP-MCNC: 2.4 G/DL (ref 3.5–5)
ALBUMIN SERPL BCP-MCNC: 2.5 G/DL (ref 3.5–5)
ALP SERPL-CCNC: 98 U/L (ref 46–116)
ALT SERPL W P-5'-P-CCNC: 30 U/L (ref 12–78)
ANION GAP SERPL CALCULATED.3IONS-SCNC: 4 MMOL/L (ref 4–13)
ANION GAP SERPL CALCULATED.3IONS-SCNC: 8 MMOL/L (ref 4–13)
AST SERPL W P-5'-P-CCNC: 40 U/L (ref 5–45)
ATRIAL RATE: 79 BPM
BASOPHILS # BLD AUTO: 0.01 THOUSANDS/ΜL (ref 0–0.1)
BASOPHILS NFR BLD AUTO: 0 % (ref 0–1)
BILIRUB SERPL-MCNC: 0.7 MG/DL (ref 0.2–1)
BUN SERPL-MCNC: 43 MG/DL (ref 5–25)
BUN SERPL-MCNC: 47 MG/DL (ref 5–25)
CA-I BLD-SCNC: 1.45 MMOL/L (ref 1.12–1.32)
CALCIUM SERPL-MCNC: 10.5 MG/DL (ref 8.3–10.1)
CALCIUM SERPL-MCNC: 11 MG/DL (ref 8.3–10.1)
CHLORIDE SERPL-SCNC: 120 MMOL/L (ref 100–108)
CHLORIDE SERPL-SCNC: 121 MMOL/L (ref 100–108)
CO2 SERPL-SCNC: 28 MMOL/L (ref 21–32)
CO2 SERPL-SCNC: 29 MMOL/L (ref 21–32)
CREAT SERPL-MCNC: 1.16 MG/DL (ref 0.6–1.3)
CREAT SERPL-MCNC: 1.18 MG/DL (ref 0.6–1.3)
EOSINOPHIL # BLD AUTO: 0.01 THOUSAND/ΜL (ref 0–0.61)
EOSINOPHIL NFR BLD AUTO: 0 % (ref 0–6)
ERYTHROCYTE [DISTWIDTH] IN BLOOD BY AUTOMATED COUNT: 13.8 % (ref 11.6–15.1)
GFR SERPL CREATININE-BSD FRML MDRD: 57 ML/MIN/1.73SQ M
GFR SERPL CREATININE-BSD FRML MDRD: 58 ML/MIN/1.73SQ M
GLUCOSE SERPL-MCNC: 127 MG/DL (ref 65–140)
GLUCOSE SERPL-MCNC: 84 MG/DL (ref 65–140)
HCT VFR BLD AUTO: 37.9 % (ref 36.5–49.3)
HGB BLD-MCNC: 11.5 G/DL (ref 12–17)
IMM GRANULOCYTES # BLD AUTO: 0.04 THOUSAND/UL (ref 0–0.2)
IMM GRANULOCYTES NFR BLD AUTO: 0 % (ref 0–2)
L PNEUMO1 AG UR QL IA.RAPID: NEGATIVE
LYMPHOCYTES # BLD AUTO: 0.61 THOUSANDS/ΜL (ref 0.6–4.47)
LYMPHOCYTES NFR BLD AUTO: 5 % (ref 14–44)
MAGNESIUM SERPL-MCNC: 2.1 MG/DL (ref 1.6–2.6)
MCH RBC QN AUTO: 28.5 PG (ref 26.8–34.3)
MCHC RBC AUTO-ENTMCNC: 30.3 G/DL (ref 31.4–37.4)
MCV RBC AUTO: 94 FL (ref 82–98)
MONOCYTES # BLD AUTO: 0.53 THOUSAND/ΜL (ref 0.17–1.22)
MONOCYTES NFR BLD AUTO: 4 % (ref 4–12)
NEUTROPHILS # BLD AUTO: 11.3 THOUSANDS/ΜL (ref 1.85–7.62)
NEUTS SEG NFR BLD AUTO: 91 % (ref 43–75)
NRBC BLD AUTO-RTO: 0 /100 WBCS
P AXIS: 88 DEGREES
PHOSPHATE SERPL-MCNC: 2.2 MG/DL (ref 2.3–4.1)
PHOSPHATE SERPL-MCNC: 2.5 MG/DL (ref 2.3–4.1)
PLATELET # BLD AUTO: 262 THOUSANDS/UL (ref 149–390)
PMV BLD AUTO: 11.6 FL (ref 8.9–12.7)
POTASSIUM SERPL-SCNC: 3.6 MMOL/L (ref 3.5–5.3)
POTASSIUM SERPL-SCNC: 3.6 MMOL/L (ref 3.5–5.3)
PR INTERVAL: 172 MS
PROT SERPL-MCNC: 6.8 G/DL (ref 6.4–8.2)
QRS AXIS: -80 DEGREES
QRSD INTERVAL: 88 MS
QT INTERVAL: 400 MS
QTC INTERVAL: 458 MS
RBC # BLD AUTO: 4.04 MILLION/UL (ref 3.88–5.62)
S PNEUM AG UR QL: NEGATIVE
SODIUM SERPL-SCNC: 153 MMOL/L (ref 136–145)
SODIUM SERPL-SCNC: 157 MMOL/L (ref 136–145)
T WAVE AXIS: 98 DEGREES
TROPONIN I SERPL-MCNC: 0.16 NG/ML
VENTRICULAR RATE: 79 BPM
WBC # BLD AUTO: 12.5 THOUSAND/UL (ref 4.31–10.16)

## 2020-08-17 PROCEDURE — 80053 COMPREHEN METABOLIC PANEL: CPT | Performed by: STUDENT IN AN ORGANIZED HEALTH CARE EDUCATION/TRAINING PROGRAM

## 2020-08-17 PROCEDURE — 85025 COMPLETE CBC W/AUTO DIFF WBC: CPT | Performed by: STUDENT IN AN ORGANIZED HEALTH CARE EDUCATION/TRAINING PROGRAM

## 2020-08-17 PROCEDURE — 84100 ASSAY OF PHOSPHORUS: CPT | Performed by: STUDENT IN AN ORGANIZED HEALTH CARE EDUCATION/TRAINING PROGRAM

## 2020-08-17 PROCEDURE — 84484 ASSAY OF TROPONIN QUANT: CPT | Performed by: FAMILY MEDICINE

## 2020-08-17 PROCEDURE — 80069 RENAL FUNCTION PANEL: CPT | Performed by: INTERNAL MEDICINE

## 2020-08-17 PROCEDURE — 92610 EVALUATE SWALLOWING FUNCTION: CPT

## 2020-08-17 PROCEDURE — 83735 ASSAY OF MAGNESIUM: CPT | Performed by: STUDENT IN AN ORGANIZED HEALTH CARE EDUCATION/TRAINING PROGRAM

## 2020-08-17 PROCEDURE — 99232 SBSQ HOSP IP/OBS MODERATE 35: CPT | Performed by: FAMILY MEDICINE

## 2020-08-17 PROCEDURE — 93010 ELECTROCARDIOGRAM REPORT: CPT | Performed by: INTERNAL MEDICINE

## 2020-08-17 PROCEDURE — 99223 1ST HOSP IP/OBS HIGH 75: CPT | Performed by: INTERNAL MEDICINE

## 2020-08-17 RX ORDER — CEFTRIAXONE 1 G/50ML
1000 INJECTION, SOLUTION INTRAVENOUS EVERY 24 HOURS
Status: DISCONTINUED | OUTPATIENT
Start: 2020-08-17 | End: 2020-08-18

## 2020-08-17 RX ORDER — POTASSIUM CHLORIDE 14.9 MG/ML
20 INJECTION INTRAVENOUS ONCE
Status: COMPLETED | OUTPATIENT
Start: 2020-08-17 | End: 2020-08-17

## 2020-08-17 RX ORDER — DEXTROSE AND SODIUM CHLORIDE 5; .2 G/100ML; G/100ML
125 INJECTION, SOLUTION INTRAVENOUS CONTINUOUS
Status: DISCONTINUED | OUTPATIENT
Start: 2020-08-17 | End: 2020-08-18

## 2020-08-17 RX ADMIN — ATORVASTATIN CALCIUM 80 MG: 40 TABLET, FILM COATED ORAL at 18:03

## 2020-08-17 RX ADMIN — CYANOCOBALAMIN TAB 1000 MCG 1000 MCG: 1000 TAB at 08:56

## 2020-08-17 RX ADMIN — PANTOPRAZOLE SODIUM 20 MG: 20 TABLET, DELAYED RELEASE ORAL at 08:55

## 2020-08-17 RX ADMIN — TAMSULOSIN HYDROCHLORIDE 0.4 MG: 0.4 CAPSULE ORAL at 18:03

## 2020-08-17 RX ADMIN — LEVOTHYROXINE SODIUM 75 MCG: 75 TABLET ORAL at 08:55

## 2020-08-17 RX ADMIN — DEXTROSE AND SODIUM CHLORIDE 125 ML/HR: 5; .2 INJECTION, SOLUTION INTRAVENOUS at 18:14

## 2020-08-17 RX ADMIN — DEXTROSE AND SODIUM CHLORIDE 125 ML/HR: 5; .2 INJECTION, SOLUTION INTRAVENOUS at 09:13

## 2020-08-17 RX ADMIN — FINASTERIDE 5 MG: 5 TABLET, FILM COATED ORAL at 08:55

## 2020-08-17 RX ADMIN — DEXTROSE AND SODIUM CHLORIDE 125 ML/HR: 5; .2 INJECTION, SOLUTION INTRAVENOUS at 23:51

## 2020-08-17 RX ADMIN — DULOXETINE 30 MG: 30 CAPSULE, DELAYED RELEASE ORAL at 18:03

## 2020-08-17 RX ADMIN — CEFTRIAXONE 1000 MG: 1 INJECTION, SOLUTION INTRAVENOUS at 12:13

## 2020-08-17 RX ADMIN — MEMANTINE 10 MG: 10 TABLET ORAL at 08:56

## 2020-08-17 RX ADMIN — DRONABINOL 5 MG: 2.5 CAPSULE ORAL at 18:03

## 2020-08-17 RX ADMIN — ASPIRIN 81 MG: 81 TABLET, COATED ORAL at 08:55

## 2020-08-17 RX ADMIN — DRONABINOL 5 MG: 2.5 CAPSULE ORAL at 08:55

## 2020-08-17 RX ADMIN — ENOXAPARIN SODIUM 30 MG: 30 INJECTION SUBCUTANEOUS at 09:02

## 2020-08-17 RX ADMIN — POTASSIUM CHLORIDE 20 MEQ: 14.9 INJECTION, SOLUTION INTRAVENOUS at 09:03

## 2020-08-17 RX ADMIN — SODIUM CHLORIDE 125 ML/HR: 0.9 INJECTION, SOLUTION INTRAVENOUS at 02:41

## 2020-08-17 RX ADMIN — MEMANTINE 10 MG: 10 TABLET ORAL at 18:03

## 2020-08-17 RX ADMIN — DULOXETINE HYDROCHLORIDE 60 MG: 30 CAPSULE, DELAYED RELEASE ORAL at 08:54

## 2020-08-17 NOTE — UTILIZATION REVIEW
Initial Clinical Review    Admission: Date/Time/Statement:   Admission Orders (From admission, onward)     Ordered        08/16/20 1024  Inpatient Admission  Once                   Orders Placed This Encounter   Procedures    Inpatient Admission     Standing Status:   Standing     Number of Occurrences:   1     Order Specific Question:   Admitting Physician     Answer:   Madelin Humphrey     Order Specific Question:   Level of Care     Answer:   Med Surg [16]     Order Specific Question:   Estimated length of stay     Answer:   More than 2 Midnights     Order Specific Question:   Certification     Answer:   I certify that inpatient services are medically necessary for this patient for a duration of greater than two midnights  See H&P and MD Progress Notes for additional information about the patient's course of treatment  ED Arrival Information     Expected Arrival Acuity Means of Arrival Escorted By Service Admission Type    - 8/16/2020 08:24 Urgent Ambulance [facility] General Medicine Urgent    Arrival Complaint    -        Chief Complaint   Patient presents with    Weakness - Generalized     pt resident of [facility]  per staff "rapid decline in mental status" since 0600     Assessment/Plan:   52-year-old male with a history of dementia, chronic kidney disease, chronic spears, hypothyroidism, hypertension, hyperlipidemia, chronic respiratory failure on 4L NC at baseline who presents from nursing facility with weakness and altered mental status  Per the facility, the patient has experienced a rapid decline over the past 24 hours  He has not been eating or drinking  The patient was up at 4:00 am c/o being cold  recheck at 6:00 a m , patient was "unresponsive"  State they were unable to get a blood pressure or pulse ox on him  When EMS arrived, patient was weak but able to answer questions  Vital signs were normal   Difficult time obtaining a pulse ox due to cold/cyanotic fingers  Presents alert and oriented x2 (person and place), open his eyes to voice, follows commands, appears to answer questions correctly  Admission work-up showing hypernatremia, hypercalcemia, leukocytosis, kuldip, +troponin, lung nodules with suspected mets to ribs  Admitted to inpatient status for acute metabolic encephalopathy  Started on ivf & ivabt, renal consulted  8/17  Mental status with minimal improvement, responds to name, able to answer questions & follow commands upon probing  Na climbing to 157  IVf changed to D5 w/0 2%NS  Per renal:  Hypercalcemia improving with the IV hydration intravascular volume resuscitation  Initial calcium was 12 4 on admission and admission workup did reveal left upper lobe lung mass and also history of bladder malignancy which could be underlying reason for this hypercalcemia on top of it free water deficit would like oral intake    PTH noted 24 1 appropriately suppressed  Ionized calcium 1 45  Will wait for PTH related protein    ED Triage Vitals   Temperature Pulse Respirations Blood Pressure SpO2   08/16/20 0831 08/16/20 0827 08/16/20 0827 08/16/20 0827 08/16/20 0827   98 4 °F (36 9 °C) 74 12 126/83 100 %      Temp Source Heart Rate Source Patient Position - Orthostatic VS BP Location FiO2 (%)   08/16/20 0831 08/16/20 0827 08/16/20 0827 08/16/20 0827 --   Rectal Monitor Lying Left arm       Pain Score       08/16/20 1301       No Pain          Wt Readings from Last 1 Encounters:   08/16/20 44 7 kg (98 lb 8 7 oz)     Additional Vital Signs:   08/16/20 11:38:45   98 5 °F (36 9 °C)      18   172/92Abnormal     119   96 %   32   3 L/min   Nasal cannula   Lying    08/16/20 1100      61   15   115/68   87   100 %   36   4 L/min   Nasal cannula   Lying    08/16/20 1030      59   20   155/78   110   100 %   36   4 L/min   Nasal cannula   Lying    08/16/20 0930      68   15   153/75   108   100 %   36   4 L/min   Nasal cannula   Lying    08/16/20 0900      70   17   153/72   103 99 %   36   4 L/min   Nasal cannula   Lying    08/16/20 0851                           Nasal cannula       08/16/20 0831   98 4 °F (36 9 °C)                               08/16/20 0827      74   12   126/83      100 %   36   4 L/min   Nasal cannula   Lying      Pertinent Labs/Diagnostic Test Results:   Results from last 7 days   Lab Units 08/16/20  0842   SARS-COV-2  Negative     Results from last 7 days   Lab Units 08/17/20  0439 08/16/20  0842   WBC Thousand/uL 12 50* 16 39*   HEMOGLOBIN g/dL 11 5* 13 9   HEMATOCRIT % 37 9 43 9   PLATELETS Thousands/uL 262 311   NEUTROS ABS Thousands/µL 11 30* 14 89*     Results from last 7 days   Lab Units 08/17/20  0439 08/16/20  1618 08/16/20  1604 08/16/20  0842   SODIUM mmol/L 157*  --  155* 154*   POTASSIUM mmol/L 3 6  --  3 6 3 9   CHLORIDE mmol/L 121*  --  117* 112*   CO2 mmol/L 28  --  30 34*   ANION GAP mmol/L 8  --  8 8   BUN mg/dL 47*  --  51* 55*   CREATININE mg/dL 1 16  --  1 29 1 51*   EGFR ml/min/1 73sq m 58  --  51 42   CALCIUM mg/dL 11 0*  --  11 4* 12 4*   CALCIUM, IONIZED mmol/L  --  1 45*  --   --    MAGNESIUM mg/dL 2 1  --  2 3 2 4   PHOSPHORUS mg/dL 2 5  --   --   --      Results from last 7 days   Lab Units 08/17/20  0439 08/16/20  1604   AST U/L 40 40   ALT U/L 30 36   ALK PHOS U/L 98 105   TOTAL PROTEIN g/dL 6 8 7 3   ALBUMIN g/dL 2 5* 2 7*   TOTAL BILIRUBIN mg/dL 0 70 0 80     Results from last 7 days   Lab Units 08/17/20  0439 08/16/20  1604 08/16/20  0842   GLUCOSE RANDOM mg/dL 84 105 116     Results from last 7 days   Lab Units 08/17/20  0952 08/16/20  1604 08/16/20  1222 08/16/20  0842   TROPONIN I ng/mL 0 16* 0 15* 0 12* 0 14*     Results from last 7 days   Lab Units 08/16/20  0842   TSH 3RD GENERATON uIU/mL 2 314     Results from last 7 days   Lab Units 08/16/20  1222   PROCALCITONIN ng/ml <0 05     Results from last 7 days   Lab Units 08/16/20  1427   CLARITY UA  Clear   COLOR UA  Yellow   SPEC GRAV UA  1 025   PH UA  6 0 GLUCOSE UA mg/dl Negative   KETONES UA mg/dl Negative   BLOOD UA  Trace-Intact*   PROTEIN UA mg/dl 30 (1+)*   NITRITE UA  Negative   BILIRUBIN UA  Negative   UROBILINOGEN UA E U /dl 0 2   LEUKOCYTES UA  Trace*   WBC UA /hpf 2-4*   RBC UA /hpf 0-1*   BACTERIA UA /hpf Moderate*   EPITHELIAL CELLS WET PREP /hpf Occasional   SODIUM UR  19   CREATININE UR mg/dL 92 8   PROTEIN UR mg/dL 79   PROT/CREAT RATIO UR  0 85*     Results from last 7 days   Lab Units 08/16/20  1427 08/16/20  1426   STREP PNEUMONIAE ANTIGEN, URINE  Negative  --    LEGIONELLA URINARY ANTIGEN   --  Negative     Results from last 7 days   Lab Units 08/16/20  1224   BLOOD CULTURE  Received in Microbiology Lab  Culture in Progress  Received in Microbiology Lab  Culture in Progress  8/16  Cxr=Oval-shaped mass in the superior segment of the left lower lobe  An additional pleural-based masses seen laterally at the left lung base  Hyperinflation  Ct head=No acute intracranial abnormality  Microangiopathic changes  Chronic ventriculomegaly  Ct chest, a/p=1   4 6 x 6 0 cm spiculated mass in the superior segment of the left lower lobe, most likely bronchogenic carcinoma  2   Several small bilateral pulmonary nodules as described above, many of which appear to be inflammatory in etiology  However, metastases and/or synchronous primary malignancies cannot be excluded  3   Emphysema  4   No evidence of mediastinal or hilar lymphadenopathy  5   4 3 x 5 1 cm soft tissue mass in the left lateral chest wall, 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 with extension into the intercostal and extrapleural soft tissues    Ekg=Sinus rhythm with with sinus arrhythmia  Left anterior fascicular block  Anteroseptal infarct (cited on or before 05-MAY-2018)    ED Treatment:   Medication Administration from 08/16/2020 0824 to 08/16/2020 1135       Date/Time Order Dose Route Action     08/16/2020 0856 sodium chloride 0 9 % bolus 500 mL 500 mL Intravenous New Bag     08/16/2020 1007 sodium chloride 0 9 % infusion 125 mL/hr Intravenous New Bag        Past Medical History:   Diagnosis Date    Abnormal stress test     Abnormal weight loss     Alzheimer's disease ([facility] ) 5/2/2018    Aortic insufficiency with aortic stenosis     [s/p AVR (tissue) 4/2013]     Bradycardia, unspecified     Coronary artery disease      [s/p CABG x3 4/2013]    Diabetes ([facility] )     Disease of thyroid gland     Dyslipidemia     Gastric ulcer     last assessed: 2/24/14    H/O cardiovascular stress test      (EF 0 67, Not suspicious for significant occlusive CAD  ) - 11/28/2007; [facility] (Normal EF, Inferior wall post stress HK, markedly positive EKG with chest pressure) - 4/10/2013    History of echocardiogram 08/05/2016    2-D w/ CFD:  EF 0 55 (55%), Normal LVSF  Normal functioning bioprosthetic AV   History of EKG     EKG shows sinus bradycardia, 49 bpm, otherwise normal     History of EKG      (Sinus Rhythm) - 3/25/2013;  (Sinus Mickeal Alex) - 5/20/2013;  9/25/2013     History of Holter monitoring      (SR w/ rates ranging from 44-78BPM  No afib, no heart block, no vtach, no pauses, and no symptoms per pt  there were few short atrial runs, highest being 140BPM, the longest being 8 beats; though overall atrial and ventricular ectopic beats were rare ) - 8/3/2016     History of Holter monitoring 08/22/2016    24 hr    Hx of echocardiogram     (EF 0 70, Without regional wall abnormalities  Possibly bicuspid aortic valve with mild AS, no aortic insufficiency ) 3/22/2007;  (EF 0 55 (55%), Normal LVSF  Normal functioning bioprosthetic AV ) - 8/3/2016     Hypertension     Hypothyroid 5/2/2018    ICAO (internal carotid artery occlusion)     Duplex (There is known occlusion of ICA in rt carotid and the vertebral artery is antegrade  0-19% ICA stenosis of Lt carotid  The vertebral artery is antegrade  Widely patent endarterectomy site on the left ) - 1/30/2014 Carotid Duplex (No change   Known occlusion of ICA in right carotid   0-19% ICA stenosis in left carotid, and widely patent endarterectomy site on left ) - 8/28/2014    Inguinal hernia, left     last assessed: 10/8/14    Peptic ulcer     last assessed: 5/21/13    Prostate enlargement 5/2/2018     Present on Admission:   Malignant [facility]oplasm of bladder ([facility] )   Dementia ([place] [address] )   Hypothyroidism   Hypertension    Admitting Diagnosis: Hypercalcemia [E83 52]  Adult failure to thrive [R62 7]  Hypernatremia [E87 0]  Lung cancer ([place] [address] ) [C34 90]  Altered mental status [R41 82]  Severe malnutrition ([place] [address] ) [E43]  Failure to thrive in adult [R62 7]  CONCHA (acute kidney injury) ([place] [address] ) [N17 9]  Stage 3 chronic kidney disease ([place] [address] ) [N18 3]  Age/Sex: 81 y o  male  Admission Orders:  Contact & airborne isolation  Incentive spirometry  Pt/ot/st eval & tx  Scd/foot pumps  Consult renal  O2 to keep sats>92%  Telemetry  Consult nutrition    Scheduled Medications:  aspirin, 81 mg, Oral, Daily  atorvastatin, 80 mg, Oral, QPM  cefTRIAXone, 1,000 mg, Intravenous, Q24H  vitamin B-12, 1,000 mcg, Oral, Daily  dronabinol, 5 mg, Oral, BID AC  DULoxetine, 30 mg, Oral, QPM  DULoxetine, 60 mg, Oral, QAM  enoxaparin, 30 mg, Subcutaneous, Daily  finasteride, 5 mg, Oral, Daily  levothyroxine, 75 mcg, Oral, Early Morning  memantine, 10 mg, Oral, BID  pantoprazole, 20 mg, Oral, Daily Before Breakfast  tamsulosin, 0 4 mg, Oral, Daily With Dinner    Continuous IV Infusions:  sodium chloride 0 9 % infusion    Rate: 125 mL/hr Dose: 125 mL/hr  Last Dose: Stopped (08/17/20 0913)  Start: 08/16/20 1000 End: 08/17/20 0703     dextrose 5 % and sodium chloride 0 2 %, 125 mL/hr  Start: 08/17/20 0815    PRN Meds:  acetaminophen, 650 mg, Oral, Q6H PRN  albuterol, 2 5 mg, Nebulization, Q6H PRN  LORazepam, 0 5 mg, Oral, Q8H PRN  ondansetron, 4 mg, Intravenous, Q6H PRN  polyethylene glycol, 17 g, Oral, Daily PRN    Network Utilization Review [email or web address]  ATTENTION: Please call with any questions or concerns to [phone] and carefully listen to the prompts so that you are directed to the right person  All voicemails are confidential   Maryan Atkins all requests for admission clinical reviews, approved or denied determinations and any other requests to dedicated fax number below belonging to the campus where the patient is receiving treatment   List of dedicated fax numbers for the Facilities:  [address] DENIALS (Administrative/Medical Necessity) [phone]   [address] (Maternity/NICU/Pediatrics) [phone]   [facility] [phone]     [address]  [phone]   Vanessa Zamora [phone]   Kenna Holguin [phone]   [address] [place] [address] [phone]   [facility]  [phone]   [address] [facility]  [address] [facility] [address] [facility] [phone]

## 2020-08-17 NOTE — CONSULTS
Consultation - Nephrology   Venecia Fleming 81 y o  male MRN: [id number]  Unit/Bed#: 417-01 Encounter: [id number]      A/P:  1  CONCHA on CKD stage II  Oliguric present on admission  creatinine baseline about 1mg/dl  Presenting creatinine 1 51 most likely due to dehydration and lack of oral intake and severe malnourishment  Please note that hypercalcemia itself can cause diuresis and severe dehydration free water clearance and hypernatremia  Received normal saline upon presentation about a L and currently getting hypotonic D5 W infusion at 1 was2 5 cc an hour  Agree with the D5W water at this time given the rising sodium levels  Serial BMP and urine output and close monitoring vitals and hemodynamics status  2 Hypercalcemia also been improved with the IV hydration intravascular volume resuscitation  Initial calcium was 12 4 on admission and admission workup did reveal left upper lobe lung mass and also history of bladder malignancy which could be underlying reason for this hypercalcemia on top of it free water deficit would like oral intake  PTH noted 24 1 appropriately suppressed  Ionized calcium 1 45  Will wait for PTH related protein    3  Acute metabolic encephalopathy   Most likely due to hypercalcemia as well as hypernatremia management as above  Review agree with the septic workup to rule out underlying contributing infection  4  Pulmonary nodules and left lung mass   Malignancy workup as per the primary team   5 neoplasm of the bladder history of bladder cancer chronic indwelling Loyd's catheter continue Flomax and Proscar  Thank you for allowing us to participate in the care of your patient  Please feel free to contact us regarding the care of this patient, or any other questions/concerns that may be applicable      Patient Active Problem List   Diagnosis    Hypothyroidism    Dementia [facility] [address] [facility]    Prostate enlargement    Bradycardia    Malignant neoplasm of bladder ([facility] [address] [facility]    Arteriosclerotic heart disease    Arthralgia    Asymptomatic bilateral carotid artery stenosis    Carotid artery stenosis    Chronic kidney disease    Depression    Dermatitis, eczematoid    Hypercholesterolemia    Hypertension    Nocturia    Occlusion and stenosis of carotid artery without mention of cerebral infarction    Peptic ulcer    Weight loss    Irritability and anger    Presence of aortocoronary bypass graft    Presence of prosthetic heart valve    Personal history of nicotine dependence    Aortic valve disorder    History of left-sided carotid endarterectomy    Benign prostatic hyperplasia with urinary obstruction    Exposure to SARS-associated coronavirus    Acute metabolic encephalopathy    Hypernatremia    Hypercalcemia    Acute renal failure superimposed on stage 3 chronic kidney disease (HCC)    Pulmonary nodules    Mass of left lung    Adult failure to thrive    Severe malnutrition (HCC)    Elevated troponin level not due myocardial infarction    Acute cystitis with hematuria       History of Present Illness   Physician Requesting Consult: Yifan Barr MD  Reason for Consult / Principal Problem: CONCHA, hypernatremia, Hypercalcemia  Hx and PE limited by:   HPI: Yousif Obando is a 81y o  year old male who was brought in from the nursing home with a history of dementia, hypothyroidism, CKD stage 3 baseline creatinine 1-1 2, mg bladder carcinoma, chronic respiratory failure on 4 L nasal for oxygen at home and failure to thrive very malnourished with BMI only 15 and was admitted due to severe encephalopathy and found to have severe hypernatremia as well as hypercalcemia  Patient was given 2 in L of normal saline and sodium level is creeping up although renal function is improving so nephrology was consulted for further management regarding Concha I with hypercalcemia and hypernatremia    Not much information available for the patient so review of systems not available  History obtained from chart review and unobtainable from patient due to mental status    Review of Systems -not available due to mental statusHistorical Information   Past Medical History:   Diagnosis Date    Abnormal stress test     Abnormal weight loss     Alzheimer's disease ([facility] [address] ) 5/2/2018    Aortic insufficiency with aortic stenosis     [s/p AVR (tissue) 4/2013]     Bradycardia, unspecified     Coronary artery disease      [s/p CABG x3 4/2013]    Diabetes ([facility] [address] )     Disease of thyroid gland     Dyslipidemia     Gastric ulcer     last assessed: 2/24/14    H/O cardiovascular stress test      (EF 0 67, Not suspicious for significant occlusive CAD  ) - 11/28/2007; [facility] (Normal EF, Inferior wall post stress HK, markedly positive EKG with chest pressure) - 4/10/2013    History of echocardiogram 08/05/2016    2-D w/ CFD:  EF 0 55 (55%), Normal LVSF  Normal functioning bioprosthetic AV   History of EKG     EKG shows sinus bradycardia, 49 bpm, otherwise normal     History of EKG      (Sinus Rhythm) - 3/25/2013;  (Sinus Acie Cheri) - 5/20/2013;  9/25/2013     History of Holter monitoring      (SR w/ rates ranging from 44-78BPM  No afib, no heart block, no vtach, no pauses, and no symptoms per pt  there were few short atrial runs, highest being 140BPM, the longest being 8 beats; though overall atrial and ventricular ectopic beats were rare ) - 8/3/2016     History of Holter monitoring 08/22/2016    24 hr    Hx of echocardiogram     (EF 0 70, Without regional wall abnormalities  Possibly bicuspid aortic valve with mild AS, no aortic insufficiency ) 3/22/2007;  (EF 0 55 (55%), Normal LVSF  Normal functioning bioprosthetic AV ) - 8/3/2016     Hypertension     Hypothyroid 5/2/2018    ICAO (internal carotid artery occlusion)     Duplex (There is known occlusion of ICA in rt carotid and the vertebral artery is antegrade  0-19% ICA stenosis of Lt carotid  The vertebral artery is antegrade    Widely patent endarterectomy site on the left ) - 1/30/2014 Carotid Duplex (No change  Known occlusion of ICA in right carotid   0-19% ICA stenosis in left carotid, and widely patent endarterectomy site on left ) - 8/28/2014    Inguinal hernia, left     last assessed: 10/8/14    Peptic ulcer     last assessed: 5/21/13    Prostate enlargement 5/2/2018     Past Surgical History:   Procedure Laterality Date    AORTIC VALVE REPLACEMENT      CAROTID ENDARTARECTOMY  04/26/2013    CATARACT EXTRACTION Bilateral     CORONARY ARTERY BYPASS GRAFT       (3V CABG: LIMA-LAD, VG-RCA, VG-CX, Tissue AVR (#23 [address])) - 4/26/2013     CYSTOSCOPY  04/23/2020    INGUINAL HERNIA REPAIR      NEPHRECTOMY Right     NC CYSTOURETHROSCOPY W/IRRIG & EVAC CLOTS N/A 5/7/2018    Procedure: CYSTOSCOPY EVACUATION OF CLOTS AND TURBT;  Surgeon: Oneyda Oliveira MD;  Location: [facility];  Service: Urology    THROMBOENDARTERECTOMY      Carotid     Social History   Social History     Substance and Sexual Activity   Alcohol Use Never    Frequency: Never    Drinks per session: Patient refused    Binge frequency: Never    Comment: social     Social History     Substance and Sexual Activity   Drug Use No     Social History     Tobacco Use   Smoking Status Former Smoker    Types: Cigarettes   Smokeless Tobacco Never Used   Tobacco Comment    quit 20 years ago     Family History   Problem Relation Age of Onset   Maddie Awad Sudden death Mother         unexpected death   Maddie Awad Other Father         Coal workers' Pneumoconiosis    Alzheimer's disease Brother        Meds/Allergies   all current active meds have been reviewed, current meds:   Current Facility-Administered Medications   Medication Dose Route Frequency    acetaminophen (TYLENOL) tablet 650 mg  650 mg Oral Q6H PRN    albuterol inhalation solution 2 5 mg  2 5 mg Nebulization Q6H PRN    aspirin (ECOTRIN LOW STRENGTH) EC tablet 81 mg  81 mg Oral Daily    atorvastatin (LIPITOR) tablet 80 mg  80 mg Oral QPM    cefTRIAXone (ROCEPHIN) IVPB (premix) 1,000 mg 50 mL  1,000 mg Intravenous Q24H    cyanocobalamin (VITAMIN B-12) tablet 1,000 mcg  1,000 mcg Oral Daily    dextrose 5 % and sodium chloride 0 2 % infusion  125 mL/hr Intravenous Continuous    dronabinol (MARINOL) capsule 5 mg  5 mg Oral BID AC    DULoxetine (CYMBALTA) delayed release capsule 30 mg  30 mg Oral QPM    DULoxetine (CYMBALTA) delayed release capsule 60 mg  60 mg Oral QAM    enoxaparin (LOVENOX) subcutaneous injection 30 mg  30 mg Subcutaneous Daily    finasteride (PROSCAR) tablet 5 mg  5 mg Oral Daily    levothyroxine tablet 75 mcg  75 mcg Oral Early Morning    LORazepam (ATIVAN) tablet 0 5 mg  0 5 mg Oral Q8H PRN    memantine (NAMENDA) tablet 10 mg  10 mg Oral BID    ondansetron (ZOFRAN) injection 4 mg  4 mg Intravenous Q6H PRN    pantoprazole (PROTONIX) EC tablet 20 mg  20 mg Oral Daily Before Breakfast    polyethylene glycol (MIRALAX) packet 17 g  17 g Oral Daily PRN    tamsulosin (FLOMAX) capsule 0 4 mg  0 4 mg Oral Daily With Dinner    and PTA meds:    Medications Prior to Admission   Medication    aspirin (ECOTRIN LOW STRENGTH) 81 mg EC tablet    atorvastatin (LIPITOR) 80 mg tablet    dronabinol (MARINOL) 5 MG capsule    DULoxetine (CYMBALTA) 30 mg delayed release capsule    DULoxetine (CYMBALTA) 60 mg delayed release capsule    finasteride (PROSCAR) 5 mg tablet    levothyroxine 75 mcg tablet    LORazepam (ATIVAN) 0 5 mg tablet    pantoprazole (PROTONIX) 20 mg tablet    tamsulosin (FLOMAX) 0 4 mg    vitamin B-12 (VITAMIN B-12) 1,000 mcg tablet    memantine (NAMENDA) 10 mg tablet         Allergies   Allergen Reactions    Contrast [Iodinated Diagnostic Agents]     Iodine Throat Swelling     IVP contrast dye    Morphine Rash       Objective     Intake/Output Summary (Last 24 hours) at 8/17/2020 1126  Last data filed at 8/17/2020 0810  Gross per 24 hour   Intake 0 ml   Output    Net 0 ml       Invasive Devices:        Physical Exam      I/O last 3 completed shifts: In: 500 [IV Piggyback:500]  Out: -     Vitals:    08/17/20 0703   BP: 138/75   Pulse: 69   Resp: 20   Temp:    SpO2: 99%       Gen: in NAD, fatty somnolent, cachectic malnourished, not able to follow commands  HEENT: no sclerous icterus, MM dry,, neck supple bitemporal wasting  CV: +S1/S2, RRR  Lungs: CTA bilaterally  Abd: +BS, soft NT/ND  Ext: all four extremities are warm  Skin: no rashes noted  Neuro: CN II-XII intact    Current Weight: Weight - Scale: 44 7 kg (98 lb 8 7 oz)  First Weight: Weight - Scale: 47 5 kg (104 lb 11 5 oz)    Lab Results:  I have personally reviewed pertinent labs      CBC:   Lab Results   Component Value Date    WBC 12 50 (H) 08/17/2020    HGB 11 5 (L) 08/17/2020    HCT 37 9 08/17/2020    MCV 94 08/17/2020     08/17/2020    MCH 28 5 08/17/2020    MCHC 30 3 (L) 08/17/2020    RDW 13 8 08/17/2020    MPV 11 6 08/17/2020    NRBC 0 08/17/2020     CMP:   Lab Results   Component Value Date    K 3 6 08/17/2020     (H) 08/17/2020    CO2 28 08/17/2020    BUN 47 (H) 08/17/2020    CREATININE 1 16 08/17/2020    CALCIUM 11 0 (H) 08/17/2020    AST 40 08/17/2020    ALT 30 08/17/2020    ALKPHOS 98 08/17/2020    EGFR 58 08/17/2020     Phosphorus:   Lab Results   Component Value Date    PHOS 2 5 08/17/2020     Magnesium:   Lab Results   Component Value Date    MG 2 1 08/17/2020     Urinalysis:   Lab Results   Component Value Date    COLORU Yellow 08/16/2020    CLARITYU Clear 08/16/2020    SPECGRAV 1 025 08/16/2020    PHUR 6 0 08/16/2020    LEUKOCYTESUR Trace (A) 08/16/2020    NITRITE Negative 08/16/2020    GLUCOSEU Negative 08/16/2020    KETONESU Negative 08/16/2020    BILIRUBINUR Negative 08/16/2020    BLOODU Trace-Intact (A) 08/16/2020     Ionized Calcium: No results found for: CAION  Coagulation: No results found for: PT, INR, APTT  Troponin:   Lab Results   Component Value Date    TROPONINI 0 16 (H) 08/17/2020     ABG: No results found for: PHART, BCE6HGA, PO2ART, TEH1TTW, M0MDYZNM, BEART, SOURCE    Results from last 7 days   Lab Units 08/17/20  0439 08/16/20  1604 08/16/20  0842   POTASSIUM mmol/L 3 6 3 6 3 9   CHLORIDE mmol/L 121* 117* 112*   CO2 mmol/L 28 30 34*   BUN mg/dL 47* 51* 55*   CREATININE mg/dL 1 16 1 29 1 51*   CALCIUM mg/dL 11 0* 11 4* 12 4*   ALK PHOS U/L 98 105  --    ALT U/L 30 36  --    AST U/L 40 40  --        Radiology review:  Procedure: Xr Chest 1 View Portable    Result Date: 8/16/2020  Narrative: CHEST INDICATION:   failure to thrive  COMPARISON:  05/09/2018 EXAM PERFORMED/VIEWS:  XR CHEST PORTABLE 1 image FINDINGS: Cardiomediastinal silhouette appears unremarkable  A median sternotomy has been performed  Pulmonary vessels are normal  Lungs are hyperinflated  A mass in the superior segment of the left lower lobe measures 6 6 x 4 4 cm  An additional pleural-based mass is seen laterally in the left lower lobe  Osseous structures appear within normal limits for patient age  Impression: Oval-shaped mass in the superior segment of the left lower lobe  An additional pleural-based masses seen laterally at the left lung base  Hyperinflation  Workstation performed: [id number]     Procedure: Ct Head Without Contrast    Result Date: 8/16/2020  Narrative: CT BRAIN - WITHOUT CONTRAST INDICATION:   failure to thrive, ams  COMPARISON:  7/28/2020 TECHNIQUE:  CT examination of the brain was performed  In addition to axial images, coronal 2D reformatted images were created and submitted for interpretation  Radiation dose length product (DLP) for this visit:  928 mGy-cm   This examination, like all CT scans performed in the [facility], was performed utilizing techniques to minimize radiation dose exposure, including the use of iterative reconstruction and automated exposure control  IMAGE QUALITY:  Diagnostic   FINDINGS: PARENCHYMA: Decreased attenuation is noted in periventricular and subcortical white matter demonstrating an appearance that is statistically most likely to represent moderate microangiopathic change  No CT signs of acute infarction  No intracranial mass, mass effect or midline shift  No acute parenchymal hemorrhage  VENTRICLES AND EXTRA-AXIAL SPACES:  Chronic ventriculomegaly out of proportion to the degree of atrophy  No extra-axial hemorrhagic fluid collections  VISUALIZED ORBITS AND PARANASAL SINUSES:  Unremarkable  CALVARIUM AND EXTRACRANIAL SOFT TISSUES:  Normal      Impression: No acute intracranial abnormality  Microangiopathic changes  Chronic ventriculomegaly  Workstation performed: [id number]     Procedure: Ct Chest Without Contrast    Result Date: 8/16/2020  Narrative: CT CHEST WITHOUT IV CONTRAST INDICATION:   abnormal xray, mass of left lung?   COMPARISON:  Portable chest from earlier today  TECHNIQUE: CT examination of the chest was performed without intravenous contrast   Axial, sagittal, and coronal 2D reformatted images were created from the source data and submitted for interpretation  Radiation dose length product (DLP) for this visit:  288 mGy-cm   This examination, like all CT scans performed in the [facility], was performed utilizing techniques to minimize radiation dose exposure, including the use of iterative reconstruction and automated exposure control  FINDINGS: LUNGS:  4 6 x 6 0 x 4 6 cm mass in the superior segment of the left lower lobe with spiculated margins, most likely lung cancer  The mass extends peripherally to the adjacent pleural surface and centrally towards the left hilum without convincing evidence of hilar involvement  Emphysematous changes, most prominent in the right upper lobe    Several irregular solid and some solid nodules in the superior segment of the right upper lobe, the largest measuring 0 7 x 1 0 cm, with adjacent areas of groundglass attenuation, possibly inflammatory although pulmonary metastases can have a similar appearance  Similar appearing nodules in the left lower lobe  0 8 x 1 0 cm solid nodule in the left upper lobe, extending to the pleura, age indeterminant  This could be a synchronous bronchogenic carcinoma or metastasis or an area of chronic fibrosis  Patchy groundglass attenuation in the posterior segment of the right upper lobe, more consistent with an inflammatory process than malignancy  PLEURA:  Left-sided pleural or extrapleural mass, described in more detail below  No pleural effusions  HEART/GREAT VESSELS:  Prior open heart surgery  Extensive calcification of the thoracic aorta and coronary arteries  Ectasia of the distal aortic arch, 3 3 cm in maximal diameter  MEDIASTINUM AND LARRY: No lymphadenopathy or mass  Esophagus unremarkable  Trachea and main stem bronchi normal  CHEST WALL AND LOWER NECK:   4 3 x 5 1 cm soft tissue mass in the lateral aspect of the left 8th intercostal space, incompletely included on this examination, causing destruction of the adjacent portion of the left 7th rib, consistent with a metastasis, possibly arising from the left 7th rib with extraosseous extension  VISUALIZED STRUCTURES IN THE UPPER ABDOMEN:  Unremarkable  OSSEOUS STRUCTURES:  Destruction of portion of the left 7th rib with adjacent soft tissue mass, as described above  Included bones otherwise intact  Impression: 1   4 6 x 6 0 cm spiculated mass in the superior segment of the left lower lobe, most likely bronchogenic carcinoma  2   Several small bilateral pulmonary nodules as described above, many of which appear to be inflammatory in etiology  However, metastases and/or synchronous primary malignancies cannot be excluded  3   Emphysema  4   No evidence of mediastinal or hilar lymphadenopathy  5   4 3 x 5 1 cm soft tissue mass in the left lateral chest wall, 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 with extension into the intercostal and extrapleural soft tissues    I personally discussed this study with [address] on 8/16/2020 at 10:17 AM  Workstation performed: [id number]         EKG, Pathology, and Other Studies:  Reviewed    Counseling / Coordination of Care  Total ADDITIONAL floor / unit time spent today 60 minutes  Greater than 50% of total time was spent with the patient and / or family counseling and / or coordination of care  A description of the counseling / coordination of care: Ancillary staff    Shay Tamayo MD      This consultation note was produced in part using a dictation device which may document imprecise wording from author's original intent

## 2020-08-17 NOTE — SPEECH THERAPY NOTE
Speech-Language Pathology Bedside Swallow Evaluation    Patient Name: Thomas John    DAXNN'X Date: 8/17/2020     Problem List  Principal Problem:    Acute metabolic encephalopathy  Active Problems:    Hypothyroidism    Dementia ([facility] )    Malignant neoplasm of bladder ([place] [address] )    Hypertension    Hypernatremia    Hypercalcemia    Acute renal failure superimposed on stage 3 chronic kidney disease (HCC)    Pulmonary nodules    Mass of left lung    Adult failure to thrive    Severe malnutrition (HCC)    Elevated troponin level not due myocardial infarction    Acute cystitis with hematuria      Past Medical History  Past Medical History:   Diagnosis Date    Abnormal stress test     Abnormal weight loss     Alzheimer's disease ([facility] ) 5/2/2018    Aortic insufficiency with aortic stenosis     [s/p AVR (tissue) 4/2013]     Bradycardia, unspecified     Coronary artery disease      [s/p CABG x3 4/2013]    Diabetes ([place] [address] )     Disease of thyroid gland     Dyslipidemia     Gastric ulcer     last assessed: 2/24/14    H/O cardiovascular stress test      (EF 0 67, Not suspicious for significant occlusive CAD  ) - 11/28/2007; [facility] (Normal EF, Inferior wall post stress HK, markedly positive EKG with chest pressure) - 4/10/2013    History of echocardiogram 08/05/2016    2-D w/ CFD:  EF 0 55 (55%), Normal LVSF  Normal functioning bioprosthetic AV      History of EKG     EKG shows sinus bradycardia, 49 bpm, otherwise normal     History of EKG      (Sinus Rhythm) - 3/25/2013;  (Sinus Acie Cheri) - 5/20/2013;  9/25/2013     History of Holter monitoring      (SR w/ rates ranging from 44-78BPM  No afib, no heart block, no vtach, no pauses, and no symptoms per pt  there were few short atrial runs, highest being 140BPM, the longest being 8 beats; though overall atrial and ventricular ectopic beats were rare ) - 8/3/2016     History of Holter monitoring 08/22/2016    24 hr    Hx of echocardiogram     (EF 0 70, Without regional wall abnormalities  Possibly bicuspid aortic valve with mild AS, no aortic insufficiency ) 3/22/2007;  (EF 0 55 (55%), Normal LVSF  Normal functioning bioprosthetic AV ) - 8/3/2016     Hypertension     Hypothyroid 5/2/2018    ICAO (internal carotid artery occlusion)     Duplex (There is known occlusion of ICA in rt carotid and the vertebral artery is antegrade  0-19% ICA stenosis of Lt carotid  The vertebral artery is antegrade  Widely patent endarterectomy site on the left ) - 1/30/2014 Carotid Duplex (No change  Known occlusion of ICA in right carotid  0-19% ICA stenosis in left carotid, and widely patent endarterectomy site on left ) - 8/28/2014    Inguinal hernia, left     last assessed: 10/8/14    Peptic ulcer     last assessed: 5/21/13    Prostate enlargement 5/2/2018       Past Surgical History  Past Surgical History:   Procedure Laterality Date    AORTIC VALVE REPLACEMENT      CAROTID ENDARTARECTOMY  04/26/2013    CATARACT EXTRACTION Bilateral     CORONARY ARTERY BYPASS GRAFT       (3V CABG: LIMA-LAD, VG-RCA, VG-CX, Tissue AVR (#23 Ester-Veliz)) - 4/26/2013     CYSTOSCOPY  04/23/2020    INGUINAL HERNIA REPAIR      NEPHRECTOMY Right     MD CYSTOURETHROSCOPY W/IRRIG & EVAC CLOTS N/A 5/7/2018    Procedure: CYSTOSCOPY EVACUATION OF CLOTS AND TURBT;  Surgeon: Phoenix Malloy MD;  Location: [facility];  Service: Urology    THROMBOENDARTERECTOMY      Carotid       Summary  Pt presented with s/s suggestive of moderate-severe oropharyngeal dysphagia  Symptoms or concerns included decreased bolus acceptance, decreased bolus propulsion and suspected decreased control of liquids, as well as  suspected pharyngeal swallow delay and weak, decreased hyolaryngeal elevation upon palpation  Cough noted with trials of thin and nectar thick liquids  Manipulation of puree was weak, not felt appropriate for mechanical soft textures  ST will f/u for tolerance of conservative diet       Risk/s for Aspiration: mod    Recommended Diet: puree/level 1 diet and honey thick liquids   Recommended Form of Meds: crushed with puree   Aspiration precautions and swallowing strategies: upright posture, only feed when fully alert, slow rate of feeding, small bites/sips and alternating bites and sips      Current Medical Status per Dr Christopher Carballo H&P 8/16/2020  Víctor Pittman is an 80 y/o man brought from [facility] with a PMH of dementia with irritability and anger, hypothyroidism, HTN, CKD stage 3 with baseline Cr 1 0-1 2, malignant bladder carcinoma, chronic respiratory failure baseline 4L NC,and adult failure to thrive with a BMI of 14 is being admitted to the medical-surgical floor for acute metabolic encephalopathy most likely due to dehydration and malnutrition  He is currently hemodynamically stable, in no acute respiratory distress on 4 L NC, and afebrile  He will most likely spine greater than 2 midnights  Current Precautions: Fall, Aspiration     Special Studies:  CT chest 8/16/2020 LUNGS:  4 6 x 6 0 x 4 6 cm mass in the superior segment of the left lower lobe with spiculated margins, most likely lung cancer  The mass extends peripherally to the adjacent pleural surface and centrally towards the left hilum without convincing evidence of hilar involvement  Emphysematous changes, most prominent in the right upper lobe  Several irregular solid and some solid nodules in the superior segment of the right upper lobe, the largest measuring 0 7 x 1 0 cm, with adjacent areas of groundglass attenuation, possibly   inflammatory although pulmonary metastases can have a similar appearance  Similar appearing nodules in the left lower lobe  0 8 x 1 0 cm solid nodule in the left upper lobe, extending to the pleura, age indeterminant  This could be a synchronous   bronchogenic carcinoma or metastasis or an area of chronic fibrosis    Patchy groundglass attenuation in the posterior segment of the right upper lobe, more consistent with an inflammatory process than malignancy  CT head 8/16/2020 IMPRESSION:  No acute intracranial abnormality  Microangiopathic changes  Chronic ventriculomegaly  CXR 8/16/2020 IMPRESSION:  Oval-shaped mass in the superior segment of the left lower lobe  An additional pleural-based masses seen laterally at the left lung base  Hyperinflation  Social/Education/Vocational Hx:  Pt lives in SNF/ECF    Swallow Information   Current Risks for Dysphagia & Aspiration: AMS  Current Symptoms/Concerns: coughing with po  Current Diet: regular diet and thin liquids   Baseline Diet: unknown      Baseline Assessment   Behavior/Cognition: lethargic bur arousable  Speech/Language Status: limited verbal output  Patient Positioning: upright in bed  Pain Status/Interventions/Response to Interventions:  No report of or nonverbal indications of pain  Swallow Mechanism Exam  Facial: masked facies  Labial: decreased strength  Lingual: unable to test 2/2 limited command following  Velum: unable to visualize  Mandible:  decreased ROM  Dentition: edentulous  Vocal quality:weak       Consistencies Assessed and Performance   Consistencies Administered: thin liquids, nectar thick, honey thick and puree    Oral Stage: moderate-severe  Decreased bolus acceptance, decreased control of liquids, and decreased manipulation of puree  Wife reported dentures were left at [facility] but do not fit well  Pt not felt appropriate for [facility] soft trials due to limited manipulation of puree  Pharyngeal Stage: moderate-severe  Swallow Mechanics: Swallowing initiation appeared delayed  Laryngeal rise was palpated and judged to be weak/reduced  Cough noted with trials of thin and NTL  No s/s aspiration with trials of puree or HTL      Esophageal Concerns: none reported    Summary and Recommendations (see above)    Results Reviewed with: patient, RN and family     Treatment Recommended: yes    Frequency of treatment: 2-3x/week pending plan of care  Dysphagia LTG  -Patient will demonstrate safe and effective oral intake (without overt s/s significant oral/pharyngeal dysphagia including s/s penetration or aspiration) for the highest appropriate diet level  Short Term Goals:  -Pt will tolerate Dysphagia 1/pureed diet and honey thick liquid with no significant s/s oral or pharyngeal dysphagia across 1-3 diagnostic sessions     -Patient will tolerate trials of upgraded food and/or liquid texture with no significant s/s of oral or pharyngeal dysphagia including aspiration across 1-3 diagnostic sessions

## 2020-08-17 NOTE — ASSESSMENT & PLAN NOTE
Ruled out with a bland UA and urine cultures only growing [phone] colonies of strep species  Discontinue antibiotics and observe

## 2020-08-17 NOTE — UTILIZATION REVIEW
Notification of Inpatient Admission/Inpatient Authorization Request   This is a Notification of Inpatient Admission for [facility] [address]  Be advised that this patient was admitted to our facility under Inpatient Status  Contact Zaheer Dykes at [phone] for additional admission information  [facility] DEPT  DEDICATED [phone]  Patient Name:   Emily Machado   YOB: 1935       [address]   [address]:   [address]  Tax ID: [id number]  NPI: [id number] Attending Provider/NPI:  Phone:  Address: Jaqueline Mahan, Lonnie Reyes [[id number]]  [phone]  Same as MAVERICK/[place] [place] [address] of Service Code: 24 Place of Service Name:  [address] [place] [address]   Start Date: 8/16/20 1024 Discharge Date & Time: No discharge date for patient encounter  Type of Admission: Inpatient Status Discharge Disposition   (if discharged): Home/Self Care   Patient Diagnoses: Hypercalcemia [E83 52]  Adult failure to thrive [R62 7]  Hypernatremia [E87 0]  Lung cancer ([facility] ) [C34 90]  Altered mental status [R41 82]  Severe malnutrition ([facility] ) [E43]  Failure to thrive in adult [R62 7]  CONCHA (acute kidney injury) ([facility] ) [N17 9]  Stage 3 chronic kidney disease ([facility] ) [N18 3]     Orders: Admission Orders (From admission, onward)     Ordered        08/16/20 1024  Inpatient Admission  Once                    Assigned Utilization Review Contact: Zaheer Dykes  Utilization   Network Utilization Review Department  Phone: [phone]; Fax [phone]  Email: [email or web address]   ATTENTION PAYERS: Please call the assigned Utilization  directly with any questions or concerns ALL voicemails in the department are confidential  Send all requests for admission clinical reviews, approved or denied determinations and any other requests to dedicated fax number belonging to the campus where the patient is receiving treatment

## 2020-08-17 NOTE — PROGRESS NOTES
[address] [facility] [address] [facility] Practice Progress Note - Violetta Browning 81 y o  male MRN: [id number]    Unit/Bed#: 921-25 Encounter: [id number]      Assessment/Plan:  * Acute metabolic encephalopathy  Assessment & Plan  Presented from [place] due to acute decline in mental status over 24 hours most likely secondary to malnutrition and dehydration causing hypernatremia and hypercalcemia    Baseline oxygen 4 L nasal cannula    Trend fever curve  IV fluids D5 1/4 NS @125mL/hr  Ca correction 12 2  Ionized Ca 1 45  PTH 24 1   PTHrP pending   bld cx x2 pending  MRSA cx pending  UA with reflex showing trace leukocytes and moderate bacteruria  Urine strept and legionella negative  Procalcitonin <0 05 continue to trend  Troponin 0 14->0 12->0 15->0 16  Speech and swallow eval and treat    Acute cystitis with hematuria  Assessment & Plan  Has chronic indwelling catheter due to bladder cancer that was present on admission  UA showed trace leukocytes moderate bacteriuria 0-1 rbc's 2-4 wbc's  Urine culture pending  Ceftriaxone initiated    Acute renal failure superimposed on stage 3 chronic kidney disease (HCC)  Assessment & Plan  POA with Cr of 1 51 most likely secondary to dehydration and malnutrition  Baseline creatinine 1 0-1 2  GFR baseline 54-69 stage III  Avoid nephrotoxin agents  IV fluids  Has history of metastatic bladder carcinoma has chronic indwelling Loyd  Nephrology consulted and recommendations appreciated  resolved    Pulmonary nodules  Assessment & Plan  As seen on CT chest:  Pneumonia versus malignancy  Will do infectious workup  See metabolic encephalopathy    Hypercalcemia  Assessment & Plan  Calcium 12 4 on admission secondary to dehydration versus malnutrition versus malignancy  IV fluids  Corrected calcium 12 2  Ionized calcium 1 45  +/- Zoledronic Acid and calcitonin  Continue to trend  Nephrology consult and recommendations appreciated    Hypernatremia  Assessment & Plan  Sodium 154 on admission most likely secondary to dehydration and malnutrition  IV fluids started  Continue to trend  Nephrology consulted and recommendations appreciated    Hypothyroidism  Assessment & Plan  Continue levothyroxine    Elevated troponin level not due myocardial infarction  Assessment & Plan  Elevated troponin levels remaining stable at 0 14  Telemetry order  Most likely d/t pulmonary dz    Severe malnutrition (HCC)  Assessment & Plan  Malnutrition Findings:           BMI Findings: Body mass index is 14 2 kg/m²  CC adult failure to thrive    Adult failure to thrive  Assessment & Plan  Patient is emaciated and cachectic  BMI 14  Nutrition consulted  Speech and swallow evaluation  Nutrition supplementation  Continue Dronabinol    Mass of left lung  Assessment & Plan  Noted on CT on admission  Most likely malignant in nature  F/u H/O outpt    Hypertension  Assessment & Plan  Blood pressure stable on admission  Not on any oral HTN meds  Continue to monitor    Malignant neoplasm of bladder (HCC)  Assessment & Plan  History of bladder cancer  Has chronic indwelling FoleyContinue Flomax and Proscar    Dementia ([facility] )  Assessment & Plan  Advanced dementia associated with irritability and anger  Continue home medication of vitamin B12 memantine, Ativan and Cymbalta          Subjective: Erling Cuff was seen and examined at bedside  No acute events overnight  Patient is a poor historian still has altered mental status  Slightly improved since yesterday responses name and is able to answer questions and follow commands on continued probing  Patient has dementia  Objective:     Vitals: Blood pressure 138/75, pulse 69, temperature 98 6 °F (37 °C), temperature source Temporal, resp  rate 20, height 6' (1 829 m), weight 44 7 kg (98 lb 8 7 oz), SpO2 99 %  ,Body mass index is 13 37 kg/m²    Wt Readings from Last 3 Encounters:   08/16/20 44 7 kg (98 lb 8 7 oz)   07/28/20 53 8 kg (118 lb 9 7 oz)   05/06/20 63 kg (139 lb)       Intake/Output Summary (Last 24 hours) at 8/17/2020 1117  Last data filed at 8/17/2020 0810  Gross per 24 hour   Intake 0 ml   Output    Net 0 ml       Physical Exam: General appearance: cachectic, pale, slowed mentation and Bitemporal wasting  Lungs: clear to auscultation bilaterally  Heart: regular rate and rhythm, S1, S2 normal, no murmur, click, rub or gallop  Abdomen: soft, non-tender; bowel sounds normal; no masses,  no organomegaly  Extremities: extremities normal, warm and well-perfused; no cyanosis, clubbing, or edema     Recent Results (from the past 24 hour(s))   Procalcitonin    Collection Time: 08/16/20 12:22 PM   Result Value Ref Range    Procalcitonin <0 05 <=0 25 ng/ml   Vitamin D 25 hydroxy    Collection Time: 08/16/20 12:22 PM   Result Value Ref Range    Vit D, 25-Hydroxy 52 0 30 0 - 100 0 ng/mL   Troponin I    Collection Time: 08/16/20 12:22 PM   Result Value Ref Range    Troponin I 0 12 (H) <=0 04 ng/mL   Blood culture    Collection Time: 08/16/20 12:24 PM    Specimen: Arm, Left; Blood   Result Value Ref Range    Blood Culture Received in Microbiology Lab  Culture in Progress  Blood culture    Collection Time: 08/16/20 12:24 PM    Specimen: Arm, Left; Blood   Result Value Ref Range    Blood Culture Received in Microbiology Lab  Culture in Progress      Legionella antigen, urine    Collection Time: 08/16/20  2:26 PM    Specimen: Urine, Catheter   Result Value Ref Range    Legionella Urinary Antigen Negative Negative   UA w Reflex to Microscopic w Reflex to Culture    Collection Time: 08/16/20  2:27 PM    Specimen: Urine, Clean Catch   Result Value Ref Range    Color, UA Yellow     Clarity, UA Clear     Specific Gravity, UA 1 025 1 003 - 1 030    pH, UA 6 0 4 5, 5 0, 5 5, 6 0, 6 5, 7 0, 7 5, 8 0    Leukocytes, UA Trace (A) Negative    Nitrite, UA Negative Negative    Protein, UA 30 (1+) (A) Negative mg/dl    Glucose, UA Negative Negative mg/dl    Ketones, UA Negative Negative mg/dl    Urobilinogen, UA 0 2 0 2, 1 0 E U /dl E U /dl    Bilirubin, UA Negative Negative    Blood, UA Trace-Intact (A) Negative   Strep Pneumoniae, Urine    Collection Time: 08/16/20  2:27 PM    Specimen: Urine, Catheter   Result Value Ref Range    Strep pneumoniae antigen, urine Negative Negative   Protein / creatinine ratio, urine    Collection Time: 08/16/20  2:27 PM   Result Value Ref Range    Creatinine, Ur 92 8 mg/dL    Protein Urine Random 79 mg/dL    Prot/Creat Ratio, Ur 0 85 (H) 0 00 - 0 10   Sodium, urine, random    Collection Time: 08/16/20  2:27 PM   Result Value Ref Range    Sodium, Ur 19    Urine Microscopic    Collection Time: 08/16/20  2:27 PM   Result Value Ref Range    RBC, UA 0-1 (A) None Seen, 0-5 /hpf    WBC, UA 2-4 (A) None Seen, 0-5, 5-55, 5-65 /hpf    Epithelial Cells Occasional None Seen, Occasional /hpf    Bacteria, UA Moderate (A) None Seen, Occasional /hpf   Comprehensive metabolic panel    Collection Time: 08/16/20  4:04 PM   Result Value Ref Range    Sodium 155 (H) 136 - 145 mmol/L    Potassium 3 6 3 5 - 5 3 mmol/L    Chloride 117 (H) 100 - 108 mmol/L    CO2 30 21 - 32 mmol/L    ANION GAP 8 4 - 13 mmol/L    BUN 51 (H) 5 - 25 mg/dL    Creatinine 1 29 0 60 - 1 30 mg/dL    Glucose 105 65 - 140 mg/dL    Calcium 11 4 (H) 8 3 - 10 1 mg/dL    AST 40 5 - 45 U/L    ALT 36 12 - 78 U/L    Alkaline Phosphatase 105 46 - 116 U/L    Total Protein 7 3 6 4 - 8 2 g/dL    Albumin 2 7 (L) 3 5 - 5 0 g/dL    Total Bilirubin 0 80 0 20 - 1 00 mg/dL    eGFR 51 ml/min/1 73sq m   Troponin I    Collection Time: 08/16/20  4:04 PM   Result Value Ref Range    Troponin I 0 15 (H) <=0 04 ng/mL   Magnesium    Collection Time: 08/16/20  4:04 PM   Result Value Ref Range    Magnesium 2 3 1 6 - 2 6 mg/dL   Calcium, ionized    Collection Time: 08/16/20  4:18 PM   Result Value Ref Range    Calcium, Ionized 1 45 (H) 1 12 - 1 32 mmol/L   Comprehensive metabolic panel    Collection Time: 08/17/20  4:39 AM   Result Value Ref Range    Sodium 157 (H) 136 - 145 mmol/L    Potassium 3 6 3 5 - 5 3 mmol/L    Chloride 121 (H) 100 - 108 mmol/L    CO2 28 21 - 32 mmol/L    ANION GAP 8 4 - 13 mmol/L    BUN 47 (H) 5 - 25 mg/dL    Creatinine 1 16 0 60 - 1 30 mg/dL    Glucose 84 65 - 140 mg/dL    Calcium 11 0 (H) 8 3 - 10 1 mg/dL    AST 40 5 - 45 U/L    ALT 30 12 - 78 U/L    Alkaline Phosphatase 98 46 - 116 U/L    Total Protein 6 8 6 4 - 8 2 g/dL    Albumin 2 5 (L) 3 5 - 5 0 g/dL    Total Bilirubin 0 70 0 20 - 1 00 mg/dL    eGFR 58 ml/min/1 73sq m   Magnesium    Collection Time: 08/17/20  4:39 AM   Result Value Ref Range    Magnesium 2 1 1 6 - 2 6 mg/dL   CBC and differential    Collection Time: 08/17/20  4:39 AM   Result Value Ref Range    WBC 12 50 (H) 4 31 - 10 16 Thousand/uL    RBC 4 04 3 88 - 5 62 Million/uL    Hemoglobin 11 5 (L) 12 0 - 17 0 g/dL    Hematocrit 37 9 36 5 - 49 3 %    MCV 94 82 - 98 fL    MCH 28 5 26 8 - 34 3 pg    MCHC 30 3 (L) 31 4 - 37 4 g/dL    RDW 13 8 11 6 - 15 1 %    MPV 11 6 8 9 - 12 7 fL    Platelets 942 627 - 057 Thousands/uL    nRBC 0 /100 WBCs    Neutrophils Relative 91 (H) 43 - 75 %    Immat GRANS % 0 0 - 2 %    Lymphocytes Relative 5 (L) 14 - 44 %    Monocytes Relative 4 4 - 12 %    Eosinophils Relative 0 0 - 6 %    Basophils Relative 0 0 - 1 %    Neutrophils Absolute 11 30 (H) 1 85 - 7 62 Thousands/µL    Immature Grans Absolute 0 04 0 00 - 0 20 Thousand/uL    Lymphocytes Absolute 0 61 0 60 - 4 47 Thousands/µL    Monocytes Absolute 0 53 0 17 - 1 22 Thousand/µL    Eosinophils Absolute 0 01 0 00 - 0 61 Thousand/µL    Basophils Absolute 0 01 0 00 - 0 10 Thousands/µL   Phosphorus    Collection Time: 08/17/20  4:39 AM   Result Value Ref Range    Phosphorus 2 5 2 3 - 4 1 mg/dL   Troponin I    Collection Time: 08/17/20  9:52 AM   Result Value Ref Range    Troponin I 0 16 (H) <=0 04 ng/mL       Current Facility-Administered Medications   Medication Dose Route Frequency Provider Last Rate Last Dose    acetaminophen (TYLENOL) tablet 650 mg  650 mg Oral Q6H PRN Luis Mendoza MD        albuterol inhalation solution 2 5 mg  2 5 mg Nebulization Q6H PRN Robinson Le MD        aspirin (ECOTRIN LOW STRENGTH) EC tablet 81 mg  81 mg Oral Daily Luis Mendoza MD   81 mg at 08/17/20 0855    atorvastatin (LIPITOR) tablet 80 mg  80 mg Oral QPM Luis Mendoza MD        cefTRIAXone (ROCEPHIN) IVPB (premix) 1,000 mg 50 mL  1,000 mg Intravenous Q24H Luis Mendoza MD        cyanocobalamin (VITAMIN B-12) tablet 1,000 mcg  1,000 mcg Oral Daily Luis Mendoza MD   1,000 mcg at 08/17/20 0856    dextrose 5 % and sodium chloride 0 2 % infusion  125 mL/hr Intravenous Continuous Irish Nino  mL/hr at 08/17/20 0913 125 mL/hr at 08/17/20 0913    dronabinol (MARINOL) capsule 5 mg  5 mg Oral BID AC Luis Mendoza MD   5 mg at 08/17/20 0855    DULoxetine (CYMBALTA) delayed release capsule 30 mg  30 mg Oral QPM Luis Mendoza MD        DULoxetine (CYMBALTA) delayed release capsule 60 mg  60 mg Oral QAM Luis Mendoza MD   60 mg at 08/17/20 0854    enoxaparin (LOVENOX) subcutaneous injection 30 mg  30 mg Subcutaneous Daily Luis Mendoza MD   30 mg at 08/17/20 0902    finasteride (PROSCAR) tablet 5 mg  5 mg Oral Daily Luis Mendoza MD   5 mg at 08/17/20 0855    levothyroxine tablet 75 mcg  75 mcg Oral Early Morning Luis Mendoza MD   75 mcg at 08/17/20 0855    LORazepam (ATIVAN) tablet 0 5 mg  0 5 mg Oral Q8H PRN Luis Mendoza MD        memantine (NAMENDA) tablet 10 mg  10 mg Oral BID Luis Mendoza MD   10 mg at 08/17/20 0856    ondansetron (ZOFRAN) injection 4 mg  4 mg Intravenous Q6H PRN Luis Mendoza MD        pantoprazole (PROTONIX) EC tablet 20 mg  20 mg Oral Daily Before Breakfast Luis Mendoza MD   20 mg at 08/17/20 0855    polyethylene glycol (MIRALAX) packet 17 g  17 g Oral Daily PRN Luis Mendoza MD        tamsulosin (FLOMAX) capsule 0 4 mg  0 4 mg Oral Daily With Carleton Bamberger, MD           Invasive Devices     Peripheral Intravenous Line            Peripheral IV 08/16/20 Right Antecubital 1 day                Lab, Imaging and other studies: I have personally reviewed pertinent reports      VTE Pharmacologic Prophylaxis: Enoxaparin (Lovenox)  VTE Mechanical Prophylaxis: sequential compression device    Gianluca Pena MD

## 2020-08-17 NOTE — OCCUPATIONAL THERAPY NOTE
Occupational Therapy         Patient Name: Angie Gutierrez  SZPQR'[id number] Date: 8/17/2020    Order received and chart review performed; attempted OT evaluation this AM; pt states "no" and "I am tired" and continues to sleep; will attempt when pt is more alert and able to participate

## 2020-08-17 NOTE — PLAN OF CARE
Problem: Potential for Falls  Goal: Patient will remain free of falls  Description: INTERVENTIONS:  - Assess patient frequently for physical needs  -  Identify cognitive and physical deficits and behaviors that affect risk of falls  -  [place] fall precautions as indicated by assessment   - Educate patient/family on patient safety including physical limitations  - Instruct patient to call for assistance with activity based on assessment  - Modify environment to reduce risk of injury  - Consider OT/PT consult to assist with strengthening/mobility  Outcome: Progressing     Problem: Prexisting or High Potential for Compromised Skin Integrity  Goal: Skin integrity is maintained or improved  Description: INTERVENTIONS:  - Identify patients at risk for skin breakdown  - Assess and monitor skin integrity  - Assess and monitor nutrition and hydration status  - Monitor labs   - Assess for incontinence   - Turn and reposition patient  - Assist with mobility/ambulation  - Relieve pressure over bony prominences  - Avoid friction and shearing  - Provide appropriate hygiene as needed including keeping skin clean and dry  - Evaluate need for skin moisturizer/barrier cream  - Collaborate with interdisciplinary team   - Patient/family teaching  - Consider wound care consult   Outcome: Progressing     Problem: Nutrition/Hydration-ADULT  Goal: Nutrient/Hydration intake appropriate for improving, restoring or maintaining nutritional needs  Description: Monitor and assess patient's nutrition/hydration status for malnutrition  Collaborate with interdisciplinary team and initiate plan and interventions as ordered  Monitor patient's weight and dietary intake as ordered or per policy  Utilize nutrition screening tool and intervene as necessary  Determine patient's food preferences and provide high-protein, high-caloric foods as appropriate       INTERVENTIONS:  - Monitor oral intake, urinary output, labs, and treatment plans  - Assess nutrition and hydration status and recommend course of action  - Evaluate amount of meals eaten  - Assist patient with eating if necessary   - Allow adequate time for meals  - Recommend/ encourage appropriate diets, oral nutritional supplements, and vitamin/mineral supplements  - Order, calculate, and assess calorie counts as needed  - Recommend, monitor, and adjust tube feedings and TPN/PPN based on assessed needs  - Assess need for intravenous fluids  - Provide specific nutrition/hydration education as appropriate  - Include patient/family/caregiver in decisions related to nutrition  Outcome: Progressing

## 2020-08-17 NOTE — PHYSICAL THERAPY NOTE
PHYSICAL THERAPY NOTE          Patient Name: Vinicio Dillon  OVWLB'K Date: 8/17/2020     Order received and chart review performed  Attempted PT evaluation this AM, however pt lethargic and minimally responsive  Pt able to verbalize "no" when asked if he would like to get out of bed  Will re-attempt as appropriate/able      Luiza Chawla, PT

## 2020-08-17 NOTE — SOCIAL WORK
Met with pt to discuss role as  in helping pt to develop discharge plan and to help pt carry out their plan  Pt lives at [facility] assisted living   Pt is wheel chair bound  Pt is an assist with ADL's  Pt was active with [facility]  [facility] the [facility] back  Will continue to follow for any Case Management needs

## 2020-08-18 PROBLEM — F03.91 DEMENTIA WITH BEHAVIORAL DISTURBANCE (HCC): Status: ACTIVE | Noted: 2018-05-02

## 2020-08-18 LAB
ALBUMIN SERPL BCP-MCNC: 2.3 G/DL (ref 3.5–5)
ALBUMIN SERPL BCP-MCNC: 2.3 G/DL (ref 3.5–5)
ALP SERPL-CCNC: 102 U/L (ref 46–116)
ALT SERPL W P-5'-P-CCNC: 47 U/L (ref 12–78)
ANION GAP SERPL CALCULATED.3IONS-SCNC: 4 MMOL/L (ref 4–13)
ANION GAP SERPL CALCULATED.3IONS-SCNC: 5 MMOL/L (ref 4–13)
AST SERPL W P-5'-P-CCNC: 51 U/L (ref 5–45)
BASOPHILS # BLD MANUAL: 0 THOUSAND/UL (ref 0–0.1)
BASOPHILS NFR MAR MANUAL: 0 % (ref 0–1)
BILIRUB SERPL-MCNC: 0.6 MG/DL (ref 0.2–1)
BUN SERPL-MCNC: 35 MG/DL (ref 5–25)
BUN SERPL-MCNC: 36 MG/DL (ref 5–25)
CALCIUM SERPL-MCNC: 10.7 MG/DL (ref 8.3–10.1)
CALCIUM SERPL-MCNC: 10.7 MG/DL (ref 8.3–10.1)
CHLORIDE SERPL-SCNC: 113 MMOL/L (ref 100–108)
CHLORIDE SERPL-SCNC: 114 MMOL/L (ref 100–108)
CO2 SERPL-SCNC: 30 MMOL/L (ref 21–32)
CO2 SERPL-SCNC: 30 MMOL/L (ref 21–32)
CREAT SERPL-MCNC: 1.22 MG/DL (ref 0.6–1.3)
CREAT SERPL-MCNC: 1.28 MG/DL (ref 0.6–1.3)
EOSINOPHIL # BLD MANUAL: 0 THOUSAND/UL (ref 0–0.4)
EOSINOPHIL NFR BLD MANUAL: 0 % (ref 0–6)
ERYTHROCYTE [DISTWIDTH] IN BLOOD BY AUTOMATED COUNT: 13.8 % (ref 11.6–15.1)
GFR SERPL CREATININE-BSD FRML MDRD: 51 ML/MIN/1.73SQ M
GFR SERPL CREATININE-BSD FRML MDRD: 54 ML/MIN/1.73SQ M
GLUCOSE SERPL-MCNC: 124 MG/DL (ref 65–140)
GLUCOSE SERPL-MCNC: 124 MG/DL (ref 65–140)
HCT VFR BLD AUTO: 43.7 % (ref 36.5–49.3)
HGB BLD-MCNC: 12.9 G/DL (ref 12–17)
LYMPHOCYTES # BLD AUTO: 0.93 THOUSAND/UL (ref 0.6–4.47)
LYMPHOCYTES # BLD AUTO: 8 % (ref 14–44)
MCH RBC QN AUTO: 28.2 PG (ref 26.8–34.3)
MCHC RBC AUTO-ENTMCNC: 29.5 G/DL (ref 31.4–37.4)
MCV RBC AUTO: 95 FL (ref 82–98)
MONOCYTES # BLD AUTO: 0.46 THOUSAND/UL (ref 0–1.22)
MONOCYTES NFR BLD: 4 % (ref 4–12)
MRSA NOSE QL CULT: NORMAL
NEUTROPHILS # BLD MANUAL: 10.22 THOUSAND/UL (ref 1.85–7.62)
NEUTS BAND NFR BLD MANUAL: 6 % (ref 0–8)
NEUTS SEG NFR BLD AUTO: 82 % (ref 43–75)
NRBC BLD AUTO-RTO: 0 /100 WBCS
PHOSPHATE SERPL-MCNC: 2 MG/DL (ref 2.3–4.1)
PLATELET # BLD AUTO: 209 THOUSANDS/UL (ref 149–390)
PLATELET BLD QL SMEAR: ADEQUATE
PMV BLD AUTO: 11.5 FL (ref 8.9–12.7)
POTASSIUM SERPL-SCNC: 3.7 MMOL/L (ref 3.5–5.3)
POTASSIUM SERPL-SCNC: 3.7 MMOL/L (ref 3.5–5.3)
PROT SERPL-MCNC: 6.9 G/DL (ref 6.4–8.2)
RBC # BLD AUTO: 4.58 MILLION/UL (ref 3.88–5.62)
RBC MORPH BLD: NORMAL
SODIUM SERPL-SCNC: 148 MMOL/L (ref 136–145)
SODIUM SERPL-SCNC: 148 MMOL/L (ref 136–145)
TOTAL CELLS COUNTED SPEC: 100
WBC # BLD AUTO: 11.61 THOUSAND/UL (ref 4.31–10.16)

## 2020-08-18 PROCEDURE — 85027 COMPLETE CBC AUTOMATED: CPT | Performed by: FAMILY MEDICINE

## 2020-08-18 PROCEDURE — 99232 SBSQ HOSP IP/OBS MODERATE 35: CPT | Performed by: FAMILY MEDICINE

## 2020-08-18 PROCEDURE — 85007 BL SMEAR W/DIFF WBC COUNT: CPT | Performed by: FAMILY MEDICINE

## 2020-08-18 PROCEDURE — 99233 SBSQ HOSP IP/OBS HIGH 50: CPT | Performed by: NURSE PRACTITIONER

## 2020-08-18 PROCEDURE — 80053 COMPREHEN METABOLIC PANEL: CPT | Performed by: STUDENT IN AN ORGANIZED HEALTH CARE EDUCATION/TRAINING PROGRAM

## 2020-08-18 PROCEDURE — 80069 RENAL FUNCTION PANEL: CPT | Performed by: INTERNAL MEDICINE

## 2020-08-18 RX ORDER — DEXTROSE MONOHYDRATE 50 MG/ML
75 INJECTION, SOLUTION INTRAVENOUS CONTINUOUS
Status: DISCONTINUED | OUTPATIENT
Start: 2020-08-18 | End: 2020-08-19 | Stop reason: HOSPADM

## 2020-08-18 RX ADMIN — PANTOPRAZOLE SODIUM 20 MG: 20 TABLET, DELAYED RELEASE ORAL at 06:00

## 2020-08-18 RX ADMIN — DEXTROSE AND SODIUM CHLORIDE 125 ML/HR: 5; .2 INJECTION, SOLUTION INTRAVENOUS at 06:00

## 2020-08-18 RX ADMIN — DRONABINOL 5 MG: 2.5 CAPSULE ORAL at 06:04

## 2020-08-18 RX ADMIN — DEXTROSE 60 ML/HR: 5 SOLUTION INTRAVENOUS at 11:49

## 2020-08-18 RX ADMIN — ENOXAPARIN SODIUM 30 MG: 30 INJECTION SUBCUTANEOUS at 08:55

## 2020-08-18 RX ADMIN — CEFTRIAXONE 1000 MG: 1 INJECTION, SOLUTION INTRAVENOUS at 11:55

## 2020-08-18 RX ADMIN — LEVOTHYROXINE SODIUM 75 MCG: 75 TABLET ORAL at 05:50

## 2020-08-18 NOTE — CONSULTS
Completed nutrition assessment is viewable in the nutrition documentation  See also Malnutrition Note  Supplements adjusted per preference from Ensure Pudding to Magic Cup at all meals  Will follow  Thank you for the consult

## 2020-08-18 NOTE — SOCIAL WORK
I received a call from [address] [place] [address] Avenue at [facility], she stated the pt was about  to be signed onto hospice prior to his admission, they are able to accept the pt back to the [facility] with hospice care, pt will need a rx for hospice care with [facility]

## 2020-08-18 NOTE — PLAN OF CARE
Problem: Potential for Falls  Goal: Patient will remain free of falls  Description: INTERVENTIONS:  - Assess patient frequently for physical needs  -  Identify cognitive and physical deficits and behaviors that affect risk of falls  -  [place] fall precautions as indicated by assessment   - Educate patient/family on patient safety including physical limitations  - Instruct patient to call for assistance with activity based on assessment  - Modify environment to reduce risk of injury  - Consider OT/PT consult to assist with strengthening/mobility  Outcome: Progressing     Problem: Prexisting or High Potential for Compromised Skin Integrity  Goal: Skin integrity is maintained or improved  Description: INTERVENTIONS:  - Identify patients at risk for skin breakdown  - Assess and monitor skin integrity  - Assess and monitor nutrition and hydration status  - Monitor labs   - Assess for incontinence   - Turn and reposition patient  - Assist with mobility/ambulation  - Relieve pressure over bony prominences  - Avoid friction and shearing  - Provide appropriate hygiene as needed including keeping skin clean and dry  - Evaluate need for skin moisturizer/barrier cream  - Collaborate with interdisciplinary team   - Patient/family teaching  - Consider wound care consult   Outcome: Progressing     Problem: Nutrition/Hydration-ADULT  Goal: Nutrient/Hydration intake appropriate for improving, restoring or maintaining nutritional needs  Description: Monitor and assess patient's nutrition/hydration status for malnutrition  Collaborate with interdisciplinary team and initiate plan and interventions as ordered  Monitor patient's weight and dietary intake as ordered or per policy  Utilize nutrition screening tool and intervene as necessary  Determine patient's food preferences and provide high-protein, high-caloric foods as appropriate       INTERVENTIONS:  - Monitor oral intake, urinary output, labs, and treatment plans  - Assess nutrition and hydration status and recommend course of action  - Evaluate amount of meals eaten  - Assist patient with eating if necessary   - Allow adequate time for meals  - Recommend/ encourage appropriate diets, oral nutritional supplements, and vitamin/mineral supplements  - Order, calculate, and assess calorie counts as needed  - Recommend, monitor, and adjust tube feedings and TPN/PPN based on assessed needs  - Assess need for intravenous fluids  - Provide specific nutrition/hydration education as appropriate  - Include patient/family/caregiver in decisions related to nutrition  Outcome: Progressing

## 2020-08-18 NOTE — PROGRESS NOTES
Progress Note Christina Greenwood 1935, 81 y o  male MRN: [id number]    Unit/Bed#: 417-01 Encounter: [id number]    Primary Care Provider: Lia Robison MD   Date and time admitted to hospital: 8/16/2020  8:24 AM        * Acute metabolic encephalopathy  Assessment & Plan  Presented from [facility] due to acute decline in mental status over 24 hours most likely secondary to malnutrition and dehydration causing hypernatremia and hypercalcemia    Baseline oxygen 4 L nasal cannula    Trend fever curve  IV fluids D5 1/4 NS @125mL/hr  Ca correction 12 2  Ionized Ca 1 45  PTH 24 1   PTHrP pending   bld cx x2 pending  MRSA cx pending  UA with reflex showing trace leukocytes and moderate bacteruria  Urine strept and legionella negative  Procalcitonin <0 05 continue to trend  Troponin 0 14->0 12->0 15->0 16  Speech and swallow eval and treat    Acute cystitis with hematuria  Assessment & Plan  Ruled out with a bland UA and urine cultures only growing [id number] colonies of strep species  Discontinue antibiotics and observe      Elevated troponin level not due myocardial infarction  Assessment & Plan  Elevated troponin levels remaining stable at 0 14  Telemetry order  Most likely d/t pulmonary dz    Severe malnutrition (HCC)  Assessment & Plan  Malnutrition Findings:           BMI Findings: Body mass index is 14 2 kg/m²     CC adult failure to thrive    Adult failure to thrive  Assessment & Plan  Patient is emaciated and cachectic  BMI 14  Nutrition consulted  Speech and swallow evaluation  Nutrition supplementation  Continue Dronabinol    Mass of left lung  Assessment & Plan  Noted on CT on admission  Most likely malignant in nature  F/u H/O outpt    Pulmonary nodules  Assessment & Plan  As seen on CT chest:  Pneumonia versus malignancy  Will do infectious workup  See metabolic encephalopathy    Acute renal failure superimposed on stage 3 chronic kidney disease (HCC)  Assessment & Plan  POA with Cr of 1 51 most likely secondary to dehydration and malnutrition  Baseline creatinine 1 0-1 2  GFR baseline 54-69 stage III  Avoid nephrotoxin agents  IV fluids  Has history of metastatic bladder carcinoma has chronic indwelling Loyd  Nephrology consulted and recommendations appreciated  resolved    Hypercalcemia  Assessment & Plan  Calcium 12 4 on admission secondary to dehydration versus malnutrition versus malignancy  IV fluids  Corrected calcium 12 2  Ionized calcium 1 45, continue to monitor  +/- Zoledronic Acid and calcitonin  Continue to trend  Nephrology consult and recommendations appreciated  Concern for underlying malignancy, however, noted goals of care will be transitioned to hospice upon discharge so may defer to limited workup     Hypernatremia  Assessment & Plan  Sodium 154 on admission most likely secondary to dehydration and malnutrition  IV fluids started  Continue to trend  Nephrology consulted and recommendations appreciated    Hypertension  Assessment & Plan  Blood pressure stable on admission  Not on any oral HTN meds  Continue to monitor    Malignant neoplasm of bladder ([facility] [address] )  Assessment & Plan  History of bladder cancer  Has chronic indwelling FoleyContinue Flomax and Proscar    Dementia with behavioral disturbance (HCC)  Assessment & Plan  Advanced dementia associated with irritability and anger  Continue home medication of vitamin B12 memantine, Ativan and Cymbalta    Hypothyroidism  Assessment & Plan  Continue levothyroxine      VTE Pharmacologic Prophylaxis:   Pharmacologic: Enoxaparin (Lovenox)  Mechanical VTE Prophylaxis in Place: Yes    Patient Centered Rounds: I have performed bedside rounds with nursing staff today  Discussions with Specialists or Other Care Team Provider: case management    Education and Discussions with Family / Patient: wife    Time Spent for Care: 45 minutes  More than 50% of total time spent on counseling and coordination of care as described above      Current Length of Stay: 2 day(s)    Current Patient Status: Inpatient   Certification Statement: The patient will continue to require additional inpatient hospital stay due to IVF, close monitoring     Discharge Plan: TBD    Code Status: Level 3 - DNAR and DNI      Subjective:   Patient seen and examined  He appears decreasingly responsive, only to painful stimuli  Objective:     Vitals:   Temp (24hrs), Av 3 °F (37 4 °C), Min:99 3 °F (37 4 °C), Max:99 3 °F (37 4 °C)    Temp:  [99 3 °F (37 4 °C)] 99 3 °F (37 4 °C)  HR:  [75-94] 83  Resp:  [16-18] 18  BP: ()/(46-63) 85/46  SpO2:  [98 %-100 %] 98 %  Body mass index is 13 34 kg/m²  Input and Output Summary (last 24 hours): Intake/Output Summary (Last 24 hours) at 2020 1658  Last data filed at 2020 1148  Gross per 24 hour   Intake 2962 92 ml   Output    Net 2962 92 ml       Physical Exam:     Physical Exam  Constitutional:       Appearance: He is cachectic  HENT:      Head: Normocephalic and atraumatic  Nose: No congestion  Mouth/Throat:      Mouth: Mucous membranes are dry  Eyes:      Conjunctiva/sclera: Conjunctivae normal    Cardiovascular:      Rate and Rhythm: Normal rate and regular rhythm  Pulmonary:      Effort: Pulmonary effort is normal    Abdominal:      General: There is no distension  Tenderness: There is no abdominal tenderness  Musculoskeletal:         General: No swelling  Skin:     General: Skin is warm and dry  Neurological:      Mental Status: He is lethargic     Psychiatric:      Comments: Decreased alertness          Additional Data:     Labs:    Results from last 7 days   Lab Units 20  0601 20  0439   WBC Thousand/uL 11 61* 12 50*   HEMOGLOBIN g/dL 12 9 11 5*   HEMATOCRIT % 43 7 37 9   PLATELETS Thousands/uL 209 262   BANDS PCT % 6  --    NEUTROS PCT %  --  91*   LYMPHS PCT %  --  5*   LYMPHO PCT % 8*  --    MONOS PCT %  --  4   MONO PCT % 4  --    EOS PCT % 0 0     Results from last 7 days   Lab Units 08/18/20  0609 08/18/20  0601   SODIUM mmol/L 148* 148*   POTASSIUM mmol/L 3 7 3 7   CHLORIDE mmol/L 113* 114*   CO2 mmol/L 30 30   BUN mg/dL 36* 35*   CREATININE mg/dL 1 22 1 28   ANION GAP mmol/L 5 4   CALCIUM mg/dL 10 7* 10 7*   ALBUMIN g/dL 2 3* 2 3*   TOTAL BILIRUBIN mg/dL  --  0 60   ALK PHOS U/L  --  102   ALT U/L  --  47   AST U/L  --  51*   GLUCOSE RANDOM mg/dL 124 124                 Results from last 7 days   Lab Units 08/16/20  1222   PROCALCITONIN ng/ml <0 05           * I Have Reviewed All Lab Data Listed Above  * Additional Pertinent Lab Tests Reviewed: [address] [address] Admission Reviewed    Imaging:    Imaging Reports Reviewed Today Include: CT chest wo contrast 8/16      Recent Cultures (last 7 days):     Results from last 7 days   Lab Units 08/16/20  1427 08/16/20  1426 08/16/20  1224   BLOOD CULTURE   --   --  No Growth at 24 hrs  No Growth at 24 hrs     URINE CULTURE  30,000-39,000 cfu/ml Streptococcus species*  --   --    LEGIONELLA URINARY ANTIGEN   --  Negative  --        Last 24 Hours Medication List:   Current Facility-Administered Medications   Medication Dose Route Frequency Provider Last Rate    acetaminophen  650 mg Oral Q6H PRN Andrew Avila MD      albuterol  2 5 mg Nebulization Q6H PRN Mayra Almaraz MD      aspirin  81 mg Oral Daily Andrew Avila MD      atorvastatin  80 mg Oral QPM Andrew Avila MD      cefTRIAXone  1,000 mg Intravenous Q24H Andrew Avila MD 1,000 mg (08/18/20 1155)    vitamin B-12  1,000 mcg Oral Daily Andrew Avila MD      dextrose  75 mL/hr Intravenous Continuous Cordell Nation CRNP 75 mL/hr (08/18/20 1502)    dronabinol  5 mg Oral BID AC Andrew Avila MD      DULoxetine  30 mg Oral QPM Andrew Avila MD      DULoxetine  60 mg Oral QAM Andrew Avila MD      enoxaparin  30 mg Subcutaneous Daily Andrew Avila MD      finasteride  5 mg Oral Daily Andrew Avila MD      levothyroxine  75 mcg Oral Early Morning Lenka Moya MD Reuben      LORazepam  0 5 mg Oral Q8H PRN Andrew Avila MD      memantine  10 mg Oral BID Andrew Avila MD      ondansetron  4 mg Intravenous Q6H PRN Andrew Avila MD      pantoprazole  20 mg Oral Daily Before Breakfast Andrew Avila MD      polyethylene glycol  17 g Oral Daily PRN Andrew Avila MD      tamsulosin  0 4 mg Oral Daily With Lorne Kirk MD          Today, Patient Was Seen By: Ramon Weems MD    ** Please Note: Dictation voice to text software may have been used in the creation of this document   **

## 2020-08-18 NOTE — OCCUPATIONAL THERAPY NOTE
Occupational Therapy         Patient Name: Deni Parada  Today's Date: 8/18/2020    Pt on hospice at baseline per case management note and chart review; pt to transition to hospice again upon discharge; pt has been poorly responsive during hospital admission; not appropriate for OT evaluation and will complete orders at this time

## 2020-08-18 NOTE — PHYSICAL THERAPY NOTE
PHYSICAL THERAPY NOTE          Patient Name: Yousif Obando  BILRN'V Date: 8/18/2020     Pt previously active with [facility] and will likely d/c back to [facility] on Hospice services  Pt has been minimally responsive during hospitalization and when PT evaluations were attempted  Pt not appropriate for PT intervention; will d/c PT orders      Ferdinand Swift, PT

## 2020-08-18 NOTE — PROGRESS NOTES
NEPHROLOGY PROGRESS NOTE   Elodia Frye 81 y o  male MRN: [id number]  Unit/Bed#: 701-79 Encounter: [id number]    Assessment/Plan:    81year old male with dementia, FTT, malignant bladder carcinoma who lives at [facility] admitted 08/16 with acute metabolic encephalopathy due to dehydration and malnutrition  A renal consultation was requested for hypercalcemia and hypernatremia  He has been receiving volume expansion with hypotonic crystalloid  1  Acute kidney injury (POA) -resolved  2  Stage 3 chronic kidney disease with baseline creatinine around 1 1-1 3 mg/dL  3  Hyperchloremic hypernatremia  · Due to decreased oral intake in demented patient with failure to thrive  Sodium 148 millimoles per L this morning  Continue hypotonic fluids  Encourage oral hydration and nutrition as best as possible on honey thick liquid diet  Per record review, patient may return to [facility] with hospice care  4  Moderate Hypercalcemia  · No ionized calcium checked today  Calcium corrected for albumin around 12 0 mg/dL  Hypercalcemia may be attributing to patient's acute metabolic encephalopathy  This patient has a known malignant neoplasm of the bladder and mass now seen on CT scan suspicious for bronchogenic carcinoma  PTH related peptide still pending  Concern that this is hypercalcemia of malignancy  Consider treating with bisphosphonate therapy, zometa or pamidronate, if calcium does not improve much by tomorrow  Will check SPEP/UPEP however unclear what type of treatment would be offered to patient if gammopathy is revealed  Avoid exogenous calcium and vitamin-D   5  Acute metabolic encephalopathy  · Likely due to hypercalcemia and hypernatremia  Rest of workup per primary team  6  Neoplasm of the bladder  · Indwelling chronic urinary catheter in place  7  Left lung mass and pulmonary nodules  · Again, suspicious for hypercalcemia of malignancy  PTH related peptide pending    Feel he may benefit from biphosphonate therapy  8  Severe protein calorie malnutrition  9  Hypophosphatemia  · Attempt to correct with offering food and oral supplementation versus IV        ROS  Unable to obtain due to disorientation with history of dementia      Historical Information   Past Medical History:   Diagnosis Date    Abnormal stress test     Abnormal weight loss     [facility] 5/2/2018    Aortic insufficiency with aortic stenosis     [s/p AVR (tissue) 4/2013]     Bradycardia, unspecified     Coronary artery disease      [s/p CABG x3 4/2013]    Diabetes ([facility]     Disease of thyroid gland     Dyslipidemia     Gastric ulcer     last assessed: 2/24/14    H/O cardiovascular stress test      (EF 0 67, Not suspicious for significant occlusive CAD  ) - 11/28/2007; [facility] (Normal EF, Inferior wall post stress HK, markedly positive EKG with chest pressure) - 4/10/2013    History of echocardiogram 08/05/2016    2-D w/ CFD:  EF 0 55 (55%), Normal LVSF  Normal functioning bioprosthetic AV   History of EKG     EKG shows sinus bradycardia, 49 bpm, otherwise normal     History of EKG      (Sinus Rhythm) - 3/25/2013;  (Sinus Trenna Most) - 5/20/2013;  9/25/2013     History of Holter monitoring      (SR w/ rates ranging from 44-78BPM  No afib, no heart block, no vtach, no pauses, and no symptoms per pt  there were few short atrial runs, highest being 140BPM, the longest being 8 beats; though overall atrial and ventricular ectopic beats were rare ) - 8/3/2016     History of Holter monitoring 08/22/2016    24 hr    Hx of echocardiogram     (EF 0 70, Without regional wall abnormalities  Possibly bicuspid aortic valve with mild AS, no aortic insufficiency ) 3/22/2007;  (EF 0 55 (55%), Normal LVSF   Normal functioning bioprosthetic AV ) - 8/3/2016     Hypertension     Hypothyroid 5/2/2018    ICAO (internal carotid artery occlusion)     Duplex (There is known occlusion of ICA in rt carotid and the vertebral artery is antegrade  0-19% ICA stenosis of Lt carotid  The vertebral artery is antegrade  Widely patent endarterectomy site on the left ) - 1/30/2014 Carotid Duplex (No change  Known occlusion of ICA in right carotid   0-19% ICA stenosis in left carotid, and widely patent endarterectomy site on left ) - 8/28/2014    Inguinal hernia, left     last assessed: 10/8/14    Peptic ulcer     last assessed: 5/21/13    Prostate enlargement 5/2/2018     Past Surgical History:   Procedure Laterality Date    AORTIC VALVE REPLACEMENT      CAROTID ENDARTARECTOMY  04/26/2013    CATARACT EXTRACTION Bilateral     CORONARY ARTERY BYPASS GRAFT       (3V CABG: LIMA-LAD, VG-RCA, VG-CX, Tissue AVR (#23 [place])) - 4/26/2013     CYSTOSCOPY  04/23/2020    INGUINAL HERNIA REPAIR      NEPHRECTOMY Right     WY CYSTOURETHROSCOPY W/IRRIG & EVAC CLOTS N/A 5/7/2018    Procedure: CYSTOSCOPY EVACUATION OF CLOTS AND TURBT;  Surgeon: Levi Omer MD;  Location: [facility];  Service: Urology    THROMBOENDARTERECTOMY      Carotid     Social History   Social History     Substance and Sexual Activity   Alcohol Use Never    Frequency: Never    Drinks per session: Patient refused    Binge frequency: Never    Comment: social     Social History     Substance and Sexual Activity   Drug Use No     Social History     Tobacco Use   Smoking Status Former Smoker    Types: Cigarettes   Smokeless Tobacco Never Used   Tobacco Comment    quit 20 years ago       Family History:   Family History   Problem Relation Age of Onset   [facility] Sudden death Mother         unexpected death   [facility] Other Father         Coal workers' Pneumoconiosis    Alzheimer's disease Brother        Medications:  Pertinent medications were reviewed  Current Facility-Administered Medications   Medication Dose Route Frequency Provider Last Rate    acetaminophen  650 mg Oral Q6H PRN Yvonne Torres MD      albuterol  2 5 mg Nebulization Q6H PRN Glenna Mackey MD      aspirin  81 mg Oral Daily Lorena Garnica MD      atorvastatin  80 mg Oral QPM Lorena Garnica MD      cefTRIAXone  1,000 mg Intravenous Q24H Lorena Garnica MD 1,000 mg (08/18/20 1155)    vitamin B-12  1,000 mcg Oral Daily Lorena Garnica MD      dextrose  60 mL/hr Intravenous Continuous Francis Ramirez MD 60 mL/hr (08/18/20 1149)    dronabinol  5 mg Oral BID AC Lorena Garnica MD      DULoxetine  30 mg Oral QPM Lorena Garnica MD      DULoxetine  60 mg Oral QAM Lorena Garnica MD      enoxaparin  30 mg Subcutaneous Daily Lorena Garnica MD      finasteride  5 mg Oral Daily Lorena Garnica MD      levothyroxine  75 mcg Oral Early Morning Lorena Garnica MD      LORazepam  0 5 mg Oral Q8H PRN Lorena Garnica MD      memantine  10 mg Oral BID Lorena Garnica MD      ondansetron  4 mg Intravenous Q6H PRN Lorena Garnica MD      pantoprazole  20 mg Oral Daily Before Breakfast Lorena Garnica MD      polyethylene glycol  17 g Oral Daily PRN Lorena Garnica MD      tamsulosin  0 4 mg Oral Daily With Madalyn Montalvo MD           Allergies   Allergen Reactions    Contrast [Iodinated Diagnostic Agents]     Iodine Throat Swelling     IVP contrast dye    Morphine Rash         Vitals:   /63 (BP Location: Left arm)   Pulse 94   Temp 99 3 °F (37 4 °C) (Temporal)   Resp 16   Ht 6' (1 829 m)   Wt 44 6 kg (98 lb 5 2 oz)   SpO2 99%   BMI 13 34 kg/m²   Body mass index is 13 34 kg/m²    SpO2: 99 %,   SpO2 Activity: At Rest,   O2 Device: Nasal cannula      Intake/Output Summary (Last 24 hours) at 8/18/2020 1443  Last data filed at 8/18/2020 1148  Gross per 24 hour   Intake 2962 92 ml   Output    Net 2962 92 ml     Invasive Devices     Peripheral Intravenous Line            Peripheral IV 08/16/20 Right Antecubital 2 days                Physical Exam  General: conscious, cooperative, in no acute distress chronically ill-appearing  Eyes: conjunctivae pink, anicteric sclerae  ENT: lips and mucous membranes very dry  Neck: supple, no JVD, no masses  Chest:  Diminished breath sounds bilateral, no crackles, ronchus or wheezings, poor effort  CVS: S1 & S2, normal rate, regular rhythm  Abdomen: soft, non-tender, non-distended, normoactive bowel sounds, cachectic  Extremities: no edema of both legs  Skin: no rash  Neuro:  Disoriented      Diagnostic Data:  Lab: I have personally reviewed pertinent lab results  ,   CBC:  Results from last 7 days   Lab Units 08/18/20  0601   WBC Thousand/uL 11 61*   HEMOGLOBIN g/dL 12 9   HEMATOCRIT % 43 7   PLATELETS Thousands/uL 209      CMP:   Lab Results   Component Value Date    SODIUM 148 (H) 08/18/2020    K 3 7 08/18/2020     (H) 08/18/2020    CO2 30 08/18/2020    BUN 36 (H) 08/18/2020    CREATININE 1 22 08/18/2020    CALCIUM 10 7 (H) 08/18/2020    AST 51 (H) 08/18/2020    ALT 47 08/18/2020    ALKPHOS 102 08/18/2020    EGFR 54 08/18/2020   ,   PT/INR: No results found for: PT, INR,   Magnesium: No components found for: MAG,  Phosphorous:   Lab Results   Component Value Date    PHOS 2 0 (L) 08/18/2020       Microbiology:  @LABRCNTNOE,(urinecx:7)@        PAULO Monroe    Portions of the record may have been created with voice recognition software  Occasional wrong word or "sound a like" substitutions may have occurred due to the inherent limitations of voice recognition software  Read the chart carefully and recognize, using context, where substitutions have occurred

## 2020-08-18 NOTE — MALNUTRITION/BMI
This medical record reflects one or more clinical indicators suggestive of malnutrition and/or morbid obesity  Malnutrition Findings:   Malnutrition type: Chronic illness  Degree of Malnutrition: Other severe protein calorie malnutrition(Severe protein-calorie malnutrition r/t ongoing minimal po intakes subsequent to CA & dementia as evidenced by severe wt  loss of ~40# (29%) x ~3 5 months, muscle/fat loss in temples, clavicles, buccal fat pads; treat w/ dysphagia diet & supplements )  Malnutrition Characteristics: Muscle loss, Weight loss, Inadequate energy, Fat loss    BMI Findings:  BMI Classifications: Underweight < 18 5     Body mass index is 13 34 kg/m²  See Nutrition note dated 8/18/20 for additional details  Completed nutrition assessment is viewable in the nutrition documentation

## 2020-08-19 VITALS
HEIGHT: 72 IN | RESPIRATION RATE: 18 BRPM | BODY MASS INDEX: 14.57 KG/M2 | WEIGHT: 107.58 LBS | DIASTOLIC BLOOD PRESSURE: 86 MMHG | OXYGEN SATURATION: 98 % | TEMPERATURE: 97 F | SYSTOLIC BLOOD PRESSURE: 114 MMHG | HEART RATE: 73 BPM

## 2020-08-19 PROBLEM — E87.6 HYPOKALEMIA: Status: ACTIVE | Noted: 2020-08-19

## 2020-08-19 PROBLEM — E43 SEVERE PROTEIN-CALORIE MALNUTRITION (HCC): Status: ACTIVE | Noted: 2020-08-19

## 2020-08-19 LAB
ALBUMIN SERPL BCP-MCNC: 1.9 G/DL (ref 3.5–5)
ALP SERPL-CCNC: 84 U/L (ref 46–116)
ALT SERPL W P-5'-P-CCNC: 46 U/L (ref 12–78)
ANION GAP SERPL CALCULATED.3IONS-SCNC: 6 MMOL/L (ref 4–13)
AST SERPL W P-5'-P-CCNC: 60 U/L (ref 5–45)
BACTERIA UR CULT: ABNORMAL
BACTERIA UR CULT: ABNORMAL
BILIRUB SERPL-MCNC: 0.7 MG/DL (ref 0.2–1)
BUN SERPL-MCNC: 28 MG/DL (ref 5–25)
CA-I BLD-SCNC: 1.38 MMOL/L (ref 1.12–1.32)
CALCIUM SERPL-MCNC: 9.8 MG/DL (ref 8.3–10.1)
CHLORIDE SERPL-SCNC: 108 MMOL/L (ref 100–108)
CO2 SERPL-SCNC: 28 MMOL/L (ref 21–32)
CREAT SERPL-MCNC: 1.12 MG/DL (ref 0.6–1.3)
ERYTHROCYTE [DISTWIDTH] IN BLOOD BY AUTOMATED COUNT: 13.9 % (ref 11.6–15.1)
GFR SERPL CREATININE-BSD FRML MDRD: 60 ML/MIN/1.73SQ M
GLUCOSE SERPL-MCNC: 109 MG/DL (ref 65–140)
HCT VFR BLD AUTO: 34.1 % (ref 36.5–49.3)
HGB BLD-MCNC: 10.6 G/DL (ref 12–17)
MAGNESIUM SERPL-MCNC: 1.6 MG/DL (ref 1.6–2.6)
MCH RBC QN AUTO: 28.4 PG (ref 26.8–34.3)
MCHC RBC AUTO-ENTMCNC: 31.1 G/DL (ref 31.4–37.4)
MCV RBC AUTO: 91 FL (ref 82–98)
NRBC BLD AUTO-RTO: 0 /100 WBCS
PLATELET # BLD AUTO: 189 THOUSANDS/UL (ref 149–390)
PMV BLD AUTO: 11.8 FL (ref 8.9–12.7)
POTASSIUM SERPL-SCNC: 3.2 MMOL/L (ref 3.5–5.3)
PROT SERPL-MCNC: 6.1 G/DL (ref 6.4–8.2)
RBC # BLD AUTO: 3.73 MILLION/UL (ref 3.88–5.62)
SARS-COV-2 RNA RESP QL NAA+PROBE: NEGATIVE
SODIUM SERPL-SCNC: 142 MMOL/L (ref 136–145)
WBC # BLD AUTO: 12.84 THOUSAND/UL (ref 4.31–10.16)

## 2020-08-19 PROCEDURE — 84165 PROTEIN E-PHORESIS SERUM: CPT | Performed by: NURSE PRACTITIONER

## 2020-08-19 PROCEDURE — 82330 ASSAY OF CALCIUM: CPT | Performed by: NURSE PRACTITIONER

## 2020-08-19 PROCEDURE — 80053 COMPREHEN METABOLIC PANEL: CPT | Performed by: NURSE PRACTITIONER

## 2020-08-19 PROCEDURE — 86334 IMMUNOFIX E-PHORESIS SERUM: CPT | Performed by: NURSE PRACTITIONER

## 2020-08-19 PROCEDURE — 99239 HOSP IP/OBS DSCHRG MGMT >30: CPT | Performed by: FAMILY MEDICINE

## 2020-08-19 PROCEDURE — 84165 PROTEIN E-PHORESIS SERUM: CPT | Performed by: PATHOLOGY

## 2020-08-19 PROCEDURE — 86334 IMMUNOFIX E-PHORESIS SERUM: CPT | Performed by: PATHOLOGY

## 2020-08-19 PROCEDURE — 83735 ASSAY OF MAGNESIUM: CPT | Performed by: FAMILY MEDICINE

## 2020-08-19 PROCEDURE — 85027 COMPLETE CBC AUTOMATED: CPT | Performed by: FAMILY MEDICINE

## 2020-08-19 PROCEDURE — 87635 SARS-COV-2 COVID-19 AMP PRB: CPT | Performed by: FAMILY MEDICINE

## 2020-08-19 RX ORDER — POTASSIUM CHLORIDE 20 MEQ/1
40 TABLET, EXTENDED RELEASE ORAL ONCE
Status: DISCONTINUED | OUTPATIENT
Start: 2020-08-19 | End: 2020-08-19 | Stop reason: HOSPADM

## 2020-08-19 RX ORDER — ACETAMINOPHEN 325 MG/1
650 TABLET ORAL EVERY 6 HOURS PRN
Qty: 30 TABLET | Refills: 0
Start: 2020-08-19

## 2020-08-19 RX ORDER — ALBUTEROL SULFATE 2.5 MG/3ML
2.5 SOLUTION RESPIRATORY (INHALATION) EVERY 6 HOURS PRN
Refills: 0
Start: 2020-08-19

## 2020-08-19 RX ORDER — POLYETHYLENE GLYCOL 3350 17 G/17G
17 POWDER, FOR SOLUTION ORAL DAILY PRN
Qty: 14 EACH | Refills: 0
Start: 2020-08-19

## 2020-08-19 RX ADMIN — DEXTROSE 75 ML/HR: 5 SOLUTION INTRAVENOUS at 01:14

## 2020-08-19 NOTE — ASSESSMENT & PLAN NOTE
Progressive overall decline   Presented from [facility] due to acute worseing in mental status over 24 hours in setting of worsening dementia-associated malnutrition associated with dehydration causing hypernatremia   Hypercalcemia without significant improvement with IVF and suspected underlying malignancy   Due to overall decline and poor prognosis, wife deciding to proceed with hospice care - will be initiated upon discharge to [facility] assisted living where the patient had been the resident

## 2020-08-19 NOTE — ASSESSMENT & PLAN NOTE
Advanced dementia associated with irritability and anger  Continue home medication of vitamin B12 memantine, Ativan and Cymbalta  As above, the patient will be transitioned to hospice on discharge

## 2020-08-19 NOTE — ASSESSMENT & PLAN NOTE
Elevated troponin levels remaining stable at 0 14  Secondary to severe dehydration  As above, patient being transitioned to hospice care

## 2020-08-19 NOTE — SOCIAL WORK
Pt is being discharged today to return to [facility]   As per [facility]  Pt  needs an order for [facility] to restart caring for the patient  Physician notified of same  [facility] will pick the patient up at 11:30 am to transport him back to [facility] notified of transport  I called patient's wife Marquis Magdaleno and notified her what time patient would be going back to [place]  A copy of AVS faxed to [facility] and sent with patient

## 2020-08-19 NOTE — ASSESSMENT & PLAN NOTE
Calcium 12 4 on admission secondary to dehydration versus malnutrition versus malignancy  Improved IV fluids  Corrected calcium 12 2  Ionized calcium elevated 1 38  Nephrology consult and recommendations appreciated  Concern for underlying malignancy - known bladder cancer and newly noted lung mass, however, noted goals of care will be transitioned to hospice upon discharge so no additional workup

## 2020-08-19 NOTE — PLAN OF CARE
Problem: Potential for Falls  Goal: Patient will remain free of falls  Description: INTERVENTIONS:  - Assess patient frequently for physical needs  -  Identify cognitive and physical deficits and behaviors that affect risk of falls  -  [place] fall precautions as indicated by assessment   - Educate patient/family on patient safety including physical limitations  - Instruct patient to call for assistance with activity based on assessment  - Modify environment to reduce risk of injury  - Consider OT/PT consult to assist with strengthening/mobility  Outcome: Adequate for Discharge     Problem: Prexisting or High Potential for Compromised Skin Integrity  Goal: Skin integrity is maintained or improved  Description: INTERVENTIONS:  - Identify patients at risk for skin breakdown  - Assess and monitor skin integrity  - Assess and monitor nutrition and hydration status  - Monitor labs   - Assess for incontinence   - Turn and reposition patient  - Assist with mobility/ambulation  - Relieve pressure over bony prominences  - Avoid friction and shearing  - Provide appropriate hygiene as needed including keeping skin clean and dry  - Evaluate need for skin moisturizer/barrier cream  - Collaborate with interdisciplinary team   - Patient/family teaching  - Consider wound care consult   Outcome: Adequate for Discharge     Problem: Nutrition/Hydration-ADULT  Goal: Nutrient/Hydration intake appropriate for improving, restoring or maintaining nutritional needs  Description: Monitor and assess patient's nutrition/hydration status for malnutrition  Collaborate with interdisciplinary team and initiate plan and interventions as ordered  Monitor patient's weight and dietary intake as ordered or per policy  Utilize nutrition screening tool and intervene as necessary  Determine patient's food preferences and provide high-protein, high-caloric foods as appropriate       INTERVENTIONS:  - Monitor oral intake, urinary output, labs, and treatment plans  - Assess nutrition and hydration status and recommend course of action  - Evaluate amount of meals eaten  - Assist patient with eating if necessary   - Allow adequate time for meals  - Recommend/ encourage appropriate diets, oral nutritional supplements, and vitamin/mineral supplements  - Order, calculate, and assess calorie counts as needed  - Recommend, monitor, and adjust tube feedings and TPN/PPN based on assessed needs  - Assess need for intravenous fluids  - Provide specific nutrition/hydration education as appropriate  - Include patient/family/caregiver in decisions related to nutrition  Outcome: Adequate for Discharge

## 2020-08-19 NOTE — ASSESSMENT & PLAN NOTE
Noted on CT on admission  Most likely malignant in nature  No additional workup per family's wishes - transitioning to hospice on discharge   Lengthy discussion with patient's wife Jay

## 2020-08-19 NOTE — NURSING NOTE
Patient discharged to [facility]  Patient VSS  IV removed with catheter tip intact, DSD and pressure applied  All belongings returned to patient upon discharge  Pt taken by [employer]  Report given to RN at above facility

## 2020-08-19 NOTE — ASSESSMENT & PLAN NOTE
Ruled out with a bland UA and urine cultures only growing [phone] colonies of strep species  Discontinued antibiotics 8/18/20

## 2020-08-19 NOTE — ASSESSMENT & PLAN NOTE
POA with Cr of 1 51 most likely secondary to dehydration and malnutrition  Resolved with IVF   Baseline creatinine 1 0-1 2

## 2020-08-19 NOTE — ASSESSMENT & PLAN NOTE
Malnutrition Findings:   Malnutrition type: Chronic illness  Degree of Malnutrition: Other severe protein calorie malnutrition(Severe protein-calorie malnutrition r/t ongoing minimal po intakes subsequent to CA & dementia as evidenced by severe wt  loss of ~40# (29%) x ~3 5 months, muscle/fat loss in temples, clavicles, buccal fat pads; treat w/ dysphagia diet & supplements )    BMI Findings:  BMI Classifications: Underweight < 18 5     Body mass index is 14 59 kg/m²     CC adult failure to thrive  Continue to encourage PO intake/pleasure feeds

## 2020-08-19 NOTE — UTILIZATION REVIEW
Notification of Discharge  This is a Notification of Discharge from our facility [address]  Please be advised that this patient has been discharge from our facility  Below you will find the admission and discharge date and time including the patients disposition  PRESENTATION DATE: 8/16/2020  8:24 AM  OBS ADMISSION DATE:   IP ADMISSION DATE: 8/16/20 1024   DISCHARGE DATE: 8/19/2020 11:40 AM  DISPOSITION: Non [facility]/S[facility] Non [facility] SNF/TCU/SNU   Admission Orders listed below:  Admission Orders (From admission, onward)     Ordered        08/16/20 1024  Inpatient Admission  Once                   Please contact the UR Department if additional information is required to close this patient's authorization/case  [address] [facility] Utilization Review Department  Main: [phone] x carefully listen to the prompts  All voicemails are confidential   [email or web address]  Send all requests for admission clinical reviews, approved or denied determinations and any other requests to dedicated fax number below belonging to the campus where the patient is receiving treatment   List of dedicated fax numbers:  [address] DENIALS (Administrative/Medical Necessity) [phone]   [address]  [facility] (Maternity/NICU/Pediatrics) [phone]   [facility] [phone]   [facility] [place] [phone]   [address] [place] [address] [phone]   Herrera Olivares [facility] [address] [facility] [phone]   Alex Upton [phone] [address] [facility] [address] [facility]  [address] [address] [facility] [phone]

## 2020-08-19 NOTE — DISCHARGE SUMMARY
Discharge- Barry Vegas 1935, 81 y o  male MRN: [id number]    Unit/Bed#: 417-01 Encounter: [id number]    Primary Care Provider: Wyvonnia Dancer, MD   Date and time admitted to hospital: 8/16/2020  8:24 AM        * Acute metabolic encephalopathy  Assessment & Plan  Progressive overall decline   Presented from [facility] due to acute worseing in mental status over 24 hours in setting of worsening dementia-associated malnutrition associated with dehydration causing hypernatremia   Hypercalcemia without significant improvement with IVF and suspected underlying malignancy   Due to overall decline and poor prognosis, wife deciding to proceed with hospice care - will be initiated upon discharge to [facility] assisted living where the patient had been the resident       Hypokalemia  Assessment & Plan  Replace prior to discharge     Acute cystitis with hematuria  Assessment & Plan  Ruled out with a bland UA and urine cultures only growing [id number] colonies of strep species  Discontinued antibiotics 8/18/20      Elevated troponin level not due myocardial infarction  Assessment & Plan  Elevated troponin levels remaining stable at 0 14  Secondary to severe dehydration  As above, patient being transitioned to hospice care     Severe malnutrition ([address] )  Assessment & Plan  Malnutrition Findings:   Malnutrition type: Chronic illness  Degree of Malnutrition: Other severe protein calorie malnutrition(Severe protein-calorie malnutrition r/t ongoing minimal po intakes subsequent to CA & dementia as evidenced by severe wt  loss of ~40# (29%) x ~3 5 months, muscle/fat loss in temples, clavicles, buccal fat pads; treat w/ dysphagia diet & supplements )    BMI Findings:  BMI Classifications: Underweight < 18 5     Body mass index is 14 59 kg/m²     CC adult failure to thrive  Continue to encourage PO intake/pleasure feeds    Mass of left lung  Assessment & Plan  Noted on CT on admission  Most likely malignant in nature  No additional workup per family's wishes - transitioning to hospice on discharge   Lengthy discussion with patient's wife [place] Done     Acute renal failure superimposed on stage 3 chronic kidney disease ([address] )  Assessment & Plan  POA with Cr of 1 51 most likely secondary to dehydration and malnutrition  Resolved with IVF   Baseline creatinine 1 0-1 2      Hypercalcemia  Assessment & Plan  Calcium 12 4 on admission secondary to dehydration versus malnutrition versus malignancy  Improved IV fluids  Corrected calcium 12 2  Ionized calcium elevated 1 38  Nephrology consult and recommendations appreciated  Concern for underlying malignancy - known bladder cancer and newly noted lung mass, however, noted goals of care will be transitioned to hospice upon discharge so no additional workup    Hypernatremia  Assessment & Plan  Sodium 154 on admission most likely secondary to dehydration and malnutrition  Resolved with IVF     Hypertension  Assessment & Plan  Blood pressure stable on admission  Not on any oral HTN meds      Malignant neoplasm of bladder (HCC)  Assessment & Plan  History of bladder cancer  Has chronic indwelling FoleyContinue Flomax and Proscar    Dementia with behavioral disturbance (HCC)  Assessment & Plan  Advanced dementia associated with irritability and anger  Continue home medication of vitamin B12 memantine, Ativan and Cymbalta  As above, the patient will be transitioned to hospice on discharge     Hypothyroidism  Assessment & Plan  Continue levothyroxine for now  The patient will be transitioned to hospice care upon discharge       Discharging Physician / Practitioner: Isacc Choudhury MD  PCP: Christiano Terry MD  Admission Date:   Admission Orders (From admission, onward)     Ordered        08/16/20 1024  Inpatient Admission  Once                   Discharge Date: 08/19/20    Resolved Problems  Date Reviewed: 8/19/2020    None          Consultations During Hospital Stay:  · Nephrology, PT, OT, CM    Procedures Performed:   CT chest without contrast   Final Result by Aries Colby MD (08/16 1017)      1   4 6 x 6 0 cm spiculated mass in the superior segment of the left lower lobe, most likely bronchogenic carcinoma  2   Several small bilateral pulmonary nodules as described above, many of which appear to be inflammatory in etiology  However, metastases and/or synchronous primary malignancies cannot be excluded  3   Emphysema  4   No evidence of mediastinal or hilar lymphadenopathy  5   4 3 x 5 1 cm soft tissue mass in the left lateral chest wall, 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 Final Result by Eloise Mohr MD (08/16 5078)      Oval-shaped mass in the superior segment of the left lower lobe  An additional pleural-based masses seen laterally at the left lung base  Hyperinflation              Workstation performed: [id number]               Significant Findings / Test Results:   Results from last 7 days   Lab Units 08/19/20  0600   WBC Thousand/uL 12 84*   HEMOGLOBIN g/dL 10 6*   HEMATOCRIT % 34 1*   PLATELETS Thousands/uL 189     Results from last 7 days   Lab Units 08/19/20  0600   SODIUM mmol/L 142   CHLORIDE mmol/L 108   CO2 mmol/L 28   BUN mg/dL 28*   CREATININE mg/dL 1 12   CALCIUM mg/dL 9 8   ALK PHOS U/L 84   ALT U/L 46   AST U/L 60*       Incidental Findings:   · None     Test Results Pending at Discharge (will require follow up):   · None     Outpatient Tests Requested:  · None    Complications:  None    Reason for Admission: encephalopathy    Hospital Course: Humberto Du is a 81 y o  male patient with history of progressive dementia, bladder cancer who originally presented to the hospital on 8/16/2020 due to worsening mental status associated with hypernatremia and hypercalcemia and severe malnutrition  The patient was treated with IV fluids with eventual improvement of his electrolyte disturbances, however, no significant overall clinical improvement  Nephrology was following and advising on patient management  During workup, the patient was noted for a new lung mass concerning for malignancy  After lengthy discussion with wife and, due to patient's overall decline and poor prognosis, the decision was made for the patient to be transitioned to hospice care upon discharge  He was discharged to [address] [place] [address] where hospice care will take over the treatment  Discharge plan discussed with the patient's wife in detail, all questions answered  Please see above list of diagnoses and related plan for additional information  Condition at Discharge: poor     Discharge Day Visit / Exam:     Subjective:  Patient seen and examined  He is responsive to painful stimuli only  Minimal oral intake  Otherwise, no overnight events  Does not provide history  Vitals: Blood Pressure: 114/86 (08/19/20 0747)  Pulse: 73 (08/19/20 0747)  Temperature: (!) 97 °F (36 1 °C) (08/18/20 2207)  Temp Source: Temporal (08/18/20 2207)  Respirations: 18 (08/19/20 0747)  Height: 6' (182 9 cm) (08/16/20 1138)  Weight - Scale: 48 8 kg (107 lb 9 4 oz) (08/19/20 0600)  SpO2: 98 % (08/19/20 0747)    Exam:     Physical Exam  Constitutional:       Appearance: He is cachectic  HENT:      Head: Normocephalic and atraumatic        Mouth/Throat:      Mouth: Mucous membranes are moist    Eyes:      Conjunctiva/sclera: Conjunctivae normal  Cardiovascular:      Rate and Rhythm: Normal rate and regular rhythm  Heart sounds: No murmur  Pulmonary:      Effort: No respiratory distress  Breath sounds: No wheezing  Abdominal:      General: There is no distension  Tenderness: There is no abdominal tenderness  Musculoskeletal:         General: No swelling  Skin:     General: Skin is warm and dry  Neurological:      Mental Status: He is lethargic  Psychiatric:         Behavior: Behavior is withdrawn  Discussion with Family: wife Jackie Done    Discharge instructions/Information to patient and family:   See after visit summary for information provided to patient and family  Provisions for Follow-Up Care:  See after visit summary for information related to follow-up care and any pertinent home health orders  Disposition:     Assisted Living Facility at [address] [place] [address] status     For Discharges to [facility] SNF:   · Not Applicable to this Patient - Not Applicable to this Patient    Planned Readmission: No     Discharge Statement:  I spent 40 minutes discharging the patient  This time was spent on the day of discharge  I had direct contact with the patient on the day of discharge  Greater than 50% of the total time was spent examining patient, answering all patient questions, arranging and discussing plan of care with patient as well as directly providing post-discharge instructions  Additional time then spent on discharge activities  Discharge Medications:  See after visit summary for reconciled discharge medications provided to patient and family        ** Please Note: This note has been constructed using a voice recognition system **

## 2020-08-21 ENCOUNTER — TELEPHONE (OUTPATIENT)
Dept: NEPHROLOGY | Facility: CLINIC | Age: 85
End: 2020-08-21

## 2020-08-21 LAB
ALBUMIN SERPL ELPH-MCNC: 2.44 G/DL (ref 3.5–5)
ALBUMIN SERPL ELPH-MCNC: 43.6 % (ref 52–65)
ALPHA1 GLOB SERPL ELPH-MCNC: 0.49 G/DL (ref 0.1–0.4)
ALPHA1 GLOB SERPL ELPH-MCNC: 8.8 % (ref 2.5–5)
ALPHA2 GLOB SERPL ELPH-MCNC: 0.71 G/DL (ref 0.4–1.2)
ALPHA2 GLOB SERPL ELPH-MCNC: 12.6 % (ref 7–13)
BACTERIA BLD CULT: NORMAL
BACTERIA BLD CULT: NORMAL
BETA GLOB ABNORMAL SERPL ELPH-MCNC: 0.27 G/DL (ref 0.4–0.8)
BETA1 GLOB SERPL ELPH-MCNC: 4.9 % (ref 5–13)
BETA2 GLOB SERPL ELPH-MCNC: 7.9 % (ref 2–8)
BETA2+GAMMA GLOB SERPL ELPH-MCNC: 0.44 G/DL (ref 0.2–0.5)
GAMMA GLOB ABNORMAL SERPL ELPH-MCNC: 1.24 G/DL (ref 0.5–1.6)
GAMMA GLOB SERPL ELPH-MCNC: 22.2 % (ref 12–22)
IGG/ALB SER: 0.77 {RATIO} (ref 1.1–1.8)
INTERPRETATION UR IFE-IMP: NORMAL
M PEAK ID 2: 8 %
M PROTEIN 1 MFR SERPL ELPH: 2.1 %
M PROTEIN 1 SERPL ELPH-MCNC: 0.12 G/DL
M PROTEIN 2 SERPL ELPH-MCNC: 0.45 G/DL
PROT PATTERN SERPL ELPH-IMP: ABNORMAL
PROT SERPL-MCNC: 5.6 G/DL (ref 6.4–8.2)

## 2020-08-21 RX ORDER — HYOSCYAMINE SULFATE 0.12 MG/ML
SOLUTION/ DROPS ORAL
Qty: 15 ML | Refills: 0 | OUTPATIENT
Start: 2020-08-21

## 2020-08-21 NOTE — TELEPHONE ENCOUNTER
----- Message from Nubia Angulo, [address] sent at 8/21/2020  2:47 PM EDT -----  Hi  Can you please call [facility] regarding Ashley Bennett and kindly ask if he has been transitioned to hospice care  Thank you

## 2020-08-21 NOTE — TELEPHONE ENCOUNTER
Called and spoke to Arlyce Simmonds (nurse) she states yes, patient has been transitioned to hospice care

## 2020-08-22 ENCOUNTER — TELEPHONE (OUTPATIENT)
Dept: OTHER | Facility: OTHER | Age: 85
End: 2020-08-22

## 2020-08-22 NOTE — TELEPHONE ENCOUNTER
Mary Vila from [facility] called just to advise that pt , will follow up darnell practice Monday morning

## 2020-08-27 LAB — PTH RELATED PROT SERPL-SCNC: <2 PMOL/L

## 2022-12-30 NOTE — PATIENT INSTRUCTIONS
Medicare Preventive Visit Patient Instructions  Thank you for completing your Welcome to Medicare Visit or Medicare Annual Wellness Visit today  Your next wellness visit will be due in one year (10/23/2020)  The screening/preventive services that you may require over the next 5-10 years are detailed below  Some tests may not apply to you based off risk factors and/or age  Screening tests ordered at today's visit but not completed yet may show as past due  Also, please note that scanned in results may not display below  Preventive Screenings:  Service Recommendations Previous Testing/Comments   Colorectal Cancer Screening  · Colonoscopy    · Fecal Occult Blood Test (FOBT)/Fecal Immunochemical Test (FIT)  · Fecal DNA/Cologuard Test  · Flexible Sigmoidoscopy Age: 54-65 years old   Colonoscopy: every 10 years (May be performed more frequently if at higher risk)  OR  FOBT/FIT: every 1 year  OR  Cologuard: every 3 years  OR  Sigmoidoscopy: every 5 years  Screening may be recommended earlier than age 48 if at higher risk for colorectal cancer  Also, an individualized decision between you and your healthcare provider will decide whether screening between the ages of 74-80 would be appropriate   Colonoscopy: 04/10/2012  FOBT/FIT: Not on file  Cologuard: Not on file  Sigmoidoscopy: Not on file         Prostate Cancer Screening Individualized decision between patient and health care provider in men between ages of 53-78   Medicare will cover every 12 months beginning on the day after your 50th birthday PSA: No results in last 5 years     Screening Not Indicated     Hepatitis C Screening Once for adults born between [birth year] and 1965  More frequently in patients at high risk for Hepatitis C Hep C Antibody: Not on file       Diabetes Screening 1-2 times per year if you're at risk for diabetes or have pre-diabetes Fasting glucose: 96 mg/dL   A1C: No results in last 5 years    Screening Current   Cholesterol Screening Once every 5 years if you don't have a lipid disorder  May order more often based on risk factors  Lipid panel: 10/17/2019    Screening Not Indicated  History Lipid Disorder      Other Preventive Screenings Covered by Medicare:  1  Abdominal Aortic Aneurysm (AAA) Screening: covered once if your at risk  You're considered to be at risk if you have a family history of AAA or a male between the age of 73-68 who smoking at least 100 cigarettes in your lifetime  2  Lung Cancer Screening: covers low dose CT scan once per year if you meet all of the following conditions: (1) Age 50-69; (2) No signs or symptoms of lung cancer; (3) Current smoker or have quit smoking within the last 15 years; (4) You have a tobacco smoking history of at least 30 pack years (packs per day x number of years you smoked); (5) You get a written order from a healthcare provider  3  Glaucoma Screening: covered annually if you're considered high risk: (1) You have diabetes OR (2) Family history of glaucoma OR (3)  aged 48 and older OR (3)  American aged 72 and older  3  Osteoporosis Screening: covered every 2 years if you meet one of the following conditions: (1) Have a vertebral abnormality; (2) On glucocorticoid therapy for more than 3 months; (3) Have primary hyperparathyroidism; (4) On osteoporosis medications and need to assess response to drug therapy  5  HIV Screening: covered annually if you're between the age of 12-76  Also covered annually if you are younger than 13 and older than 72 with risk factors for HIV infection  For pregnant patients, it is covered up to 3 times per pregnancy      Immunizations:  Immunization Recommendations   Influenza Vaccine Annual influenza vaccination during flu season is recommended for all persons aged >= 6 months who do not have contraindications   Pneumococcal Vaccine (Prevnar and Pneumovax)  * Prevnar = PCV13  * Pneumovax = PPSV23 Adults 25-60 years old: 1-3 doses may be recommended based on certain risk factors  Adults 72 years old: Prevnar (PCV13) vaccine recommended followed by Pneumovax (PPSV23) vaccine  If already received PPSV23 since turning 65, then PCV13 recommended at least one year after PPSV23 dose  Hepatitis B Vaccine 3 dose series if at intermediate or high risk (ex: diabetes, end stage renal disease, liver disease)   Tetanus (Td) Vaccine - COST NOT COVERED BY MEDICARE PART B Following completion of primary series, a booster dose should be given every 10 years to maintain immunity against tetanus  Td may also be given as tetanus wound prophylaxis  Tdap Vaccine - COST NOT COVERED BY MEDICARE PART B Recommended at least once for all adults  For pregnant patients, recommended with each pregnancy  Shingles Vaccine (Shingrix) - COST NOT COVERED BY MEDICARE PART B  2 shot series recommended in those aged 48 and above     Health Maintenance Due:  There are no preventive care reminders to display for this patient  Immunizations Due:      Topic Date Due    HEPATITIS B VACCINES (1 of 3 - Risk 3-dose series) 09/28/1954    INFLUENZA VACCINE  07/01/2019     Advance Directives   What are advance directives? Advance directives are legal documents that state your wishes and plans for medical care  These plans are made ahead of time in case you lose your ability to make decisions for yourself  Advance directives can apply to any medical decision, such as the treatments you want, and if you want to donate organs  What are the types of advance directives? There are many types of advance directives, and each state has rules about how to use them  You may choose a combination of any of the following:  · Living will: This is a written record of the treatment you want  You can also choose which treatments you do not want, which to limit, and which to stop at a certain time  This includes surgery, medicine, IV fluid, and tube feedings  · Durable power of  for healthcare [facility]):   This is a written record that states who you want to make healthcare choices for you when you are unable to make them for yourself  This person, called a proxy, is usually a family member or a friend  You may choose more than 1 proxy  · Do not resuscitate (DNR) order:  A DNR order is used in case your heart stops beating or you stop breathing  It is a request not to have certain forms of treatment, such as CPR  A DNR order may be included in other types of advance directives  · Medical directive: This covers the care that you want if you are in a coma, near death, or unable to make decisions for yourself  You can list the treatments you want for each condition  Treatment may include pain medicine, surgery, blood transfusions, dialysis, IV or tube feedings, and a ventilator (breathing machine)  · Values history: This document has questions about your views, beliefs, and how you feel and think about life  This information can help others choose the care that you would choose  Why are advance directives important? An advance directive helps you control your care  Although spoken wishes may be used, it is better to have your wishes written down  Spoken wishes can be misunderstood, or not followed  Treatments may be given even if you do not want them  An advance directive may make it easier for your family to make difficult choices about your care  © Copyright [employer] 2018 Information is for End User's use only and may not be sold, redistributed or otherwise used for commercial purposes   All illustrations and images included in CareNotes® are the copyrighted property of [employer]  or [address] [place] [address] [facility] Detail Level: Detailed

## 2023-09-08 NOTE — PROGRESS NOTES
I was notified by the nursing staff that the patient was experiencing bladder spasms  I will initiate pyridium 100 mg p o  t i d  with meals  Spoke with the  patient  evie  

## (undated) DEVICE — CATH FOLEY 18FR 5ML 2 WAY SILICONE ELASTIMER

## (undated) DEVICE — BAG URINE DRAINAGE 2000ML ANTI RFLX LF

## (undated) DEVICE — PACK TUR

## (undated) DEVICE — REM POLYHESIVE ADULT PATIENT RETURN ELECTRODE: Brand: VALLEYLAB

## (undated) DEVICE — Device: Brand: OLYMPUS

## (undated) DEVICE — EVACUATOR BLADDER ELLIK DISP STRL

## (undated) DEVICE — LUBRICANT SURGILUBE TUBE 4 OZ  FLIP TOP

## (undated) DEVICE — TUBING SUCTION 5MM X 12 FT

## (undated) DEVICE — BASIC SINGLE BASIN 2-LF: Brand: MEDLINE INDUSTRIES, INC.

## (undated) DEVICE — SPECIMEN CONTAINER STERILE PEEL PACK

## (undated) DEVICE — GLOVE SRG BIOGEL 8

## (undated) DEVICE — DRESSING TELFA 2 X 3 IN STRL